# Patient Record
Sex: FEMALE | Race: OTHER | HISPANIC OR LATINO | Employment: OTHER | URBAN - METROPOLITAN AREA
[De-identification: names, ages, dates, MRNs, and addresses within clinical notes are randomized per-mention and may not be internally consistent; named-entity substitution may affect disease eponyms.]

---

## 2017-01-18 ENCOUNTER — HOSPITAL ENCOUNTER (OUTPATIENT)
Dept: CT IMAGING | Facility: CLINIC | Age: 57
Discharge: HOME | End: 2017-01-18
Payer: COMMERCIAL

## 2017-01-18 DIAGNOSIS — R93.89 ABNORMAL FINDINGS ON DIAGNOSTIC IMAGING OF OTHER SPECIFIED BODY STRUCTURES: ICD-10-CM

## 2017-01-18 PROCEDURE — 71250 CT THORAX DX C-: CPT

## 2017-02-14 ENCOUNTER — TRANSCRIBE ORDERS (OUTPATIENT)
Dept: MRI IMAGING | Facility: CLINIC | Age: 57
End: 2017-02-14

## 2017-02-14 DIAGNOSIS — D25.1 FIBROIDS, INTRAMURAL: ICD-10-CM

## 2017-02-14 DIAGNOSIS — R10.2 ADNEXAL TENDERNESS, RIGHT: Primary | ICD-10-CM

## 2017-02-23 ENCOUNTER — HOSPITAL ENCOUNTER (OUTPATIENT)
Dept: ULTRASOUND IMAGING | Facility: CLINIC | Age: 57
Discharge: HOME | End: 2017-02-23
Payer: COMMERCIAL

## 2017-02-23 DIAGNOSIS — R10.2 ADNEXAL TENDERNESS, RIGHT: ICD-10-CM

## 2017-02-23 DIAGNOSIS — D25.1 FIBROIDS, INTRAMURAL: ICD-10-CM

## 2017-02-23 PROCEDURE — 76856 US EXAM PELVIC COMPLETE: CPT

## 2017-02-23 PROCEDURE — 76830 TRANSVAGINAL US NON-OB: CPT

## 2017-04-06 ENCOUNTER — ALLSCRIPTS OFFICE VISIT (OUTPATIENT)
Dept: OTHER | Facility: OTHER | Age: 57
End: 2017-04-06

## 2017-05-01 ENCOUNTER — ALLSCRIPTS OFFICE VISIT (OUTPATIENT)
Dept: OTHER | Facility: OTHER | Age: 57
End: 2017-05-01

## 2017-05-01 ENCOUNTER — TRANSCRIBE ORDERS (OUTPATIENT)
Dept: ADMINISTRATIVE | Facility: HOSPITAL | Age: 57
End: 2017-05-01

## 2017-05-01 DIAGNOSIS — N20.2 CALCULUS OF KIDNEY AND URETER: ICD-10-CM

## 2017-05-01 DIAGNOSIS — N20.0 URIC ACID NEPHROLITHIASIS: Primary | ICD-10-CM

## 2017-05-01 LAB
CLARITY UR: NORMAL
COLOR UR: YELLOW
GLUCOSE (HISTORICAL): NORMAL
HGB UR QL STRIP.AUTO: NORMAL
KETONES UR STRIP-MCNC: NORMAL MG/DL
LEUKOCYTE ESTERASE UR QL STRIP: NORMAL
NITRITE UR QL STRIP: NORMAL
PH UR STRIP.AUTO: 5 [PH]
PROT UR STRIP-MCNC: NORMAL MG/DL
SP GR UR STRIP.AUTO: 1.01

## 2017-05-05 ENCOUNTER — HOSPITAL ENCOUNTER (OUTPATIENT)
Dept: CT IMAGING | Facility: CLINIC | Age: 57
Discharge: HOME | End: 2017-05-05
Payer: COMMERCIAL

## 2017-05-05 DIAGNOSIS — G35 MULTIPLE SCLEROSIS (HCC): ICD-10-CM

## 2017-05-05 DIAGNOSIS — Z87.442 PERSONAL HISTORY OF URINARY CALCULI: ICD-10-CM

## 2017-05-05 PROCEDURE — 74176 CT ABD & PELVIS W/O CONTRAST: CPT

## 2017-05-19 ENCOUNTER — TRANSCRIBE ORDERS (OUTPATIENT)
Dept: ADMINISTRATIVE | Facility: HOSPITAL | Age: 57
End: 2017-05-19

## 2017-05-19 ENCOUNTER — ALLSCRIPTS OFFICE VISIT (OUTPATIENT)
Dept: OTHER | Facility: OTHER | Age: 57
End: 2017-05-19

## 2017-05-19 DIAGNOSIS — G35 MULTIPLE SCLEROSIS (HCC): Primary | ICD-10-CM

## 2017-05-23 ENCOUNTER — ALLSCRIPTS OFFICE VISIT (OUTPATIENT)
Dept: OTHER | Facility: OTHER | Age: 57
End: 2017-05-23

## 2017-06-04 ENCOUNTER — APPOINTMENT (EMERGENCY)
Dept: RADIOLOGY | Facility: HOSPITAL | Age: 57
End: 2017-06-04
Payer: COMMERCIAL

## 2017-06-04 ENCOUNTER — HOSPITAL ENCOUNTER (EMERGENCY)
Facility: HOSPITAL | Age: 57
Discharge: HOME | End: 2017-06-04
Attending: EMERGENCY MEDICINE | Admitting: EMERGENCY MEDICINE
Payer: COMMERCIAL

## 2017-06-04 VITALS
OXYGEN SATURATION: 98 % | SYSTOLIC BLOOD PRESSURE: 139 MMHG | BODY MASS INDEX: 25.93 KG/M2 | TEMPERATURE: 98.3 F | RESPIRATION RATE: 16 BRPM | DIASTOLIC BLOOD PRESSURE: 69 MMHG | WEIGHT: 132.06 LBS | HEART RATE: 80 BPM | HEIGHT: 60 IN

## 2017-06-04 DIAGNOSIS — G35 MULTIPLE SCLEROSIS EXACERBATION (HCC): Primary | ICD-10-CM

## 2017-06-04 PROCEDURE — 96365 THER/PROPH/DIAG IV INF INIT: CPT

## 2017-06-04 PROCEDURE — 99283 EMERGENCY DEPT VISIT LOW MDM: CPT

## 2017-06-04 PROCEDURE — 73521 X-RAY EXAM HIPS BI 2 VIEWS: CPT

## 2017-06-04 RX ORDER — BUTALBITAL, ACETAMINOPHEN AND CAFFEINE 50; 325; 40 MG/1; MG/1; MG/1
CAPSULE ORAL
COMMUNITY
Start: 2017-05-19 | End: 2018-03-15

## 2017-06-04 RX ORDER — EPINEPHRINE 0.3 MG/.3ML
0.3 INJECTION SUBCUTANEOUS
COMMUNITY
Start: 2011-01-13 | End: 2022-03-17 | Stop reason: SDUPTHER

## 2017-06-04 RX ORDER — METHYLPREDNISOLONE 4 MG/1
4 TABLET ORAL DAILY
Qty: 21 TABLET | Refills: 0 | Status: ON HOLD | OUTPATIENT
Start: 2017-06-04 | End: 2018-01-11

## 2017-06-04 RX ORDER — DIMETHYL FUMARATE 240 MG/1
CAPSULE ORAL
COMMUNITY
Start: 2016-04-11 | End: 2017-06-04

## 2017-06-04 RX ORDER — GABAPENTIN 300 MG/1
CAPSULE ORAL
COMMUNITY
End: 2018-03-22 | Stop reason: SDUPTHER

## 2017-06-04 RX ORDER — BACLOFEN 20 MG/1
TABLET ORAL
COMMUNITY
End: 2018-03-15

## 2017-06-04 RX ORDER — SUMATRIPTAN 100 MG/1
100 TABLET, FILM COATED ORAL ONCE AS NEEDED
COMMUNITY
End: 2018-04-19 | Stop reason: ALTCHOICE

## 2017-06-04 RX ORDER — METHYLPREDNISOLONE 4 MG/1
4 TABLET ORAL DAILY
Qty: 42 TABLET | Refills: 0 | Status: ON HOLD | OUTPATIENT
Start: 2017-06-04 | End: 2018-01-11

## 2017-06-04 RX ORDER — ERGOCALCIFEROL (VITAMIN D2) 1250 MCG
CAPSULE ORAL
COMMUNITY
End: 2017-06-04

## 2017-06-04 RX ORDER — ALBUTEROL SULFATE 2.5 MG/3ML
SOLUTION RESPIRATORY (INHALATION)
COMMUNITY
Start: 2016-07-12

## 2017-06-04 RX ORDER — DIMETHYL FUMARATE 240 MG/1
CAPSULE ORAL
COMMUNITY
Start: 2016-04-11 | End: 2018-04-19 | Stop reason: ALTCHOICE

## 2017-06-04 RX ORDER — TOLTERODINE 4 MG/1
4 CAPSULE, EXTENDED RELEASE ORAL DAILY
COMMUNITY
Start: 2016-10-18 | End: 2018-02-21 | Stop reason: SDUPTHER

## 2017-06-04 RX ORDER — SIMVASTATIN 40 MG
TABLET ORAL
Status: ON HOLD | COMMUNITY
End: 2018-01-17 | Stop reason: ALTCHOICE

## 2017-06-04 RX ORDER — FAMOTIDINE 20 MG/1
TABLET, FILM COATED ORAL
COMMUNITY
End: 2022-05-05 | Stop reason: ALTCHOICE

## 2017-06-04 RX ORDER — LEVOCETIRIZINE DIHYDROCHLORIDE 5 MG/1
5 TABLET, FILM COATED ORAL AS NEEDED
COMMUNITY
End: 2018-03-13 | Stop reason: SDUPTHER

## 2017-06-04 RX ORDER — NIFEDIPINE 90 MG/1
90 TABLET, FILM COATED, EXTENDED RELEASE ORAL DAILY
Qty: 7 TABLET | Refills: 0 | Status: SHIPPED | OUTPATIENT
Start: 2017-06-04 | End: 2020-11-11 | Stop reason: HOSPADM

## 2017-06-04 RX ORDER — NIFEDIPINE 90 MG/1
TABLET, EXTENDED RELEASE ORAL
Status: ON HOLD | COMMUNITY
End: 2018-01-11

## 2017-06-04 RX ORDER — TIZANIDINE HYDROCHLORIDE 4 MG/1
4 CAPSULE, GELATIN COATED ORAL
Status: ON HOLD | COMMUNITY
End: 2018-01-11

## 2017-06-04 RX ORDER — INDOMETHACIN 50 MG/1
CAPSULE ORAL
COMMUNITY
End: 2018-04-19 | Stop reason: ALTCHOICE

## 2017-06-04 RX ORDER — VALACYCLOVIR HYDROCHLORIDE 1 G/1
1000 TABLET, FILM COATED ORAL AS NEEDED
COMMUNITY
End: 2020-11-11 | Stop reason: HOSPADM

## 2017-06-04 RX ORDER — TIZANIDINE 4 MG/1
TABLET ORAL
COMMUNITY
Start: 2016-04-11 | End: 2017-06-04

## 2017-06-04 RX ADMIN — SODIUM CHLORIDE 1000 MG: 0.9 INJECTION, SOLUTION INTRAVENOUS at 15:38

## 2017-06-08 ENCOUNTER — TRANSCRIBE ORDERS (OUTPATIENT)
Dept: ADMINISTRATIVE | Facility: HOSPITAL | Age: 57
End: 2017-06-08

## 2017-06-08 ENCOUNTER — ALLSCRIPTS OFFICE VISIT (OUTPATIENT)
Dept: OTHER | Facility: OTHER | Age: 57
End: 2017-06-08

## 2017-06-08 DIAGNOSIS — Z87.442 PERSONAL HISTORY OF URINARY CALCULI: Primary | ICD-10-CM

## 2017-06-08 LAB
CLARITY UR: NORMAL
COLOR UR: YELLOW
GLUCOSE (HISTORICAL): NORMAL
HGB UR QL STRIP.AUTO: NORMAL
KETONES UR STRIP-MCNC: NORMAL MG/DL
LEUKOCYTE ESTERASE UR QL STRIP: NORMAL
NITRITE UR QL STRIP: NORMAL
PH UR STRIP.AUTO: 6 [PH]
PROT UR STRIP-MCNC: NORMAL MG/DL
SP GR UR STRIP.AUTO: 1.01

## 2017-06-20 ENCOUNTER — TRANSCRIBE ORDERS (OUTPATIENT)
Dept: ADMINISTRATIVE | Facility: HOSPITAL | Age: 57
End: 2017-06-20

## 2017-06-20 ENCOUNTER — LAB (OUTPATIENT)
Dept: LAB | Facility: HOSPITAL | Age: 57
End: 2017-06-20
Attending: PSYCHIATRY & NEUROLOGY
Payer: COMMERCIAL

## 2017-06-20 ENCOUNTER — GENERIC CONVERSION - ENCOUNTER (OUTPATIENT)
Dept: OTHER | Facility: OTHER | Age: 57
End: 2017-06-20

## 2017-06-20 DIAGNOSIS — G35 MULTIPLE SCLEROSIS (HCC): ICD-10-CM

## 2017-06-20 DIAGNOSIS — G35 MULTIPLE SCLEROSIS (HCC): Primary | ICD-10-CM

## 2017-06-20 LAB
ALBUMIN SERPL BCP-MCNC: 4.1 G/DL (ref 3.5–5)
ALP SERPL-CCNC: 131 U/L (ref 46–116)
ALT SERPL W P-5'-P-CCNC: 28 U/L (ref 12–78)
ANION GAP SERPL CALCULATED.3IONS-SCNC: 7 MMOL/L (ref 4–13)
AST SERPL W P-5'-P-CCNC: 10 U/L (ref 5–45)
BASOPHILS # BLD AUTO: 0.03 THOUSANDS/ΜL (ref 0–0.1)
BASOPHILS NFR BLD AUTO: 0 % (ref 0–1)
BILIRUB SERPL-MCNC: 0.2 MG/DL (ref 0.2–1)
BUN SERPL-MCNC: 11 MG/DL (ref 5–25)
CALCIUM SERPL-MCNC: 9.3 MG/DL (ref 8.3–10.1)
CHLORIDE SERPL-SCNC: 105 MMOL/L (ref 100–108)
CO2 SERPL-SCNC: 33 MMOL/L (ref 21–32)
CREAT SERPL-MCNC: 0.63 MG/DL (ref 0.6–1.3)
EOSINOPHIL # BLD AUTO: 0.11 THOUSAND/ΜL (ref 0–0.61)
EOSINOPHIL NFR BLD AUTO: 2 % (ref 0–6)
ERYTHROCYTE [DISTWIDTH] IN BLOOD BY AUTOMATED COUNT: 12.3 % (ref 11.6–15.1)
GFR SERPL CREATININE-BSD FRML MDRD: >60 ML/MIN/1.73SQ M
GLUCOSE P FAST SERPL-MCNC: 149 MG/DL (ref 65–99)
HCT VFR BLD AUTO: 44.5 % (ref 34.8–46.1)
HGB BLD-MCNC: 14.6 G/DL (ref 11.5–15.4)
LYMPHOCYTES # BLD AUTO: 2.41 THOUSANDS/ΜL (ref 0.6–4.47)
LYMPHOCYTES NFR BLD AUTO: 34 % (ref 14–44)
MCH RBC QN AUTO: 28.5 PG (ref 26.8–34.3)
MCHC RBC AUTO-ENTMCNC: 32.8 G/DL (ref 31.4–37.4)
MCV RBC AUTO: 87 FL (ref 82–98)
MONOCYTES # BLD AUTO: 0.62 THOUSAND/ΜL (ref 0.17–1.22)
MONOCYTES NFR BLD AUTO: 9 % (ref 4–12)
NEUTROPHILS # BLD AUTO: 3.81 THOUSANDS/ΜL (ref 1.85–7.62)
NEUTS SEG NFR BLD AUTO: 54 % (ref 43–75)
NRBC BLD AUTO-RTO: 0 /100 WBCS
PLATELET # BLD AUTO: 252 THOUSANDS/UL (ref 149–390)
PMV BLD AUTO: 10.5 FL (ref 8.9–12.7)
POTASSIUM SERPL-SCNC: 3.9 MMOL/L (ref 3.5–5.3)
PROT SERPL-MCNC: 7.4 G/DL (ref 6.4–8.2)
RBC # BLD AUTO: 5.12 MILLION/UL (ref 3.81–5.12)
SODIUM SERPL-SCNC: 145 MMOL/L (ref 136–145)
WBC # BLD AUTO: 7 THOUSAND/UL (ref 4.31–10.16)

## 2017-06-20 PROCEDURE — 36415 COLL VENOUS BLD VENIPUNCTURE: CPT

## 2017-06-20 PROCEDURE — 85025 COMPLETE CBC W/AUTO DIFF WBC: CPT

## 2017-06-20 PROCEDURE — 86480 TB TEST CELL IMMUN MEASURE: CPT

## 2017-06-20 PROCEDURE — 86787 VARICELLA-ZOSTER ANTIBODY: CPT

## 2017-06-20 PROCEDURE — 80053 COMPREHEN METABOLIC PANEL: CPT

## 2017-06-21 ENCOUNTER — LAB CONVERSION - ENCOUNTER (OUTPATIENT)
Dept: OTHER | Facility: OTHER | Age: 57
End: 2017-06-21

## 2017-06-21 LAB
INDEX VALUE (HISTORICAL): 0.25
JCV ANTIBODY (HISTORICAL): ABNORMAL

## 2017-06-22 ENCOUNTER — ALLSCRIPTS OFFICE VISIT (OUTPATIENT)
Dept: OTHER | Facility: OTHER | Age: 57
End: 2017-06-22

## 2017-06-22 ENCOUNTER — GENERIC CONVERSION - ENCOUNTER (OUTPATIENT)
Dept: OTHER | Facility: OTHER | Age: 57
End: 2017-06-22

## 2017-06-22 ENCOUNTER — TRANSCRIBE ORDERS (OUTPATIENT)
Dept: ADMINISTRATIVE | Facility: HOSPITAL | Age: 57
End: 2017-06-22

## 2017-06-22 ENCOUNTER — LAB CONVERSION - ENCOUNTER (OUTPATIENT)
Dept: OTHER | Facility: OTHER | Age: 57
End: 2017-06-22

## 2017-06-22 DIAGNOSIS — R26.9 ABNORMALITY OF GAIT AND MOBILITY: ICD-10-CM

## 2017-06-22 LAB
ANNOTATION COMMENT IMP: NORMAL
GAMMA INTERFERON BACKGROUND BLD IA-ACNC: 0.05 IU/ML
INDEX VALUE (HISTORICAL): 0.25
JCV ANTIBODY (HISTORICAL): ABNORMAL
JCV ANTIBODY (HISTORICAL): ABNORMAL
M TB IFN-G BLD-IMP: NEGATIVE
M TB IFN-G CD4+ BCKGRND COR BLD-ACNC: <0.01 IU/ML
M TB IFN-G CD4+ T-CELLS BLD-ACNC: 0.03 IU/ML
MITOGEN IGNF BLD-ACNC: 1.45 IU/ML
QUANTIFERON-TB GOLD IN TUBE: NORMAL
SERVICE CMNT-IMP: NORMAL
VZV IGG SER IA-ACNC: NORMAL

## 2017-06-27 ENCOUNTER — ALLSCRIPTS OFFICE VISIT (OUTPATIENT)
Dept: OTHER | Facility: OTHER | Age: 57
End: 2017-06-27

## 2017-06-27 ENCOUNTER — LAB CONVERSION - ENCOUNTER (OUTPATIENT)
Dept: OTHER | Facility: OTHER | Age: 57
End: 2017-06-27

## 2017-07-07 ENCOUNTER — HOSPITAL ENCOUNTER (OUTPATIENT)
Dept: MRI IMAGING | Facility: CLINIC | Age: 57
Discharge: HOME | End: 2017-07-07
Payer: COMMERCIAL

## 2017-07-07 DIAGNOSIS — R26.9 ABNORMALITY OF GAIT AND MOBILITY: ICD-10-CM

## 2017-07-07 PROCEDURE — A9585 GADOBUTROL INJECTION: HCPCS | Performed by: PSYCHIATRY & NEUROLOGY

## 2017-07-07 PROCEDURE — 72156 MRI NECK SPINE W/O & W/DYE: CPT

## 2017-07-07 RX ADMIN — GADOBUTROL 6 ML: 604.72 INJECTION INTRAVENOUS at 13:32

## 2017-08-01 ENCOUNTER — ALLSCRIPTS OFFICE VISIT (OUTPATIENT)
Dept: OTHER | Facility: OTHER | Age: 57
End: 2017-08-01

## 2017-09-08 ENCOUNTER — TRANSCRIBE ORDERS (OUTPATIENT)
Dept: ADMINISTRATIVE | Facility: HOSPITAL | Age: 57
End: 2017-09-08

## 2017-10-17 ENCOUNTER — HOSPITAL ENCOUNTER (OUTPATIENT)
Dept: MRI IMAGING | Facility: CLINIC | Age: 57
Discharge: HOME | End: 2017-10-17
Payer: COMMERCIAL

## 2017-10-17 DIAGNOSIS — G35 MULTIPLE SCLEROSIS (HCC): ICD-10-CM

## 2017-10-17 PROCEDURE — A9585 GADOBUTROL INJECTION: HCPCS | Performed by: PSYCHIATRY & NEUROLOGY

## 2017-10-17 PROCEDURE — 72157 MRI CHEST SPINE W/O & W/DYE: CPT

## 2017-10-17 RX ADMIN — GADOBUTROL 6 ML: 604.72 INJECTION INTRAVENOUS at 10:23

## 2017-10-18 ENCOUNTER — GENERIC CONVERSION - ENCOUNTER (OUTPATIENT)
Dept: OTHER | Facility: OTHER | Age: 57
End: 2017-10-18

## 2017-10-24 ENCOUNTER — TRANSCRIBE ORDERS (OUTPATIENT)
Dept: ADMINISTRATIVE | Facility: HOSPITAL | Age: 57
End: 2017-10-24

## 2017-10-26 ENCOUNTER — TRANSCRIBE ORDERS (OUTPATIENT)
Dept: ADMINISTRATIVE | Facility: HOSPITAL | Age: 57
End: 2017-10-26

## 2017-10-26 DIAGNOSIS — N60.01 BILATERAL BREAST CYSTS: Primary | ICD-10-CM

## 2017-10-26 DIAGNOSIS — N60.02 BILATERAL BREAST CYSTS: Primary | ICD-10-CM

## 2017-11-07 ENCOUNTER — HOSPITAL ENCOUNTER (OUTPATIENT)
Dept: MRI IMAGING | Facility: CLINIC | Age: 57
Discharge: HOME | End: 2017-11-07
Payer: COMMERCIAL

## 2017-11-07 DIAGNOSIS — G35 MULTIPLE SCLEROSIS (HCC): ICD-10-CM

## 2017-11-07 PROCEDURE — A9585 GADOBUTROL INJECTION: HCPCS | Performed by: PSYCHIATRY & NEUROLOGY

## 2017-11-07 PROCEDURE — 70553 MRI BRAIN STEM W/O & W/DYE: CPT

## 2017-11-07 RX ADMIN — GADOBUTROL 6 ML: 604.72 INJECTION INTRAVENOUS at 09:31

## 2017-11-08 ENCOUNTER — GENERIC CONVERSION - ENCOUNTER (OUTPATIENT)
Dept: OTHER | Facility: OTHER | Age: 57
End: 2017-11-08

## 2017-11-09 ENCOUNTER — HOSPITAL ENCOUNTER (OUTPATIENT)
Dept: MAMMOGRAPHY | Facility: CLINIC | Age: 57
Discharge: HOME | End: 2017-11-09
Payer: COMMERCIAL

## 2017-11-09 ENCOUNTER — HOSPITAL ENCOUNTER (OUTPATIENT)
Dept: ULTRASOUND IMAGING | Facility: CLINIC | Age: 57
Discharge: HOME | End: 2017-11-09
Payer: COMMERCIAL

## 2017-11-09 DIAGNOSIS — N60.02 BILATERAL BREAST CYSTS: ICD-10-CM

## 2017-11-09 DIAGNOSIS — N60.01 BILATERAL BREAST CYSTS: ICD-10-CM

## 2017-11-09 PROCEDURE — G0204 DX MAMMO INCL CAD BI: HCPCS

## 2017-11-09 PROCEDURE — G0279 TOMOSYNTHESIS, MAMMO: HCPCS

## 2017-11-09 PROCEDURE — 76642 ULTRASOUND BREAST LIMITED: CPT

## 2017-11-16 ENCOUNTER — ALLSCRIPTS OFFICE VISIT (OUTPATIENT)
Dept: OTHER | Facility: OTHER | Age: 57
End: 2017-11-16

## 2017-11-16 ENCOUNTER — TRANSCRIBE ORDERS (OUTPATIENT)
Dept: ADMINISTRATIVE | Facility: HOSPITAL | Age: 57
End: 2017-11-16

## 2017-11-16 DIAGNOSIS — R29.898 OTHER SYMPTOMS AND SIGNS INVOLVING THE MUSCULOSKELETAL SYSTEM: ICD-10-CM

## 2017-11-16 DIAGNOSIS — G35 MULTIPLE SCLEROSIS (HCC): ICD-10-CM

## 2017-11-16 DIAGNOSIS — R29.898 MOTOR PROBLEMS WITH LIMBS: Primary | ICD-10-CM

## 2017-11-17 NOTE — PROGRESS NOTES
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) (R26 9)   4  Panniculitis (729 30) (M79 3)   5  Transient right leg weakness (729 89) (R29 898)   6  Bowel incontinence (787 60) (R15 9)    Plan   Multiple sclerosis    · (1) CBC/PLT/DIFF; Status:Active; Requested for:16Nov2017;    Perform:[facility]; WUU:84NSJ1749; Ordered; For:Multiple sclerosis; Ordered By:Carlota Morales;   · (1) QUANTIFERON - TB GOLD; Status:Active; Requested for:16Nov2017;    Perform:[facility]; HZD:27NGK9810; Ordered; For:Multiple sclerosis; Ordered By:Carlota Morales;   · (1) VARICELLA ZOSTER IMMUNITY(IGG); Status:Active; Requested for:16Nov2017;    Perform:[facility]; HTP:33IEQ6674; Ordered; For:Multiple sclerosis; Ordered By:Carlota Morales;   · (LC) CMP14+eGFR; Status:Active; Requested for:16Nov2017;    Perform:[facility]; Due:16Nov2018; Ordered; For:Multiple sclerosis; Ordered By:Carlota Morales;   · ECG 12-LEAD; Status:Hold For - Scheduling; Requested XRX:89LVF4580;    Perform:[facility]; GZY:72KFF6217; Ordered;sclerosis; Ordered By:Carlota Morales;  Panniculitis    · *1 -  [facility]  patient hasmesenteric panniculitis - please evaluate  Status: Active  Requested for:16Nov2017   Ordered;Panniculitis; Ordered By: Gallito Graves Performed:  Due: 61ZZI8727  Care Summary provided  : Yes  Right sided weakness    · EMG ONE EXTREMITY WITH OR W/O RELATED PARASPINAL AREAS; Status:Hold For -Scheduling; Requested LIJ:09LYH8381;    Perform:[facility]; SHP:48XUI9229; Ordered;sided weakness; Ordered By:Carlota Morales;  ONE : RLE  Transient right leg weakness    · * MRI LUMBAR SPINE W WO CONTRAST; Status:Need Information - FinancialAuthorization;  Requested for:16Nov2017;    Perform:[facility]; RCW:[id number]; Last Updated By:Nate Yadav; 11/16/2017 2:47:16 PM;Ordered; Stat;right leg weakness; Ordered By:Carlota Morales; Follow-up visit in 2 months Evaluation and Treatment  Follow-up  Status: Hold For - Scheduling  Requested for: 90QEL4103 Ordered; For: Multiple sclerosis; Ordered By: Luke Sullivan  Performed:   Due: 81NQZ6818   Discussion/Summary  Mrs Juan Wright has presented to [address] [place] [address] for follow up on Ms and related issues  Since last office visit, patient described having more right lower extremity weakness  She had completed MRI brain with mild burden of the disease noted, and then MRI cervical spine with persistent  C2 lesion appreciated (1 7 cm), with no changes noted since 2012  Patient has no demyelination in her thoracic spine though Schmorl's nodes appreciated through  Patient has C5-C6 and C6-C7 moderate spine l stenosis and mild lumbar stenosis in 2016 - we will repeat her imaging and if there is any progression in her lumbar degenerative disorder noted I would recommend to follow with Ortho team   Considering progression of ambulation - I may consider she has primary progressive MS, if no obvious worsening in her lumbar spine noted  Patient has been complaining about right upper and lower quadrant pain with mesenteric panniculitis suspected in May 2017 - she has not followed with GI tea m and referral was provided  We agreed to discontinue Tecfidera  now, as it is acidic drug may potentially worsen her status - we will start Gilenya on her follow up visit, blood work was sent  EKG will be ordered  EMG/NCS of RLE will be further considered- patient ambulates with a cane  Chief Complaint  Chief Complaint Free Text Note Form: Patient is here today for her 5 month follow up for her multiple sclerosis      History of Present Illness  Mrs Fidel Covarrubias has a history of relapsing remitting multiple sclerosis, neurogenic bladder, disbalance, cervical disc disease with myelopathy, classic migraine without aura, who presented for follow-up on multiple sclerosis and related issues  Since last office visit patient described daily headaches predominantly in the morning time with relieve noted with Fioricet and Advil  Patient has progressive worsening of right leg weakness and she fell to the time since last office visit  Patient times feel lightheaded and dizzy when she would turn her head to the left  No vertigo has been noted  She has been hearing clicking sounds in her neck on turning likely due to advanced arthritis  Patient has been using cane for ambulation  Patient has been using Tecfidera for her multiple sclerosis  Her absolute lymphocyte count is 2 41  Since last evaluation and hospitalization patient was also hospitalized in June 2017 requiring steroid treatment  She has never followed with gastroenterology for information in lymphoid tissue in her abdomen with persistent right upper quadrant pain  Patient has completed MRI of brain, cervical and thoracic spine and she is here today to discuss her findings  Review of Systems  Neurological ROS: last fall 11/15/17  Constitutional: fatigue  HEENT: sinus problems-- and-- blurred vision  Cardiovascular:  no chest pain or pressure, no palpitations present, the heart rate was not rapid or irregular, no swelling in the arms or legs, no poor circulation  Respiratory:  no unusual or persistant cough, no shortness of breath with or without exertion  Gastrointestinal: changes in bowel habits-- and-- loss of bowel control  Genitourinary: feelings of urinary urgency-- and-- increased frequency  Musculoskeletal: myalgias,-- immobility or loss of function,-- head/neck/back pain-- and-- neck pain  Integumentary  no masses, no rash, no skin lesions, no livedo reticularis  Psychiatric: mood swings  Endocrine   no unusual weight loss or gain, no excessive urination, no excessive thirst, no hair loss or gain, no hot or cold intolerance, no menstrual period change or irregularity, no loss of sexual ability or drive, no erection difficulty, no nipple discharge  Hematologic/Lymphatic:  no unusual bleeding, no tendency for easy bruising, no clotting skin or lumps  Neurological General: headache,-- lightheadedness,-- trouble falling asleep,-- snoring-- and-- waking up at night  Neurological Mental Status: confusion-- and-- memory problems  Neurological Cranial Nerves: vertigo or dizziness  Neurological Motor findings include:  no tremor, no twitching, no cramping(pre/post exercise), no atrophy  Neurological Coordination: balance difficulties-- and-- clumsiness  Neurological Sensory: numbness-- and-- tingling  Neurological Gait: difficulty walking-- and-- has had falls  ROS Reviewed:   ROS reviewed  Active Problems    1  Asthma (493 90) (J45 909)   2  Cervical disc disease with myelopathy (722 71) (M50 00)   3  Chronic cough (786 2) (R05)   4  Chronic fatigue (780 79) (R53 82)   5  Common migraine without aura (346 10) (G43 009)   6  High cholesterol (272 0) (E78 00)   7  History of nephrolithiasis (V13 01) (Z87 442)   8  Hypertension (401 9) (I10)   9  Irregular cardiac rhythm (427 9) (I49 9)   10  Lumbar strain (847 2) (S39 012A)   11  Multiple sclerosis (340) (G35)   12  Neurogenic bladder (596 54) (N31 9)   13  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) (R26 9)   14  Nonspecific abnormal findings on radiological and examination of intrathoracic organs  (793 2) (R93 8)   15  Overactive bladder (596 51) (N32 81)   16  Right sided weakness (728 87) (R53 1)   17  Seasonal allergies (477 9) (J30 2)   18  Shortness of breath on exertion (786 05) (R06 02)   19  Trigger ring finger of left hand (727 03) (M65 342)    Past Medical History  1  History of COPD exacerbation (491 21) (J44 1)   2  History of High cholesterol (272 0) (E78 00)   3  History of depression (V11 8) (Z86 59)   4  History of hypertension (V12 59) (Z86 79)   5  History of migraine (V12 49) (Z86 69)   6   History of multiple sclerosis (V12 49) (Z86 69)  Active Problems And Past Medical History Reviewed: The active problems and past medical history were reviewed and updated today  Surgical History  1  History of Dilation And Curettage   2  History of Uterine Fibroid Embolization  Surgical History Reviewed: The surgical history was reviewed and updated today  Family History  Mother    1  Family history of arthritis (V17 7) (Z82 61)   2  Family history of diabetes mellitus (V18 0) (Z83 3)   3  Family history of hypertension (V17 49) (Z82 49)   4  Family history of Headache   5  Family history of High cholesterol   6  Family history of Stroke, hemorrhagic  Father    7  Family history of diabetes mellitus (V18 0) (Z83 3)   8  Family history of hypertension (V17 49) (Z82 49)   9  FH: heart attack (V17 3) (Z82 49)   10  Family history of Headache   11  Family history of High cholesterol  Sister    15  Family history of arthritis (V17 7) (Z82 61)   13  Family history of Headache  Brother    15  Family history of arthritis (V17 7) (Z82 61)   15  Family history of hypertension (V17 49) (Z82 49)   16  Family history of myasthenia gravis (V17 89) (Z82 0)   17  Family history of neuropathy (V17 2) (Z82 0)   18  Family history of Headache   19  Family history of High cholesterol  Family History Reviewed: The family history was reviewed and updated today  Social History     · Disabled   · Never a smoker   · No illicit drug use   · Occasional alcohol use   ·  (V61 03) (Z63 5)  Social History Reviewed: The social history was reviewed and updated today  The social history was reviewed and is unchanged  Current Meds   1  Albuterol Sulfate (2 5 MG/3ML) 0 083% Inhalation Nebulization Solution; USE 1 UNIT DOSE IN NEBULIZER EVERY 6 HOURS AS NEEDED; Therapy: 50QUT2753 to (Evaluate:08Jan2017)  Requested for: 11Wvm8652; Last Rx:59Bpk3095 Ordered   2  Aspirin 81 MG TABS; Take 1 tablet daily;  Therapy: (Recorded:89Qan0311) to Recorded   3  Atorvastatin Calcium 20 MG Oral Tablet; TAKE 1 TABLET AT BEDTIME; Therapy: (Recorded:16Nov2017) to Recorded   4  Baclofen 20 MG Oral Tablet; Take 1 tablet twice daily; Therapy: 69GNC3160 to (Evaluate:18Nov2017)  Requested for: 79JFJ2158; Last Rx:08Nov2017 Ordered   5  Butalbital-APAP-Caffeine -40 MG Oral Capsule; TAKE 1 CAPSULE EVERY 4 HOURS AS NEEDED; Therapy: 87FAS8200 to (Evaluate:90Ons2409); Last Rx:34Iwm7087 Ordered   6  Ergocalciferol 24804 UNIT CAPS; TAKE 1 CAPSULE Weekly; Therapy: (Recorded:52Jro5277) to Recorded   7  Famotidine 20 MG Oral Tablet; TAKE 1 TABLET  PRN; Therapy: (Recorded:06Apr2017) to Recorded   8  Gabapentin 300 MG Oral Capsule; TAKE 1 CAPSULE 3 times daily; Therapy: 63RHL7862 to (Juan Leonard)  Requested for: 05Slr5982; Last Rx:08Sep2017 Ordered   9  Indomethacin 50 MG Oral Capsule; TAKE 1 CAPSULE  PRN; Therapy: ([facility]) to Recorded   10  Levocetirizine Dihydrochloride 5 MG Oral Tablet; take 1-2 tablets daily prn; Therapy: (Recorded:06Apr2017) to Recorded   11  Procardia XL 90 MG Oral Tablet Extended Release 24 Hour; TAKE 1 TABLET DAILY; Therapy: (Recorded:06Apr2017) to Recorded   12  Pulmicort Flexhaler 180 MCG/ACT Inhalation Aerosol Powder Breath Activated; INHALE 1  PUFF TWICE DAILY  RINSE MOUTH AFTER USE; Therapy: 90NNT3458 to (Evaluate:66Mlj9680); Last Rx:25Hpv3833 Ordered   13  Sertraline HCl - 50 MG Oral Tablet; Take 1 tablet twice daily; Therapy: ([facility]) to Recorded   14  SUMAtriptan Succinate 100 MG Oral Tablet; TAKE 1 TABLET FOR MIGRAINE RELIEF  MAY  REPEAT 2 HOURS LATER  MAXIMUM 200MG/DAY; Therapy: 37WHO0448 to (Evaluate:65Xtw7104)  Requested for: 42YHT1399; Last  Rx:06Apr2017 Ordered   15  Tecfidera 240 MG Oral Capsule Delayed Release; TAKE ONE CAPSULE (240MG)  ORALLY TWICE A DAY  MAY TAKE WITH FOOD  DO NOT CUT, OR CHEW  CAPSULES;   Therapy: 17Dsv7612 to (Evaluate:21Apr2018)  Requested for: 85ESV4144; Last  Rx:46Msv6511 Ordered   16  TiZANidine HCl - 4 MG Oral Tablet; TAKE 1 TABLET AT BEDTIME AS NEEDED; Therapy: 52Uau0024 to (Evaluate:17Mar2017)  Requested for: 22EDM8922; Last  Rx:16Jan2017 Ordered   17  Tolterodine Tartrate ER 4 MG Oral Capsule Extended Release 24 Hour; take 1 capsule  daily; Therapy: 25SRS6540 to (Evaluate:13Oct2017); Last Rx:88Asy0386 Ordered   18  ValACYclovir HCl - 1 GM Oral Tablet; TAKE 1 TABLET  PRN; Therapy: (Recorded:06Apr2017) to Recorded   19  Ventolin  (90 Base) MCG/ACT Inhalation Aerosol Solution; INHALE 2 PUFFS  Every 6 hours PRN; Therapy: 56JDJ8033 to (Talya Nuñezchcock)  Requested for: 44Lwu2184; Last  Rx:10Rja6446 Ordered  Medication List Reviewed: The medication list was reviewed and updated today  Allergies  1  Copaxone    2  Nuts   3  Other   4  Shellfish   5  Pollen   6  Seasonal   7  Trees    Vitals  Signs   Recorded: 62URS4768 01:38PM   Heart Rate: 99  Respiration: 18  Systolic: 459  Diastolic: 71  Height: 4 ft 11 in  Weight: 132 lb 0 5 oz  BMI Calculated: 26 67  BSA Calculated: 1 55  O2 Saturation: 97    Physical Exam  CONSTITUTIONAL: NAD, pleasant  NECK: supple, no lymphadenopathy, no thyromegaly, no JVD  CARDIOVASCULAR: RRR, normal S1S2, no murmurs, no rubs  RESP: clear to auscultation bilaterally, no wheezes/rhonchi/rales  ABDOMEN: soft, non tender, non distended  SKIN: no rash or skin lesions  EXTREMITIES: no edema, pulses 2+bilaterally  PSYCH: appropriate mood and affect NEUROLOGIC COMPREHENSIVE EXAM: Patient is oriented to person, place and time, NAD; appropriate affect  CN II, III, IV, V, VI, VII,VIII,IX,X,XI-XII intact with EOMI, PERRLA, OKN intact, VF grossly intact, fundi poorly visualized secondary to pupillary constriction; symmetric face noted  Motor: 5/5 LUE/LLE through, RUE 4/5 shoulder abduction, biceps and finger extension, 4/5 hip flexion right and 3/5 dorsiflexion; Sensory: Decrease to light touch and pinprick Right lower extremity; normal vibration sensation feet bilaterally; Coordination within normal limits on FTN and ROLANDO testing; DTR: 2/4 through, Brisk reflexes right biceps and right patella  , no Babinski, no clonus  Tandem gait is intact  Romberg: negative  Future Appointments    Date/Time Provider Specialty Site   01/09/2018 11:00 AM Davis Orta MD Urology [facility] FOR UROLOGY [facility]   11/29/2017 11:00 AM DIVYA Gandhi  Orthopedic Surgery [facility]   01/19/2018 10:30 AM DIVYA Brooke   Neurology [address] [place] [address]       Signatures   Electronically signed by : DIVYA Cortes ; Nov 16 2017  9:28PM EST                       (Author)

## 2017-11-27 DIAGNOSIS — R50.9 FEVER: ICD-10-CM

## 2017-11-27 DIAGNOSIS — Z87.442 PERSONAL HISTORY OF URINARY CALCULI: ICD-10-CM

## 2017-11-27 DIAGNOSIS — R93.5 ABNORMAL FINDINGS ON DIAGNOSTIC IMAGING OF OTHER ABDOMINAL REGIONS, INCLUDING RETROPERITONEUM: ICD-10-CM

## 2017-12-01 ENCOUNTER — ALLSCRIPTS OFFICE VISIT (OUTPATIENT)
Dept: OTHER | Facility: OTHER | Age: 57
End: 2017-12-01

## 2017-12-01 ENCOUNTER — GENERIC CONVERSION - ENCOUNTER (OUTPATIENT)
Dept: OTHER | Facility: OTHER | Age: 57
End: 2017-12-01

## 2017-12-05 ENCOUNTER — GENERIC CONVERSION - ENCOUNTER (OUTPATIENT)
Dept: OTHER | Facility: OTHER | Age: 57
End: 2017-12-05

## 2017-12-05 ENCOUNTER — HOSPITAL ENCOUNTER (OUTPATIENT)
Dept: ULTRASOUND IMAGING | Facility: CLINIC | Age: 57
Discharge: HOME | End: 2017-12-05
Payer: COMMERCIAL

## 2017-12-05 ENCOUNTER — GENERIC CONVERSION - ENCOUNTER (OUTPATIENT)
Dept: GASTROENTEROLOGY | Facility: CLINIC | Age: 57
End: 2017-12-05

## 2017-12-05 DIAGNOSIS — Z87.442 PERSONAL HISTORY OF URINARY CALCULI: ICD-10-CM

## 2017-12-05 PROCEDURE — 51798 US URINE CAPACITY MEASURE: CPT

## 2017-12-11 ENCOUNTER — GENERIC CONVERSION - ENCOUNTER (OUTPATIENT)
Dept: OTHER | Facility: OTHER | Age: 57
End: 2017-12-11

## 2017-12-11 ENCOUNTER — HOSPITAL ENCOUNTER (OUTPATIENT)
Dept: MRI IMAGING | Facility: CLINIC | Age: 57
Discharge: HOME | End: 2017-12-11
Payer: COMMERCIAL

## 2017-12-11 DIAGNOSIS — R29.898 OTHER SYMPTOMS AND SIGNS INVOLVING THE MUSCULOSKELETAL SYSTEM: ICD-10-CM

## 2017-12-11 PROCEDURE — 72158 MRI LUMBAR SPINE W/O & W/DYE: CPT

## 2017-12-11 PROCEDURE — A9585 GADOBUTROL INJECTION: HCPCS | Performed by: PSYCHIATRY & NEUROLOGY

## 2017-12-11 RX ADMIN — GADOBUTROL 6 ML: 604.72 INJECTION INTRAVENOUS at 14:33

## 2017-12-14 ENCOUNTER — ALLSCRIPTS OFFICE VISIT (OUTPATIENT)
Dept: OTHER | Facility: OTHER | Age: 57
End: 2017-12-14

## 2017-12-22 ENCOUNTER — GENERIC CONVERSION - ENCOUNTER (OUTPATIENT)
Dept: OTHER | Facility: OTHER | Age: 57
End: 2017-12-22

## 2017-12-22 ENCOUNTER — LAB CONVERSION - ENCOUNTER (OUTPATIENT)
Dept: OTHER | Facility: OTHER | Age: 57
End: 2017-12-22

## 2017-12-22 LAB
A/G RATIO (HISTORICAL): 1.8 (CALC) (ref 1–2.5)
ALBUMIN SERPL BCP-MCNC: 4.7 G/DL (ref 3.6–5.1)
ALP SERPL-CCNC: 144 U/L (ref 33–130)
ALT SERPL W P-5'-P-CCNC: 26 U/L (ref 6–29)
AST SERPL W P-5'-P-CCNC: 19 U/L (ref 10–35)
BASOPHILS # BLD AUTO: 0.6 %
BASOPHILS # BLD AUTO: 31 CELLS/UL (ref 0–200)
BILIRUB SERPL-MCNC: 0.6 MG/DL (ref 0.2–1.2)
BUN SERPL-MCNC: 12 MG/DL (ref 7–25)
BUN/CREA RATIO (HISTORICAL): ABNORMAL (CALC) (ref 6–22)
CALCIUM SERPL-MCNC: 9.9 MG/DL (ref 8.6–10.4)
CHLORIDE SERPL-SCNC: 107 MMOL/L (ref 98–110)
CO2 SERPL-SCNC: 29 MMOL/L (ref 20–31)
CREAT SERPL-MCNC: 0.6 MG/DL (ref 0.5–1.05)
DEPRECATED RDW RBC AUTO: 12.9 % (ref 11–15)
EGFR AFRICAN AMERICAN (HISTORICAL): 116 ML/MIN/1.73M2
EGFR-AMERICAN CALC (HISTORICAL): 100 ML/MIN/1.73M2
EOSINOPHIL # BLD AUTO: 1.1 %
EOSINOPHIL # BLD AUTO: 57 CELLS/UL (ref 15–500)
GAMMA GLOBULIN (HISTORICAL): 2.6 G/DL (CALC) (ref 1.9–3.7)
GLUCOSE (HISTORICAL): 131 MG/DL (ref 65–99)
HCT VFR BLD AUTO: 42.2 % (ref 35–45)
HGB BLD-MCNC: 14.1 G/DL (ref 11.7–15.5)
LYMPHOCYTES # BLD AUTO: 2044 CELLS/UL (ref 850–3900)
LYMPHOCYTES # BLD AUTO: 39.3 %
MCH RBC QN AUTO: 29.4 PG (ref 27–33)
MCHC RBC AUTO-ENTMCNC: 33.4 G/DL (ref 32–36)
MCV RBC AUTO: 87.9 FL (ref 80–100)
MONOCYTES # BLD AUTO: 468 CELLS/UL (ref 200–950)
MONOCYTES (HISTORICAL): 9 %
NEUTROPHILS # BLD AUTO: 2600 CELLS/UL (ref 1500–7800)
NEUTROPHILS # BLD AUTO: 50 %
PLATELET # BLD AUTO: 264 THOUSAND/UL (ref 140–400)
PMV BLD AUTO: 11.1 FL (ref 7.5–12.5)
POTASSIUM SERPL-SCNC: 3.9 MMOL/L (ref 3.5–5.3)
RBC # BLD AUTO: 4.8 MILLION/UL (ref 3.8–5.1)
SODIUM SERPL-SCNC: 145 MMOL/L (ref 135–146)
TOTAL PROTEIN (HISTORICAL): 7.3 G/DL (ref 6.1–8.1)
VARICELLA ZOSTER IGG (HISTORICAL): >4000 INDEX
WBC # BLD AUTO: 5.2 THOUSAND/UL (ref 3.8–10.8)

## 2017-12-26 ENCOUNTER — APPOINTMENT (OUTPATIENT)
Dept: RADIOLOGY | Facility: CLINIC | Age: 57
End: 2017-12-26
Payer: COMMERCIAL

## 2017-12-26 ENCOUNTER — GENERIC CONVERSION - ENCOUNTER (OUTPATIENT)
Dept: OTHER | Facility: OTHER | Age: 57
End: 2017-12-26

## 2017-12-26 DIAGNOSIS — R50.9 FEVER: ICD-10-CM

## 2017-12-26 PROCEDURE — 71020 HB CHEST X-RAY 2VW FRONTAL&LATL: CPT

## 2018-01-02 ENCOUNTER — OFFICE VISIT (OUTPATIENT)
Dept: LAB | Facility: HOSPITAL | Age: 58
End: 2018-01-02
Attending: PSYCHIATRY & NEUROLOGY
Payer: COMMERCIAL

## 2018-01-02 ENCOUNTER — TRANSCRIBE ORDERS (OUTPATIENT)
Dept: ADMINISTRATIVE | Facility: HOSPITAL | Age: 58
End: 2018-01-02

## 2018-01-02 ENCOUNTER — GENERIC CONVERSION - ENCOUNTER (OUTPATIENT)
Dept: OTHER | Facility: OTHER | Age: 58
End: 2018-01-02

## 2018-01-02 ENCOUNTER — GENERIC CONVERSION - ENCOUNTER (OUTPATIENT)
Dept: NEUROLOGY | Facility: CLINIC | Age: 58
End: 2018-01-02

## 2018-01-02 DIAGNOSIS — G35 MULTIPLE SCLEROSIS (HCC): ICD-10-CM

## 2018-01-02 DIAGNOSIS — G35 MULTIPLE SCLEROSIS (HCC): Primary | ICD-10-CM

## 2018-01-02 LAB
ATRIAL RATE: 63 BPM
P AXIS: 49 DEGREES
PR INTERVAL: 132 MS
QRS AXIS: 9 DEGREES
QRSD INTERVAL: 82 MS
QT INTERVAL: 408 MS
QTC INTERVAL: 417 MS
T WAVE AXIS: 23 DEGREES
VENTRICULAR RATE: 63 BPM

## 2018-01-02 PROCEDURE — 93005 ELECTROCARDIOGRAM TRACING: CPT

## 2018-01-04 RX ORDER — ATORVASTATIN CALCIUM 20 MG/1
40 TABLET, FILM COATED ORAL
COMMUNITY
End: 2022-05-05 | Stop reason: SDUPTHER

## 2018-01-05 ENCOUNTER — LAB CONVERSION - ENCOUNTER (OUTPATIENT)
Dept: OTHER | Facility: OTHER | Age: 58
End: 2018-01-05

## 2018-01-05 ENCOUNTER — GENERIC CONVERSION - ENCOUNTER (OUTPATIENT)
Dept: OTHER | Facility: OTHER | Age: 58
End: 2018-01-05

## 2018-01-05 LAB
MITOGEN-NIL (HISTORICAL): 0.53 IU/ML
QAUNTIFERON NIL VALUE (HISTORICAL): 0.02 IU/ML
QFT TB AG MINUS NIL VALUE (HISTORICAL): 0.01 IU/ML
QUANTIFERON TB GOLD (HISTORICAL): NEGATIVE

## 2018-01-09 ENCOUNTER — TRANSCRIBE ORDERS (OUTPATIENT)
Dept: ADMINISTRATIVE | Facility: HOSPITAL | Age: 58
End: 2018-01-09

## 2018-01-09 ENCOUNTER — ALLSCRIPTS OFFICE VISIT (OUTPATIENT)
Dept: OTHER | Facility: OTHER | Age: 58
End: 2018-01-09

## 2018-01-09 DIAGNOSIS — Z87.442 PERSONAL HISTORY OF KIDNEY STONES: Primary | ICD-10-CM

## 2018-01-10 ENCOUNTER — ANESTHESIA EVENT (OUTPATIENT)
Dept: PERIOP | Facility: HOSPITAL | Age: 58
End: 2018-01-10
Payer: COMMERCIAL

## 2018-01-10 NOTE — ANESTHESIA PREPROCEDURE EVALUATION
Review of Systems/Medical History  Patient summary reviewed        Cardiovascular  EKG reviewed, Hyperlipidemia, Hypertension , CAD, ,    Pulmonary  COPD , Asthma: , ,        GI/Hepatic  Negative GI/hepatic ROS          Kidney stones,        Endo/Other  Negative endo/other ROS      GYN  Negative gynecology ROS          Hematology  Negative hematology ROS      Musculoskeletal  Negative musculoskeletal ROS   Comment: MS      Neurology    Neuromuscular disease , multiple sclerosis, Headaches,    Psychology   Depression ,            Physical Exam    Airway    Mallampati score: II  TM Distance: >3 FB  Neck ROM: full     Dental       Cardiovascular  Cardiovascular exam normal    Pulmonary  Pulmonary exam normal     Other Findings        Anesthesia Plan  ASA Score- 2     Anesthesia Type- IV sedation with anesthesia with ASA Monitors  Additional Monitors:   Airway Plan:         Plan Factors-    Induction- intravenous  Postoperative Plan-     Informed Consent- Anesthetic plan and risks discussed with patient  I personally reviewed this patient with the CRNA  Discussed and agreed on the Anesthesia Plan with the CRNA  Elzbieta Rg

## 2018-01-10 NOTE — RESULT NOTES
Verified Results  (1) QUANTIFERON - TB GOLD 20Jun2017 02:07PM [facility]   TW Order Number: [id number]     Test Name Result Flag Reference   QUANTIFERON TB GOLD      Specimen incubated at [place], [place]    Performed at:  [address] [place] [address]  [id number]  : Lukasz Ngo MD, Phone:  [phone]   QUANTIFERON TB GOLD Negative  Negative   QUANTIFERON CRITERIA Comment     To be considered positive a specimen should have a TB Ag minus Nil  value greater than or equal to 0 35 IU/mL and in addition the TB Ag  minus Nil value must be greater than or equal to 25% of the Nil  value  There may be insufficient information in these values to  differentiate between some negative and some indeterminate test  values  QUANTIFERON TB AG VALUE 0 03 IU/mL     QUANTIFERON NIL VALUE 0 05 IU/mL     QUANTIFERON MITOGEN VALUE 1 45 IU/mL     QFT TB AG MINUS NIL VALUE <0 01 IU/mL     QFT INTERPRETATION Comment     The QuantiFERON TB Gold (in Tube) assay is intended for use as an aid  in the diagnosis of TB infection  Negative results suggest that there  is no TB infection  In patients with high suspicion of exposure, a  negative test should be repeated  A positive test indicates infection  with Mycobacterium tuberculosis  Among individuals without  tuberculosis infection, a positive test may be due to exposure to  [place], M  gayathrii or M  marinum  On the Internet, go to  cdc gov/tb for further details      Performed at:  [address] [place] [address]  [id number]  : Lukasz Ngo MD, Phone:  [phone]     STRATIFY JCV(TM) AB (WITH INDEX) W/RFL INHIBITION [id number] 11:47AM [facility]   REPORT COMMENT:  FASTING:NO     Test Name Result Flag Reference   INDEX VALUE 0 25 H    JCV ANTIBODY INDETERMINATE A    Index interpretive criteria:       <0 20 negative       0 20-0 40 indeterminate       >0 40 positive     INTERPRETATION Negative: Antibodies to JCV not detected  Indeterminate: Low level reactivity detected, see                      Inhibition Assay result to follow                      for the final antibody result  Positive: Antibodies to GABRIELE virus (JCV) detected                 indicating the patient has been exposed                 to JCV at an undetermined time  The STRATIFY JCV Antibody Test is an enzyme-linked  immunosorbent assay (MIRTHA) designed to detect JCV  antibodies to help identify individuals who have  been exposed to the virus  Samples with low level  reactivity in the detection assay are retested in  a confirmation (inhibition) assay to confirm  presence or absence of JCV-specific antibodies     JCV AB BY INHIBITION FINAL RSLT: NEGATIVE     INTERPRETATION  Positive: Antibodies to GABRIELE virus (JCV) detected            indicating the patient has been exposed            to JCV at an undetermined time  Negative: Antibodies to JCV not detected

## 2018-01-10 NOTE — RESULT NOTES
Verified Results  * MRI THORACIC SPINE [address] [id number] 08:59AM Terry Laureano Order Number: [id number]    - Patient Instructions: To schedule this appointment, please contact Central Scheduling at [phone]  Test Name Result Flag Reference   MRI THORACIC SPINE [address] (Report)     MRI THORACIC SPINE WITH AND WITHOUT CONTRAST     INDICATION: Bilateral hip and leg pain, back spasms, multiple sclerosis     COMPARISON: MR 1/23/2012     TECHNIQUE: Sagittal T1, sagittal T2, sagittal inversion recovery, axial T2, axial 2D MERGE  Sagittal and axial T1 postcontrast      IV Contrast: 6 mL of Gadobutrol injection (SINGLE-DOSE)      IMAGE QUALITY: Diagnostic  FINDINGS:     ALIGNMENT: Normal alignment of the thoracic spine  No compression fracture  No subluxation  No evidence of scoliosis  MARROW SIGNAL: Normal marrow signal is identified within the visualized bony structures  No discrete marrow lesion  THORACIC CORD: Normal signal within the thoracic cord  No convincing evidence for demyelinating disease  Conus terminates at the L1 level  PREVERTEBRAL AND PARASPINAL SOFT TISSUES:  Normal          THORACIC DEGENERATIVE CHANGE: Minor, noncompressive degenerative changes of the thoracic spine with the small number of scattered Schmorl's nodes  POSTCONTRAST: No abnormal enhancement  IMPRESSION:     Normal enhanced MRI of the thoracic spine  No convincing evidence for demyelinating disease  No cord or root compression  Workstation performed: [id number]     Signed by:    Marty Morrissey MD   10/18/17

## 2018-01-10 NOTE — PROGRESS NOTES
Assessment   1  Overactive bladder (596 51) (N32 81)   2  Neurogenic bladder (596 54) (N31 9)   3  History of nephrolithiasis (V13 01) (Z87 442)   4  Nephrolithiasis (592 0) (N20 0)    Plan   History of nephrolithiasis    · * XR ABDOMEN 1 VIEW KUB; Status:Hold For - Exact Date; Requested for:Approx    [id number]; Perform:[facility]; Last Updated By:Radha Patrick; 1/9/2018 11:32:51 AM;Ordered;For:History of nephrolithiasis; Ordered By:Levy Bales;   · [facility]; Status:Hold For - Scheduling; Requested for:57Ygs1420; Perform:[facility]; HRR:42IUJ1249;IVRMUSL;VDY:FIIVZOI of nephrolithiasis; Ordered By:Levy Bales;    Discussion/Summary   Discussion Summary:    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, and nephrolithiasis    is doing well from a overactive bladder standpoint  She will continue her anticholinergic as needed  Refills were provided  did discuss that her left upper pole stone has grown in size now measuring 4 mm  If the stone is radiopaque I would favor shockwave lithotripsy as she is likely to experience some morbidity should the stone moved into the ureter  is scheduled for a GI endoscopy for further evaluation of panniculitis and has a number of other elective surgical procedure schedule  As such we have elected to defer management for now    will return in 4 months with an updated KUB and renal ultrasound  Again if the stone is visualized on her KUB shockwave lithotripsy will be offered  Chief Complaint   Chief Complaint Free Text Note Form: Nephrolithiasis / NGB      History of Present Illness   HPI: Beba Brar returns in follow-up for nephrolithiasis and 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  is s doing well from a urologic standpoint  n he continues on her anticholinergic  She takes this as needed as her urinary symptoms seem to wax and wane with her multiple sclerosis flare ups   She has had no UTIs or hematuria  presents with updated renal ultrasound and KUB  Her left upper pole stone has grown somewhat now measuring 4 mm  She remains asymptomatic    is scheduled for a GI endoscopy for further evaluation of panic ileitis visualized on her prior stone protocol CTs      Review of Systems   Complete-Female Urology:      Constitutional: No fever, no chills, feels well, no tiredness, no recent weight gain or weight loss  Respiratory: No complaints of shortness of breath, no wheezing, no cough, no SOB on exertion, no orthopnea, no PND  Cardiovascular: No complaints of slow heart rate, no fast heart rate, no chest pain, no palpitations, no leg claudication, no lower extremity edema  Gastrointestinal: No complaints of abdominal pain, no constipation, no nausea or vomiting, no diarrhea, no bloody stools  Genitourinary: feelings of urinary urgency-- (occ),-- incontinence-- (stress)-- and-- stream quality fair, but-- as noted in HPI,-- no dysuria,-- no empty sensation-- and-- no hematuria--       The patient presents with complaints of urinary hesitancy (occ)  The patient presents with complaints of nocturia (3 times)      Musculoskeletal: No complaints of arthralgias, no myalgias, no joint swelling or stiffness, no limb pain or swelling  Integumentary: No complaints of skin rash or lesions, no itching, no skin wounds, no breast pain or lump  Hematologic/Lymphatic: No complaints of swollen glands, no swollen glands in the neck, does not bleed easily, does not bruise easily  Neurological: No complaints of headache, no confusion, no convulsions, no numbness, no dizziness or fainting, no tingling, no limb weakness, no difficulty walking  ROS Reviewed:    ROS reviewed  Active Problems   1  Abdominal discomfort in right lower quadrant (789 03) (R10 31)   2  Abnormal CT of the abdomen (793 6) (R93 5)   3  Acute URI (465 9) (J06 9)   4  Asthma (493 90) (J45 909)   5   Bowel incontinence (787 60) (R15 9)   6  Cervical disc disease with myelopathy (722 71) (M50 00)   7  Change in bowel habits (787 99) (R19 4)   8  Chronic cough (786 2) (R05)   9  Chronic fatigue (780 79) (R53 82)   10  Common migraine without aura (346 10) (G43 009)   11  Diarrhea, unspecified type (787 91) (R19 7)   12  Fecal urgency (787 63) (R15 2)   13  Fever (780 60) (R50 9)   14  High cholesterol (272 0) (E78 00)   15  History of nephrolithiasis (V13 01) (Z87 442)   16  Hypertension (401 9) (I10)   17  Irregular cardiac rhythm (427 9) (I49 9)   18  Lumbar strain (847 2) (S39 012A)   19  Multiple sclerosis (340) (G35)   20  Neurogenic bladder (596 54) (N31 9)   21  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) (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)   22  Nonspecific abnormal findings on radiological and examination of intrathoracic organs      (793 2) (R93 8)   23  Overactive bladder (596 51) (N32 81)   24  Panniculitis (729 30) (M79 3)   25  Right sided weakness (728 87) (R53 1)   26  Seasonal allergies (477 9) (J30 2)   27  Shortness of breath on exertion (786 05) (R06 02)   28  Transient right leg weakness (729 89) (R29 898)   29  Trigger ring finger of left hand (727 03) (M65 342)    Past Medical History   1  History of COPD exacerbation (491 21) (J44 1)   2  History of High cholesterol (272 0) (E78 00)   3  History of depression (V11 8) (Z86 59)   4  History of hypertension (V12 59) (Z86 79)   5  History of migraine (V12 49) (Z86 69)   6  History of multiple sclerosis (V12 49) (Z86 69)  Active Problems And Past Medical History Reviewed: The active problems and past medical history were reviewed and updated today  Surgical History   1  History of Dilation And Curettage   2  History of Uterine Fibroid Embolization  Surgical History Reviewed: The surgical history was reviewed and updated today  Family History   Mother    1  Family history of arthritis (V17 7) (Z82 61)   2  Family history of diabetes mellitus (V18 0) (Z83 3)   3   Family history of hypertension (V17 49) (Z82 49)   4  Family history of Headache   5  Family history of High cholesterol   6  Family history of Stroke, hemorrhagic  Father    7  Family history of diabetes mellitus (V18 0) (Z83 3)   8  Family history of hypertension (V17 49) (Z82 49)   9  FH: heart attack (V17 3) (Z82 49)   10  Family history of Headache   11  Family history of High cholesterol  Sister    15  Family history of arthritis (V17 7) (Z82 61)   13  Family history of Headache  Brother    15  Family history of arthritis (V17 7) (Z82 61)   15  Family history of hypertension (V17 49) (Z82 49)   16  Family history of myasthenia gravis (V17 89) (Z82 0)   17  Family history of neuropathy (V17 2) (Z82 0)   18  Family history of Headache   19  Family history of High cholesterol  Family History Reviewed: The family history was reviewed and updated today  Social History    · Disabled   · Never a smoker   · No illicit drug use   · Occasional alcohol use   ·  (V61 03) (Z63 5)  Social History Reviewed: The social history was reviewed and updated today  Current Meds    1  Albuterol Sulfate (2 5 MG/3ML) 0 083% Inhalation Nebulization Solution; USE 1 UNIT     DOSE IN NEBULIZER EVERY 6 HOURS AS NEEDED; Therapy: 53OLU3124 to (Evaluate:08Jan2017)  Requested for: 44Cvb3540; Last     Rx:83Efj9363 Ordered   2  Aspirin 81 MG TABS; Take 1 tablet daily; Therapy: (Recorded:46Vmv4587) to Recorded   3  Atorvastatin Calcium 20 MG Oral Tablet; TAKE 1 TABLET AT BEDTIME; Therapy: (Recorded:16Nov2017) to Recorded   4  Baclofen 20 MG Oral Tablet; 1 tablet twice a day; Therapy: 99CUW1200 to (OMQNKONB:73NQP9088)  Requested for: 92QNE5808; Last     Rx:17Nov2017 Ordered   5  Butalbital-APAP-Caffeine -40 MG Oral Capsule; TAKE 1 CAPSULE EVERY 4     HOURS AS NEEDED; Therapy: 68HWZ2183 to (Evaluate:48Equ9561); Last Rx:19May2017 Ordered   6  Ergocalciferol 40316 UNIT CAPS; TAKE 1 CAPSULE Weekly;      Therapy: (Recorded:98Ppy6800) to Recorded   7  Famotidine 20 MG Oral Tablet; TAKE 1 TABLET  PRN; Therapy: (Recorded:06Apr2017) to Recorded   8  Gabapentin 300 MG Oral Capsule; TAKE 1 CAPSULE 3 times daily; Therapy: 87UIX2025 to (Triston Montesinos)  Requested for: 69Wjb3006; Last     Rx:47Mjr2192 Ordered   9  Indomethacin 50 MG Oral Capsule; TAKE 1 CAPSULE  PRN; Therapy: (Xavier Baugh) to Recorded   10  Levocetirizine Dihydrochloride 5 MG Oral Tablet; take 1-2 tablets daily prn; Therapy: (Recorded:06Apr2017) to Recorded   11  Metamucil 48 57 % Oral Powder; USE AS DIRECTED; Therapy: 42LJC7872 to (Last Rx:83Qvv7668) Ordered   12  Oseltamivir Phosphate 75 MG Oral Capsule; ONE CAP DAILY FOR 10 DAYS; Therapy: 19YKD8266 to 450 39 173)  Requested for: 11Bjr3395; Last      Rx:41Mhv2388 Ordered   13  PredniSONE 20 MG Oral Tablet; 2 TAB FOR 3 DAYS AND ONE TAB FOR 3 DAYS AND      THEN 1/2 TAB FOR 3 DAYS; Therapy: 56PCJ8550 to (Complete:15Jan2018)  Requested for: 45Kkv7717; Last      Rx:04Byx4757 Ordered   14  Procardia XL 90 MG Oral Tablet Extended Release 24 Hour; TAKE 1 TABLET DAILY; Therapy: (Recorded:06Apr2017) to Recorded   15  Pulmicort Flexhaler 180 MCG/ACT Inhalation Aerosol Powder Breath Activated; INHALE 1      PUFF TWICE DAILY  RINSE MOUTH AFTER USE; Therapy: 62GMX6647 to (Evaluate:54Dpt0349); Last Rx:31Xvl4266 Ordered   16  Sertraline HCl - 50 MG Oral Tablet; Take 1 tablet twice daily; Therapy: (Xavier Baugh) to Recorded   17  SUMAtriptan Succinate 100 MG Oral Tablet; TAKE 1 TABLET FOR MIGRAINE RELIEF  MAY      REPEAT 2 HOURS LATER  MAXIMUM 200MG/DAY; Therapy: 67YGZ7755 to (Evaluate:07Vtl8729)  Requested for: 02LIC8271; Last      Rx:06Apr2017 Ordered   18  Tecfidera 240 MG Oral Capsule Delayed Release; TAKE ONE CAPSULE (240MG)      ORALLY TWICE A DAY  MAY TAKE WITH FOOD  DO NOT CUT, OR CHEW      CAPSULES;       Therapy: 09Mnn4544 to (Evaluate:21Apr2018) Requested for: 80Vgo7457; Last      Rx:83Tiv9774 Ordered   19  TiZANidine HCl - 4 MG Oral Tablet; TAKE 1 TABLET AT BEDTIME AS NEEDED; Therapy: 00Obg9340 to (Evaluate:17Mar2017)  Requested for: 37DOZ6902; Last      Rx:16Jan2017 Ordered   20  Tolterodine Tartrate ER 4 MG Oral Capsule Extended Release 24 Hour; take 1 capsule      daily; Therapy: 46FCG6468 to (Evaluate:13Oct2017); Last Rx:29Gov9581 Ordered   21  ValACYclovir HCl - 1 GM Oral Tablet; TAKE 1 TABLET  PRN; Therapy: (Recorded:95Ndv0573) to Recorded   22  Ventolin  (90 Base) MCG/ACT Inhalation Aerosol Solution; INHALE 2 PUFFS      Every 6 hours PRN; Therapy: 38UMH1710 to (Lance Bathe)  Requested for: 21Xyn8224; Last      Rx:74Rqf2639 Ordered  Medication List Reviewed: The medication list was reviewed and updated today  Allergies   1  Copaxone  2  Nuts   3  Other   4  Shellfish   5  Pollen   6  Seasonal   7  Trees    Vitals   Vital Signs    Recorded: 12YBE3684 11:21AM   Heart Rate 78   Systolic 282   Diastolic 68   Height 4 ft 11 in   Weight 130 lb    BMI Calculated 26 26   BSA Calculated 1 54     Physical Exam        Constitutional      General appearance: No acute distress, well appearing and well nourished  Pulmonary      Respiratory effort: No increased work of breathing or signs of respiratory distress  Cardiovascular      Examination of extremities for edema and/or varicosities: Normal        Musculoskeletal ambulates with cane assistance  Skin      Skin and subcutaneous tissue: Normal without rashes or lesions  Additional Exam:  no focal neurologic deficits  Mood and affect appropriate  Results/Data   US KIDNEY AND BLADDER WITH PVR 69DKT3959 09:14AM Lobo Hernadez      Test Name Result Flag Reference   US KIDNEY AND BLADDER WITH PVR (Report)     RENAL ULTRASOUND           INDICATION: Z87 442: Personal history of urinary calculi   History taken directly from the electronic ordering system  COMPARISON: CT 5/5/2017  TECHNIQUE:  Ultrasound of the retroperitoneum was performed with a curvilinear transducer utilizing volumetric sweeps and still imaging techniques  FINDINGS:           KIDNEYS:      Symmetric and normal size  Right kidney: 11 6 x 3 6 cm  Normal echogenicity and contour  No suspicious masses detected  No hydronephrosis  No shadowing calculi  No perinephric fluid collections  Left kidney: 12 4 x 5 9 cm  Normal echogenicity and contour  No suspicious masses detected  No hydronephrosis  There is a 4 mm nonobstructing calculus within the upper pole which corresponds to findings on prior CT  No perinephric fluid collections  URETERS:      Nonvisualized  BLADDER:       Normally distended  No focal thickening or mass lesions  Bilateral ureteral jets detected  No significant postvoid residual urine volume  IMPRESSION:                1  4 mm nonobstructing calculus within the upper pole of the left kidney, corresponding to finding on prior CT  2  Normal right kidney  3  Bilateral ureteral jets detected  Workstation performed: [id number]           Signed by:      Ariela Sanchez MD      12/7/17      Future Appointments      Date/Time Provider Specialty Site   01/24/2018 03:00 PM [facility] Orthopedic Surgery [facility] [address] [place]   01/17/2018 01:30 PM DIVYA Sánchez  Orthopedic Surgery [facility] [address]   01/11/2018 07:45 AM DIVYA Simental  Gastroenterology Adult [address]   01/25/2018 05:30 PM Georgiann Bamberger, M D   Neurology [address]     Signatures    Electronically signed by : Wicho James MD; Jan 9 2018 11:34AM EST                       (Author)

## 2018-01-11 ENCOUNTER — ANESTHESIA (OUTPATIENT)
Dept: PERIOP | Facility: HOSPITAL | Age: 58
End: 2018-01-11
Payer: COMMERCIAL

## 2018-01-11 ENCOUNTER — GENERIC CONVERSION - ENCOUNTER (OUTPATIENT)
Dept: GASTROENTEROLOGY | Facility: CLINIC | Age: 58
End: 2018-01-11

## 2018-01-11 ENCOUNTER — HOSPITAL ENCOUNTER (OUTPATIENT)
Facility: HOSPITAL | Age: 58
Setting detail: OUTPATIENT SURGERY
Discharge: HOME | End: 2018-01-11
Attending: INTERNAL MEDICINE | Admitting: INTERNAL MEDICINE
Payer: COMMERCIAL

## 2018-01-11 VITALS
RESPIRATION RATE: 20 BRPM | SYSTOLIC BLOOD PRESSURE: 125 MMHG | BODY MASS INDEX: 24.98 KG/M2 | TEMPERATURE: 98.1 F | HEIGHT: 60 IN | DIASTOLIC BLOOD PRESSURE: 60 MMHG | WEIGHT: 127.25 LBS | OXYGEN SATURATION: 95 % | HEART RATE: 64 BPM

## 2018-01-11 DIAGNOSIS — R93.5 ABNORMAL FINDINGS ON DIAGNOSTIC IMAGING OF OTHER ABDOMINAL REGIONS, INCLUDING RETROPERITONEUM: ICD-10-CM

## 2018-01-11 DIAGNOSIS — R19.7 DIARRHEA: ICD-10-CM

## 2018-01-11 DIAGNOSIS — R15.2 FECAL URGENCY: ICD-10-CM

## 2018-01-11 DIAGNOSIS — R19.4 CHANGE IN BOWEL HABITS: ICD-10-CM

## 2018-01-11 DIAGNOSIS — R15.9 BOWEL INCONTINENCE: ICD-10-CM

## 2018-01-11 DIAGNOSIS — R10.31 RLQ ABDOMINAL PAIN: ICD-10-CM

## 2018-01-11 PROCEDURE — 88305 TISSUE EXAM BY PATHOLOGIST: CPT | Performed by: INTERNAL MEDICINE

## 2018-01-11 RX ORDER — SODIUM CHLORIDE, SODIUM LACTATE, POTASSIUM CHLORIDE, CALCIUM CHLORIDE 600; 310; 30; 20 MG/100ML; MG/100ML; MG/100ML; MG/100ML
125 INJECTION, SOLUTION INTRAVENOUS CONTINUOUS
Status: DISCONTINUED | OUTPATIENT
Start: 2018-01-11 | End: 2018-01-11 | Stop reason: HOSPADM

## 2018-01-11 RX ORDER — PROPOFOL 10 MG/ML
INJECTION, EMULSION INTRAVENOUS AS NEEDED
Status: DISCONTINUED | OUTPATIENT
Start: 2018-01-11 | End: 2018-01-11 | Stop reason: SURG

## 2018-01-11 RX ORDER — SODIUM CHLORIDE 9 MG/ML
INJECTION, SOLUTION INTRAVENOUS CONTINUOUS PRN
Status: DISCONTINUED | OUTPATIENT
Start: 2018-01-11 | End: 2018-01-11 | Stop reason: SURG

## 2018-01-11 RX ADMIN — PROPOFOL 50 MG: 10 INJECTION, EMULSION INTRAVENOUS at 07:47

## 2018-01-11 RX ADMIN — PROPOFOL 100 MG: 10 INJECTION, EMULSION INTRAVENOUS at 07:43

## 2018-01-11 RX ADMIN — PROPOFOL 50 MG: 10 INJECTION, EMULSION INTRAVENOUS at 07:50

## 2018-01-11 RX ADMIN — SODIUM CHLORIDE: 0.9 INJECTION, SOLUTION INTRAVENOUS at 07:41

## 2018-01-11 RX ADMIN — SODIUM CHLORIDE, SODIUM LACTATE, POTASSIUM CHLORIDE, AND CALCIUM CHLORIDE 125 ML/HR: .6; .31; .03; .02 INJECTION, SOLUTION INTRAVENOUS at 07:00

## 2018-01-11 NOTE — ANESTHESIA POSTPROCEDURE EVALUATION
Post-Op Assessment Note      CV Status:  Stable    Mental Status:  Alert and awake    Hydration Status:  Stable and euvolemic    PONV Controlled:  Controlled    Airway Patency:  Patent and adequate    Post Op Vitals Reviewed: Yes          Staff: CRNA           /62 (01/11/18 0751)    Temp     Pulse 73 (01/11/18 0751)   Resp 20 (01/11/18 0751)    SpO2 100 % (01/11/18 0751)

## 2018-01-11 NOTE — DISCHARGE INSTRUCTIONS
Abdominal Pain   WHAT YOU NEED TO KNOW:   Abdominal pain can be dull, achy, or sharp  You may have pain in one area of your abdomen, or in your entire abdomen  Your pain may be caused by a condition such as constipation, food sensitivity or poisoning, infection, or a blockage  Abdominal pain can also be from a hernia, appendicitis, or an ulcer  Liver, gallbladder, or kidney conditions can also cause abdominal pain  The cause of your abdominal pain may be unknown  DISCHARGE INSTRUCTIONS:   Return to the emergency department if:   · You have new chest pain or shortness of breath  · You have pulsing pain in your upper abdomen or lower back that suddenly becomes constant  · Your pain is in the right lower abdominal area and worsens with movement  · You have a fever over 100 4°F (38°C) or shaking chills  · You are vomiting and cannot keep food or liquids down  · Your pain does not improve or gets worse over the next 8 to 12 hours  · You see blood in your vomit or bowel movements, or they look black and tarry  · Your skin or the whites of your eyes turn yellow  · You are a woman and have a large amount of vaginal bleeding that is not your monthly period  Contact your healthcare provider if:   · You have pain in your lower back  · You are a man and have pain in your testicles  · You have pain when you urinate  · You have questions or concerns about your condition or care  Follow up with your healthcare provider within 24 hours or as directed:  Write down your questions so you remember to ask them during your visits  Medicines:   · Medicines  may be given to calm your stomach and prevent vomiting or to decrease pain  Ask how to take pain medicine safely  · Take your medicine as directed  Contact your healthcare provider if you think your medicine is not helping or if you have side effects  Tell him of her if you are allergic to any medicine   Keep a list of the medicines, vitamins, and herbs you take  Include the amounts, and when and why you take them  Bring the list or the pill bottles to follow-up visits  Carry your medicine list with you in case of an emergency  © 2017 [address] [employer] Information is for End User's use only and may not be sold, redistributed or otherwise used for commercial purposes  All illustrations and images included in CareNotes® are the copyrighted property of A D A M , Inc  or [employer]le[employer]  The above information is an  only  It is not intended as medical advice for individual conditions or treatments  Talk to your doctor, nurse or pharmacist before following any medical regimen to see if it is safe and effective for you

## 2018-01-11 NOTE — OP NOTE
**** GI/ENDOSCOPY REPORT ****     PATIENT NAME: SELENA MORRISSEY ------ VISIT ID:  Patient ID:   [id number] YOB: 1960     INTRODUCTION: Colonoscopy - A 61 female patient presents for an outpatient   Colonoscopy at [facility]  PREVIOUS COLONOSCOPY: Patient's last colonoscopy was 5 years ago  INDICATIONS: Change in bowel habits  Diarrhea  Abnormal CT scan  Pain   centered in the right lower quadrant of the abdomen  Hx polyps     CONSENT:  The benefits, risks, and alternatives to the procedure were   discussed and informed consent was obtained from the patient  PREPARATION: EKG, pulse, pulse oximetry and blood pressure were monitored   throughout the procedure  The patient was identified by myself both   verbally and by visual inspection of ID band  Airway Assessment   Classification: Airway class 2 - Visualization of the soft palate, fauces   and uvula  ASA Classification: Class 2 - Patient has mild to moderate   systemic disturbance that may or may not be related to the disorder   requiring surgery  MEDICATIONS: Anesthesia-check records     PROCEDURE:  The endoscope was passed with ease through the anus under   direct visualization and advanced to the terminal ileum, confirmed by   appendiceal orifice, cecal strap (crow's foot), and ileocecal valve  The   scope was withdrawn and the mucosa was carefully examined  The quality of   the preparation was excellent  Cecal Intubation Time: 2 minutes(s) Scope   Withdrawal Time: 6 minutes(s)     RECTAL EXAM: Normal rectal exam      FINDINGS:  The terminal ileum appeared to be normal  The colonoscopy   examination was completely normal  A biopsy was taken from the ascending   colon and descending colon  Sent for microscopic colitis  Normal   retroversion     COMPLICATIONS: There were no complications  IMPRESSIONS: Normal terminal ileum  Normal colonoscopy       RECOMMENDATIONS: Follow-up on the results of the biopsy specimens  Colonoscopy recommended in 5 years  ESTIMATED BLOOD LOSS: Insignificant  PATHOLOGY SPECIMENS: Random biopsy taken from the ascending colon and   descending colon  PROCEDURE CODES: Colonoscopy with biopsy     ICD-9 Codes: 787 99 Other symptoms involving digestive system 787 91   Diarrhea 793 4 Nonspecific (abnormal) findings on radiological and other   examination of gastrointestinal tract 789 03 Abdominal pain, right lower   quadrant     ICD-10 Codes: R19 4 Change in bowel habit R19 7 Diarrhea, unspecified   R93 3 Abnormal findings on diagnostic imaging of other parts of digestive   tract R10 31 Right lower quadrant pain     PERFORMED BY: DIVYA Clinton  [facility]  on 01/11/2018  Version 1, electronically signed by DIVYA Murcia , D O  on   01/11/2018 at 07:55

## 2018-01-12 VITALS
DIASTOLIC BLOOD PRESSURE: 73 MMHG | BODY MASS INDEX: 25.2 KG/M2 | HEART RATE: 83 BPM | WEIGHT: 125 LBS | HEIGHT: 59 IN | SYSTOLIC BLOOD PRESSURE: 149 MMHG

## 2018-01-12 VITALS
HEART RATE: 78 BPM | HEIGHT: 59 IN | WEIGHT: 131 LBS | SYSTOLIC BLOOD PRESSURE: 118 MMHG | BODY MASS INDEX: 26.41 KG/M2 | DIASTOLIC BLOOD PRESSURE: 64 MMHG

## 2018-01-12 VITALS
DIASTOLIC BLOOD PRESSURE: 72 MMHG | WEIGHT: 128 LBS | HEART RATE: 88 BPM | HEIGHT: 59 IN | SYSTOLIC BLOOD PRESSURE: 126 MMHG | BODY MASS INDEX: 25.8 KG/M2

## 2018-01-12 VITALS
HEIGHT: 59 IN | WEIGHT: 130 LBS | HEART RATE: 76 BPM | DIASTOLIC BLOOD PRESSURE: 78 MMHG | BODY MASS INDEX: 26.21 KG/M2 | SYSTOLIC BLOOD PRESSURE: 134 MMHG

## 2018-01-12 NOTE — RESULT NOTES
PFT Results v2:   Diagnosis/Reason For Study: Shortness of breath   Referring Provider: Dr Erick Maravilla   Spirometry: Forced vital capacity: 2 75L and 99% Predicted Values  Forced expiratory volume in one second: 2 28L and 104% Predicted Value  FEV1/FVC ratio is 104% Predicted Values  Post Bronchodilator Spirometry: Forced vital capacity : 2 76L and 99% Predicted Values  Forced expiratory volume in one second : 2 42L and 111% Predicted Value  FEV1/FVC ratio is 111% Predicted Values  Lung Volumes: Total lung capacity : 4 46L and 101% Predicted Values  RV: 97% Predicted Values  RV/T% Predicted Values  DLCO:   DLCO 80% Predicted Values  PFT Interpretation:   Patient had a full lung function testing with spirometry lung volumes and DLCO  Patient gave a good effort  The flow volume curve is normal   There is no obstructive or restrictive ventilatory limitation  The lung volumes and DLCO are normal   Clinical correlation is required  Future Appointments    Date/Time Provider Specialty Site   2016 01:55 PM Bibi Lechuga MD Neurology [facility] [address] [place] [address]   2016 11:00 AM DIVYA Silver   Pulmonary Medicine [facility]  2  CT      Electronically signed by : DIVYA Chiu ; 2016  9:26AM EST                       (Author)

## 2018-01-13 VITALS
SYSTOLIC BLOOD PRESSURE: 146 MMHG | HEIGHT: 59 IN | HEART RATE: 76 BPM | WEIGHT: 131.38 LBS | DIASTOLIC BLOOD PRESSURE: 78 MMHG | BODY MASS INDEX: 26.48 KG/M2

## 2018-01-14 VITALS
SYSTOLIC BLOOD PRESSURE: 137 MMHG | DIASTOLIC BLOOD PRESSURE: 81 MMHG | HEART RATE: 78 BPM | BODY MASS INDEX: 26.28 KG/M2 | HEIGHT: 59 IN | WEIGHT: 130.38 LBS

## 2018-01-14 VITALS
WEIGHT: 132.03 LBS | DIASTOLIC BLOOD PRESSURE: 71 MMHG | SYSTOLIC BLOOD PRESSURE: 119 MMHG | HEART RATE: 99 BPM | RESPIRATION RATE: 18 BRPM | OXYGEN SATURATION: 97 % | BODY MASS INDEX: 26.62 KG/M2 | HEIGHT: 59 IN

## 2018-01-14 VITALS
DIASTOLIC BLOOD PRESSURE: 80 MMHG | BODY MASS INDEX: 26.41 KG/M2 | WEIGHT: 131 LBS | OXYGEN SATURATION: 98 % | HEIGHT: 59 IN | RESPIRATION RATE: 16 BRPM | SYSTOLIC BLOOD PRESSURE: 120 MMHG | HEART RATE: 71 BPM

## 2018-01-14 NOTE — RESULT NOTES
Verified Results  (1) CBC/PLT/DIFF 20Jun2017 02:07PM Silverio Martinez    Order Number: [id number]     Test Name Result Flag Reference   WBC COUNT 7 00 Thousand/uL  4 31-10 16   RBC COUNT 5 12 Million/uL  3 81-5 12   HEMOGLOBIN 14 6 g/dL  11 5-15 4   HEMATOCRIT 44 5 %  34 8-46  1   MCV 87 fL  82-98   MCH 28 5 pg  26 8-34 3   MCHC 32 8 g/dL  31 4-37 4   RDW 12 3 %  11 6-15 1   MPV 10 5 fL  8 9-12 7   PLATELET COUNT 379 Thousands/uL  149-390   nRBC AUTOMATED 0 /100 WBCs     NEUTROPHILS RELATIVE PERCENT 54 %  43-75   LYMPHOCYTES RELATIVE PERCENT 34 %  14-44   MONOCYTES RELATIVE PERCENT 9 %  4-12   EOSINOPHILS RELATIVE PERCENT 2 %  0-6   BASOPHILS RELATIVE PERCENT 0 %  0-1   NEUTROPHILS ABSOLUTE COUNT 3 81 Thousands/? ??L  1 85-7 62   LYMPHOCYTES ABSOLUTE COUNT 2 41 Thousands/? ??L  0 60-4 47   MONOCYTES ABSOLUTE COUNT 0 62 Thousand/? ??L  0 17-1 22   EOSINOPHILS ABSOLUTE COUNT 0 11 Thousand/? ??L  0 00-0 61   BASOPHILS ABSOLUTE COUNT 0 03 Thousands/? ??L  0 00-0 10

## 2018-01-14 NOTE — RESULT NOTES
Verified Results  (1) VARICELLA ZOSTER IMMUNITY(IGG) 25UZP5117 02:07PM Donya Paz    Order Number: [id number]     Test Name Result Flag Reference   SYBIL ZOSTER IGG IMMUNE  IMMUNE   INTERPRETATION:    IMMUNE     - Presumed immune to VZV IgG infection  EQUIVOCAL  - Suggest repeat testing in 2-4 weeks  NON-IMMUNE - Presumed non-immune to VZV IgG infection

## 2018-01-15 VITALS
BODY MASS INDEX: 26.81 KG/M2 | HEART RATE: 78 BPM | HEIGHT: 59 IN | DIASTOLIC BLOOD PRESSURE: 74 MMHG | SYSTOLIC BLOOD PRESSURE: 118 MMHG | WEIGHT: 133 LBS

## 2018-01-16 NOTE — PRE-PROCEDURE INSTRUCTIONS
Pre-Surgery Instructions:   Medication Instructions    albuterol (2 5 mg/3 mL) 0 083 % nebulizer solution Patient was instructed by Physician and understands   atorvastatin (LIPITOR) 20 mg tablet Patient was instructed by Physician and understands   baclofen 20 mg tablet Patient was instructed by Physician and understands   budesonide (PULMICORT FLEXHALER) 180 MCG/ACT inhaler Patient was instructed by Physician and understands   Butalbital-APAP-Caffeine -40 MG per capsule Patient was instructed by Physician and understands   Dimethyl Fumarate (TECFIDERA) 240 MG CPDR Patient was instructed by Physician and understands   EPINEPHrine (EPIPEN) 0 3 mg/0 3 mL SOAJ Patient was instructed by Physician and understands   ERGOCALCIFEROL PO Patient was instructed by Physician and understands   famotidine (PEPCID) 20 mg tablet Patient was instructed by Physician and understands   gabapentin (NEURONTIN) 300 mg capsule Patient was instructed by Physician and understands   indomethacin (INDOCIN) 50 mg capsule Patient was instructed by Physician and understands   levocetirizine (XYZAL) 5 MG tablet Patient was instructed by Physician and understands   NIFEdipine (ADALAT CC) 90 mg 24 hr tablet Patient was instructed by Physician and understands   sertraline (ZOLOFT) 50 mg tablet Patient was instructed by Physician and understands   simvastatin (ZOCOR) 40 mg tablet Patient was instructed by Physician and understands   SUMAtriptan (IMITREX) 100 mg tablet Patient was instructed by Physician and understands   tolterodine (DETROL LA) 4 mg 24 hr capsule Patient was instructed by Physician and understands   valACYclovir (VALTREX) 1,000 mg tablet Patient was instructed by Physician and understands  Pre shower with hibiclens as instructed by surgeon  You may drive yourself to the hospital   You may take your medications day of surgery  You may eat on the day of your surgery    You may have a dressing, please wear something that will fit over it

## 2018-01-17 ENCOUNTER — HOSPITAL ENCOUNTER (OUTPATIENT)
Facility: HOSPITAL | Age: 58
Setting detail: OUTPATIENT SURGERY
Discharge: HOME | End: 2018-01-17
Attending: ORTHOPAEDIC SURGERY | Admitting: ORTHOPAEDIC SURGERY
Payer: COMMERCIAL

## 2018-01-17 VITALS
RESPIRATION RATE: 16 BRPM | TEMPERATURE: 98.3 F | SYSTOLIC BLOOD PRESSURE: 127 MMHG | OXYGEN SATURATION: 95 % | DIASTOLIC BLOOD PRESSURE: 68 MMHG | HEART RATE: 72 BPM

## 2018-01-17 PROBLEM — M65.342 TRIGGER RING FINGER OF LEFT HAND: Status: ACTIVE | Noted: 2018-01-17

## 2018-01-17 RX ORDER — MAGNESIUM HYDROXIDE 1200 MG/15ML
LIQUID ORAL AS NEEDED
Status: DISCONTINUED | OUTPATIENT
Start: 2018-01-17 | End: 2018-01-17 | Stop reason: HOSPADM

## 2018-01-17 RX ORDER — HYDROCODONE BITARTRATE AND ACETAMINOPHEN 5; 325 MG/1; MG/1
1 TABLET ORAL EVERY 6 HOURS PRN
Qty: 10 TABLET | Refills: 0 | Status: SHIPPED | OUTPATIENT
Start: 2018-01-17 | End: 2018-01-27

## 2018-01-17 RX ORDER — LIDOCAINE HYDROCHLORIDE AND EPINEPHRINE 10; 10 MG/ML; UG/ML
10 INJECTION, SOLUTION INFILTRATION; PERINEURAL ONCE
Status: COMPLETED | OUTPATIENT
Start: 2018-01-17 | End: 2018-01-17

## 2018-01-17 RX ADMIN — LIDOCAINE HYDROCHLORIDE,EPINEPHRINE BITARTRATE 6 ML: 10; .01 INJECTION, SOLUTION INFILTRATION; PERINEURAL at 12:11

## 2018-01-17 NOTE — H&P
H&P Exam - [address] 61 y o  female MRN: [id number]  Unit/Bed#: OR POOL    Assessment/Plan   Assessment:  Left hand ring finger trigger finger  Plan:  Plan for left hand ring finger trigger finger release under local medication    Risks, benefits, and alternatives of the procedure were explained to the patient  The patient is willing to undergo the procedure  Operative consent was obtained  All questions were answered to the patient's satisfaction and they were willing to proceed  Risks include but are not limited to bleeding, infection, damage to nerves, arteries, or blood vessels, problems with wound healing, delayed healing, healing in the wrong position, pain, scar, failure to give the required result, need for further surgery and anesthesia risks  History of Present Illness   HPI:  Diane Kimbrough is a 61 y o  y o  female who presents with left hand ring finger pain with catching, popping, clicking, and locking that has failed conservative management with no associated numbness or tingling  Historical Information  Review Of Systems:   · Skin: Normal  · Neuro: See HPI  · Musculoskeletal: See HPI  · 14 point review of systems negative except as stated above     Past Medical History:   Past Medical History:   Diagnosis Date    Asthma     Cardiac disease     COPD (chronic obstructive pulmonary disease) ([facility]     Depression     Hyperlipidemia     Hypertension     Influenza     December 2017    Irregular heart beat     Migraine     Migraine     MRSA infection     [facility]     Multiple sclerosis ([facility] )     Nephrolithiasis     Neurogenic bladder     Panniculitis        Past Surgical History:   Past Surgical History:   Procedure Laterality Date    COLONOSCOPY      DILATION AND CURETTAGE OF UTERUS      VT COLONOSCOPY FLX DX W/COLLJ SPEC WHEN PFRMD N/A 1/11/2018    Procedure: COLONOSCOPY;  Surgeon: Anna Gill MD;  Location: [facility];   Service: Gastroenterology    UTERINE FIBROID SURGERY         Family History:  Family history reviewed and non-contributory  Family History   Problem Relation Age of Onset    Stroke Mother     Diabetes Mother     Hypertension Mother     Heart disease Father    Clifm Riley Diabetes Father     Hypertension Father     Diabetes Sister     Hypertension Brother        Social History:  Social History     Social History    Marital status: /Civil Union     Spouse name: N/A    Number of children: N/A    Years of education: N/A     Social History Main Topics    Smoking status: Never Smoker    Smokeless tobacco: Never Used    Alcohol use Yes      Comment: social    Drug use: No      Comment: last use 4 days ago   Sexual activity: Not Asked     Other Topics Concern    None     Social History Narrative    None       Allergies: Allergies   Allergen Reactions    Nuts Shortness Of Breath    Other Anaphylaxis      walnuts & cashews  Cats    Shellfish Allergy Shortness Of Breath    Shellfish-Derived Products Shortness Of Breath and Swelling    Copaxone [Glatiramer Acetate] Hives    Pollen Extract Sneezing           Labs:    0  Lab Value Date/Time   HCT 44 5 06/20/2017 1407   HGB 14 6 06/20/2017 1407   WBC 7 00 06/20/2017 1407       Meds:  No current facility-administered medications for this encounter  Blood Culture:   No results found for: BLOODCX    Wound Culture:   No results found for: WOUNDCULT    Ins and Outs:  No intake/output data recorded  Physical Exam  /79   Pulse 79   Temp 98 3 °F (36 8 °C) (Temporal)   Resp 16   SpO2 95%   Gen: Alert and oriented to person, place, time  HEENT: EOMI, eyes clear, moist mucus membranes, hearing intact  Respiratory: Bilateral chest rise  No audible wheezing found  Cardiovascular: Regular Rate and Rhythm  Abdomen: soft nontender/nondistended  Ortho Exam:  Tenderness to palpation left hand ring finger with positive nodule in crepitation    Decreased range of motion secondary to pain  Flexor tendons are intact  Neuro Exam:  Neurologically intact left upper extremity      Lab Results: Reviewed  Imaging: Reviewed

## 2018-01-17 NOTE — OP NOTE
OPERATIVE REPORT  PATIENT NAME: Bernie Maldonado  :  1960  MRN: [id number]  Pt Location: [facility]    SURGERY DATE: 18    Surgeon(s) and Role:     Laurie Rodriguez MD - Primary     * Citlalli Edwards PA-C - Assisting    Pre-Op Diagnosis:  Trigger ring finger of left hand [M65 342]    Post-Op Diagnosis Codes:     * Trigger ring finger of left hand [M65 342]    Procedure(s):  RING TRIGGER FINGER RELEASE (Left)    Specimen(s):  * No order type specified *    Estimated Blood Loss:   Minimal      Anesthesia Type:   Local    Operative Indications: The patient has a history of Trigger Finger that was recalcitrant to conservative management  The decision was made to bring the patient to the operating room for Trigger Finger Release  Risks of the procedure were explained which include, but are not limited to bleeding; infection; damage to nerves, arteries,veins, tendons; scar; pain; need for reoperation; failure to give desired result; and risks of anaesthesia  All questions were answered to satisfaction and they were willing to proceed  Operative Findings:  Left ring finger trigger finger    Complications:   None    Procedure and Technique:  After the patient, site, and procedure were identified, the patient was brought into the operating room in a supine position  Local anaesthesia was adminstered in the preoperative holding area  A tourniquet was not used  The  left upper extremity was then prepped and drapped in a normal, sterile, orthopedic fashion  After the patient, site, and procedure were once again identified, attention was turned to the left ring finger  An incision was made over the flexor tendon sheath at the level of the A1 pulley  Dissection was carried out in-line with the flexor tendon sheath and the radial and ulnar digital artery and nerve were protected  The A1 pulley was identified at the base of the incision    Under direct visualization, the A1 pulley was divided along the midline in its entirety with care taken to avoid injury to the underlying tendon  The tendons were examined to ensure that no further catching, popping, clicking or locking occurred with motion of the finger  At the completion of the procedure, hemostasis was obtained with cautery and direct pressure  The wounds were copiously irrigated with sterile solution  The wounds were closed with Monocryl, Histocryl and Steri-strips  Sterile dressings were applied, including Gauze, Tweeners, Webril, Ace  Please note, all sponge, needle, and instrument counts were correct prior to closure  Loupe magnification was utilized  The patient tolerated the procedure well       I was present for the entire procedure and A qualified resident physician was not available    Patient Disposition:  APU and hemodynamically stable    SIGNATURE: Winnie Michaels MD  DATE: 01/17/18  TIME: 12:46 PM

## 2018-01-17 NOTE — DISCHARGE INSTRUCTIONS
Post Operative Instructions    You have had surgery on your arm today, please read and follow the information below:  Elevate your hand above your elbow during the next 24-48 hours to help with swelling  Place your hand and arm over your head with motion at your shoulder three times a day  Do not apply any cream/ointment/oil to your incisions including antibiotics  Do not soak your hands in standing water (dishwater, tubs, Jacuzzi's, pools, etc ) until given permission (typically 2-3 weeks after injury)    Call the office if you notice any:  Increased numbness or tingling of your hand or fingers that is not relieved with elevation  Increasing pain that is not controlled with medication  Difficulty chewing, breathing, swallowing  Chest pains or shortness of breath  Fever over 101 4 degrees  Bandage: Remove bandage after 2 days  Motion: Move fingers into a fist 5 times a day, DO NOT move any splinted fingers  Weight bearing status: Avoid heavy lifting with the extremity that was operated on until follow up appointment  Normal activities of daily living are OK  Ice:  You may apply ice to year hand for 10 minutes every hour for 24 hours  Sling: No sling necessary  Pain medication: Norco/Hydrocodone 1 tab every 6 hours as needed for pain  Follow-up Appointment: 7-10 days  Please call the office if you have any questions or concerns regarding your post-operative care

## 2018-01-22 VITALS
BODY MASS INDEX: 26.51 KG/M2 | SYSTOLIC BLOOD PRESSURE: 137 MMHG | WEIGHT: 131.5 LBS | HEIGHT: 59 IN | DIASTOLIC BLOOD PRESSURE: 77 MMHG | HEART RATE: 71 BPM

## 2018-01-22 VITALS
DIASTOLIC BLOOD PRESSURE: 77 MMHG | BODY MASS INDEX: 26.51 KG/M2 | HEIGHT: 59 IN | HEART RATE: 71 BPM | WEIGHT: 131.5 LBS | SYSTOLIC BLOOD PRESSURE: 137 MMHG

## 2018-01-23 VITALS
BODY MASS INDEX: 26.61 KG/M2 | WEIGHT: 132 LBS | SYSTOLIC BLOOD PRESSURE: 136 MMHG | HEIGHT: 59 IN | DIASTOLIC BLOOD PRESSURE: 74 MMHG

## 2018-01-23 VITALS
WEIGHT: 130 LBS | SYSTOLIC BLOOD PRESSURE: 122 MMHG | BODY MASS INDEX: 26.21 KG/M2 | HEIGHT: 59 IN | DIASTOLIC BLOOD PRESSURE: 68 MMHG | HEART RATE: 78 BPM

## 2018-01-23 NOTE — RESULT NOTES
Verified Results  (1) CBC/PLT/DIFF [id number] 10:34AM Haile Davis   REPORT COMMENT:  FASTING:NO  SPECIALIZED COLLECTION  PATIENT REFERRED TO ALTERNATE SITE  Test Name Result Flag Reference   WHITE BLOOD CELL COUNT 5 2 Thousand/uL  3 8-10 8   RED BLOOD CELL COUNT 4 80 Million/uL  3 80-5 10   HEMOGLOBIN 14 1 g/dL  11 7-15 5   HEMATOCRIT 42 2 %  35 0-45 0   MCV 87 9 fL  80 0-100 0   MCH 29 4 pg  27 0-33 0   MCHC 33 4 g/dL  32 0-36 0   RDW 12 9 %  11 0-15 0   PLATELET COUNT 635 Thousand/uL  140-400   ABSOLUTE NEUTROPHILS 2600 cells/uL  7300-9838   ABSOLUTE LYMPHOCYTES 2044 cells/uL  850-3900   ABSOLUTE MONOCYTES 468 cells/uL  200-950   ABSOLUTE EOSINOPHILS 57 cells/uL     ABSOLUTE BASOPHILS 31 cells/uL  0-200   NEUTROPHILS 50 %     LYMPHOCYTES 39 3 %     MONOCYTES 9 0 %     EOSINOPHILS 1 1 %     BASOPHILS 0 6 %     MPV 11 1 fL  7 5-12 5     (1) COMPREHENSIVE METABOLIC PANEL [id number] 93:33EJ Haile Davis     Test Name Result Flag Reference   GLUCOSE 131 mg/dL H 65-99   Fasting reference interval     For someone without known diabetes, a glucose  value >125 mg/dL indicates that they may have  diabetes and this should be confirmed with a  follow-up test    UREA NITROGEN (BUN) 12 mg/dL  7-25   CREATININE 0 60 mg/dL  0 50-1 05   For patients >52years of age, the reference limit  for Creatinine is approximately 13% higher for people  identified as -American  eGFR NON-AFR   AMERICAN 100 mL/min/1 73m2  > OR = 60   eGFR AFRICAN AMERICAN 116 mL/min/1 73m2  > OR = 60   BUN/CREATININE RATIO   4-92   NOT APPLICABLE (calc)   SODIUM 145 mmol/L  135-146   POTASSIUM 3 9 mmol/L  3 5-5 3   CHLORIDE 107 mmol/L     CARBON DIOXIDE 29 mmol/L  20-31   CALCIUM 9 9 mg/dL  8 6-10 4   PROTEIN, TOTAL 7 3 g/dL  6 1-8 1   ALBUMIN 4 7 g/dL  3 6-5 1   GLOBULIN 2 6 g/dL (calc)  1 9-3 7   ALBUMIN/GLOBULIN RATIO 1 8 (calc)  1 0-2 5   BILIRUBIN, TOTAL 0 6 mg/dL  0 2-1 2   ALKALINE PHOSPHATASE 144 U/L H  AST 19 U/L  10-35   ALT 26 U/L  6-29

## 2018-01-23 NOTE — CONSULTS
I had the pleasure of evaluating your patient, Myla Morris  My full evaluation follows:      Chief Complaint  Abdominal discomfort      History of Present Illness  58-year-old female with several GI problems  The 1st appears to be right lower quadrant pain of sudden onset  At 1st it was thought it might be kidney stones  Patient underwent CT of the abdomen kidney protocol about 6 months ago  At that time it showed mesenteric panniculitis as a possibility, with hazy mesentery as the finding on CT scan  Patient was not treated with any antibiotics at this time  No weight loss  No hematochezia or melena  No fever or chills  Patient also has developed fecal urgency and incontinence  Change in bowel habits with loose stool  Again no rectal bleeding or weight loss  No other alarm symptomatology  No change in or medication at this time  She does have MS and is being considered for other medications besides the 1 she is on  No other associated symptomatology      Review of Systems    Constitutional: No fever, no chills, feels well, no tiredness, no recent weight gain or weight loss  Eyes: eyesight problems and Difficulty with vision in the right eye secondary to MS    ENT: no complaints of earache, no loss of hearing, no nose bleeds, no nasal discharge, no sore throat, no hoarseness  Cardiovascular: No complaints of slow heart rate, no fast heart rate, no chest pain, no palpitations, no leg claudication, no lower extremity edema  Respiratory: No complaints of shortness of breath, no wheezing, no cough, no SOB on exertion, no orthopnea, no PND  Gastrointestinal: as noted in HPI  Genitourinary: No complaints of dysuria, no incontinence, no pelvic pain, no dysmenorrhea, no vaginal discharge or bleeding  Musculoskeletal: No complaints of arthralgias, no myalgias, no joint swelling or stiffness, no limb pain or swelling     Integumentary: No complaints of skin rash or lesions, no itching, no skin wounds, no breast pain or lump  Neurological: limb weakness and difficulty walking  Psychiatric: Not suicidal, no sleep disturbance, no anxiety or depression, no change in personality, no emotional problems  Endocrine: No complaints of proptosis, no hot flashes, no muscle weakness, no deepening of the voice, no feelings of weakness  Hematologic/Lymphatic: No complaints of swollen glands, no swollen glands in the neck, does not bleed easily, does not bruise easily  Active Problems    1  Asthma (493 90) (J45 909)   2  Bowel incontinence (787 60) (R15 9)   3  Cervical disc disease with myelopathy (722 71) (M50 00)   4  Chronic cough (786 2) (R05)   5  Chronic fatigue (780 79) (R53 82)   6  Common migraine without aura (346 10) (G43 009)   7  High cholesterol (272 0) (E78 00)   8  History of nephrolithiasis (V13 01) (Z87 442)   9  Hypertension (401 9) (I10)   10  Irregular cardiac rhythm (427 9) (I49 9)   11  Lumbar strain (847 2) (S39 012A)   12  Multiple sclerosis (340) (G35)   13  Neurogenic bladder (596 54) (N31 9)   14  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) (R26 9)   15  Nonspecific abnormal findings on radiological and examination of intrathoracic organs    (793 2) (R93 8)   16  Overactive bladder (596 51) (N32 81)   17  Panniculitis (729 30) (M79 3)   18  Right sided weakness (728 87) (R53 1)   19  Seasonal allergies (477 9) (J30 2)   20  Shortness of breath on exertion (786 05) (R06 02)   21  Transient right leg weakness (729 89) (R29 898)   22  Trigger ring finger of left hand (727 03) (M65 342)    Past Medical History    · History of COPD exacerbation (491 21) (J44 1)   · History of High cholesterol (272 0) (E78 00)   · History of depression (V11 8) (Z86 59)   · History of hypertension (V12 59) (Z86 79)   · History of migraine (V12 49) (Z86 69)   · History of multiple sclerosis (V12 49) (Z86 69)    The active problems and past medical history were reviewed and updated today        Surgical History    · History of Dilation And Curettage   · History of Uterine Fibroid Embolization    The surgical history was reviewed and updated today  Family History    · Family history of arthritis (V17 7) (Z82 61)   · Family history of diabetes mellitus (V18 0) (Z83 3)   · Family history of hypertension (V17 49) (Z82 49)   · Family history of Headache   · Family history of High cholesterol   · Family history of Stroke, hemorrhagic    · Family history of diabetes mellitus (V18 0) (Z83 3)   · Family history of hypertension (V17 49) (Z82 49)   · FH: heart attack (V17 3) (Z82 49)   · Family history of Headache   · Family history of High cholesterol    · Family history of arthritis (V17 7) (Z82 61)   · Family history of Headache    · Family history of arthritis (V17 7) (Z82 61)   · Family history of hypertension (V17 49) (Z82 49)   · Family history of myasthenia gravis (V17 89) (Z82 0)   · Family history of neuropathy (V17 2) (Z82 0)   · Family history of Headache   · Family history of High cholesterol    The family history was reviewed and updated today  Social History    · Disabled   · Never a smoker   · No illicit drug use   · Occasional alcohol use   ·  (V61 03) (Z63 5)  The social history was reviewed and updated today  The social history was reviewed and is unchanged  Current Meds   1  Albuterol Sulfate (2 5 MG/3ML) 0 083% Inhalation Nebulization Solution; USE 1 UNIT   DOSE IN NEBULIZER EVERY 6 HOURS AS NEEDED; Therapy: 57JJT4314 to (Evaluate:08Jan2017)  Requested for: 20Qdo8541; Last   Rx:10Jbt9261 Ordered   2  Aspirin 81 MG TABS; Take 1 tablet daily; Therapy: (Recorded:31Zlr6023) to Recorded   3  Atorvastatin Calcium 20 MG Oral Tablet; TAKE 1 TABLET AT BEDTIME; Therapy: (Recorded:16Nov2017) to Recorded   4  Baclofen 20 MG Oral Tablet; 1 tablet twice a day; Therapy: 22LEQ7706 to (CRHELUTA:66KQM0038)  Requested for: 72GCI3113; Last   Rx:17Nov2017 Ordered   5   Butalbital-APAP-Caffeine -40 MG Oral Capsule; TAKE 1 CAPSULE EVERY 4   HOURS AS NEEDED; Therapy: 11XEC5860 to (Evaluate:42Ezl2389); Last Rx:53Hvd8365 Ordered   6  Ergocalciferol 73632 UNIT CAPS; TAKE 1 CAPSULE Weekly; Therapy: (Recorded:11Bho8255) to Recorded   7  Famotidine 20 MG Oral Tablet; TAKE 1 TABLET  PRN; Therapy: (Recorded:27Bnm4405) to Recorded   8  Gabapentin 300 MG Oral Capsule; TAKE 1 CAPSULE 3 times daily; Therapy: 87UKU6121 to (Jacque Thompson)  Requested for: 21Goj3393; Last   Rx:71Pga5368 Ordered   9  Indomethacin 50 MG Oral Capsule; TAKE 1 CAPSULE  PRN; Therapy: (Rosa Ramires) to Recorded   10  Levocetirizine Dihydrochloride 5 MG Oral Tablet; take 1-2 tablets daily prn; Therapy: (Recorded:77Gfr5210) to Recorded   11  Procardia XL 90 MG Oral Tablet Extended Release 24 Hour; TAKE 1 TABLET DAILY; Therapy: (Recorded:37Ciz0888) to Recorded   12  Pulmicort Flexhaler 180 MCG/ACT Inhalation Aerosol Powder Breath Activated; INHALE 1    PUFF TWICE DAILY  RINSE MOUTH AFTER USE; Therapy: 83XNQ3787 to (Evaluate:65Htc1287); Last Rx:02Ebz6764 Ordered   13  Sertraline HCl - 50 MG Oral Tablet; Take 1 tablet twice daily; Therapy: (Rosa Ramires) to Recorded   14  SUMAtriptan Succinate 100 MG Oral Tablet; TAKE 1 TABLET FOR MIGRAINE RELIEF  MAY    REPEAT 2 HOURS LATER  MAXIMUM 200MG/DAY; Therapy: 76QHV9129 to (Evaluate:77Ure3642)  Requested for: 58RRA7245; Last    Rx:68Uqd5412 Ordered   15  Tecfidera 240 MG Oral Capsule Delayed Release; TAKE ONE CAPSULE (240MG)    ORALLY TWICE A DAY  MAY TAKE WITH FOOD  DO NOT CUT, OR CHEW    CAPSULES; Therapy: 71Vqa3485 to (Evaluate:91Nmh3842)  Requested for: 24EBA2945; Last    Rx:70Cmh9263 Ordered   16  TiZANidine HCl - 4 MG Oral Tablet; TAKE 1 TABLET AT BEDTIME AS NEEDED; Therapy: 72Omz4365 to (Evaluate:81Ark8609)  Requested for: 20YMV5790; Last    Rx:16Jan2017 Ordered   17   Tolterodine Tartrate ER 4 MG Oral Capsule Extended Release 24 Hour; take 1 capsule    daily; Therapy: 70KYJ2203 to (Evaluate:13Oct2017); Last Rx:51Mqq6145 Ordered   18  ValACYclovir HCl - 1 GM Oral Tablet; TAKE 1 TABLET  PRN; Therapy: (Recorded:06Apr2017) to Recorded   19  Ventolin  (90 Base) MCG/ACT Inhalation Aerosol Solution; INHALE 2 PUFFS    Every 6 hours PRN; Therapy: 52KJZ0056 to (Judi Santizo)  Requested for: 83Wsl0953; Last    Rx:09Eey0576 Ordered    The medication list was reviewed and updated today  Allergies    1  Copaxone    2  Nuts   3  Other   4  Shellfish   5  Pollen   6  Seasonal   7  Trees    Vitals   Recorded: 75YZC8085 67:42NU   Systolic 649   Diastolic 74   Height 4 ft 11 in   Weight 132 lb    BMI Calculated 26 66   BSA Calculated 1 55     Physical Exam    Constitutional   General appearance: No acute distress, well appearing and well nourished  Eyes   Conjunctiva and lids: No swelling, erythema or discharge  Pupils and irises: Abnormal   Slight divergent strabismus right pupil  Ears, Nose, Mouth, and Throat   External inspection of ears and nose: Normal     Nasal mucosa, septum, and turbinates: Normal without edema or erythema  Oropharynx: Normal with no erythema, edema, exudate or lesions  Pulmonary   Respiratory effort: No increased work of breathing or signs of respiratory distress  Auscultation of lungs: Clear to auscultation  Cardiovascular   Auscultation of heart: Normal rate and rhythm, normal S1 and S2, without murmurs  Examination of extremities for edema and/or varicosities: Normal     Carotid pulses: Normal     Abdomen   Abdomen: Abnormal   Minimal right lower quadrant tenderness on deep palpation, no rebound  Bowel sounds normoactive  Liver and spleen: No hepatomegaly or splenomegaly  Lymphatic   Palpation of lymph nodes in neck: No lymphadenopathy  Musculoskeletal   Gait and station: Abnormal   Unsteady gait, uses a cane  Digits and nails: Normal without clubbing or cyanosis      Inspection/palpation of joints, bones, and muscles: Normal     Skin   Skin and subcutaneous tissue: Normal without rashes or lesions  Neurologic No nystagmus or asterixis  Psychiatric   Orientation to person, place, and time: Normal     Mood and affect: Normal          Assessment    1  Bowel incontinence (787 60) (R15 9)   2  Change in bowel habits (787 99) (R19 4)   3  Diarrhea, unspecified type (787 91) (R19 7)   4  Fecal urgency (787 63) (R15 2)   5  Abdominal discomfort in right lower quadrant (789 03) (R10 31)   6  Abnormal CT of the abdomen (793 6) (R93 5)    Plan  Abnormal CT of the abdomen    · COLONOSCOPY; Status:Active; Requested for:77Wki0057;    Perform:[facility]; Due:29Ppy8070; Ordered; For:Abnormal CT of the abdomen; Ordered By:Bayron Hendrix;   · CT ABDOMEN W CONTRAST; Status:Need Information - Financial Authorization; Requested for:68Fsn7662;    Perform:[facility]; Due:72Hfn0400; Ordered; For:Abnormal CT of the abdomen; Ordered By:Bayron Hendrix;   · ENDOSCOPY - PROCEDURE, RISKS, AND BENEFITS; Status:Complete;   Done:  55IJK7883   Ordered; For:Abnormal CT of the abdomen; Ordered By:Carlos Alberto Hendrix;  Fecal urgency    · Metamucil 48 57 % Oral Powder; USE AS DIRECTED   Rx By: Jimmy Delong; Dispense: 0 Days ; #:120 GM; Refill: 0; For: Fecal urgency; KIP = N; Record    Discussion/Summary    Problem 1 , change in bowel habits with fecal urgency, incontinence, loose stool  , colonoscopy  Last colonoscopy almost 5 years ago  No alarm symptoms problem 2 , abnormal CT scan with mesenteric haziness  No weight loss  Plan repeat CT  Trial of Metamucil  Thank you very much for allowing me to participate in the care of this patient  If you have any questions, please do not hesitate to contact me        Future Appointments    Signatures   Electronically signed by : DIVYA Reagan ; Dec 14 2017  3:40PM EST                       (Author)

## 2018-01-23 NOTE — CONSULTS
Chief Complaint    1  Finger Problem    Active Problems    1  Asthma (493 90) (J45 909)   2  Bowel incontinence (787 60) (R15 9)   3  Cervical disc disease with myelopathy (722 71) (M50 00)   4  Chronic cough (786 2) (R05)   5  Chronic fatigue (780 79) (R53 82)   6  Common migraine without aura (346 10) (G43 009)   7  High cholesterol (272 0) (E78 00)   8  History of nephrolithiasis (V13 01) (Z87 442)   9  Hypertension (401 9) (I10)   10  Irregular cardiac rhythm (427 9) (I49 9)   11  Lumbar strain (847 2) (S39 012A)   12  Multiple sclerosis (340) (G35)   13  Neurogenic bladder (596 54) (N31 9)   14  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) (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)   15  Nonspecific abnormal findings on radiological and examination of intrathoracic organs    (793 2) (R93 8)   16  Overactive bladder (596 51) (N32 81)   17  Panniculitis (729 30) (M79 3)   18  Right sided weakness (728 87) (R53 1)   19  Seasonal allergies (477 9) (J30 2)   20  Shortness of breath on exertion (786 05) (R06 02)   21  Transient right leg weakness (729 89) (R29 898)   22   Trigger ring finger of left hand (727 03) (M65 342)    Past Medical History    · History of COPD exacerbation (491 21) (J44 1)   · History of High cholesterol (272 0) (E78 00)   · History of depression (V11 8) (Z86 59)   · History of hypertension (V12 59) (Z86 79)   · History of migraine (V12 49) (Z86 69)   · History of multiple sclerosis (V12 49) (Z86 69)    Surgical History    · History of Dilation And Curettage   · History of Uterine Fibroid Embolization    Family History    · Family history of arthritis (V17 7) (Z82 61)   · Family history of diabetes mellitus (V18 0) (Z83 3)   · Family history of hypertension (V17 49) (Z82 49)   · Family history of Headache   · Family history of High cholesterol   · Family history of Stroke, hemorrhagic    · Family history of diabetes mellitus (V18 0) (Z83 3)   · Family history of hypertension (V17 49) (Z82 49)   · FH: heart attack (V17 3) (Z82 49)   · Family history of Headache   · Family history of High cholesterol    · Family history of arthritis (V17 7) (Z82 61)   · Family history of Headache    · Family history of arthritis (V17 7) (Z82 61)   · Family history of hypertension (V17 49) (Z82 49)   · Family history of myasthenia gravis (V17 89) (Z82 0)   · Family history of neuropathy (V17 2) (Z82 0)   · Family history of Headache   · Family history of High cholesterol    Social History    · Disabled   · Never a smoker   · No illicit drug use   · Occasional alcohol use   ·  (V61 03) (Z63 5)    Current Meds   1  Albuterol Sulfate (2 5 MG/3ML) 0 083% Inhalation Nebulization Solution; USE 1 UNIT   DOSE IN NEBULIZER EVERY 6 HOURS AS NEEDED; Therapy: 68ULZ6620 to (Evaluate:08Jan2017)  Requested for: 83Ksg0290; Last   Rx:11Hqp3333 Ordered   2  Aspirin 81 MG TABS; Take 1 tablet daily; Therapy: (Recorded:39Xpb8941) to Recorded   3  Atorvastatin Calcium 20 MG Oral Tablet; TAKE 1 TABLET AT BEDTIME; Therapy: (Recorded:16Nov2017) to Recorded   4  Baclofen 20 MG Oral Tablet; 1 tablet twice a day; Therapy: 88OCV9313 [facility]  Requested for: 37GGI9600; Last   Rx:17Nov2017 Ordered   5  Butalbital-APAP-Caffeine -40 MG Oral Capsule; TAKE 1 CAPSULE EVERY 4   HOURS AS NEEDED; Therapy: 64MIV2874 to (Evaluate:24Ysy7237); Last Rx:35Krn4849 Ordered   6  Ergocalciferol 12143 UNIT CAPS; TAKE 1 CAPSULE Weekly; Therapy: (Recorded:26Kfl8157) to Recorded   7  Famotidine 20 MG Oral Tablet; TAKE 1 TABLET  PRN; Therapy: (Recorded:06Apr2017) to Recorded   8  Gabapentin 300 MG Oral Capsule; TAKE 1 CAPSULE 3 times daily; Therapy: 75KIS8034 to (Leonora Car)  Requested for: 08Sep2017; Last   Rx:08Sep2017 Ordered   9  Indomethacin 50 MG Oral Capsule; TAKE 1 CAPSULE  PRN; Therapy: ([facility]) to Recorded   10  Levocetirizine Dihydrochloride 5 MG Oral Tablet; take 1-2 tablets daily prn; Therapy: (Recorded:06Apr2017) to Recorded   11  Procardia XL 90 MG Oral Tablet Extended Release 24 Hour; TAKE 1 TABLET DAILY; Therapy: (Recorded:06Apr2017) to Recorded   12  Pulmicort Flexhaler 180 MCG/ACT Inhalation Aerosol Powder Breath Activated; INHALE 1    PUFF TWICE DAILY  RINSE MOUTH AFTER USE; Therapy: 50ZSZ9638 to (Evaluate:25Ezy3262); Last Rx:17Bhl6487 Ordered   13  Sertraline HCl - 50 MG Oral Tablet; Take 1 tablet twice daily; Therapy: ([place] [facility]) to Recorded   14  SUMAtriptan Succinate 100 MG Oral Tablet; TAKE 1 TABLET FOR MIGRAINE RELIEF  MAY    REPEAT 2 HOURS LATER  MAXIMUM 200MG/DAY; Therapy: 39ACU8962 to (Evaluate:34Pby2917)  Requested for: 28LEJ9254; Last    Rx:06Apr2017 Ordered   15  Tecfidera 240 MG Oral Capsule Delayed Release; TAKE ONE CAPSULE (240MG)    ORALLY TWICE A DAY  MAY TAKE WITH FOOD  DO NOT CUT, OR CHEW    CAPSULES; Therapy: 77Sij2505 to (Evaluate:21Apr2018)  Requested for: 34RNO3335; Last    Rx:09Zkb8935 Ordered   16  TiZANidine HCl - 4 MG Oral Tablet; TAKE 1 TABLET AT BEDTIME AS NEEDED; Therapy: 89Yev1372 to (Evaluate:17Mar2017)  Requested for: 05GVG6567; Last    Rx:16Jan2017 Ordered   17  Tolterodine Tartrate ER 4 MG Oral Capsule Extended Release 24 Hour; take 1 capsule    daily; Therapy: 17GHK9086 to (Evaluate:13Oct2017); Last Rx:02Soq2215 Ordered   18  ValACYclovir HCl - 1 GM Oral Tablet; TAKE 1 TABLET  PRN; Therapy: (Recorded:06Apr2017) to Recorded   19  Ventolin  (90 Base) MCG/ACT Inhalation Aerosol Solution; INHALE 2 PUFFS    Every 6 hours PRN; Therapy: 20JWT8238 to (Leeanna Stakatya)  Requested for: 28Irj1952; Last    Rx:05Get5864 Ordered    Allergies    1  Copaxone    2  Nuts   3  Other   4  Shellfish   5  Pollen   6  Seasonal   7  Trees    Vitals  Signs    Heart Rate: 18YCJZJCUN: 136LCJBYFQCM: 81JSAMXO: 4 ft 11 inWeight: 131 lb 8 ozBMI Calculated: 26 56BSA Calculated: 1 54    Assessment    1   Trigger ring finger of left hand (727 03) (C42 071)    Plan     Trigger ring finger of left hand    · Follow Up After Surgery Evaluation and Treatment  Follow-up  Status: Hold For -  Scheduling  Requested for: 66CKO9018   · Continue with our present treatment plan ; Status:Complete;   Done: 57NBV7042    Discussion/Summary    Assessment: L ring trigger finger    Plan:   The anatomy and physiology of trigger finger was discussed with the patient today in the office  Edema and increased contact pressure within the flexor tendons at the A1 pulley can cause pain, crepitation, and limitation of function  Treatment options include resting MP blocking splints to decrease edema, oral anti-inflammatory medications, home or formal therapy exercises, up to 2 steroid injections or surgical release  While majority of patients do respond to conservative treatment, up to 20% may require surgical release  The patient has elected release of the L hand ring trigger finger  The patient has elected to undergo a release of the A1 pulley (trigger finger)  A small incision will be made over the palmar aspect of the hand, the tendon sheath holding the flexor tendons will be released  In the postoperative period, light activities are allowed immediately, driving is allowed when narcotic medication has stopped, and the incision may get wet after 2 days  Heavy activities (lifting more than approximately 10 pounds) will be allowed after the follow up appointment in 1-2 weeks  While the pain and discomfort within the wrist typically improves rapidly, some residual discomfort may be present for up to 6 weeks  The nodule that is typically palpable in the palmar aspect of the hand will not be removed, as this would necessitate removal of a portion of the flexor tendon, however the catching, clicking, and locking should resolve  Approximate success rate is 98%      The risks and benefits of the procedure were explained to the patient, which include, but are not limited to: Bleeding, infection, recurrence, pain, scar, damage to tendons, damage to nerves, and damage to blood vessels, need for future surgery and complications related to anesthesia  If bony work is done, risks also include malunion and nonunion  These risks, along with alternative conservative treatment options, and postoperative protocols were voiced back and understood by the patient  All questions were answered to the patient's satisfaction  The patient agrees to comply with a standard postoperative protocol, and is willing to proceed  Education was provided via written and auditory forms  There were no barriers to learning  Written handouts regarding wound care, incision and scar care, and general preoperative information, as well as risks and benefits were provided to the patient  This note was dictated with dragon dictation software  As such, and may contain typographical errors, improperly dictated words, background noise, or other errors  HPI:  The patient presents with L ring finger trigger finger  Pt states that Dr Denisse Murphy gave her two injections in her L ring finger  Pt states that previous injections worked for about 1 month  Pt states that Dr Denisse Murphy also drained a cyst on the volvar side of the base of the L ring finger at her last visit and says that mostly water came out of this cyst  Pt today c/o not being able to fully flex her L ring finger for the past 3 months with moderate pain to that finger  No numbness or tingling  Pt wants to discuss surgical options today  The past medical history, surgical history, family history, social history, medications, allergies, and review of systems have been read and reviewed on the chart and have been updated  Review of Systems was recorded in the office today on a separate evaluation sheet and is listed below      Review of Systems:  Constitutional: Negative  HEENT: Negative  Cardiovascular: Negative  Pulmonary: Negative  : Negative  GI: Negative  Skin: Negative except as above  Musculoskeletal: As above  Neurologic: Negative except as above  Endocrine: Negative  Psychiatric: Negative    Examination:    General / Psych: Awake and Oriented, No acute distress, age appropriate  HEENT: Normocephalic, atraumatic, mucous membranes moist, neck supple, trachea midline  Abdomen: No rebound or signs of guarding  Cardio: No discernible arrhythmia, 2+ pulses with good capillary refill  Pulmonary: No audible wheezing or audible stridor  Neurologic: [The patient is neurovascularly intact in the median, ulnar, and radial nerve distribution  There is normal sensation and good capillary refill within the digits  2+ pulses ]  Skin: [No lesions, rashes, streaking, purulence, abscesses, lymphangitis]  Musculoskeletal: full elbow and wrist ROM, full motion of fingers expect ring finger  Ring finger passively has full motion, active full motion at MP jt and 30 degrees flexion at PIP jt  no triggering at thumb, index, and small  Trigger and nodel at A1 pully of L ring finger  No evidence of arthritis   negative tinels at the wrist      Diagnostic Studies: [none]         Signatures   Electronically signed by : DIVYA Jurado ; Dec  1 2017  1:48PM EST                       (Author)

## 2018-01-23 NOTE — RESULT NOTES
Verified Results  * MRI LUMBAR SPINE W WO CONTRAST [id number] 01:49PM Nancy Staff Order Number: [id number]    - Patient Instructions: To schedule this appointment, please contact Central Scheduling at [phone]  Test Name Result Flag Reference   MRI LUMBAR SPINE W [address] (Report)     MRI LUMBAR SPINE WITH AND WITHOUT CONTRAST     INDICATION: Other symptoms and signs involving the musculoskeletal system [R29 898 (ICD-10-CM)]     COMPARISON: March 1, 2016     TECHNIQUE: Sagittal T1, sagittal T2, sagittal inversion recovery, axial T1 and axial T2, coronal T2  Sagittal and axial T1 postcontrast      IV Contrast: 6 mL of Gadobutrol injection (SINGLE-DOSE)      IMAGE QUALITY: Diagnostic     FINDINGS:     ALIGNMENT: Normal alignment of the lumbar spine  No compression fracture  No spondylolysis or spondylolisthesis  No scoliosis  MARROW SIGNAL: Unremarkable except for multilevel Schmorl's nodes  DISTAL CORD AND CONUS: Normal size and signal of the distal cord and conus  The conus ends at the L1 level  PARASPINAL SOFT TISSUES: Paraspinal soft tissues are unremarkable  SACRUM: Normal signal within the sacrum  No evidence of insufficiency or stress fracture  LOWER THORACIC DISC SPACES: Normal disc height and signal  No disc herniation, canal stenosis or foraminal narrowing  LUMBAR DISC SPACES:        L1-L2: Disc desiccation and mild broad-based disc bulge  L2-L3: Disc desiccation with bilateral facet arthropathy causing mild left neural foraminal narrowing  L3-L4: Broad-based disc bulge causing mild spinal canal narrowing with the spinal canal measuring 7 mm in AP dimension  Bilateral facet arthropathy causing mild bilateral neural foraminal narrowing  The inferior and superior degenerative Schmorl's nodes   at L3 vertebral body have progressed from prior exam      L4-L5: Disc desiccation and posterior annular fissure tear   Bilateral facet arthropathy and right foraminal disc protrusion causing moderate right and mild to moderate left neural foraminal narrowing  L5-S1: Posterior annular fissure tear  Bilateral facet arthropathy  POSTCONTRAST IMAGING: No abnormal enhancement  IMPRESSION:     Degenerative changes as described above have only slightly progressed from the prior exam  These are most severe at L3-L4 where there is spinal canal narrowing         Workstation performed: [id number]     Signed by:   Lauren Cervantes MD   12/11/17

## 2018-01-23 NOTE — CONSULTS
Chief Complaint    1  Finger Problem    Active Problems    1  Asthma (493 90) (J45 909)   2  Bowel incontinence (787 60) (R15 9)   3  Cervical disc disease with myelopathy (722 71) (M50 00)   4  Chronic cough (786 2) (R05)   5  Chronic fatigue (780 79) (R53 82)   6  Common migraine without aura (346 10) (G43 009)   7  High cholesterol (272 0) (E78 00)   8  History of nephrolithiasis (V13 01) (Z87 442)   9  Hypertension (401 9) (I10)   10  Irregular cardiac rhythm (427 9) (I49 9)   11  Lumbar strain (847 2) (S39 012A)   12  Multiple sclerosis (340) (G35)   13  Neurogenic bladder (596 54) (N31 9)   14  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) (R26 9)   15  Nonspecific abnormal findings on radiological and examination of intrathoracic organs    (793 2) (R93 8)   16  Overactive bladder (596 51) (N32 81)   17  Panniculitis (729 30) (M79 3)   18  Right sided weakness (728 87) (R53 1)   19  Seasonal allergies (477 9) (J30 2)   20  Shortness of breath on exertion (786 05) (R06 02)   21  Transient right leg weakness (729 89) (R29 898)   22  Trigger ring finger of left hand (727 03) (M65 342)    Past Medical History    1  History of COPD exacerbation (491 21) (J44 1)   2  History of High cholesterol (272 0) (E78 00)   3  History of depression (V11 8) (Z86 59)   4  History of hypertension (V12 59) (Z86 79)   5  History of migraine (V12 49) (Z86 69)   6  History of multiple sclerosis (V12 49) (Z86 69)    Surgical History    1  History of Dilation And Curettage   2  History of Uterine Fibroid Embolization    Family History    1  Family history of arthritis (V17 7) (Z82 61)   2  Family history of diabetes mellitus (V18 0) (Z83 3)   3  Family history of hypertension (V17 49) (Z82 49)   4  Family history of Headache   5  Family history of High cholesterol   6  Family history of Stroke, hemorrhagic    7  Family history of diabetes mellitus (V18 0) (Z83 3)   8  Family history of hypertension (V17 49) (Z82 49)   9   FH: heart attack (V17 3) (Z82 49)   10  Family history of Headache   11  Family history of High cholesterol    12  Family history of arthritis (V17 7) (Z82 61)   13  Family history of Headache    14  Family history of arthritis (V17 7) (Z82 61)   15  Family history of hypertension (V17 49) (Z82 49)   16  Family history of myasthenia gravis (V17 89) (Z82 0)   17  Family history of neuropathy (V17 2) (Z82 0)   18  Family history of Headache   19  Family history of High cholesterol    Social History    · Disabled   · Never a smoker   · No illicit drug use   · Occasional alcohol use   ·  (V61 03) (Z63 5)    Current Meds   1  Albuterol Sulfate (2 5 MG/3ML) 0 083% Inhalation Nebulization Solution; USE 1 UNIT   DOSE IN NEBULIZER EVERY 6 HOURS AS NEEDED; Therapy: 25VZA9019 to (Evaluate:08Jan2017)  Requested for: 77Qgt2295; Last   Rx:92Bzs7170 Ordered   2  Aspirin 81 MG TABS; Take 1 tablet daily; Therapy: (Recorded:50Kzb1493) to Recorded   3  Atorvastatin Calcium 20 MG Oral Tablet; TAKE 1 TABLET AT BEDTIME; Therapy: (Recorded:66Wwo4959) to Recorded   4  Baclofen 20 MG Oral Tablet; 1 tablet twice a day; [facility]HV6712 [facility]  Requested for: 04PLP2350; Last   Rx:17Nov2017 Ordered   5  Butalbital-APAP-Caffeine -40 MG Oral Capsule; TAKE 1 CAPSULE EVERY 4   HOURS AS NEEDED; Therapy: 68QQQ2152 to (Evaluate:30Vil5382); Last Rx:28Jhj6411 Ordered   6  Ergocalciferol 91384 UNIT CAPS; TAKE 1 CAPSULE Weekly; Therapy: (Recorded:39Vgw0551) to Recorded   7  Famotidine 20 MG Oral Tablet; TAKE 1 TABLET  PRN; Therapy: (Recorded:12Etw0841) to Recorded   8  Gabapentin 300 MG Oral Capsule; TAKE 1 CAPSULE 3 times daily; Therapy: 81XDI3982 to (Kenisha Thomas)  Requested for: 72Qni6917; Last   Rx:42Pbz0092 Ordered   9  Indomethacin 50 MG Oral Capsule; TAKE 1 CAPSULE  PRN; Therapy: ([facility]) to Recorded   10  Levocetirizine Dihydrochloride 5 MG Oral Tablet; take 1-2 tablets daily prn;     Therapy: (Recorded:06Apr2017) to Recorded   11  Procardia XL 90 MG Oral Tablet Extended Release 24 Hour; TAKE 1 TABLET DAILY; Therapy: (Recorded:06Apr2017) to Recorded   12  Pulmicort Flexhaler 180 MCG/ACT Inhalation Aerosol Powder Breath Activated; INHALE 1    PUFF TWICE DAILY  RINSE MOUTH AFTER USE; Therapy: 89NQI1396 to (Evaluate:55Qvf0586); Last Rx:10Tvx9220 Ordered   13  Sertraline HCl - 50 MG Oral Tablet; Take 1 tablet twice daily; Therapy: (Candelaria Rios) to Recorded   14  SUMAtriptan Succinate 100 MG Oral Tablet; TAKE 1 TABLET FOR MIGRAINE RELIEF  MAY    REPEAT 2 HOURS LATER  MAXIMUM 200MG/DAY; Therapy: 52BEP4204 to (Evaluate:70Ruw7595)  Requested for: 66JZV8661; Last    Rx:06Apr2017 Ordered   15  Tecfidera 240 MG Oral Capsule Delayed Release; TAKE ONE CAPSULE (240MG)    ORALLY TWICE A DAY  MAY TAKE WITH FOOD  DO NOT CUT, OR CHEW    CAPSULES; Therapy: 16Sfh0481 to (Evaluate:21Apr2018)  Requested for: 46JRA4933; Last    Rx:76Jpt2389 Ordered   16  TiZANidine HCl - 4 MG Oral Tablet; TAKE 1 TABLET AT BEDTIME AS NEEDED; Therapy: 78Fwu7928 to (Evaluate:17Mar2017)  Requested for: 63FUV6693; Last    Rx:16Jan2017 Ordered   17  Tolterodine Tartrate ER 4 MG Oral Capsule Extended Release 24 Hour; take 1 capsule    daily; Therapy: 93RBM5129 to (Evaluate:13Oct2017); Last Rx:18Oct2016 Ordered   18  ValACYclovir HCl - 1 GM Oral Tablet; TAKE 1 TABLET  PRN; Therapy: (Recorded:06Apr2017) to Recorded   19  Ventolin  (90 Base) MCG/ACT Inhalation Aerosol Solution; INHALE 2 PUFFS    Every 6 hours PRN; Therapy: 28QMQ1891 to (Oneyda Torres)  Requested for: 00Vwi4630; Last    Rx:00Tsz7772 Ordered    Allergies    1  Copaxone    2  Nuts   3  Other   4  Shellfish   5  Pollen   6  Seasonal   7  Trees    Vitals  Signs   Recorded: 01Dec2017 08:42AM   Heart Rate: 71  Systolic: 176  Diastolic: 77  Height: 4 ft 11 in  Weight: 131 lb 8 oz  BMI Calculated: 26 56  BSA Calculated: 1 54    Assessment    1  Trigger ring finger of left hand (727 03) (M65 342)    Plan  Trigger ring finger of left hand    1  Follow Up After Surgery Evaluation and Treatment  Follow-up  Status: Hold For -   Scheduling  Requested for: 33VGG1662   2  Continue with our present treatment plan ; Status:Complete;   Done: 16VKC1347    Discussion/Summary  Discussion Summary:   Assessment: L ring trigger finger    Plan:   The anatomy and physiology of trigger finger was discussed with the patient today in the office  Edema and increased contact pressure within the flexor tendons at the A1 pulley can cause pain, crepitation, and limitation of function  Treatment options include resting MP blocking splints to decrease edema, oral anti-inflammatory medications, home or formal therapy exercises, up to 2 steroid injections or surgical release  While majority of patients do respond to conservative treatment, up to 20% may require surgical release  The patient has elected release of the L hand ring trigger finger  The patient has elected to undergo a release of the A1 pulley (trigger finger)  A small incision will be made over the palmar aspect of the hand, the tendon sheath holding the flexor tendons will be released  In the postoperative period, light activities are allowed immediately, driving is allowed when narcotic medication has stopped, and the incision may get wet after 2 days  Heavy activities (lifting more than approximately 10 pounds) will be allowed after the follow up appointment in 1-2 weeks  While the pain and discomfort within the wrist typically improves rapidly, some residual discomfort may be present for up to 6 weeks  The nodule that is typically palpable in the palmar aspect of the hand will not be removed, as this would necessitate removal of a portion of the flexor tendon, however the catching, clicking, and locking should resolve  Approximate success rate is 98%      The risks and benefits of the procedure were explained to the patient, which include, but are not limited to: Bleeding, infection, recurrence, pain, scar, damage to tendons, damage to nerves, and damage to blood vessels, need for future surgery and complications related to anesthesia  If bony work is done, risks also include malunion and nonunion  These risks, along with alternative conservative treatment options, and postoperative protocols were voiced back and understood by the patient  All questions were answered to the patient's satisfaction  The patient agrees to comply with a standard postoperative protocol, and is willing to proceed  Education was provided via written and auditory forms  There were no barriers to learning  Written handouts regarding wound care, incision and scar care, and general preoperative information, as well as risks and benefits were provided to the patient  This note was dictated with dragon dictation software  As such, and may contain typographical errors, improperly dictated words, background noise, or other errors  HPI:  The patient presents with L ring finger trigger finger  Pt states that Dr Romana Gondola gave her two injections in her L ring finger  Pt states that previous injections worked for about 1 month  Pt states that Dr Romana Gondola also drained a cyst on the volvar side of the base of the L ring finger at her last visit and says that mostly water came out of this cyst  Pt today c/o not being able to fully flex her L ring finger for the past 3 months with moderate pain to that finger  No numbness or tingling  Pt wants to discuss surgical options today  The past medical history, surgical history, family history, social history, medications, allergies, and review of systems have been read and reviewed on the chart and have been updated  Review of Systems was recorded in the office today on a separate evaluation sheet and is listed below      Review of Systems:  Constitutional: Negative  HEENT: Negative  Cardiovascular: Negative  Pulmonary: Negative  : Negative  GI: Negative  Skin: Negative except as above  Musculoskeletal: As above  Neurologic: Negative except as above  Endocrine: Negative  Psychiatric: Negative    Examination:    General / Psych: Awake and Oriented, No acute distress, age appropriate  HEENT: Normocephalic, atraumatic, mucous membranes moist, neck supple, trachea midline  Abdomen: No rebound or signs of guarding  Cardio: No discernible arrhythmia, 2+ pulses with good capillary refill  Pulmonary: No audible wheezing or audible stridor  Neurologic: [The patient is neurovascularly intact in the median, ulnar, and radial nerve distribution  There is normal sensation and good capillary refill within the digits  2+ pulses ]  Skin: [No lesions, rashes, streaking, purulence, abscesses, lymphangitis]  Musculoskeletal: full elbow and wrist ROM, full motion of fingers expect ring finger  Ring finger passively has full motion, active full motion at MP jt and 30 degrees flexion at PIP jt  no triggering at thumb, index, and small  Trigger and nodel at A1 pully of L ring finger  No evidence of arthritis   negative tinels at the wrist      Diagnostic Studies: [none]         Future Appointments    Signatures   Electronically signed by : DIVYA Mcallister ; Dec  1 2017  1:48PM EST                       (Author)

## 2018-01-23 NOTE — RESULT NOTES
Verified Results  (Q) QUANTIFERON(R)-TB [facility] INCUBATED) [id number] 09:30AM Nathaly Bolus   REPORT COMMENT:  SPLIT 12/21/2017 FROM [id number]  FASTING:NO     Test Name Result Flag Reference   QUANTIFERON(R)-TB GOLD,$(INCUBATED) NEGATIVE  NEGATIVE   Negative test result  M  tuberculosis complex   infection unlikely  NIL 0 02 IU/mL     MITOGEN-NIL 0 53 IU/mL     TB-NIL 0 01 IU/mL     The Nil tube value is used to determine if the patient  has a preexisting immune response which could cause a   false-positive reading on the test  In order for a   test to be valid, the Nil tube must have a value of   less than or equal to 8 0 IU/mL  The mitogen control tube is used to assure the patient   has a healthy immune status and also serves as a   control for correct blood handling and incubation  It   is used to detect false-negative readings  The mitogen   tube must have a gamma interferon value of greater   than or equal to 0 5 IU/mL higher than the value of   the Nil tube  The TB antigen tube is coated with the M  tuberculosis  specific antigens  For a test to be considered   positive, the TB antigen tube value minus the Nil tube   value must be greater than or equal to 0 35 IU/mL  For additional information, please refer to   http://education  "[employer]/faq/QFT  (This link is being provided for informational/  educational purposes only )

## 2018-01-23 NOTE — RESULT NOTES
Verified Results  ECG 12-LEAD 02Jan2018 02:03PM Ugo Roca     Test Name Result Flag Reference   ECG 12-LEAD      Normal sinus rhythm   Possible Left atrial enlargement   No previous ECGs available   Confirmed by Celio Lainez MD, Purcell Spatz ([id number]) on 1/2/2018 3:00:57 PM

## 2018-01-23 NOTE — RESULT NOTES
Verified Results  (1) CBC/PLT/DIFF [id number] 10:34AM Colleen Lewis     Test Name Result Flag Reference   WHITE BLOOD CELL COUNT 5 2 Thousand/uL  3 8-10 8   RED BLOOD CELL COUNT 4 80 Million/uL  3 80-5 10   HEMOGLOBIN 14 1 g/dL  11 7-15 5   HEMATOCRIT 42 2 %  35 0-45 0   MCV 87 9 fL  80 0-100 0   MCH 29 4 pg  27 0-33 0   MCHC 33 4 g/dL  32 0-36 0   RDW 12 9 %  11 0-15 0   PLATELET COUNT 465 Thousand/uL  140-400   ABSOLUTE NEUTROPHILS 2600 cells/uL  0901-9839   ABSOLUTE LYMPHOCYTES 2044 cells/uL  850-3900   ABSOLUTE MONOCYTES 468 cells/uL  200-950   ABSOLUTE EOSINOPHILS 57 cells/uL     ABSOLUTE BASOPHILS 31 cells/uL  0-200   NEUTROPHILS 50 %     LYMPHOCYTES 39 3 %     MONOCYTES 9 0 %     EOSINOPHILS 1 1 %     BASOPHILS 0 6 %     MPV 11 1 fL  7 5-12 5     (1) COMPREHENSIVE METABOLIC PANEL [id number] 94:35KK Colleen Lewis     Test Name Result Flag Reference   GLUCOSE 131 mg/dL H 65-99   Fasting reference interval     For someone without known diabetes, a glucose  value >125 mg/dL indicates that they may have  diabetes and this should be confirmed with a  follow-up test    UREA NITROGEN (BUN) 12 mg/dL  7-25   CREATININE 0 60 mg/dL  0 50-1 05   For patients >52years of age, the reference limit  for Creatinine is approximately 13% higher for people  identified as -American  eGFR NON-AFR   AMERICAN 100 mL/min/1 73m2  > OR = 60   eGFR AFRICAN AMERICAN 116 mL/min/1 73m2  > OR = 60   BUN/CREATININE RATIO   8-17   NOT APPLICABLE (calc)   SODIUM 145 mmol/L  135-146   POTASSIUM 3 9 mmol/L  3 5-5 3   CHLORIDE 107 mmol/L     CARBON DIOXIDE 29 mmol/L  20-31   CALCIUM 9 9 mg/dL  8 6-10 4   PROTEIN, TOTAL 7 3 g/dL  6 1-8 1   ALBUMIN 4 7 g/dL  3 6-5 1   GLOBULIN 2 6 g/dL (calc)  1 9-3 7   ALBUMIN/GLOBULIN RATIO 1 8 (calc)  1 0-2 5   BILIRUBIN, TOTAL 0 6 mg/dL  0 2-1 2   ALKALINE PHOSPHATASE 144 U/L H    AST 19 U/L  10-35   ALT 26 U/L  6-29     (Q) VARICELLA ZOSTER VIRUS AB (IGG) [id number] 10: 34AM Muriellaly LordSusan   REPORT COMMENT:  FASTING:NO  SPECIALIZED COLLECTION  PATIENT REFERRED TO ALTERNATE SITE  Test Name Result Flag Reference   VARICELLA ZOSTER VIRUS$AB (IGG) >4000 00 index     Index               Interpretation       ---------         ----------------------      <135 00            Negative - Antibody not detected      135 00 - 164 99    Equivocal      > or = 165 00      Positive - Antibody detected         A positive result indicates that the patient      has antibody to VZV but does not differentiate      between an active or past infection  The clinical diagnosis must be interpreted in       conjunction with the clinical signs and symptoms of       the patient  This assay reliably measures immunity      due to previous infection but may not be       sensitive enough to detect antibodies induced by      vaccination  Thus, a negative result in a vaccinated      individual does not necessarily indicate      susceptibility to VZV infection

## 2018-01-24 ENCOUNTER — OFFICE VISIT (OUTPATIENT)
Dept: OBGYN CLINIC | Facility: HOSPITAL | Age: 58
End: 2018-01-24

## 2018-01-24 VITALS
BODY MASS INDEX: 26.15 KG/M2 | DIASTOLIC BLOOD PRESSURE: 70 MMHG | WEIGHT: 133.2 LBS | SYSTOLIC BLOOD PRESSURE: 120 MMHG | HEART RATE: 83 BPM | HEIGHT: 60 IN

## 2018-01-24 VITALS
TEMPERATURE: 101.8 F | WEIGHT: 132 LBS | HEIGHT: 59 IN | HEART RATE: 70 BPM | BODY MASS INDEX: 26.61 KG/M2 | OXYGEN SATURATION: 98 % | DIASTOLIC BLOOD PRESSURE: 80 MMHG | SYSTOLIC BLOOD PRESSURE: 144 MMHG

## 2018-01-24 DIAGNOSIS — M65.342 TRIGGER RING FINGER OF LEFT HAND: Primary | ICD-10-CM

## 2018-01-24 PROCEDURE — 99024 POSTOP FOLLOW-UP VISIT: CPT | Performed by: PHYSICIAN ASSISTANT

## 2018-01-24 RX ORDER — TOLTERODINE 4 MG/1
1 CAPSULE, EXTENDED RELEASE ORAL DAILY
COMMUNITY
Start: 2016-10-18 | End: 2022-05-05 | Stop reason: CLARIF

## 2018-01-24 RX ORDER — CEPHALEXIN 500 MG/1
500 CAPSULE ORAL EVERY 6 HOURS SCHEDULED
Qty: 28 CAPSULE | Refills: 0 | Status: SHIPPED | OUTPATIENT
Start: 2018-01-24 | End: 2018-01-24 | Stop reason: SDUPTHER

## 2018-01-24 RX ORDER — OSELTAMIVIR PHOSPHATE 75 MG/1
CAPSULE ORAL
COMMUNITY
Start: 2017-12-26 | End: 2018-04-19 | Stop reason: ALTCHOICE

## 2018-01-24 RX ORDER — CEPHALEXIN 500 MG/1
500 CAPSULE ORAL EVERY 6 HOURS SCHEDULED
Qty: 28 CAPSULE | Refills: 0 | Status: SHIPPED | OUTPATIENT
Start: 2018-01-24 | End: 2018-01-31

## 2018-01-24 RX ORDER — PREDNISONE 20 MG/1
TABLET ORAL
Refills: 1 | COMMUNITY
Start: 2017-12-26 | End: 2018-04-19 | Stop reason: ALTCHOICE

## 2018-01-24 RX ORDER — ERGOCALCIFEROL 1.25 MG/1
CAPSULE ORAL
Refills: 1 | COMMUNITY
Start: 2017-10-20

## 2018-01-24 NOTE — PROGRESS NOTES
ASSESSMENT / PLAN:  ASSESSMENT:   Trigger Finger  left  ring finger  PLAN:  Wound care to include washing with soap and water, thin layer of abx ointment to be applied 2x/day, use band-aid for drainage or if exposure to dirty surfaces, otherwise keep open to air  Do not use cane with left hand if able to avoid  Avoid lifting >5lbs with left hand  FOLLOW UP:  1  week(s)      CHIEF COMPLAINT:  Chief Complaint   Patient presents with    Left Hand - Pain, Post-op         SUBJECTIVE:  Jesus Mdidleton is a 61y o  year old female who presents for follow up after Trigger Finger Release  left  ring finger  Today patient has Pain  Mild  Intermittant  Aching, tingling and tremor when active  Some of the tingling/tremor goes into the small finger as well  PHYSICAL EXAMINATION:  General: well developed and well nourished, alert, oriented times 3 and appears comfortable  Psychiatric: Normal    MUSCULOSKELETAL EXAMINATION:  Incision: clean, dry, dehiscence noted and swelling present  Range of Motion: As expected  Neurovascular status: Pt with negative tinels at [facility] canal and cubital tunnel  She has 5/5 strength to intrinsics     Activity Restrictions: Restrictions: 5lb weight liting         STUDIES REVIEWED:  No Studies to review      PROCEDURES PERFORMED:  Procedures  No Procedures performed today

## 2018-01-26 ENCOUNTER — TELEPHONE (OUTPATIENT)
Dept: GASTROENTEROLOGY | Facility: CLINIC | Age: 58
End: 2018-01-26

## 2018-01-26 NOTE — TELEPHONE ENCOUNTER
Results given - doing well  Wants an opinion for an abdominal bulge, non painful - I suggested she contact dr Neha Garay for an opinion

## 2018-02-02 ENCOUNTER — OFFICE VISIT (OUTPATIENT)
Dept: OBGYN CLINIC | Facility: HOSPITAL | Age: 58
End: 2018-02-02

## 2018-02-02 VITALS
HEART RATE: 68 BPM | WEIGHT: 130 LBS | BODY MASS INDEX: 25.52 KG/M2 | DIASTOLIC BLOOD PRESSURE: 74 MMHG | HEIGHT: 60 IN | SYSTOLIC BLOOD PRESSURE: 144 MMHG

## 2018-02-02 DIAGNOSIS — M65.342 TRIGGER RING FINGER OF LEFT HAND: Primary | ICD-10-CM

## 2018-02-02 DIAGNOSIS — Z98.890 S/P TRIGGER FINGER RELEASE: ICD-10-CM

## 2018-02-02 PROCEDURE — 99024 POSTOP FOLLOW-UP VISIT: CPT | Performed by: ORTHOPAEDIC SURGERY

## 2018-02-02 NOTE — PROGRESS NOTES
ASSESSMENT / PLAN:  ASSESSMENT:   Trigger Finger  left  ring finger  PLAN:  wound care  FOLLOW UP:  PRN    Patient's incision is healing  Explained that edema and stiffness should improve with time  Was offered formal therapy, but patient politely declined  She is to keep the area clean with soap and water, finish her antibiotics  Regarding the rash on her arm, this is believed to be an allergic reaction of sort, not related to her trigger finger surgery  Patient is to notify us immediately if this gets worse  I did explain that we have a phone service available over the weekend if needed  CHIEF COMPLAINT:  Chief Complaint   Patient presents with    Left Ring Finger - Post-op         SUBJECTIVE:  Lynsey Nicholson is a 61y o  year old female who presents for follow up after Trigger Finger Release  left  ring finger  Today patient has Pain  Moderate  Intermittant  Aching, Stiffness/LROM and edema  Patient has been taking antibiotics and applying abx ointment as instructed at last appointment  She states she is concerned b/c she has also developed a rash going from the hand up the arm which started this morning  PHYSICAL EXAMINATION:  General: well developed and well nourished, alert, oriented times 3 and appears comfortable  Psychiatric: Normal    MUSCULOSKELETAL EXAMINATION:  Incision: Incision is still dehisced, but closing more than her last visit by secondary intent  She has just some slight serous drainage sitting in the wound, this is not able to be expressed with pressure  No concerning warmth, but ttp  Range of Motion: Limited due to stiffness  Neurovascular status: Neuro intact, good cap refill   Skin: Patient with a red rash extending from her dorsal hand up her arm with some ecchymosis seen on the upper arm  This does not resemble streaking, but more-so a contact reaction (patient with multiple allergies)        STUDIES REVIEWED:  No Studies to review      PROCEDURES PERFORMED:  Procedures  No Procedures performed today

## 2018-02-05 ENCOUNTER — TELEPHONE (OUTPATIENT)
Dept: OBGYN CLINIC | Facility: HOSPITAL | Age: 58
End: 2018-02-05

## 2018-02-05 NOTE — TELEPHONE ENCOUNTER
If rash is worsening, she should probably be seen to make sure there is nothing else that could be causing this (PCP, urgent care, ED) and also that she doesn't need further tx  Should def be seen in the ED if having any worsening allergy type symptoms (ie difficulty swallowing, trouble breathing)  Is her wound looking OK? Last we saw it, still needed to heal but no signs of active infection

## 2018-02-05 NOTE — TELEPHONE ENCOUNTER
Patient states that the Rash is improving after stopping Keflex  No problems with throat closing or itching in throat  Patient advised if these symptoms should arise she should go to ER for evaluation  Keflex marked as an allergy  Wound looks good and is improving  Patient is using neosporin ointment daily

## 2018-02-05 NOTE — TELEPHONE ENCOUNTER
Pt called to let dr Jeremiah Gonzalez know that she stopping taking her keflex about 3 days early because she is having a rash that is spreading

## 2018-02-21 ENCOUNTER — OFFICE VISIT (OUTPATIENT)
Dept: NEUROLOGY | Facility: CLINIC | Age: 58
End: 2018-02-21
Payer: COMMERCIAL

## 2018-02-21 VITALS
WEIGHT: 131.3 LBS | BODY MASS INDEX: 25.78 KG/M2 | HEART RATE: 73 BPM | DIASTOLIC BLOOD PRESSURE: 74 MMHG | HEIGHT: 60 IN | SYSTOLIC BLOOD PRESSURE: 118 MMHG

## 2018-02-21 DIAGNOSIS — G35 MULTIPLE SCLEROSIS (HCC): ICD-10-CM

## 2018-02-21 DIAGNOSIS — G89.29 CHRONIC RIGHT-SIDED HEADACHES: ICD-10-CM

## 2018-02-21 DIAGNOSIS — R51.9 CHRONIC RIGHT-SIDED HEADACHES: ICD-10-CM

## 2018-02-21 DIAGNOSIS — M79.3 PANNICULITIS: Primary | ICD-10-CM

## 2018-02-21 DIAGNOSIS — R26.2 AMBULATORY DYSFUNCTION: ICD-10-CM

## 2018-02-21 PROCEDURE — 99215 OFFICE O/P EST HI 40 MIN: CPT | Performed by: PSYCHIATRY & NEUROLOGY

## 2018-02-21 RX ORDER — BUTALBITAL, ACETAMINOPHEN AND CAFFEINE 50; 325; 40 MG/1; MG/1; MG/1
1 TABLET ORAL EVERY 4 HOURS PRN
Qty: 12 TABLET | Refills: 4 | Status: SHIPPED | OUTPATIENT
Start: 2018-02-21 | End: 2022-03-17 | Stop reason: SDUPTHER

## 2018-02-21 RX ORDER — BACLOFEN 20 MG/1
20 TABLET ORAL 3 TIMES DAILY
Qty: 270 TABLET | Refills: 1 | Status: SHIPPED | OUTPATIENT
Start: 2018-02-21 | End: 2018-08-19 | Stop reason: SDUPTHER

## 2018-02-21 NOTE — PROGRESS NOTES
Patient ID: Harlan Morgan is a 60 y o  female  Assessment/Plan:     Problem List Items Addressed This Visit        Nervous and Auditory    Multiple sclerosis (HCC)    Relevant Medications    baclofen 20 mg tablet    Other Relevant Orders    Motorized Scooter       Musculoskeletal and Integument    Panniculitis - Primary       Other    Ambulatory dysfunction    Relevant Orders    Motorized Scooter    Chronic right-sided headaches    Relevant Medications    butalbital-acetaminophen-caffeine (FIORICET,ESGIC) -40 mg per tablet         Mrs Cheli Walters has presented for follow up on MS and related issues, with no new focal neurologic deficit has been described  Patient was asked to discontinue Tecfidera now and complete her work up with GI team for mesenteric panniculitis  Her blood work in December was consistent with normal liver and kidney function with absolute lymphocytes count 2044  Patient JCV 0 25, which is negative, but might be seroconverted  We agreed to consider steroid infusion in 6-8 weeks and then initiate either Tysabri or Gilenya - patient completed blood work and she was cleared for both medication  She is to follow with Cardiology for left atrial enlargement-  Tysabri would be of better option in her case if underlying cardiac dysfunction suspected  Patient is to resume baclofen 20 mg tid, normal liver function  Patirent has 2 forms for both DMTs, and based on final management and diagnosis of panniculitis, we will proceed with either medication  No new focal neurologic deficit noted - she was provided script and referral for Motorized scooter  HPI History of Present Illness    Mrs Cheli Walters has a history of relapsing remitting multiple sclerosis, neurogenic bladder, disbalance, mesenteric panniculitis, cervical disc disease with myelopathy, classic migraine without aura, who presented for follow Apt on multiple sclerosis and related issues    Since last office visit patient described no new focal neurological deficit  She has started experiencing new headache which comes once a week predominantly in the right side of her head  Headache comes with lightheadedness and described as paresthesia and numbness without severe pain  Sometimes patient can have up to 4 headaches in 2 weeks  Patient responded very well to Fioricet with no signs of overuse of the medication has been noted  Patient discussed having right upper quadrant tenderness with diarrhea on and no with subcutaneous heart tissue noted  This event started 1 year ago  Patient had completed colonoscopy with no involvement of the gut has been described, no diverticulitis or inflammatory process inside her intestine has been appreciated  Patient has normal kidney and liver function  Patient is to follow with Dr Kofi Bojorquez, gastroenterology surgical team for further evaluation  Patient continues experiencing right hemiparesis with ambulatory dysfunction and she requires cane to walk short distances as well as wheelchair for ambulation due long distances  The following portions of the patient's history were reviewed and updated as appropriate: allergies, current medications, past family history, past medical history, past social history, past surgical history and problem list          Objective:    Blood pressure 118/74, pulse 73, height 5' (1 524 m), weight 59 6 kg (131 lb 4 8 oz)  Physical Exam/Neurological Exam  CONSTITUTIONAL: NAD, pleasant  NECK: supple, no lymphadenopathy, no thyromegaly, no JVD  CARDIOVASCULAR: RRR, normal S1S2, no murmurs, no rubs  RESP: clear to auscultation bilaterally, no wheezes/rhonchi/rales  ABDOMEN: soft, non tender, non distended  SKIN: no rash or skin lesions  EXTREMITIES: no edema, pulses 2+bilaterally  PSYCH: appropriate mood and affect  NEUROLOGIC COMPREHENSIVE EXAM: Patient is oriented to person, place and time, NAD; appropriate affect   CN II, III, IV, V, VI, VII,VIII,IX,X,XI-XII intact with EOMI, PERRLA, OKN intact, VF grossly intact, fundi poorly visualized secondary to pupillary constriction; symmetric face noted  Motor: 5/5 UE/LE bilateral symmetric; RUE 4/5 shoulder abduction, biceps and finger extension; 4/5 right hip fleexion and 4/5 dorsiflexion; Sensory: intact to light touch and pinprick bilaterally; normal vibration sensation feet bilaterally; Coordination within normal limits on FTN and ROLANDO testing; DTR: 2++/4 through R>L, no Babinski, no clonus  Tandem gait is abnormal  Romberg: negative  ROS:  12 points of review of system was reviewed with the patient and was unremarkable with exception: see HPI  Review of Systems   Constitutional: Negative  Negative for appetite change and fever  HENT: Negative  Negative for hearing loss, tinnitus, trouble swallowing and voice change  Eyes: Negative  Negative for photophobia and pain  Respiratory: Negative  Negative for shortness of breath  Cardiovascular: Negative  Negative for palpitations  Gastrointestinal: Negative  Negative for nausea and vomiting  Endocrine: Negative  Negative for cold intolerance and heat intolerance  Genitourinary: Positive for frequency  Negative for dysuria and urgency  Musculoskeletal: Positive for back pain  Negative for myalgias and neck pain  Skin: Negative  Negative for rash  Neurological: Positive for light-headedness, numbness and headaches  Negative for dizziness, tremors, seizures, syncope, facial asymmetry, speech difficulty and weakness  Tingling   Hematological: Negative  Does not bruise/bleed easily  Psychiatric/Behavioral: Positive for sleep disturbance  Negative for confusion and hallucinations

## 2018-03-04 DIAGNOSIS — G35 MULTIPLE SCLEROSIS (HCC): Primary | ICD-10-CM

## 2018-03-04 RX ORDER — BACLOFEN 20 MG/1
TABLET ORAL
Qty: 60 TABLET | Refills: 1 | Status: SHIPPED | OUTPATIENT
Start: 2018-03-04 | End: 2018-03-15

## 2018-03-13 ENCOUNTER — TELEPHONE (OUTPATIENT)
Dept: PULMONOLOGY | Facility: CLINIC | Age: 58
End: 2018-03-13

## 2018-03-13 DIAGNOSIS — J30.1 CHRONIC SEASONAL ALLERGIC RHINITIS DUE TO POLLEN: Primary | ICD-10-CM

## 2018-03-13 RX ORDER — LEVOCETIRIZINE DIHYDROCHLORIDE 5 MG/1
5 TABLET, FILM COATED ORAL AS NEEDED
Qty: 30 TABLET | Refills: 0 | Status: SHIPPED | OUTPATIENT
Start: 2018-03-13 | End: 2018-04-11 | Stop reason: SDUPTHER

## 2018-03-13 NOTE — TELEPHONE ENCOUNTER
If shes breaking out she needs to see her pcp, ?  What meds is causing that will need to figure it out?  send the levocetricizene to the pharmacy

## 2018-03-15 ENCOUNTER — OFFICE VISIT (OUTPATIENT)
Dept: SURGICAL ONCOLOGY | Facility: CLINIC | Age: 58
End: 2018-03-15
Payer: COMMERCIAL

## 2018-03-15 VITALS
SYSTOLIC BLOOD PRESSURE: 100 MMHG | TEMPERATURE: 96.9 F | HEIGHT: 60 IN | WEIGHT: 132 LBS | RESPIRATION RATE: 16 BRPM | HEART RATE: 78 BPM | DIASTOLIC BLOOD PRESSURE: 80 MMHG | BODY MASS INDEX: 25.91 KG/M2

## 2018-03-15 DIAGNOSIS — K65.4 MESENTERIC PANNICULITIS (HCC): Primary | ICD-10-CM

## 2018-03-15 PROCEDURE — 99204 OFFICE O/P NEW MOD 45 MIN: CPT | Performed by: SURGERY

## 2018-03-15 NOTE — PROGRESS NOTES
Surgical Oncology Follow Up       [facility]  [facility] [place] [address] [address]  1960  [id number]      Diagnoses and all orders for this visit:    Mesenteric panniculitis ([address] )  -     CT abdomen pelvis w contrast; Future  -     BUN; Future  -     Creatinine, serum; Future        Chief Complaint   Patient presents with    New patient consultation     Patient is here for consultation for abnormal CT from last year, possible mesenteric panniculitis  No history exists  History of Present Illness:   79-year-old female who for the last year has been having some right-sided abdominal pain  This was mostly in the right lower quadrant  This happens once every 1-2 weeks  It is not related to food intake  There is no fevers or chills  She also notices a swelling in this area and that sometimes becomes hard  She also notices a pins and needle sensation which she describes as crawling of ants in her abdomen  Over the last 2 months she has also been having vomiting of mucus, but no real nausea  She has also been complaining of greasy stools over that time period  There is a family history of gastric cancer and pancreatitis  A CT May 5, 2017 reveals haziness of the mesentery with numerous small lymph nodes  I personally reviewed the films  Review of Systems   Gastrointestinal: Positive for abdominal pain (right abdomen, pins & needles sensation) and vomiting (mucus)  Greasy stools     Complete ROS Surg Onc:   Constitutional: The patient denies new or recent history of general fatigue, no recent weight loss, no change in appetite  Eyes: No complaints of visual problems, no scleral icterus  ENT: no complaints of ear pain, no hoarseness, no difficulty swallowing,  no tinnitus and no new masses in head, oral cavity, or neck     Cardiovascular: No complaints of chest pain, no palpitations, no ankle edema  Respiratory: No complaints of shortness of breath, no cough  Gastrointestinal: No complaints of jaundice, no bloody stools, no pale stools  Genitourinary: No complaints of dysuria, no hematuria, no nocturia, no frequent urination, no urethral discharge  Musculoskeletal: No complaints of weakness, paralysis, joint stiffness or arthralgias  Integumentary: No complaints of rash, no new lesions  Neurological: No complaints of convulsions, no seizures, no dizziness  Hematologic/Lymphatic: No complaints of easy bruising  Endocrine:  No hot or cold intolerance  No polydipsia, polyphagia, or polyuria  Allergy/immunology:  No environmental allergies  No food allergies  Not immunocompromised  Skin:  No pallor or rash  No wound  Patient Active Problem List   Diagnosis    Trigger ring finger of left hand    S/P trigger finger release    Ambulatory dysfunction    Chronic right-sided headaches    Multiple sclerosis (HCC)    Panniculitis    [facility]enteric pann[address] [facility] )     Past Medical History:   Diagnosis Date    Asthma     Cardiac disease     [facility]     Depression     Hyperlipidemia     Hypertension     Influenza     December 2017    Irregular heart beat     Migraine     Migraine     MRSA infection     [facility]     Multiple sclerosis ([facility]     Nephrolithiasis     Neurogenic bladder     Panniculitis      Past Surgical History:   Procedure Laterality Date    COLONOSCOPY      DILATION AND CURETTAGE OF UTERUS      LA COLONOSCOPY FLX DX W/COLLJ SPEC WHEN PFRMD N/A 1/11/2018    Procedure: COLONOSCOPY;  Surgeon: Sindy Winston MD;  Location: [facility];   Service: Gastroenterology    LA INCISE FINGER TENDON SHEATH Left 1/17/2018    Procedure: RING TRIGGER FINGER RELEASE;  Surgeon: Adria Hu MD;  Location[facility];  Service: Orthopedics    UTERINE FIBROID SURGERY       Family History Problem Relation Age of Onset    Stroke Mother     Diabetes Mother     Hypertension Mother     Heart disease Father     Diabetes Father     Hypertension Father     Diabetes Sister     Hypertension Brother     Breast cancer Maternal Aunt     Stomach cancer Maternal Aunt      Social History     Social History    Marital status: /Civil Union     Spouse name: N/A    Number of children: N/A    Years of education: N/A     Occupational History    Not on file  Social History Main Topics    Smoking status: Never Smoker    Smokeless tobacco: Never Used    Alcohol use Yes      Comment: social    Drug use: Yes     Types: Marijuana      Comment: last use 4 days ago   Sexual activity: Not on file     Other Topics Concern    Not on file     Social History Narrative    No narrative on file       Current Outpatient Prescriptions:     albuterol (2 5 mg/3 mL) 0 083 % nebulizer solution, Albuterol Sulfate (2 5 MG/3ML) 0 083% Inhalation Nebulization Solution USE 1 UNIT DOSE IN NEBULIZER EVERY 6 HOURS AS NEEDED  Quantity: 1;  Refills: 5    Malai Henri GARCIA ;  Started 12-July-2016 Active3 ML Plas Cont (125 Plas Conts), Disp: , Rfl:     aspirin 81 MG tablet, Aspirin 81 MG TABS Take 1 tablet daily  Refills: 0  Active, Disp: , Rfl:     atorvastatin (LIPITOR) 20 mg tablet, Take 40 mg by mouth Medrol Dose Pack scheduling ONLY  , Disp: , Rfl:     baclofen 20 mg tablet, Baclofen 20 MG Oral Tablet TAKE 2 TABLETS in AM 1 tablet at NOON and 1 tablet in PM  Refills: 0  Active, Disp: , Rfl:     baclofen 20 mg tablet, Take 1 tablet (20 mg total) by mouth 3 (three) times a day, Disp: 270 tablet, Rfl: 1    baclofen 20 mg tablet, TAKE 1 TABLET TWICE DAILY, Disp: 60 tablet, Rfl: 1    budesonide (PULMICORT FLEXHALER) 180 MCG/ACT inhaler, Pulmicort Flexhaler 180 MCG/ACT Inhalation Aerosol Powder Breath Activated INHALE 1 PUFF TWICE DAILY  RINSE MOUTH AFTER USE    Quantity: 1;  Refills: 0    Maila GARCIA ; Started 12-July-2016 Active, Disp: , Rfl:     butalbital-acetaminophen-caffeine (FIORICET,ESGIC) -40 mg per tablet, Take 1 tablet by mouth every 4 (four) hours as needed for headaches, Disp: 12 tablet, Rfl: 4    Butalbital-APAP-Caffeine -40 MG per capsule, Take by mouth, Disp: , Rfl:     Cyanocobalamin (VITAMIN B-12 PO), Take by mouth Take as directed, Disp: , Rfl:     Dimethyl Fumarate (TECFIDERA) 240 MG CPDR, Tecfidera 240 MG Oral Capsule Delayed Release Take 1 capsule twice daily  Quantity: 180;  Refills: 3    Trevon Mancilla MD;  Started 11-Apr-2016 Active, Disp: , Rfl:     EPINEPHrine (EPIPEN) 0 3 mg/0 3 mL SOAJ, Inject 0 3 mg into the shoulder, thigh, or buttocks, Disp: , Rfl:     ergocalciferol (VITAMIN D2) 50,000 units, TAKE ONE CAPSULE WEEKLY WITH FOOD, Disp: , Rfl: 1    ERGOCALCIFEROL PO, Ergocalciferol 75998 UNIT Oral Capsule TAKE 1 CAPSULE Weekly  Refills: 0  Active, Disp: , Rfl:     famotidine (PEPCID) 20 mg tablet, Famotidine 20 MG Oral Tablet TAKE 1 TABLET DAILY AS DIRECTED    Refills: 0  Active, Disp: , Rfl:     gabapentin (NEURONTIN) 300 mg capsule, Gabapentin 300 MG Oral Capsule Take 2 caps in AM, 1 cap at NOON and 1 cap in PM  Refills: 0  Active, Disp: , Rfl:     indomethacin (INDOCIN) 50 mg capsule, Indomethacin CAPS TAKE 1 CAPSULE Daily PRN  Refills: 0  Active, Disp: , Rfl:     levocetirizine (XYZAL) 5 MG tablet, Take 1 tablet (5 mg total) by mouth as needed (hives), Disp: 30 tablet, Rfl: 0    NIFEdipine (ADALAT CC) 90 mg 24 hr tablet, Take 1 tablet by mouth daily, Disp: 7 tablet, Rfl: 0    oseltamivir (TAMIFLU) 75 mg capsule, Take by mouth, Disp: , Rfl:     predniSONE 20 mg tablet, TAKE 2 TABS FOR 3 DAYS AND 1 TAB FOR 3 DAYS AND THEN 1/2 TAB FOR 3 DAYS, Disp: , Rfl: 1    Psyllium (METAMUCIL) 48 57 % POWD, Take by mouth, Disp: , Rfl:     sertraline (ZOLOFT) 50 mg tablet, Sertraline HCl - 50 MG Oral Tablet TAKE 3 TABLETS DAILY  Refills: 0  Active, Disp: , Rfl:    SUMAtriptan (IMITREX) 100 mg tablet, Take 100 mg by mouth once as needed for migraine, Disp: , Rfl:     tolterodine (DETROL LA) 4 mg 24 hr capsule, Take 1 capsule by mouth daily, Disp: , Rfl:     valACYclovir (VALTREX) 1,000 mg tablet, Take 1,000 mg by mouth as needed, Disp: , Rfl:   Allergies   Allergen Reactions    Imitrex [Sumatriptan] Headache    Keflex [Cephalexin] Rash    Nuts Shortness Of Breath and Anaphylaxis    Other Anaphylaxis, Hives and Itching     Annotation - [id number]: POISON IVY   walnuts & cashews  Cats    Shellfish Allergy Shortness Of Breath and Swelling    Shellfish-Derived Products Shortness Of Breath and Swelling    Copaxone [Glatiramer Acetate] Hives    Seasonal Ic  [Cholestatin]     Pollen Extract Sneezing     Vitals:    03/15/18 1001   BP: 100/80   Pulse: 78   Resp: 16   Temp: (!) 96 9 °F (36 1 °C)       Physical Exam   Constitutional: General appearance: The Patient is well-developed and well-nourished who appears the stated age in no acute distress  Patient is pleasant and talkative  HEENT:  Normocephalic  Sclerae are anicteric  Mucous membranes are moist  Neck is supple without adenopathy  No JVD  Chest: The lungs are clear to auscultation  Cardiac: Heart is regular rate  Abdomen: Abdomen is soft, non-tender, non-distended and without masses  Extremities: There is no clubbing or cyanosis  There is no edema  Symmetric  Neuro: Grossly nonfocal  Gait is normal      Lymphatic: No evidence of cervical adenopathy bilaterally  No evidence of axillary adenopathy bilaterally  No evidence of inguinal adenopathy bilaterally  Skin: Warm, anicteric  Psych:  Patient is pleasant and talkative  Breasts:      Pathology:      Labs:      Imaging    I reviewed the above laboratory and imaging data  Discussion/Summary:  76-year-old female with a prominent small bowel mesenteric lymph nodes  I suspect this is benign    There is no obvious cause of her intermittent right lower quadrant pain  I have recommended obtaining a CT with contrast   If this shows any underlying abnormalities, we will address this at her follow-up visit  If this shows stable lymphadenopathy, this will need to be followed  I have recommended that she follow up with GI regarding her abdominal pain assuming the CT is negative  She may need to be started on pancreas enzymes given these new greasy stools  I will see her back once we have the results of the CT  She is agreeable to this  All her questions were answered

## 2018-03-15 NOTE — LETTER
March 15, 2018     Tanya Kinney, DO  [address] [place], [place] [id number]    Patient: Eugenia Taylor   YOB: 1960   Date of Visit: 3/15/2018       Dear Dr Jennie Whitley:    Thank you for referring Roslyn Douhgerty to me for evaluation  Below are my notes for this consultation  If you have questions, please do not hesitate to call me  I look forward to following your patient along with you  Sincerely,        Naty Knapp MD        CC: MD Alberto Felder MD [place] Bucks, MD Rosaland Gouty, MD  3/15/2018 10:25 AM  Sign at close encounter               Surgical Oncology Follow Up       [address]  1960  [id number]      Diagnoses and all orders for this visit:    Mesenteric panniculitis ([facility] [address] )  -     CT abdomen pelvis w contrast; Future  -     BUN; Future  -     Creatinine, serum; Future        Chief Complaint   Patient presents with    New patient consultation     Patient is here for consultation for abnormal CT from last year, possible mesenteric panniculitis  No history exists  History of Present Illness:   62-year-old female who for the last year has been having some right-sided abdominal pain  This was mostly in the right lower quadrant  This happens once every 1-2 weeks  It is not related to food intake  There is no fevers or chills  She also notices a swelling in this area and that sometimes becomes hard  She also notices a pins and needle sensation which she describes as crawling of ants in her abdomen  Over the last 2 months she has also been having vomiting of mucus, but no real nausea  She has also been complaining of greasy stools over that time period  There is a family history of gastric cancer and pancreatitis  A CT May 5, 2017 reveals haziness of the mesentery with numerous small lymph nodes    I personally reviewed the films  Review of Systems   Gastrointestinal: Positive for abdominal pain (right abdomen, pins & needles sensation) and vomiting (mucus)  Greasy stools     Complete ROS Surg Onc:   Constitutional: The patient denies new or recent history of general fatigue, no recent weight loss, no change in appetite  Eyes: No complaints of visual problems, no scleral icterus  ENT: no complaints of ear pain, no hoarseness, no difficulty swallowing,  no tinnitus and no new masses in head, oral cavity, or neck  Cardiovascular: No complaints of chest pain, no palpitations, no ankle edema  Respiratory: No complaints of shortness of breath, no cough  Gastrointestinal: No complaints of jaundice, no bloody stools, no pale stools  Genitourinary: No complaints of dysuria, no hematuria, no nocturia, no frequent urination, no urethral discharge  Musculoskeletal: No complaints of weakness, paralysis, joint stiffness or arthralgias  Integumentary: No complaints of rash, no new lesions  Neurological: No complaints of convulsions, no seizures, no dizziness  Hematologic/Lymphatic: No complaints of easy bruising  Endocrine:  No hot or cold intolerance  No polydipsia, polyphagia, or polyuria  Allergy/immunology:  No environmental allergies  No food allergies  Not immunocompromised  Skin:  No pallor or rash  No wound            Patient Active Problem List   Diagnosis    Trigger ring finger of left hand    S/P trigger finger release    Ambulatory dysfunction    Chronic right-sided headaches    Multiple sclerosis (HCC)    Panniculitis    [facility]     Past Medical History:   Diagnosis Date    Asthma     Cardiac disease     COPD [facility]chronic obstructive pulmonary disease) [facility] [address] )     Depression     Hyperlipidemia     Hypertension     Influenza     December 2017    Irregular heart beat     Migraine     Migraine     MRSA infection     [facility]     Multiple sclerosis ([facility] [address] )     Nephrolithiasis     Neurogenic bladder     Panniculitis      Past Surgical History:   Procedure Laterality Date    COLONOSCOPY      DILATION AND CURETTAGE OF UTERUS      SC COLONOSCOPY FLX DX W/COLLJ SPEC WHEN PFRMD N/A 1/11/2018    Procedure: COLONOSCOPY;  Surgeon: Sindy Winston MD;  [facility]; Service: Gastroenterology    SC INCISE FINGER TENDON SHEATH Left 1/17/2018    Procedure: RING TRIGGER FINGER RELEASE;  Surgeon: Adria Hu MD;  Location:  MAIN OR;  Service: Orthopedics    UTERINE FIBROID SURGERY       Family History   Problem Relation Age of Onset    Stroke Mother     Diabetes Mother     Hypertension Mother     Heart disease Father     Diabetes Father     Hypertension Father     Diabetes Sister     Hypertension Brother     Breast cancer Maternal Aunt     Stomach cancer Maternal Aunt      Social History     Social History    Marital status: /Civil Union     Spouse name: N/A    Number of children: N/A    Years of education: N/A     Occupational History    Not on file  Social History Main Topics    Smoking status: Never Smoker    Smokeless tobacco: Never Used    Alcohol use Yes      Comment: social    Drug use: Yes     Types: Marijuana      Comment: last use 4 days ago   Sexual activity: Not on file     Other Topics Concern    Not on file     Social History Narrative    No narrative on file       Current Outpatient Prescriptions:     albuterol (2 5 mg/3 mL) 0 083 % nebulizer solution, Albuterol Sulfate (2 5 MG/3ML) 0 083% Inhalation Nebulization Solution USE 1 UNIT DOSE IN NEBULIZER EVERY 6 HOURS AS NEEDED    Quantity: 1;  Refills: 5    Lenon Settles M D ;  Started 12-July-2016 Active3 ML Plas Cont (125 Plas Conts), Disp: , Rfl:     aspirin 81 MG tablet, Aspirin 81 MG TABS Take 1 tablet daily  Refills: 0  Active, Disp: , Rfl:     atorvastatin (LIPITOR) 20 mg tablet, Take 40 mg by mouth Medrol Dose Pack scheduling ONLY  , Disp: , Rfl:     baclofen 20 mg tablet, Baclofen 20 MG Oral Tablet TAKE 2 TABLETS in AM 1 tablet at NOON and 1 tablet in PM  Refills: 0  Active, Disp: , Rfl:     baclofen 20 mg tablet, Take 1 tablet (20 mg total) by mouth 3 (three) times a day, Disp: 270 tablet, Rfl: 1    baclofen 20 mg tablet, TAKE 1 TABLET TWICE DAILY, Disp: 60 tablet, Rfl: 1    budesonide (PULMICORT FLEXHALER) 180 MCG/ACT inhaler, Pulmicort Flexhaler 180 MCG/ACT Inhalation Aerosol Powder Breath Activated INHALE 1 PUFF TWICE DAILY  RINSE MOUTH AFTER USE  Quantity: 1;  Refills: 0    Niels GARCIA ;  Started 12-July-2016 Active, Disp: , Rfl:     butalbital-acetaminophen-caffeine (FIORICET,ESGIC) -40 mg per tablet, Take 1 tablet by mouth every 4 (four) hours as needed for headaches, Disp: 12 tablet, Rfl: 4    Butalbital-APAP-Caffeine -40 MG per capsule, Take by mouth, Disp: , Rfl:     Cyanocobalamin (VITAMIN B-12 PO), Take by mouth Take as directed, Disp: , Rfl:     Dimethyl Fumarate (TECFIDERA) 240 MG CPDR, Tecfidera 240 MG Oral Capsule Delayed Release Take 1 capsule twice daily  Quantity: 180;  Refills: 3    Joana Hathaway MD;  Started 11-Apr-2016 Active, Disp: , Rfl:     EPINEPHrine (EPIPEN) 0 3 mg/0 3 mL SOAJ, Inject 0 3 mg into the shoulder, thigh, or buttocks, Disp: , Rfl:     ergocalciferol (VITAMIN D2) 50,000 units, TAKE ONE CAPSULE WEEKLY WITH FOOD, Disp: , Rfl: 1    ERGOCALCIFEROL PO, Ergocalciferol 78779 UNIT Oral Capsule TAKE 1 CAPSULE Weekly  Refills: 0  Active, Disp: , Rfl:     famotidine (PEPCID) 20 mg tablet, Famotidine 20 MG Oral Tablet TAKE 1 TABLET DAILY AS DIRECTED    Refills: 0  Active, Disp: , Rfl:     gabapentin (NEURONTIN) 300 mg capsule, Gabapentin 300 MG Oral Capsule Take 2 caps in AM, 1 cap at NOON and 1 cap in PM  Refills: 0  Active, Disp: , Rfl:     indomethacin (INDOCIN) 50 mg capsule, Indomethacin CAPS TAKE 1 CAPSULE Daily PRN  Refills: 0  Active, Disp: , Rfl:    levocetirizine (XYZAL) 5 MG tablet, Take 1 tablet (5 mg total) by mouth as needed (hives), Disp: 30 tablet, Rfl: 0    NIFEdipine (ADALAT CC) 90 mg 24 hr tablet, Take 1 tablet by mouth daily, Disp: 7 tablet, Rfl: 0    oseltamivir (TAMIFLU) 75 mg capsule, Take by mouth, Disp: , Rfl:     predniSONE 20 mg tablet, TAKE 2 TABS FOR 3 DAYS AND 1 TAB FOR 3 DAYS AND THEN 1/2 TAB FOR 3 DAYS, Disp: , Rfl: 1    Psyllium (METAMUCIL) 48 57 % POWD, Take by mouth, Disp: , Rfl:     sertraline (ZOLOFT) 50 mg tablet, Sertraline HCl - 50 MG Oral Tablet TAKE 3 TABLETS DAILY  Refills: 0  Active, Disp: , Rfl:     SUMAtriptan (IMITREX) 100 mg tablet, Take 100 mg by mouth once as needed for migraine, Disp: , Rfl:     tolterodine (DETROL LA) 4 mg 24 hr capsule, Take 1 capsule by mouth daily, Disp: , Rfl:     valACYclovir (VALTREX) 1,000 mg tablet, Take 1,000 mg by mouth as needed, Disp: , Rfl:   Allergies   Allergen Reactions    Imitrex [Sumatriptan] Headache    Keflex [Cephalexin] Rash    Nuts Shortness Of Breath and Anaphylaxis    Other Anaphylaxis, Hives and Itching     [facility] [id number]: POISON IVY   walnuts & cashews  Cats    Shellfish Allergy Shortness Of Breath and Swelling    Shellfish-Derived Products Shortness Of Breath and Swelling    Copaxone [Glatiramer Acetate] Hives    Seasonal Ic  [Cholestatin]     Pollen Extract Sneezing     Vitals:    03/15/18 1001   BP: 100/80   Pulse: 78   Resp: 16   Temp: (!) 96 9 °F (36 1 °C)       Physical Exam   Constitutional: General appearance: The Patient is well-developed and well-nourished who appears the stated age in no acute distress  Patient is pleasant and talkative  HEENT:  Normocephalic  Sclerae are anicteric  Mucous membranes are moist  Neck is supple without adenopathy  No JVD  Chest: The lungs are clear to auscultation  Cardiac: Heart is regular rate  Abdomen: Abdomen is soft, non-tender, non-distended and without masses  Extremities:  There is no clubbing or cyanosis  There is no edema  Symmetric  Neuro: Grossly nonfocal  Gait is normal      Lymphatic: No evidence of cervical adenopathy bilaterally  No evidence of axillary adenopathy bilaterally  No evidence of inguinal adenopathy bilaterally  Skin: Warm, anicteric  Psych:  Patient is pleasant and talkative  Breasts:      Pathology:      Labs:      Imaging    I reviewed the above laboratory and imaging data  Discussion/Summary:  53-year-old female with a prominent small bowel mesenteric lymph nodes  I suspect this is benign  There is no obvious cause of her intermittent right lower quadrant pain  I have recommended obtaining a CT with contrast   If this shows any underlying abnormalities, we will address this at her follow-up visit  If this shows stable lymphadenopathy, this will need to be followed  I have recommended that she follow up with GI regarding her abdominal pain assuming the CT is negative  She may need to be started on pancreas enzymes given these new greasy stools  I will see her back once we have the results of the CT  She is agreeable to this  All her questions were answered

## 2018-03-16 LAB
BUN SERPL-MCNC: 9 MG/DL (ref 7–25)
CREAT SERPL-MCNC: 0.64 MG/DL (ref 0.5–1.05)
SL AMB EGFR AFRICAN AMERICAN: 113 ML/MIN/1.73M2
SL AMB EGFR NON AFRICAN AMERICAN: 98 ML/MIN/1.73M2

## 2018-03-19 ENCOUNTER — HOSPITAL ENCOUNTER (OUTPATIENT)
Dept: CT IMAGING | Facility: HOSPITAL | Age: 58
Discharge: HOME | End: 2018-03-19
Attending: SURGERY
Payer: COMMERCIAL

## 2018-03-19 DIAGNOSIS — K65.4 MESENTERIC PANNICULITIS (HCC): ICD-10-CM

## 2018-03-19 PROCEDURE — 74177 CT ABD & PELVIS W/CONTRAST: CPT

## 2018-03-19 RX ADMIN — IOHEXOL 100 ML: 350 INJECTION, SOLUTION INTRAVENOUS at 08:27

## 2018-03-22 DIAGNOSIS — G35 MULTIPLE SCLEROSIS (HCC): Primary | ICD-10-CM

## 2018-03-22 RX ORDER — GABAPENTIN 300 MG/1
CAPSULE ORAL
Qty: 270 CAPSULE | Refills: 1 | Status: SHIPPED | OUTPATIENT
Start: 2018-03-22 | End: 2018-09-29 | Stop reason: SDUPTHER

## 2018-04-03 ENCOUNTER — HOSPITAL ENCOUNTER (EMERGENCY)
Facility: HOSPITAL | Age: 58
Discharge: HOME | End: 2018-04-03
Payer: COMMERCIAL

## 2018-04-03 VITALS
RESPIRATION RATE: 18 BRPM | DIASTOLIC BLOOD PRESSURE: 65 MMHG | OXYGEN SATURATION: 98 % | BODY MASS INDEX: 25.91 KG/M2 | SYSTOLIC BLOOD PRESSURE: 149 MMHG | HEIGHT: 60 IN | TEMPERATURE: 98.3 F | WEIGHT: 132 LBS | HEART RATE: 70 BPM

## 2018-04-03 DIAGNOSIS — L25.9 CONTACT DERMATITIS, UNSPECIFIED CONTACT DERMATITIS TYPE, UNSPECIFIED TRIGGER: Primary | ICD-10-CM

## 2018-04-03 PROCEDURE — 99282 EMERGENCY DEPT VISIT SF MDM: CPT

## 2018-04-03 RX ORDER — PREDNISONE 20 MG/1
40 TABLET ORAL DAILY
Qty: 8 TABLET | Refills: 0 | Status: SHIPPED | OUTPATIENT
Start: 2018-04-03 | End: 2018-04-07

## 2018-04-03 RX ORDER — MUPIROCIN CALCIUM 20 MG/G
CREAM TOPICAL 3 TIMES DAILY
Qty: 15 G | Refills: 0 | Status: SHIPPED | OUTPATIENT
Start: 2018-04-03 | End: 2019-08-15 | Stop reason: ALTCHOICE

## 2018-04-03 NOTE — DISCHARGE INSTRUCTIONS
Contact Dermatitis   WHAT YOU NEED TO KNOW:   Contact dermatitis is a skin rash  It develops when you touch something that irritates your skin or causes an allergic reaction  DISCHARGE INSTRUCTIONS:   Call 911 for any of the following:   · You have sudden trouble breathing  · Your throat swells and you have trouble eating  · Your face is swollen  Contact your healthcare provider if:   · You have a fever  · Your blisters are draining pus  · Your rash spreads or does not get better, even after treatment  · You have questions or concerns about your condition or care  Medicines:   · Medicines  help decrease itching and swelling  They will be given as a topical medicine to apply to your rash or as a pill  · Take your medicine as directed  Contact your healthcare provider if you think your medicine is not helping or if you have side effects  Tell him or her if you are allergic to any medicine  Keep a list of the medicines, vitamins, and herbs you take  Include the amounts, and when and why you take them  Bring the list or the pill bottles to follow-up visits  Carry your medicine list with you in case of an emergency  Manage contact dermatitis:   · Take short baths or showers in cool water  Use mild soap or soap-free cleansers  Add oatmeal, baking soda, or cornstarch to the bath water to help decrease skin irritation  · Avoid skin irritants , such as makeup, hair products, soaps, and cleansers  Use products that do not contain perfume or dye  · Apply a cool compress to your rash  This will help soothe your skin  · Keep your skin moist   Rub unscented cream or lotion on your skin to prevent dryness and itching  Do this right after a bath or shower when your skin is still damp  Follow up with your healthcare provider or dermatologist in 2 to 3 days:  Write down your questions so you remember to ask them during your visits     © 2017 [employer] Information is for End User's use only and may not be sold, redistributed or otherwise used for commercial purposes  All illustrations and images included in CareNotes® are the copyrighted property of A D A M , Inc  or [employer]  The above information is an  only  It is not intended as medical advice for individual conditions or treatments  Talk to your doctor, nurse or pharmacist before following any medical regimen to see if it is safe and effective for you

## 2018-04-03 NOTE — ED PROVIDER NOTES
History  Chief Complaint   Patient presents with    Rash     pt with red, itching rash to the left cheek, started on easter evening     60 y o  Female presents with c o  Raised itchy rash on left cheek x 3 days; Notes vesicles were present but have since scabbed, has been applying calamine lotion and apple cider vinegar to the area  Has been taking Benadryl at night  Denies known contact with allergen, Poison Ivy, new soaps, lotions, detergents, make up, perfumes, body wash or shampoos  Does note to have had shingles in the past and this does NOT feel similar  Recently taken off a medication secondary to poor wound healing, but no new medications  Prior to Admission Medications   Prescriptions Last Dose Informant Patient Reported? Taking? Cyanocobalamin (VITAMIN B-12 PO)   Yes No   Sig: Take by mouth Take as directed   Dimethyl Fumarate (TECFIDERA) 240 MG CPDR  Self Yes No   Sig: Tecfidera 240 MG Oral Capsule Delayed Release  Take 1 capsule twice daily   Quantity: 180;  Refills: 3       Kodak Sim MD;  Started 11-Apr-2016  Active   EPINEPHrine (EPIPEN) 0 3 mg/0 3 mL SOAJ  Self Yes No   Sig: Inject 0 3 mg into the shoulder, thigh, or buttocks   NIFEdipine (ADALAT CC) 90 mg 24 hr tablet  Self No No   Sig: Take 1 tablet by mouth daily   Psyllium (METAMUCIL) 48 57 % POWD  Self Yes No   Sig: Take by mouth   SUMAtriptan (IMITREX) 100 mg tablet  Self Yes No   Sig: Take 100 mg by mouth once as needed for migraine   albuterol (2 5 mg/3 mL) 0 083 % nebulizer solution  Self Yes No   Sig: Albuterol Sulfate (2 5 MG/3ML) 0 083% Inhalation Nebulization Solution  USE 1 UNIT DOSE IN NEBULIZER EVERY 6 HOURS AS NEEDED     Quantity: 1;  Refills: 5       Lori GARCIA ;  Started 12-July-2016  Active3 ML Plas Cont (125 Plas Conts)   aspirin 81 MG tablet  Self Yes No   Sig: Aspirin 81 MG TABS  Take 1 tablet daily   Refills: 0    Active   atorvastatin (LIPITOR) 20 mg tablet  Self Yes No   Sig: Take 40 mg by mouth Medrol Dose Pack scheduling ONLY     baclofen 20 mg tablet   No No   Sig: Take 1 tablet (20 mg total) by mouth 3 (three) times a day   budesonide (PULMICORT FLEXHALER) 180 MCG/ACT inhaler  Self Yes No   Sig: Pulmicort Flexhaler 180 MCG/ACT Inhalation Aerosol Powder Breath Activated  INHALE 1 PUFF TWICE DAILY  RINSE MOUTH AFTER USE  Quantity: 1;  Refills: 0       Suman Severs M D ;  Started 12-July-2016  Active   butalbital-acetaminophen-caffeine (FIORICET,ESGIC) -40 mg per tablet   No No   Sig: Take 1 tablet by mouth every 4 (four) hours as needed for headaches   ergocalciferol (VITAMIN D2) 50,000 units  Self Yes No   Sig: TAKE ONE CAPSULE WEEKLY WITH FOOD   famotidine (PEPCID) 20 mg tablet  Self Yes No   Sig: Famotidine 20 MG Oral Tablet  TAKE 1 TABLET DAILY AS DIRECTED     Refills: 0    Active   gabapentin (NEURONTIN) 300 mg capsule   No No   Sig: TAKE 1 CAPSULE 3 TIMES DAILY   indomethacin (INDOCIN) 50 mg capsule  Self Yes No   Sig: Indomethacin CAPS  TAKE 1 CAPSULE Daily PRN   Refills: 0    Active   levocetirizine (XYZAL) 5 MG tablet   No No   Sig: Take 1 tablet (5 mg total) by mouth as needed (hives)   oseltamivir (TAMIFLU) 75 mg capsule  Self Yes No   Sig: Take by mouth   predniSONE 20 mg tablet  Self Yes No   Sig: TAKE 2 TABS FOR 3 DAYS AND 1 TAB FOR 3 DAYS AND THEN 1/2 TAB FOR 3 DAYS   sertraline (ZOLOFT) 50 mg tablet  Self Yes No   Sig: Sertraline HCl - 50 MG Oral Tablet  TAKE 3 TABLETS DAILY   Refills: 0    Active   tolterodine (DETROL LA) 4 mg 24 hr capsule  Self Yes No   Sig: Take 1 capsule by mouth daily   valACYclovir (VALTREX) 1,000 mg tablet  Self Yes No   Sig: Take 1,000 mg by mouth as needed      Facility-Administered Medications: None       Past Medical History:   Diagnosis Date    Asthma     Cardiac disease     COPD (chronic obstructive pulmonary disease) (HCC)     Depression     Hyperlipidemia     Hypertension     Influenza     December 2017    Irregular heart beat     Migraine  Migraine     MS (multiple sclerosis) (HCC)     [facility] sclerosis ([facility]     Nephrolithiasis     Neurogenic bladder     Panniculitis        Past Surgical History:   Procedure Laterality Date    COLONOSCOPY      DILATION AND CURETTAGE OF UTERUS      IN COLONOSCOPY FLX DX W/COLLJ SPEC WHEN PFRMD N/A 1/11/2018    Procedure: COLONOSCOPY;  Surgeon: Oneil Ledesma MD;  Location: [facility]; Service: Gastroenterology    IN INCISE FINGER TENDON SHEATH Left 1/17/2018    Procedure: RING TRIGGER FINGER RELEASE;  Surgeon: Kriss Bullard MD;  Location:  MAIN OR;  Service: Orthopedics    UTERINE FIBROID SURGERY         Family History   Problem Relation Age of Onset    Stroke Mother     Diabetes Mother     Hypertension Mother     Heart disease Father     Diabetes Father     Hypertension Father     Diabetes Sister     Hypertension Brother     Breast cancer Maternal Aunt     Stomach cancer Maternal Aunt      I have reviewed and agree with the history as documented  Social History   Substance Use Topics    Smoking status: Never Smoker    Smokeless tobacco: Never Used    Alcohol use Yes      Comment: social        Review of Systems   All other systems reviewed and are negative  Physical Exam  ED Triage Vitals [04/03/18 1405]   Temperature Pulse Respirations Blood Pressure SpO2   98 3 °F (36 8 °C) 70 18 149/65 98 %      Temp Source Heart Rate Source Patient Position - Orthostatic VS BP Location FiO2 (%)   Oral -- Sitting Left arm --      Pain Score       No Pain           Orthostatic Vital Signs  Vitals:    04/03/18 1405   BP: 149/65   Pulse: 70   Patient Position - Orthostatic VS: Sitting       Physical Exam   Constitutional: She is oriented to person, place, and time  She appears well-developed and well-nourished  HENT:   Head: Normocephalic and atraumatic     Right Ear: External ear normal    Left Ear: External ear normal    Nose: Nose normal    Mouth/Throat: Oropharynx is clear and moist    Eyes: Conjunctivae are normal    Neck: Normal range of motion  Neck supple  Cardiovascular: Normal rate, regular rhythm and normal heart sounds  Pulmonary/Chest: Effort normal    Abdominal: Soft  Musculoskeletal: Normal range of motion  Neurological: She is alert and oriented to person, place, and time  Skin: Skin is warm  Capillary refill takes less than 2 seconds  Rash noted  Psychiatric: She has a normal mood and affect  Her behavior is normal    Nursing note and vitals reviewed  ED Medications  Medications - No data to display    Diagnostic Studies  Results Reviewed     None                 No orders to display              Procedures  Procedures       Phone Contacts  ED Phone Contact    ED Course  ED Course                                MDM  Number of Diagnoses or Management Options  Contact dermatitis, unspecified contact dermatitis type, unspecified trigger: new and requires workup     Amount and/or Complexity of Data Reviewed  Tests in the radiology section of CPT®: ordered      CritCare Time    Disposition  Final diagnoses:   None     ED Disposition     None      Follow-up Information    None       Patient's Medications   Discharge Prescriptions    No medications on file     No discharge procedures on file      ED Provider  Electronically Signed by           Roxie Hawley PA-C  04/08/18 1131

## 2018-04-11 DIAGNOSIS — J30.1 CHRONIC SEASONAL ALLERGIC RHINITIS DUE TO POLLEN: ICD-10-CM

## 2018-04-11 RX ORDER — LEVOCETIRIZINE DIHYDROCHLORIDE 5 MG/1
5 TABLET, FILM COATED ORAL AS NEEDED
Qty: 30 TABLET | Refills: 0 | Status: SHIPPED | OUTPATIENT
Start: 2018-04-11 | End: 2018-05-13 | Stop reason: SDUPTHER

## 2018-04-19 ENCOUNTER — OFFICE VISIT (OUTPATIENT)
Dept: SURGICAL ONCOLOGY | Facility: CLINIC | Age: 58
End: 2018-04-19
Payer: COMMERCIAL

## 2018-04-19 VITALS
HEIGHT: 60 IN | DIASTOLIC BLOOD PRESSURE: 72 MMHG | RESPIRATION RATE: 14 BRPM | TEMPERATURE: 98.7 F | HEART RATE: 85 BPM | BODY MASS INDEX: 32.79 KG/M2 | SYSTOLIC BLOOD PRESSURE: 128 MMHG | WEIGHT: 167 LBS

## 2018-04-19 DIAGNOSIS — M79.3 PANNICULITIS: Primary | ICD-10-CM

## 2018-04-19 PROCEDURE — 99213 OFFICE O/P EST LOW 20 MIN: CPT | Performed by: SURGERY

## 2018-04-19 NOTE — PROGRESS NOTES
Surgical Oncology Follow Up       [facility]  [facility] [place]  [address]  1960  [phone]  [place]  [address]  [facility]  [facility] [place]  [address] [address]  Diagnoses and all orders for this visit:    Panniculitis  -     CT abdomen pelvis w contrast; Future  -     BUN; Future  -     Creatinine, serum; Future      Chief Complaint   Patient presents with    Follow-up     CT scan 03/18, resolving Mesenteric Panniculitis          No history exists  History of Present Illness:   Patient returns in follow-up of her CT  She says her right-sided abdominal pain as actually decreased in frequency  She still notices that the area on the right side occasionally becomes hard  She thinks that her increasing her Pepcid and removing red meat from her diet has improved the discomfort  CT from March 19, 2018 revealed stable haziness of the mesenteric fat with nonenlarged mesenteric lymph nodes  This was felt to be stable to her CT in May of 2017  I personally reviewed the films  Review of Systems  Complete ROS Surg Onc:   Complete ROS Surg Onc:   Constitutional: The patient denies new or recent history of general fatigue, no recent weight loss, no change in appetite  Eyes: No complaints of visual problems, no scleral icterus  ENT: no complaints of ear pain, no hoarseness, no difficulty swallowing,  no tinnitus and no new masses in head, oral cavity, or neck  Cardiovascular: No complaints of chest pain, no palpitations, no ankle edema  Respiratory: No complaints of shortness of breath, no cough  Gastrointestinal: No complaints of jaundice, no bloody stools, no pale stools  Genitourinary: No complaints of dysuria, no hematuria, no nocturia, no frequent urination, no urethral discharge     Musculoskeletal: No complaints of weakness, paralysis, joint stiffness or arthralgias  Integumentary: No complaints of rash, no new lesions  Neurological: No complaints of convulsions, no seizures, no dizziness  Hematologic/Lymphatic: No complaints of easy bruising  Endocrine:  No hot or cold intolerance  No polydipsia, polyphagia, or polyuria  Allergy/immunology:  No environmental allergies  No food allergies  Not immunocompromised  Skin:  No pallor or rash  No wound  Patient Active Problem List   Diagnosis    Trigger ring finger of left hand    S/P trigger finger release    Ambulatory dysfunction    Chronic right-sided headaches    Multiple sclerosis (HCC)    Panniculitis    Mesenteric panniculitis ([facility] )     Past Medical History:   Diagnosis Date    Asthma     Cardiac disease     COPD (chronic obstructive pulmonary disease) ([facility] )     Depression     Hyperlipidemia     Hypertension     Influenza     December 2017    Irregular heart beat     Migraine     Migraine     [facility]     Multiple sclerosis ([facility] )     Nephrolithiasis     Neurogenic bladder     Panniculitis      Past Surgical History:   Procedure Laterality Date    COLONOSCOPY      DILATION AND CURETTAGE OF UTERUS      [place] COLONOSCOPY FLX DX W/COLLJ SPEC WHEN PFRMD N/A 1/11/2018    Procedure: COLONOSCOPY;  Surgeon: Arsenio Espinoza MD;  [facility];   Service: Gastroenterology    [place] INCISE FINGER TENDON SHEATH Left 1/17/2018    Procedure: RING TRIGGER FINGER RELEASE;  Surgeon: Meera Wellington MD;  Location:  MAIN OR;  Service: Orthopedics    UTERINE FIBROID SURGERY       Family History   Problem Relation Age of Onset    Stroke Mother     Diabetes Mother     Hypertension Mother     Heart disease Father     Diabetes Father     Hypertension Father     Diabetes Sister     Hypertension Brother     Breast cancer Maternal Aunt     Stomach cancer Maternal Aunt      Social History     Social History    Marital status: /Civil Union     Spouse name: N/A    Number of children: N/A    Years of education: N/A     Occupational History    Not on file  Social History Main Topics    Smoking status: Never Smoker    Smokeless tobacco: Never Used    Alcohol use Yes      Comment: social    Drug use: Yes     Types: Marijuana      Comment: last use 4 days ago   Sexual activity: Not on file     Other Topics Concern    Not on file     Social History Narrative    No narrative on file       Current Outpatient Prescriptions:     albuterol (2 5 mg/3 mL) 0 083 % nebulizer solution, Albuterol Sulfate (2 5 MG/3ML) 0 083% Inhalation Nebulization Solution USE 1 UNIT DOSE IN NEBULIZER EVERY 6 HOURS AS NEEDED  Quantity: 1;  Refills: 5    Chao Louisville M D ;  Started 12-July-2016 Active3 ML Plas Cont (125 Plas Conts), Disp: , Rfl:     aspirin 81 MG tablet, Aspirin 81 MG TABS Take 1 tablet daily  Refills: 0  Active, Disp: , Rfl:     atorvastatin (LIPITOR) 20 mg tablet, Take 40 mg by mouth Medrol Dose Pack scheduling ONLY  , Disp: , Rfl:     baclofen 20 mg tablet, Take 1 tablet (20 mg total) by mouth 3 (three) times a day, Disp: 270 tablet, Rfl: 1    budesonide (PULMICORT FLEXHALER) 180 MCG/ACT inhaler, Pulmicort Flexhaler 180 MCG/ACT Inhalation Aerosol Powder Breath Activated INHALE 1 PUFF TWICE DAILY  RINSE MOUTH AFTER USE  Quantity: 1;  Refills: 0    Chao Darrell M D ;  Started 12-July-2016 Active, Disp: , Rfl:     butalbital-acetaminophen-caffeine (FIORICET,ESGIC) -40 mg per tablet, Take 1 tablet by mouth every 4 (four) hours as needed for headaches, Disp: 12 tablet, Rfl: 4    EPINEPHrine (EPIPEN) 0 3 mg/0 3 mL SOAJ, Inject 0 3 mg into the shoulder, thigh, or buttocks, Disp: , Rfl:     ergocalciferol (VITAMIN D2) 50,000 units, TAKE ONE CAPSULE WEEKLY WITH FOOD, Disp: , Rfl: 1    famotidine (PEPCID) 20 mg tablet, Famotidine 20 MG Oral Tablet TAKE 1 TABLET DAILY AS DIRECTED    Refills: 0  Active, Disp: , Rfl:     gabapentin (NEURONTIN) 300 mg capsule, TAKE 1 CAPSULE 3 TIMES DAILY, Disp: 270 capsule, Rfl: 1    levocetirizine (XYZAL) 5 MG tablet, TAKE 1 TABLET (5 MG TOTAL) BY MOUTH AS NEEDED (HIVES), Disp: 30 tablet, Rfl: 0    mupirocin (BACTROBAN) 2 % cream, Apply topically 3 (three) times a day, Disp: 15 g, Rfl: 0    NIFEdipine (ADALAT CC) 90 mg 24 hr tablet, Take 1 tablet by mouth daily, Disp: 7 tablet, Rfl: 0    sertraline (ZOLOFT) 50 mg tablet, Sertraline HCl - 50 MG Oral Tablet TAKE 3 TABLETS DAILY  Refills: 0  Active, Disp: , Rfl:     tolterodine (DETROL LA) 4 mg 24 hr capsule, Take 1 capsule by mouth daily, Disp: , Rfl:     valACYclovir (VALTREX) 1,000 mg tablet, Take 1,000 mg by mouth as needed, Disp: , Rfl:   Allergies   Allergen Reactions    Imitrex [Sumatriptan] Headache    Keflex [Cephalexin] Rash    Nuts Shortness Of Breath and Anaphylaxis    Other Anaphylaxis, Hives and Itching     [facility] - [id number]: POISON IVY   walnuts & cashews  Cats    Shellfish Allergy Shortness Of Breath and Swelling    Shellfish-Derived Products Shortness Of Breath and Swelling    Copaxone [Glatiramer Acetate] Hives    Seasonal Ic  [Cholestatin]     Pollen Extract Sneezing     Vitals:    04/19/18 1224   BP: 128/72   Pulse: 85   Resp: 14   Temp: 98 7 °F (37 1 °C)       Physical Exam  Constitutional: General appearance: The Patient is well-developed and well-nourished who appears the stated age in no acute distress  Patient is pleasant and talkative  HEENT:  Normocephalic  Sclerae are anicteric  Mucous membranes are moist  Neck is supple without adenopathy  No JVD  Chest: The lungs are clear to auscultation  Cardiac: Heart is regular rate  Abdomen: Abdomen is soft, non-tender, non-distended and without masses  Extremities: There is no clubbing or cyanosis  There is no edema  Symmetric  Neuro: Grossly nonfocal  Gait is normal      Lymphatic: No evidence of cervical adenopathy bilaterally     No evidence of axillary adenopathy bilaterally  Skin: Warm, anicteric  Psych:  Patient is pleasant and talkative  Breasts:        Pathology:      Labs:      Imaging  No results found  I reviewed the above laboratory and imaging data  Discussion/Summary:   13-year-old female with prominent small bowel mesenteric lymph nodes and haziness of mesenteric fat  I suspect this is panniculitis that has been stable  I doubt this is causing her right-sided abdominal discomfort  I have recommended repeat imaging in 1 year  She will continue with her dietary modifications follow-up with her primary care physician and Gastroenterology  I will see her again in 1 year with a repeat CT  She is agreeable to this  All her questions were answered

## 2018-04-19 NOTE — LETTER
April 19, 2018     José Antonio Curry DO  [address] [place] [id number]    Patient: Jennifer Mendez   YOB: 1960   Date of Visit: 4/19/2018       Dear Dr Cantor Lunch:    Thank you for referring Ayde Amaya to me for evaluation  Below are my notes for this consultation  If you have questions, please do not hesitate to call me  I look forward to following your patient along with you  Sincerely,        Kj Arauz MD        CC: No Recipients  Kj Arauz MD  4/19/2018 12:47 PM  Sign at close encounter               Surgical Oncology Follow Up       [address]  1960  [id number]  [address] [place] [address] [address] [place] [address]  Diagnoses and all orders for this visit:    Panniculitis  -     CT abdomen pelvis w contrast; Future  -     BUN; Future  -     Creatinine, serum; Future      Chief Complaint   Patient presents with    Follow-up     CT scan 03/18, resolving Mesenteric Panniculitis          No history exists  History of Present Illness:   Patient returns in follow-up of her CT  She says her right-sided abdominal pain as actually decreased in frequency  She still notices that the area on the right side occasionally becomes hard  She thinks that her increasing her Pepcid and removing red meat from her diet has improved the discomfort  CT from March 19, 2018 revealed stable haziness of the mesenteric fat with nonenlarged mesenteric lymph nodes  This was felt to be stable to her CT in May of 2017  I personally reviewed the films  Review of Systems  Complete ROS Surg Onc:   Complete ROS Surg Onc:   Constitutional: The patient denies new or recent history of general fatigue, no recent weight loss, no change in appetite     Eyes: No complaints of visual problems, no scleral icterus  ENT: no complaints of ear pain, no hoarseness, no difficulty swallowing,  no tinnitus and no new masses in head, oral cavity, or neck  Cardiovascular: No complaints of chest pain, no palpitations, no ankle edema  Respiratory: No complaints of shortness of breath, no cough  Gastrointestinal: No complaints of jaundice, no bloody stools, no pale stools  Genitourinary: No complaints of dysuria, no hematuria, no nocturia, no frequent urination, no urethral discharge  Musculoskeletal: No complaints of weakness, paralysis, joint stiffness or arthralgias  Integumentary: No complaints of rash, no new lesions  Neurological: No complaints of convulsions, no seizures, no dizziness  Hematologic/Lymphatic: No complaints of easy bruising  Endocrine:  No hot or cold intolerance  No polydipsia, polyphagia, or polyuria  Allergy/immunology:  No environmental allergies  No food allergies  Not immunocompromised  Skin:  No pallor or rash  No wound  Patient Active Problem List   Diagnosis    Trigger ring finger of left hand    S/P trigger finger release    Ambulatory dysfunction    Chronic right-sided headaches    Multiple sclerosis (HCC)    Panniculitis    [facility]     Past Medical History:   Diagnosis Date    Asthma     Cardiac disease     [facility]     Depression     Hyperlipidemia     Hypertension     Influenza     December 2017    Irregular heart beat     Migraine     Migraine     [facility]     Multiple sclerosis ([facility] )     Nephrolithiasis     Neurogenic bladder     Panniculitis      Past Surgical History:   Procedure Laterality Date    COLONOSCOPY      DILATION AND CURETTAGE OF UTERUS      [place] COLONOSCOPY FLX DX W/COLLJ SPEC WHEN PFRMD N/A 1/11/2018    Procedure: COLONOSCOPY;  Surgeon: eMlchor Cloud MD;  [facility]: [facility];   Service: Gastroenterology    [place] INCISE FINGER TENDON SHEATH Left 1/17/2018    Procedure: RING TRIGGER FINGER RELEASE;  Surgeon: Verona Neumann MD;  Location:  MAIN OR;  Service: Orthopedics    UTERINE FIBROID SURGERY       Family History   Problem Relation Age of Onset    Stroke Mother     Diabetes Mother     Hypertension Mother     Heart disease Father     Diabetes Father     Hypertension Father     Diabetes Sister     Hypertension Brother     Breast cancer Maternal Aunt     Stomach cancer Maternal Aunt      Social History     Social History    Marital status: /Civil Union     Spouse name: N/A    Number of children: N/A    Years of education: N/A     Occupational History    Not on file  Social History Main Topics    Smoking status: Never Smoker    Smokeless tobacco: Never Used    Alcohol use Yes      Comment: social    Drug use: Yes     Types: Marijuana      Comment: last use 4 days ago   Sexual activity: Not on file     Other Topics Concern    Not on file     Social History Narrative    No narrative on file       Current Outpatient Prescriptions:     albuterol (2 5 mg/3 mL) 0 083 % nebulizer solution, Albuterol Sulfate (2 5 MG/3ML) 0 083% Inhalation Nebulization Solution USE 1 UNIT DOSE IN NEBULIZER EVERY 6 HOURS AS NEEDED  Quantity: 1;  Refills: 5    Panda Hemp M D ;  Started 12-July-2016 Active3 ML Plas Cont (125 Plas Conts), Disp: , Rfl:     aspirin 81 MG tablet, Aspirin 81 MG TABS Take 1 tablet daily  Refills: 0  Active, Disp: , Rfl:     atorvastatin (LIPITOR) 20 mg tablet, Take 40 mg by mouth Medrol Dose Pack scheduling ONLY  , Disp: , Rfl:     baclofen 20 mg tablet, Take 1 tablet (20 mg total) by mouth 3 (three) times a day, Disp: 270 tablet, Rfl: 1    budesonide (PULMICORT FLEXHALER) 180 MCG/ACT inhaler, Pulmicort Flexhaler 180 MCG/ACT Inhalation Aerosol Powder Breath Activated INHALE 1 PUFF TWICE DAILY  RINSE MOUTH AFTER USE    Quantity: 1;  Refills: 0    Panda Hemp M D ;  Started 12-July-2016 Active, Disp: , Rfl:     butalbital-acetaminophen-caffeine (FIORICET,ESGIC) -40 mg per tablet, Take 1 tablet by mouth every 4 (four) hours as needed for headaches, Disp: 12 tablet, Rfl: 4    EPINEPHrine (EPIPEN) 0 3 mg/0 3 mL SOAJ, Inject 0 3 mg into the shoulder, thigh, or buttocks, Disp: , Rfl:     ergocalciferol (VITAMIN D2) 50,000 units, TAKE ONE CAPSULE WEEKLY WITH FOOD, Disp: , Rfl: 1    famotidine (PEPCID) 20 mg tablet, Famotidine 20 MG Oral Tablet TAKE 1 TABLET DAILY AS DIRECTED  Refills: 0  Active, Disp: , Rfl:     gabapentin (NEURONTIN) 300 mg capsule, TAKE 1 CAPSULE 3 TIMES DAILY, Disp: 270 capsule, Rfl: 1    levocetirizine (XYZAL) 5 MG tablet, TAKE 1 TABLET (5 MG TOTAL) BY MOUTH AS NEEDED (HIVES), Disp: 30 tablet, Rfl: 0    mupirocin (BACTROBAN) 2 % cream, Apply topically 3 (three) times a day, Disp: 15 g, Rfl: 0    NIFEdipine (ADALAT CC) 90 mg 24 hr tablet, Take 1 tablet by mouth daily, Disp: 7 tablet, Rfl: 0    sertraline (ZOLOFT) 50 mg tablet, Sertraline HCl - 50 MG Oral Tablet TAKE 3 TABLETS DAILY  Refills: 0  Active, Disp: , Rfl:     tolterodine (DETROL LA) 4 mg 24 hr capsule, Take 1 capsule by mouth daily, Disp: , Rfl:     valACYclovir (VALTREX) 1,000 mg tablet, Take 1,000 mg by mouth as needed, Disp: , Rfl:   Allergies   Allergen Reactions    Imitrex [Sumatriptan] Headache    Keflex [Cephalexin] Rash    Nuts Shortness Of Breath and Anaphylaxis    Other Anaphylaxis, Hives and Itching     Annotation - [id number]: POISON IVY   walnuts & cashews  Cats    Shellfish Allergy Shortness Of Breath and Swelling    Shellfish-Derived Products Shortness Of Breath and Swelling    Copaxone [Glatiramer Acetate] Hives    Seasonal Ic  [Cholestatin]     Pollen Extract Sneezing     Vitals:    04/19/18 1224   BP: 128/72   Pulse: 85   Resp: 14   Temp: 98 7 °F (37 1 °C)       Physical Exam  Constitutional: General appearance:  The Patient is well-developed and well-nourished who appears the stated age in no acute distress  Patient is pleasant and talkative  HEENT:  Normocephalic  Sclerae are anicteric  Mucous membranes are moist  Neck is supple without adenopathy  No JVD  Chest: The lungs are clear to auscultation  Cardiac: Heart is regular rate  Abdomen: Abdomen is soft, non-tender, non-distended and without masses  Extremities: There is no clubbing or cyanosis  There is no edema  Symmetric  Neuro: Grossly nonfocal  Gait is normal      Lymphatic: No evidence of cervical adenopathy bilaterally  No evidence of axillary adenopathy bilaterally  Skin: Warm, anicteric  Psych:  Patient is pleasant and talkative  Breasts:        Pathology:      Labs:      Imaging  No results found  I reviewed the above laboratory and imaging data  Discussion/Summary:   23-year-old female with prominent small bowel mesenteric lymph nodes and haziness of mesenteric fat  I suspect this is panniculitis that has been stable  I doubt this is causing her right-sided abdominal discomfort  I have recommended repeat imaging in 1 year  She will continue with her dietary modifications follow-up with her primary care physician and Gastroenterology  I will see her again in 1 year with a repeat CT  She is agreeable to this  All her questions were answered

## 2018-04-24 ENCOUNTER — PROCEDURE VISIT (OUTPATIENT)
Dept: NEUROLOGY | Facility: CLINIC | Age: 58
End: 2018-04-24
Payer: COMMERCIAL

## 2018-04-24 DIAGNOSIS — R53.1 WEAKNESS: Primary | ICD-10-CM

## 2018-04-24 PROCEDURE — 95886 MUSC TEST DONE W/N TEST COMP: CPT | Performed by: PHYSICAL MEDICINE & REHABILITATION

## 2018-04-24 PROCEDURE — 95908 NRV CNDJ TST 3-4 STUDIES: CPT | Performed by: PHYSICAL MEDICINE & REHABILITATION

## 2018-04-24 NOTE — PROGRESS NOTES
The procedure was discussed with the patient  Verbal consent was obtained after discussing risks and benefits  A sterile concentric needle electrode was used  The patient tolerated the procedure well  There were no complications  EMG RIGHT LOWER EXTREMITY    Motor and sensory conduction studies were performed on the right peroneal tibial and sural nerves  The peroneal motor terminal latency was normal with a low compound motor action potential amplitude and normal conduction velocity distally and across the fibular head  The tibial compound motor action potential was normal     Right peroneal and tibial F waves were normal     The right sural distal sensory latency was normal with normal sensory action potential amplitudes  H  reflexes were normal symmetrically    Concentric needle EMG was performed in the right EDB, tibialis anterior, medial gastrocnemius, vastus lateralis, gluteus medius and the lumbar paraspinal region  There was no evidence of spontaneous activity seen  Mild-to-moderate decreased recruitment of giant motor units was noted in the gluteus medius and extensor digitorum brevis  The compound motor unit potentials were of normal configuration and interference patterns were full were full for effort  in the remaining muscles tested  IMPRESSION: There is  electrophysiologic evidence  of a:    1  Chronic L5 radiculopathy as evidenced by the low peroneal motor amplitude and chronic denervation changes in the above-mentioned muscles  Clinical and imaging correlation of the lumbosacral spine is suggested      DIVYA Guevara

## 2018-04-26 ENCOUNTER — TRANSCRIBE ORDERS (OUTPATIENT)
Dept: ADMINISTRATIVE | Facility: HOSPITAL | Age: 58
End: 2018-04-26

## 2018-04-26 DIAGNOSIS — I51.7 LEFT ATRIAL ENLARGEMENT: Primary | ICD-10-CM

## 2018-05-02 ENCOUNTER — TELEPHONE (OUTPATIENT)
Dept: PULMONOLOGY | Facility: CLINIC | Age: 58
End: 2018-05-02

## 2018-05-03 DIAGNOSIS — J45.909 ASTHMA, UNSPECIFIED ASTHMA SEVERITY, UNSPECIFIED WHETHER COMPLICATED, UNSPECIFIED WHETHER PERSISTENT: Primary | ICD-10-CM

## 2018-05-03 DIAGNOSIS — Z87.442 PERSONAL HISTORY OF URINARY CALCULI: ICD-10-CM

## 2018-05-13 DIAGNOSIS — J30.1 CHRONIC SEASONAL ALLERGIC RHINITIS DUE TO POLLEN: ICD-10-CM

## 2018-05-14 RX ORDER — LEVOCETIRIZINE DIHYDROCHLORIDE 5 MG/1
TABLET, FILM COATED ORAL
Qty: 30 TABLET | Refills: 0 | Status: SHIPPED | OUTPATIENT
Start: 2018-05-14 | End: 2021-04-12 | Stop reason: ALTCHOICE

## 2018-05-21 ENCOUNTER — HOSPITAL ENCOUNTER (OUTPATIENT)
Dept: ULTRASOUND IMAGING | Facility: HOSPITAL | Age: 58
Discharge: HOME | End: 2018-05-21
Attending: UROLOGY
Payer: COMMERCIAL

## 2018-05-21 DIAGNOSIS — Z87.442 PERSONAL HISTORY OF KIDNEY STONES: ICD-10-CM

## 2018-05-21 PROCEDURE — 76770 US EXAM ABDO BACK WALL COMP: CPT

## 2018-05-23 ENCOUNTER — HOSPITAL ENCOUNTER (OUTPATIENT)
Dept: NON INVASIVE DIAGNOSTICS | Facility: CLINIC | Age: 58
Discharge: HOME | End: 2018-05-23
Payer: COMMERCIAL

## 2018-05-23 DIAGNOSIS — I51.7 LEFT ATRIAL ENLARGEMENT: ICD-10-CM

## 2018-05-23 PROCEDURE — 93306 TTE W/DOPPLER COMPLETE: CPT | Performed by: INTERNAL MEDICINE

## 2018-05-23 PROCEDURE — 93306 TTE W/DOPPLER COMPLETE: CPT

## 2018-05-24 ENCOUNTER — OFFICE VISIT (OUTPATIENT)
Dept: NEUROLOGY | Facility: CLINIC | Age: 58
End: 2018-05-24
Payer: COMMERCIAL

## 2018-05-24 ENCOUNTER — TELEPHONE (OUTPATIENT)
Dept: NEUROLOGY | Facility: CLINIC | Age: 58
End: 2018-05-24

## 2018-05-24 VITALS
SYSTOLIC BLOOD PRESSURE: 130 MMHG | BODY MASS INDEX: 26.7 KG/M2 | HEART RATE: 78 BPM | DIASTOLIC BLOOD PRESSURE: 60 MMHG | WEIGHT: 136 LBS | HEIGHT: 60 IN

## 2018-05-24 DIAGNOSIS — R26.2 AMBULATORY DYSFUNCTION: ICD-10-CM

## 2018-05-24 DIAGNOSIS — G35 MULTIPLE SCLEROSIS (HCC): Primary | ICD-10-CM

## 2018-05-24 DIAGNOSIS — K65.4 MESENTERIC PANNICULITIS (HCC): ICD-10-CM

## 2018-05-24 PROCEDURE — 99215 OFFICE O/P EST HI 40 MIN: CPT | Performed by: PSYCHIATRY & NEUROLOGY

## 2018-05-24 NOTE — TELEPHONE ENCOUNTER
lmom awaiting a call back- left detailed message to make pt aware she is scheduled for her 1 day infusion on Friday 6/1/18 at 10am at the [facility]  Please relay this message to the patient since I will be out of the office  (can close once completed)  Savage Zelaya-    Patient is all scheduled for 6/1/18 at [facility]  Orders were also faxed over on 5/24/18  Thanks!

## 2018-05-24 NOTE — PROGRESS NOTES
Patient ID: Sara Perez is a 60 y o  female  Assessment/Plan:     Problem List Items Addressed This Visit        Nervous and Auditory    Multiple sclerosis ([facility] ) - Primary    Relevant Orders    CBC and differential    Comprehensive metabolic panel       Other    Ambulatory dysfunction    Mesenteric panniculitis ([facility] )           Today I had the pleasure of seeing your patient, Malu Levy, in consultation at [address]  Mrs Edgar Chaves has RRMS with ambulatory dysfunction, ambulating with a cane  Due to mesenteric panniculitis and related issues, patient has been off Tecfidera for the last 3 months  Patient has been working with GI team and surgical oncology with no significant issues has been described  We discussed having single dose of Solumedrol 1000 mcg IV now - patient described worsening weakness in her lower extremities  Recent evaluation :  VZV IgG >4000 00  Cardiology completed 2DEcho with completely normal findings described  Patient will have CBC/CMP and JCV again ( last one was June 2017 JCV was 0 25)   We have 2 forms filled - for Tysabri ( priority) and Gilenya  Based on her JCV status, any of 2 medication forms will be faxed  No new focal neurologic deficit noted on exam      Greater than 50% of the 40 minutes evaluation was a face-to-face discussion regarding  the pathophysiology of her current symptoms and further plan, as well as counseling, educating, and coordinating the patient's care  HPI/History of Present Illness  Mrs Edgar Chaves has a history of relapsing remitting multiple sclerosis, neurogenic bladder,ambulatory disfunction, disbalance, mesenteric panniculitis, cervical disc disease with myelopathy, classic migraine without aura, who presented for follow up on MS and related issues  Patient has worsening generalized weakness since 30 days ago  Fatigue is also making the patient less functional - she ambulates with cane     Patient has not been on DMT since 2/21/2018- we will consider 1 dose of IV Solumedrol  Not confident, but Tecfidera might had contributed to mesenteric panniculitis, as patient has been feeling better and less inflammation on repeated CT A/P  Patient has been having intermittent mild pain in abdominal area, with hardness in right upper quadrant  Follow up with GI team recommended  No new or worsening bladder dysfunction, no visual disturbances, no cognitive decline reported  The following portions of the patient's history were reviewed and updated as appropriate:   She  has a past medical history of Asthma; Cardiac disease; COPD (chronic obstructive pulmonary disease) ([facility] ); Depression; Hyperlipidemia; Hypertension; Influenza; Irregular heart beat; Migraine; Migraine; MS (multiple sclerosis) ([facility] ); Multiple sclerosis ([facility] ); Nephrolithiasis; Neurogenic bladder; and Panniculitis  She   Patient Active Problem List    Diagnosis Date Noted    Mesenteric panniculitis ([facility] ) 03/15/2018    Ambulatory dysfunction 02/21/2018    Chronic right-sided headaches 02/21/2018    Multiple sclerosis ([facility] ) 02/21/2018    Panniculitis 02/21/2018    S/P trigger finger release 02/02/2018    Trigger ring finger of left hand 01/17/2018     She  has a past surgical history that includes Dilation and curettage of uterus; Uterine fibroid surgery; pr colonoscopy flx dx w/collj spec when pfrmd (N/A, 1/11/2018); Colonoscopy; and pr incise finger tendon sheath (Left, 1/17/2018)  Her family history includes Breast cancer in her maternal aunt; Diabetes in her father, mother, and sister; Heart disease in her father; Hypertension in her brother, father, and mother; Stomach cancer in her maternal aunt; Stroke in her mother  She  reports that she has never smoked  She has never used smokeless tobacco  She reports that she drinks alcohol  She reports that she uses drugs, including Marijuana    Current Outpatient Prescriptions   Medication Sig Dispense Refill    albuterol (2 5 mg/3 mL) 0 083 % nebulizer solution Albuterol Sulfate (2 5 MG/3ML) 0 083% Inhalation Nebulization Solution  USE 1 UNIT DOSE IN NEBULIZER EVERY 6 HOURS AS NEEDED  Quantity: 1;  Refills: 5       Corina Vicente GARCIA ;  Started 12-July-2016  Active3 ML Plas Cont (125 Plas Conts)      aspirin 81 MG tablet Aspirin 81 MG TABS  Take 1 tablet daily   Refills: 0    Active      atorvastatin (LIPITOR) 20 mg tablet Take 40 mg by mouth Medrol Dose Pack scheduling ONLY        baclofen 20 mg tablet Take 1 tablet (20 mg total) by mouth 3 (three) times a day 270 tablet 1    budesonide (PULMICORT FLEXHALER) 180 MCG/ACT inhaler Inhale 1 puff 2 (two) times a day 3 Inhaler 3    butalbital-acetaminophen-caffeine (FIORICET,ESGIC) -40 mg per tablet Take 1 tablet by mouth every 4 (four) hours as needed for headaches 12 tablet 4    EPINEPHrine (EPIPEN) 0 3 mg/0 3 mL SOAJ Inject 0 3 mg into the shoulder, thigh, or buttocks      ergocalciferol (VITAMIN D2) 50,000 units TAKE ONE CAPSULE WEEKLY WITH FOOD  1    famotidine (PEPCID) 20 mg tablet Famotidine 20 MG Oral Tablet  TAKE 1 TABLET DAILY AS DIRECTED  Refills: 0    Active      gabapentin (NEURONTIN) 300 mg capsule TAKE 1 CAPSULE 3 TIMES DAILY 270 capsule 1    levocetirizine (XYZAL) 5 MG tablet TAKE 1 TABLET (5 MG TOTAL) BY MOUTH DAILY AS NEEDED (HIVES) 30 tablet 0    NIFEdipine (ADALAT CC) 90 mg 24 hr tablet Take 1 tablet by mouth daily 7 tablet 0    sertraline (ZOLOFT) 50 mg tablet Sertraline HCl - 50 MG Oral Tablet  TAKE 3 TABLETS DAILY   Refills: 0    Active      tolterodine (DETROL LA) 4 mg 24 hr capsule Take 1 capsule by mouth daily      valACYclovir (VALTREX) 1,000 mg tablet Take 1,000 mg by mouth as needed      mupirocin (BACTROBAN) 2 % cream Apply topically 3 (three) times a day 15 g 0     No current facility-administered medications for this visit        Current Outpatient Prescriptions on File Prior to Visit   Medication Sig    albuterol (2 5 mg/3 mL) 0 083 % nebulizer solution Albuterol Sulfate (2 5 MG/3ML) 0 083% Inhalation Nebulization Solution  USE 1 UNIT DOSE IN NEBULIZER EVERY 6 HOURS AS NEEDED  Quantity: 1;  Refills: 5       Chao GARCIA ;  Started 12-July-2016  Active3 ML Plas Cont (125 Plas Conts)    aspirin 81 MG tablet Aspirin 81 MG TABS  Take 1 tablet daily   Refills: 0    Active    atorvastatin (LIPITOR) 20 mg tablet Take 40 mg by mouth Medrol Dose Pack scheduling ONLY      baclofen 20 mg tablet Take 1 tablet (20 mg total) by mouth 3 (three) times a day    budesonide (PULMICORT FLEXHALER) 180 MCG/ACT inhaler Inhale 1 puff 2 (two) times a day    butalbital-acetaminophen-caffeine (FIORICET,ESGIC) -40 mg per tablet Take 1 tablet by mouth every 4 (four) hours as needed for headaches    EPINEPHrine (EPIPEN) 0 3 mg/0 3 mL SOAJ Inject 0 3 mg into the shoulder, thigh, or buttocks    ergocalciferol (VITAMIN D2) 50,000 units TAKE ONE CAPSULE WEEKLY WITH FOOD    famotidine (PEPCID) 20 mg tablet Famotidine 20 MG Oral Tablet  TAKE 1 TABLET DAILY AS DIRECTED  Refills: 0    Active    gabapentin (NEURONTIN) 300 mg capsule TAKE 1 CAPSULE 3 TIMES DAILY    levocetirizine (XYZAL) 5 MG tablet TAKE 1 TABLET (5 MG TOTAL) BY MOUTH DAILY AS NEEDED (HIVES)    NIFEdipine (ADALAT CC) 90 mg 24 hr tablet Take 1 tablet by mouth daily    sertraline (ZOLOFT) 50 mg tablet Sertraline HCl - 50 MG Oral Tablet  TAKE 3 TABLETS DAILY   Refills: 0    Active    tolterodine (DETROL LA) 4 mg 24 hr capsule Take 1 capsule by mouth daily    valACYclovir (VALTREX) 1,000 mg tablet Take 1,000 mg by mouth as needed    mupirocin (BACTROBAN) 2 % cream Apply topically 3 (three) times a day     No current facility-administered medications on file prior to visit        She is allergic to imitrex [sumatriptan]; keflex [cephalexin]; nuts; other; shellfish allergy; shellfish-derived products; copaxone [glatiramer acetate]; glatiramer; seasonal ic  [cholestatin]; and pollen extract            Objective:    Blood pressure 130/60, pulse 78, height 5' (1 524 m), weight 61 7 kg (136 lb)  Physical Exam/Neurological Exam  CONSTITUTIONAL: NAD, pleasant  NECK: supple, no lymphadenopathy, no thyromegaly, no JVD  CARDIOVASCULAR: RRR, normal S1S2, no murmurs, no rubs  RESP: clear to auscultation bilaterally, no wheezes/rhonchi/rales  ABDOMEN: soft, non tender, non distended  SKIN: no rash or skin lesions  EXTREMITIES: no edema, pulses 2+bilaterally  PSYCH: appropriate mood and affect  NEUROLOGIC COMPREHENSIVE EXAM: Patient is oriented to person, place and time, NAD; appropriate affect  CN II, III, IV, V, VI, VII,VIII,IX,X,XI-XII intact with EOMI, PERRLA, OKN intact, VF grossly intact, fundi poorly visualized secondary to pupillary constriction; symmetric face noted  Motor: 5/5 UE/LE bilateral symmetric; RUE 4/5 shoulder abduction, biceps and finger extension; 4/5 right hip fleexion and 4/5 dorsiflexion; Sensory: intact to light touch and pinprick bilaterally; normal vibration sensation feet bilaterally; Coordination within normal limits on FTN and ROLANDO testing; DTR: 2++/4 through R>L, no Babinski, no clonus  Tandem gait is abnormal  Romberg: negative  ROS:  12 points of review of system was reviewed with the patient and was unremarkable with exception: see HPI  Review of Systems   Constitutional: Positive for fatigue  Negative for appetite change and fever  HENT: Negative  Negative for hearing loss, tinnitus, trouble swallowing and voice change  Eyes: Negative  Negative for photophobia and pain  Respiratory: Negative  Negative for shortness of breath  Cardiovascular: Negative  Negative for palpitations  Gastrointestinal: Negative  Negative for nausea and vomiting  Endocrine: Negative  Negative for cold intolerance and heat intolerance  Genitourinary: Negative  Negative for dysuria, frequency and urgency  Musculoskeletal: Positive for back pain and neck pain  Negative for myalgias  Muscle pain  Joint pain     Skin: Negative  Negative for rash  Allergic/Immunologic: Negative  Neurological: Positive for dizziness, weakness, numbness and headaches  Negative for tremors, seizures, syncope, facial asymmetry, speech difficulty and light-headedness  Balance problems     Hematological: Negative  Does not bruise/bleed easily  Psychiatric/Behavioral: Negative  Negative for confusion, hallucinations and sleep disturbance

## 2018-05-24 NOTE — TELEPHONE ENCOUNTER
Pt has Medicare as primary and [employer] as secondary  No PA needed per Allyson Navarrete @ [facility] as they follow medicare guidelines

## 2018-05-25 NOTE — TELEPHONE ENCOUNTER
Pt states that [facility] is her primary & medicare is secondary, she's unsure why its listed the other way here  I did pass this on to [facility] for review as Mariela Avendano is out of office today

## 2018-05-25 NOTE — TELEPHONE ENCOUNTER
I spoke to [facility] who stated that rep gave me incorrect info  BC primary and no PA needed for codes J [id number], 09060, [id number]  Pt has met calendar year deductible  Pt as met $1810 of out pocket max   Pt will be covered at 85% until met  Plan will then  cover at 100%  Reference # [id number]  Medicare/secondary will follow medicare guidelines  Pt made aware

## 2018-05-29 ENCOUNTER — TELEPHONE (OUTPATIENT)
Dept: PULMONOLOGY | Facility: CLINIC | Age: 58
End: 2018-05-29

## 2018-05-29 DIAGNOSIS — J45.909 UNCOMPLICATED ASTHMA, UNSPECIFIED ASTHMA SEVERITY, UNSPECIFIED WHETHER PERSISTENT: Primary | ICD-10-CM

## 2018-05-29 RX ORDER — ALBUTEROL SULFATE 90 UG/1
2 AEROSOL, METERED RESPIRATORY (INHALATION) EVERY 6 HOURS PRN
Qty: 3 INHALER | Refills: 0 | Status: SHIPPED | OUTPATIENT
Start: 2018-05-29 | End: 2022-05-23 | Stop reason: SDUPTHER

## 2018-05-31 ENCOUNTER — OFFICE VISIT (OUTPATIENT)
Dept: UROLOGY | Facility: CLINIC | Age: 58
End: 2018-05-31
Payer: COMMERCIAL

## 2018-05-31 VITALS
DIASTOLIC BLOOD PRESSURE: 76 MMHG | SYSTOLIC BLOOD PRESSURE: 130 MMHG | HEART RATE: 62 BPM | HEIGHT: 60 IN | BODY MASS INDEX: 26.35 KG/M2 | WEIGHT: 134.2 LBS

## 2018-05-31 DIAGNOSIS — N20.0 KIDNEY STONE: ICD-10-CM

## 2018-05-31 DIAGNOSIS — R30.0 BURNING WITH URINATION: Primary | ICD-10-CM

## 2018-05-31 LAB
POST-VOID RESIDUAL VOLUME, ML POC: 10 ML
SL AMB  POCT GLUCOSE, UA: NORMAL
SL AMB LEUKOCYTE ESTERASE,UA: NORMAL
SL AMB POCT BILIRUBIN,UA: NORMAL
SL AMB POCT BLOOD,UA: NORMAL
SL AMB POCT CLARITY,UA: CLEAR
SL AMB POCT COLOR,UA: YELLOW
SL AMB POCT KETONES,UA: NORMAL
SL AMB POCT NITRITE,UA: NORMAL
SL AMB POCT PH,UA: 6.5
SL AMB POCT SPECIFIC GRAVITY,UA: 1.01
SL AMB POCT URINE PROTEIN: NORMAL
SL AMB POCT UROBILINOGEN: NORMAL

## 2018-05-31 PROCEDURE — 87086 URINE CULTURE/COLONY COUNT: CPT | Performed by: PHYSICIAN ASSISTANT

## 2018-05-31 PROCEDURE — 99213 OFFICE O/P EST LOW 20 MIN: CPT | Performed by: PHYSICIAN ASSISTANT

## 2018-05-31 PROCEDURE — 81002 URINALYSIS NONAUTO W/O SCOPE: CPT | Performed by: PHYSICIAN ASSISTANT

## 2018-05-31 PROCEDURE — 51798 US URINE CAPACITY MEASURE: CPT | Performed by: PHYSICIAN ASSISTANT

## 2018-05-31 RX ORDER — SODIUM CHLORIDE 9 MG/ML
20 INJECTION, SOLUTION INTRAVENOUS CONTINUOUS
Status: DISCONTINUED | OUTPATIENT
Start: 2018-06-01 | End: 2018-06-04 | Stop reason: HOSPADM

## 2018-05-31 NOTE — PROGRESS NOTES
1  Burning with urination  POCT urine dip    POCT Measure PVR    Urine culture   2  Kidney stone  XR abdomen 1 view kub    Urine culture       Assessment and plan:       1  OAB/ NGB -- managed by Dr Amber Shell  - well controlled on detrol at this time  She will continue to take this daily  2  History of nephrolithiasis  - reviewed that there is not current stone burden noted on recent CT and US  - we discussed the importance of proper hydration and dietary modifications in order to prevent future stone formation   - f/u 1 year with a KUB prior to visit  VICTOR M Cope-KWADWO      Chief Complaint     f/u kidney stones     History of Present Illness     Jennifer Mendez is a 60 y o  female patient of Dr Amber Shell with a history of nephrolithiasis and 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 presenting for follow up  Patient is managed on Detrol 4mg daily  Patient does feel that her symptoms are well controlled on this  She is currently changing medications for her MS so does feel like this does exacerbate her urination occasionally  She does admit to mild intermittent dysuria, however denies any gross hematuria, flank pain, fevers, or chills  Urine dip in the office today is negative for leukocytes, nitrites, and blood  Patient has previously had nephrolithiasis  A recent CT 3/2018 and US 5/2018 was negative for any stone burden  Patient believes she may have passed her previously, however is unsure  PVR in the office reveals 10mL  Laboratory     Lab Results   Component Value Date    CREATININE 0 64 03/15/2018       Review of Systems     Review of Systems   Constitutional: Negative for activity change, appetite change, chills, diaphoresis, fatigue, fever and unexpected weight change  Respiratory: Negative for chest tightness and shortness of breath  Cardiovascular: Negative for chest pain, palpitations and leg swelling     Gastrointestinal: Negative for abdominal distention, abdominal pain, constipation, diarrhea, nausea and vomiting  Genitourinary: Negative for decreased urine volume, difficulty urinating, dysuria, enuresis, flank pain, frequency, genital sores, hematuria and urgency  Musculoskeletal: Negative for back pain, gait problem and myalgias  Skin: Negative for color change, pallor, rash and wound  Psychiatric/Behavioral: Negative for behavioral problems  The patient is not nervous/anxious  Allergies     Allergies   Allergen Reactions    Imitrex [Sumatriptan] Headache    Keflex [Cephalexin] Rash    Nuts Shortness Of Breath and Anaphylaxis    Other Anaphylaxis, Hives and Itching     [facility] [id number]: POISON IVY   walnuts & cashews  Cats    Shellfish Allergy Shortness Of Breath and Swelling    Shellfish-Derived Products Shortness Of Breath and Swelling    Copaxone [Glatiramer Acetate] Hives    Glatiramer      Other reaction(s): rash    Seasonal Ic  [Cholestatin]     Pollen Extract Sneezing       Physical Exam     Physical Exam   Constitutional: She is oriented to person, place, and time  She appears well-developed and well-nourished  Neck: Normal range of motion  No tracheal deviation present  Pulmonary/Chest: Effort normal    Musculoskeletal: Normal range of motion  She exhibits no edema or deformity  Neurological: She is alert and oriented to person, place, and time  Skin: Skin is warm and dry  No erythema  No pallor  Psychiatric: She has a normal mood and affect   Her behavior is normal          Vital Signs     Vitals:    05/31/18 1314   BP: 130/76   BP Location: Left arm   Patient Position: Sitting   Cuff Size: Standard   Pulse: 62   Weight: 60 9 kg (134 lb 3 2 oz)   Height: 5' (1 524 m)         Current Medications       Current Outpatient Prescriptions:     albuterol (2 5 mg/3 mL) 0 083 % nebulizer solution, Albuterol Sulfate (2 5 MG/3ML) 0 083% Inhalation Nebulization Solution USE 1 UNIT DOSE IN NEBULIZER EVERY 6 HOURS AS NEEDED  Quantity: 1;  Refills: 5    Ricardo GARCIA ;  Started 12-July-2016 Active3 ML Plas Cont (125 Plas Conts), Disp: , Rfl:     albuterol (VENTOLIN HFA) 90 mcg/act inhaler, Inhale 2 puffs every 6 (six) hours as needed for wheezing, Disp: 3 Inhaler, Rfl: 0    aspirin 81 MG tablet, Aspirin 81 MG TABS Take 1 tablet daily  Refills: 0  Active, Disp: , Rfl:     atorvastatin (LIPITOR) 20 mg tablet, Take 40 mg by mouth Medrol Dose Pack scheduling ONLY  , Disp: , Rfl:     baclofen 20 mg tablet, Take 1 tablet (20 mg total) by mouth 3 (three) times a day, Disp: 270 tablet, Rfl: 1    budesonide (PULMICORT FLEXHALER) 180 MCG/ACT inhaler, Inhale 1 puff 2 (two) times a day, Disp: 3 Inhaler, Rfl: 3    butalbital-acetaminophen-caffeine (FIORICET,ESGIC) -40 mg per tablet, Take 1 tablet by mouth every 4 (four) hours as needed for headaches, Disp: 12 tablet, Rfl: 4    EPINEPHrine (EPIPEN) 0 3 mg/0 3 mL SOAJ, Inject 0 3 mg into the shoulder, thigh, or buttocks, Disp: , Rfl:     ergocalciferol (VITAMIN D2) 50,000 units, TAKE ONE CAPSULE WEEKLY WITH FOOD, Disp: , Rfl: 1    famotidine (PEPCID) 20 mg tablet, Famotidine 20 MG Oral Tablet TAKE 1 TABLET DAILY AS DIRECTED    Refills: 0  Active, Disp: , Rfl:     gabapentin (NEURONTIN) 300 mg capsule, TAKE 1 CAPSULE 3 TIMES DAILY, Disp: 270 capsule, Rfl: 1    levocetirizine (XYZAL) 5 MG tablet, TAKE 1 TABLET (5 MG TOTAL) BY MOUTH DAILY AS NEEDED (HIVES), Disp: 30 tablet, Rfl: 0    mupirocin (BACTROBAN) 2 % cream, Apply topically 3 (three) times a day, Disp: 15 g, Rfl: 0    NIFEdipine (ADALAT CC) 90 mg 24 hr tablet, Take 1 tablet by mouth daily, Disp: 7 tablet, Rfl: 0    sertraline (ZOLOFT) 50 mg tablet, Sertraline HCl - 50 MG Oral Tablet TAKE 3 TABLETS DAILY  Refills: 0  Active, Disp: , Rfl:     tolterodine (DETROL LA) 4 mg 24 hr capsule, Take 1 capsule by mouth daily, Disp: , Rfl:     valACYclovir (VALTREX) 1,000 mg tablet, Take 1,000 mg by mouth as needed, Disp: , Rfl:   No current facility-administered medications for this visit  Facility-Administered Medications Ordered in Other Visits:     [START ON 6/1/2018] methylPREDNISolone sodium succinate (Solu-MEDROL) 1,000 mg in sodium chloride 0 9 % 250 mL IVPB, , Intravenous, Once, Pj Delgadillo MD  Denisa Robb  [START ON 6/1/2018] sodium chloride 0 9 % infusion, 20 mL/hr, Intravenous, Continuous, Pj Delgadillo MD      Active Problems     Patient Active Problem List   Diagnosis    Trigger ring finger of left hand    S/P trigger finger release    Ambulatory dysfunction    Chronic right-sided headaches    Multiple sclerosis (HCC)    Panniculitis    Mesenteric panniculitis ([facility] )         Past Medical History     Past Medical History:   Diagnosis Date    Asthma     Cardiac disease     [facility]     Depression     Hyperlipidemia     Hypertension     Influenza     December 2017    Irregular heart beat     Migraine     Migraine     MS (multiple sclerosis) ([facility] )     [facility] sclerosis ([facility] )     Nephrolithiasis     Neurogenic bladder     Panniculitis          Surgical History     Past Surgical History:   Procedure Laterality Date    COLONOSCOPY      DILATION AND CURETTAGE OF UTERUS      CO COLONOSCOPY FLX DX W/COLLJ SPEC WHEN PFRMD N/A 1/11/2018    Procedure: COLONOSCOPY;  Surgeon: Vishnu Flowers MD;  Location: [facility];   Service: Gastroenterology    CO INCISE FINGER TENDON SHEATH Left 1/17/2018    Procedure: RING TRIGGER FINGER RELEASE;  Surgeon: Elana Veras MD;  Location: [facility] OR;  Service: Orthopedics    UTERINE FIBROID SURGERY           Family History     Family History   Problem Relation Age of Onset    Stroke Mother     Diabetes Mother     Hypertension Mother     Heart disease Father     Diabetes Father     Hypertension Father     Diabetes Sister     Hypertension Brother     Breast cancer Maternal Aunt     Stomach cancer Maternal Aunt          Social History     Social History       Radiology   CT ABDOMEN AND PELVIS WITH IV CONTRAST     INDICATION:   K65 4: Sclerosing mesenteritis  Follow-up      COMPARISON: CT abdomen/pelvis dated May 5, 2017      TECHNIQUE:  CT examination of the abdomen and pelvis was performed  Axial, sagittal, and coronal 2D reformatted images were created from the source data and submitted for interpretation      Radiation dose length product (DLP) for this visit:  607 mGy-cm   This examination, like all CT scans performed in the [facility], was performed utilizing techniques to minimize radiation dose exposure, including the use of iterative   reconstruction and automated exposure control      IV Contrast:  100 mL of iohexol (OMNIPAQUE)  Enteric Contrast:  Enteric contrast was administered      FINDINGS:     ABDOMEN     LOWER CHEST:  No clinically significant abnormality identified in the visualized lower chest      LIVER/BILIARY TREE:  Unremarkable      GALLBLADDER:  No calcified gallstones  No pericholecystic inflammatory change      SPLEEN:  Unremarkable      PANCREAS:  Unremarkable      ADRENAL GLANDS:  Unremarkable      KIDNEYS/URETERS:  Unremarkable  No hydronephrosis      STOMACH AND BOWEL:  Unremarkable      APPENDIX:  No findings to suggest appendicitis      ABDOMINOPELVIC CAVITY:  No ascites or free intraperitoneal air  Again noted are scattered nonenlarged mesenteric root lymph nodes similar in size and appearance from the prior exam   Mild haziness of the mesentery is also similar to the prior study    No   lymphadenopathy      VESSELS:  Unremarkable for patient's age      PELVIS     REPRODUCTIVE ORGANS:  Unremarkable for patient's age      URINARY BLADDER:  Unremarkable      ABDOMINAL WALL/INGUINAL REGIONS:  There is a tiny fat-containing umbilical hernia      OSSEOUS STRUCTURES:  No acute fracture or destructive osseous lesion      IMPRESSION:     Stable mild haziness of the mesenteric root fat with multiple scattered nonenlarged mesenteric lymph nodes when compared to a CT abdomen/pelvis dated May 5, 2017  The findings are likely related to a chronic mesenteric panniculitis      No intra-abdominal or pelvic lymphadenopathy      No acute intra-abdominal abnormality  No free air or free fluid  RENAL ULTRASOUND     INDICATION:   Z87 442: Personal history of urinary calculi  Bilateral flank pain right greater than left      COMPARISON: Ultrasound 12/5/2017 and CT scan 3/19/2018     TECHNIQUE:   Ultrasound of the retroperitoneum was performed with a curvilinear transducer utilizing volumetric sweeps and still imaging techniques       FINDINGS:     KIDNEYS:  Symmetric and normal size  Right kidney:  11 4 x 4 6 cm  Left kidney:  11 3 x 6 4 cm      Right kidney  Normal echogenicity and contour  No suspicious masses detected  No hydronephrosis  No shadowing calculi  No perinephric fluid collections      Left kidney  Normal echogenicity and contour  No suspicious masses detected  No hydronephrosis  No shadowing calculi  No perinephric fluid collections      URETERS:  Nonvisualized      BLADDER:   Normally distended  No focal thickening or mass lesions    Bilateral ureteral jets detected         IMPRESSION:     Normal

## 2018-05-31 NOTE — TELEPHONE ENCOUNTER
Juanita from [address] [place] [address] pre encounter center called inquiring infusion PA  Advised her of the below and verbalized understanding

## 2018-06-01 ENCOUNTER — TELEPHONE (OUTPATIENT)
Dept: NEUROLOGY | Facility: CLINIC | Age: 58
End: 2018-06-01

## 2018-06-01 ENCOUNTER — HOSPITAL ENCOUNTER (OUTPATIENT)
Dept: INFUSION CENTER | Facility: CLINIC | Age: 58
Discharge: HOME | End: 2018-06-01
Payer: COMMERCIAL

## 2018-06-01 VITALS
HEART RATE: 84 BPM | WEIGHT: 133.6 LBS | BODY MASS INDEX: 26.23 KG/M2 | SYSTOLIC BLOOD PRESSURE: 147 MMHG | DIASTOLIC BLOOD PRESSURE: 68 MMHG | RESPIRATION RATE: 16 BRPM | HEIGHT: 60 IN | TEMPERATURE: 97.9 F

## 2018-06-01 PROCEDURE — 96374 THER/PROPH/DIAG INJ IV PUSH: CPT

## 2018-06-01 PROCEDURE — 96375 TX/PRO/DX INJ NEW DRUG ADDON: CPT

## 2018-06-01 RX ORDER — DIPHENHYDRAMINE HYDROCHLORIDE 50 MG/ML
25 INJECTION INTRAMUSCULAR; INTRAVENOUS ONCE AS NEEDED
Status: COMPLETED | OUTPATIENT
Start: 2018-06-01 | End: 2018-06-01

## 2018-06-01 RX ADMIN — SODIUM CHLORIDE: 9 INJECTION, SOLUTION INTRAVENOUS at 10:17

## 2018-06-01 RX ADMIN — SODIUM CHLORIDE 20 ML/HR: 9 INJECTION, SOLUTION INTRAVENOUS at 10:04

## 2018-06-01 RX ADMIN — DIPHENHYDRAMINE HYDROCHLORIDE 25 MG: 50 INJECTION, SOLUTION INTRAMUSCULAR; INTRAVENOUS at 10:31

## 2018-06-01 NOTE — TELEPHONE ENCOUNTER
Mariola Lovelace from infusion center called, stating pt received 10 mins of her steroid infusion (day 1 of 1) and she started experiencing itching in her throat and roof of mouth  They gave her 25 mg benadryl and this has improved  Per Aranza West- we will send over orders to D/C infusion and initiate the hypersensitivity protocol  Please generate orders for Aranza West to sign     P: [phone]  F: [phone]

## 2018-06-01 NOTE — TELEPHONE ENCOUNTER
Olu Reynaga from [facility] infusion center called back and states they need a order specifically for Benadryl 25mg IV prn  They cannot use hypersensitivity protocol as it is not specific for methylpred per her manager  Please write out on physician order sheet:    Benadryl 25mg IV prn reaction DX: multiple Sclerosis G35    Have mingo date and sign and fax  Fax [phone]    Thanks!

## 2018-06-01 NOTE — PROGRESS NOTES
called office x's 3 for specific 25mg iv benadryl order still awaiting order from Dr Preston Cloud office

## 2018-06-01 NOTE — PROGRESS NOTES
Order to dc infusion for today and initiate hypersensitivity being sent  Per Ca Aguila from Dr Olivia Payor office  Finish bag of fluids and discharge pt writing order for benadryl 25mg prn for reaction

## 2018-06-01 NOTE — TELEPHONE ENCOUNTER
Charity Hernandez completed the hypersensitivity protocol with all specifications that required and  faxed over  Thanks

## 2018-06-01 NOTE — TELEPHONE ENCOUNTER
Sara Abebe from infusion center called stating that they don't have hypersensitivity protocol  She is requesting an order for Benadryl 25 mg IV prn for reaction       Fax [phone]

## 2018-06-01 NOTE — PROGRESS NOTES
Pt started with reaction of itching and bumps on roof of mouth  pts boyfriend also stated voice was sounding different  Hyper sensitivity protocol was attached to orders and so pt was given 25mg of benadryl office is sending order and will discuss with dr to decide on resuming with methylprednisone  No other meds were given pt stated resolving of itching and bumps after stopping med and given benadryl      Vitals:  1030: 98, HR; 74, BP: 146/64, O2: 97%  1033: BP: 151/78 HR: 80

## 2018-06-02 LAB — BACTERIA UR CULT: NORMAL

## 2018-06-06 LAB
ALBUMIN SERPL-MCNC: 4.6 G/DL (ref 3.6–5.1)
ALBUMIN/GLOB SERPL: 1.9 (CALC) (ref 1–2.5)
ALP SERPL-CCNC: 143 U/L (ref 33–130)
ALT SERPL-CCNC: 22 U/L (ref 6–29)
AST SERPL-CCNC: 17 U/L (ref 10–35)
BASOPHILS # BLD AUTO: 28 CELLS/UL (ref 0–200)
BASOPHILS NFR BLD AUTO: 0.5 %
BILIRUB SERPL-MCNC: 0.7 MG/DL (ref 0.2–1.2)
BUN SERPL-MCNC: 9 MG/DL (ref 7–25)
BUN/CREAT SERPL: ABNORMAL (CALC) (ref 6–22)
CALCIUM SERPL-MCNC: 9.3 MG/DL (ref 8.6–10.4)
CHLORIDE SERPL-SCNC: 104 MMOL/L (ref 98–110)
CO2 SERPL-SCNC: 28 MMOL/L (ref 20–31)
CREAT SERPL-MCNC: 0.72 MG/DL (ref 0.5–1.05)
EOSINOPHIL # BLD AUTO: 72 CELLS/UL (ref 15–500)
EOSINOPHIL NFR BLD AUTO: 1.3 %
ERYTHROCYTE [DISTWIDTH] IN BLOOD BY AUTOMATED COUNT: 12.7 % (ref 11–15)
GLOBULIN SER CALC-MCNC: 2.4 G/DL (CALC) (ref 1.9–3.7)
GLUCOSE SERPL-MCNC: 116 MG/DL (ref 65–99)
HCT VFR BLD AUTO: 43.1 % (ref 35–45)
HGB BLD-MCNC: 14.4 G/DL (ref 11.7–15.5)
LYMPHOCYTES # BLD AUTO: 2123 CELLS/UL (ref 850–3900)
LYMPHOCYTES NFR BLD AUTO: 38.6 %
MCH RBC QN AUTO: 29.4 PG (ref 27–33)
MCHC RBC AUTO-ENTMCNC: 33.4 G/DL (ref 32–36)
MCV RBC AUTO: 88.1 FL (ref 80–100)
MONOCYTES # BLD AUTO: 374 CELLS/UL (ref 200–950)
MONOCYTES NFR BLD AUTO: 6.8 %
NEUTROPHILS # BLD AUTO: 2904 CELLS/UL (ref 1500–7800)
NEUTROPHILS NFR BLD AUTO: 52.8 %
PLATELET # BLD AUTO: 321 THOUSAND/UL (ref 140–400)
PMV BLD REES-ECKER: 10.9 FL (ref 7.5–12.5)
POTASSIUM SERPL-SCNC: 3.3 MMOL/L (ref 3.5–5.3)
PROT SERPL-MCNC: 7 G/DL (ref 6.1–8.1)
RBC # BLD AUTO: 4.89 MILLION/UL (ref 3.8–5.1)
SL AMB EGFR AFRICAN AMERICAN: 106 ML/MIN/1.73M2
SL AMB EGFR NON AFRICAN AMERICAN: 92 ML/MIN/1.73M2
SODIUM SERPL-SCNC: 141 MMOL/L (ref 135–146)
WBC # BLD AUTO: 5.5 THOUSAND/UL (ref 3.8–10.8)

## 2018-06-08 NOTE — TELEPHONE ENCOUNTER
Nay RN received call for infusion center stating attending physician signature required  Can sign below Oanh's signature  Order placed in Dr Valdez Harley folder  Please fax to infusion center & [facility]  Thanks

## 2018-06-09 LAB
JCPYV AB SERPL QL IA: ABNORMAL
SL AMB INDEX VALUE: 0.27
SL AMB JCV AB BY INHIBITION: NORMAL

## 2018-07-31 ENCOUNTER — TELEPHONE (OUTPATIENT)
Dept: NEUROLOGY | Facility: CLINIC | Age: 58
End: 2018-07-31

## 2018-08-15 ENCOUNTER — TELEPHONE (OUTPATIENT)
Dept: NEUROLOGY | Facility: CLINIC | Age: 58
End: 2018-08-15

## 2018-08-15 ENCOUNTER — OFFICE VISIT (OUTPATIENT)
Dept: NEUROLOGY | Facility: CLINIC | Age: 58
End: 2018-08-15
Payer: COMMERCIAL

## 2018-08-15 VITALS
WEIGHT: 134 LBS | HEIGHT: 60 IN | SYSTOLIC BLOOD PRESSURE: 110 MMHG | DIASTOLIC BLOOD PRESSURE: 66 MMHG | BODY MASS INDEX: 26.31 KG/M2

## 2018-08-15 DIAGNOSIS — M79.3 PANNICULITIS: ICD-10-CM

## 2018-08-15 DIAGNOSIS — R26.2 AMBULATORY DYSFUNCTION: ICD-10-CM

## 2018-08-15 DIAGNOSIS — G35 MULTIPLE SCLEROSIS (HCC): Primary | ICD-10-CM

## 2018-08-15 PROCEDURE — 99214 OFFICE O/P EST MOD 30 MIN: CPT | Performed by: PSYCHIATRY & NEUROLOGY

## 2018-08-15 NOTE — TELEPHONE ENCOUNTER
Eleicer start form completed  Per Dr Saad Long, patient cleared to start  Start form faxed to [facility], [facility] and [facility]  He will fax to [id number] [employer] [facility] and investigate where medication needs to be filled  Scanned start form to CF

## 2018-08-15 NOTE — PROGRESS NOTES
Patient ID: Bryant Alarcon is a 60 y o  female  Assessment/Plan:     Problem List Items Addressed This Visit        Nervous and Auditory    Multiple sclerosis ([facility] ) - Primary       Musculoskeletal and Integument    Panniculitis       Other    Ambulatory dysfunction         Today I had the pleasure of seeing your patient, Lesley Dao, in consultation at [address]  Mrs Saqib Flores has presented for follow up on MS and related issues with worsening right hemiparesis described  Patient has not been on any DMT for MS due to mesenteric panniculitis work up  Patient attempted Solumedrol 1000 mcg with allergic reaction described in a form of anaphylactic throat swelling - will consider Benadryl 50 mg IV and Tylenol prior to steroid infusion  Patient is JCV positive with intermediate results of JCV 0 27 -we had chosen to avoid Tysabri for now and agreed to proceed with Gilenya  We will faxing Fingolimod, with VZV and quantiferon gold, ECG and CBC/CMP had previously completed  Gilenya form is completed and faxed now  Bladder has worsening function now, concern for progression of her MS related demyelination  Will consider PT on her follow up visit  Family stress is overwhelming at this point  Follow up in 2 months  Subjective: MS and related issues  HPI/History of Present Illness  Mrs Brady has a history of relapsing remitting multiple sclerosis, neurogenic bladder,ambulatory dysfunction due to right hemiparesis, disbalance, mesenteric panniculitis, cervical disc disease with myelopathy, classic migraine without aura, who presented for follow up on MS and related issues  Mrs Saqib Flores presents today for a multiple sclerosis follow up  Patient has decided that she would like to start treatment with Gilenya  Worsening symptoms since last visit with an increase of right leg dragging  Also noticing vision changes with the right eye    Patient has not been on DMT for a while now  Family stress making her with more fatigue and cognitive sharpness  Patient's daughter in law has stage IV brain cancer  Fatigue is worse, with right sided worsening weakness and right eye is hazy  Right hemiparesis has been interfering with her balance and ambulation  The following portions of the patient's history were reviewed and updated as appropriate: She  has a past medical history of Asthma; Cardiac disease; COPD (chronic obstructive pulmonary disease) ([facility] ); Depression; Hyperlipidemia; Hypertension; Influenza; Irregular heart beat; Migraine; Migraine; MS (multiple sclerosis) ([facility] ); Multiple sclerosis ([facility] [address] ); Nephrolithiasis; Neurogenic bladder; and Panniculitis  She   Patient Active Problem List    Diagnosis Date Noted    Mesenteric panniculitis ([facility] ) 03/15/2018    Ambulatory dysfunction 02/21/2018    Chronic right-sided headaches 02/21/2018    [facility] 02/21/2018    Panniculitis 02/21/2018    S/P trigger finger release 02/02/2018    Trigger ring finger of left hand 01/17/2018     She  has a past surgical history that includes Dilation and curettage of uterus; Uterine fibroid surgery; pr colonoscopy flx dx w/collj spec when pfrmd (N/A, 1/11/2018); Colonoscopy; and pr incise finger tendon sheath (Left, 1/17/2018)  Her family history includes Breast cancer in her maternal aunt; Diabetes in her father, mother, and sister; Heart disease in her father; Hypertension in her brother, father, and mother; Stomach cancer in her maternal aunt; Stroke in her mother  She  reports that she has never smoked  She has never used smokeless tobacco  She reports that she drinks alcohol  She reports that she uses drugs, including Marijuana    Current Outpatient Prescriptions   Medication Sig Dispense Refill    albuterol (2 5 mg/3 mL) 0 083 % nebulizer solution Albuterol Sulfate (2 5 MG/3ML) 0 083% Inhalation Nebulization Solution  USE 1 UNIT DOSE IN NEBULIZER EVERY 6 HOURS AS NEEDED  Quantity: 1;  Refills: 5       Ehsan Abebe DIVYA D ;  Started 12-July-2016  Active3 ML Plas Cont (125 Plas Conts)      albuterol (VENTOLIN HFA) 90 mcg/act inhaler Inhale 2 puffs every 6 (six) hours as needed for wheezing 3 Inhaler 0    aspirin 81 MG tablet Aspirin 81 MG TABS  Take 1 tablet daily   Refills: 0    Active      atorvastatin (LIPITOR) 20 mg tablet Take 40 mg by mouth Medrol Dose Pack scheduling ONLY        baclofen 20 mg tablet Take 1 tablet (20 mg total) by mouth 3 (three) times a day 270 tablet 1    budesonide (PULMICORT FLEXHALER) 180 MCG/ACT inhaler Inhale 1 puff 2 (two) times a day 3 Inhaler 3    butalbital-acetaminophen-caffeine (FIORICET,ESGIC) -40 mg per tablet Take 1 tablet by mouth every 4 (four) hours as needed for headaches 12 tablet 4    EPINEPHrine (EPIPEN) 0 3 mg/0 3 mL SOAJ Inject 0 3 mg into the shoulder, thigh, or buttocks      ergocalciferol (VITAMIN D2) 50,000 units TAKE ONE CAPSULE WEEKLY WITH FOOD  1    famotidine (PEPCID) 20 mg tablet Famotidine 20 MG Oral Tablet  TAKE 1 TABLET DAILY AS DIRECTED  Refills: 0    Active      gabapentin (NEURONTIN) 300 mg capsule TAKE 1 CAPSULE 3 TIMES DAILY 270 capsule 1    levocetirizine (XYZAL) 5 MG tablet TAKE 1 TABLET (5 MG TOTAL) BY MOUTH DAILY AS NEEDED (HIVES) 30 tablet 0    mupirocin (BACTROBAN) 2 % cream Apply topically 3 (three) times a day 15 g 0    NIFEdipine (ADALAT CC) 90 mg 24 hr tablet Take 1 tablet by mouth daily 7 tablet 0    sertraline (ZOLOFT) 50 mg tablet Sertraline HCl - 50 MG Oral Tablet  TAKE 3 TABLETS DAILY   Refills: 0    Active      tolterodine (DETROL LA) 4 mg 24 hr capsule Take 1 capsule by mouth daily      valACYclovir (VALTREX) 1,000 mg tablet Take 1,000 mg by mouth as needed       No current facility-administered medications for this visit        Current Outpatient Prescriptions on File Prior to Visit   Medication Sig    albuterol (2 5 mg/3 mL) 0 083 % nebulizer solution Albuterol Sulfate (2 5 MG/3ML) 0 083% Inhalation Nebulization Solution  USE 1 UNIT DOSE IN NEBULIZER EVERY 6 HOURS AS NEEDED  Quantity: 1;  Refills: 5       Matthew Nora M D ;  Started 12-July-2016  Active3 ML Plas Cont (125 Plas Conts)    albuterol (VENTOLIN HFA) 90 mcg/act inhaler Inhale 2 puffs every 6 (six) hours as needed for wheezing    aspirin 81 MG tablet Aspirin 81 MG TABS  Take 1 tablet daily   Refills: 0    Active    atorvastatin (LIPITOR) 20 mg tablet Take 40 mg by mouth Medrol Dose Pack scheduling ONLY      baclofen 20 mg tablet Take 1 tablet (20 mg total) by mouth 3 (three) times a day    budesonide (PULMICORT FLEXHALER) 180 MCG/ACT inhaler Inhale 1 puff 2 (two) times a day    butalbital-acetaminophen-caffeine (FIORICET,ESGIC) -40 mg per tablet Take 1 tablet by mouth every 4 (four) hours as needed for headaches    EPINEPHrine (EPIPEN) 0 3 mg/0 3 mL SOAJ Inject 0 3 mg into the shoulder, thigh, or buttocks    ergocalciferol (VITAMIN D2) 50,000 units TAKE ONE CAPSULE WEEKLY WITH FOOD    famotidine (PEPCID) 20 mg tablet Famotidine 20 MG Oral Tablet  TAKE 1 TABLET DAILY AS DIRECTED  Refills: 0    Active    gabapentin (NEURONTIN) 300 mg capsule TAKE 1 CAPSULE 3 TIMES DAILY    levocetirizine (XYZAL) 5 MG tablet TAKE 1 TABLET (5 MG TOTAL) BY MOUTH DAILY AS NEEDED (HIVES)    mupirocin (BACTROBAN) 2 % cream Apply topically 3 (three) times a day    NIFEdipine (ADALAT CC) 90 mg 24 hr tablet Take 1 tablet by mouth daily    sertraline (ZOLOFT) 50 mg tablet Sertraline HCl - 50 MG Oral Tablet  TAKE 3 TABLETS DAILY   Refills: 0    Active    tolterodine (DETROL LA) 4 mg 24 hr capsule Take 1 capsule by mouth daily    valACYclovir (VALTREX) 1,000 mg tablet Take 1,000 mg by mouth as needed     No current facility-administered medications on file prior to visit        She is allergic to imitrex [sumatriptan]; keflex [cephalexin]; nuts; other; shellfish allergy; shellfish-derived products; copaxone Tomasa Alegria acetate]; glatiramer; seasonal ic  [cholestatin]; and pollen extract            Objective:    Blood pressure 110/66, height 5' (1 524 m), weight 60 8 kg (134 lb)  Physical Exam/Neurological Exam  CONSTITUTIONAL: NAD, pleasant  NECK: supple, no lymphadenopathy, no thyromegaly, no JVD  CARDIOVASCULAR: RRR, normal S1S2, no murmurs, no rubs  RESP: clear to auscultation bilaterally, no wheezes/rhonchi/rales  ABDOMEN: soft, non tender, non distended  SKIN: no rash or skin lesions  EXTREMITIES: no edema, pulses 2+bilaterally  PSYCH: appropriate mood and affect  NEUROLOGIC COMPREHENSIVE EXAM: Patient is oriented to person, place and time, NAD; appropriate affect  CN II, III, IV, V, VI, VII,VIII,IX,X,XI-XII intact with EOMI, PERRLA, OKN intact, VF grossly intact, fundi poorly visualized secondary to pupillary constriction; symmetric face noted  Motor: 5/5 UE/LE bilateral symmetric, RUE 4/5 shoulder abduction and finger flexion; RLU 4-/5 hip flexion and knee flexion; Sensory: intact to light touch and pinprick bilaterally; normal vibration sensation feet bilaterally; Coordination within normal limits on FTN and ROLANDO testing; DTR: 2/4 through, no Babinski, no clonus  Tandem gait is intact  Romberg: negative  ROS:  12 points of review of system was reviewed with the patient and was unremarkable with exception: see HPI  Review of Systems   Constitutional: Negative  Negative for appetite change and fever  HENT: Negative  Negative for hearing loss, tinnitus, trouble swallowing and voice change  Eyes: Positive for visual disturbance  Negative for photophobia and pain  Respiratory: Negative  Negative for shortness of breath  Cardiovascular: Negative  Negative for palpitations  Gastrointestinal: Negative  Negative for nausea and vomiting  Endocrine: Negative  Negative for cold intolerance and heat intolerance  Genitourinary: Negative  Negative for dysuria, frequency and urgency     Musculoskeletal: Negative  Negative for myalgias and neck pain  Skin: Negative  Negative for rash  Neurological: Positive for dizziness  Negative for tremors, seizures, syncope, facial asymmetry, speech difficulty, weakness, light-headedness, numbness and headaches  Hematological: Negative  Does not bruise/bleed easily  Psychiatric/Behavioral: Negative  Negative for confusion, hallucinations and sleep disturbance

## 2018-08-15 NOTE — TELEPHONE ENCOUNTER
Meli Savage from [facility] confirms PA is required  PA completed on CMM  Patiño [id number]  Awaiting determination

## 2018-08-19 DIAGNOSIS — G35 MULTIPLE SCLEROSIS (HCC): ICD-10-CM

## 2018-08-19 RX ORDER — BACLOFEN 20 MG/1
TABLET ORAL
Qty: 270 TABLET | Refills: 1 | Status: SHIPPED | OUTPATIENT
Start: 2018-08-19 | End: 2019-02-21 | Stop reason: SDUPTHER

## 2018-08-27 NOTE — TELEPHONE ENCOUNTER
BRAYDON- Spoke with Miah Villegas at [facility], they have not been able to get in contact with the patient  Co-pay is approximately $2200, need additional information from patient to submit for a co-pay card (assitance program)  Have left multiple messages on her home and cell numbers  Miah Villegas will attempt to call again now  Cannot move forward until pharmacy is able to contact patient

## 2018-09-10 NOTE — TELEPHONE ENCOUNTER
Spoke with Yana Martinez at [facility]  Sent request to [facility] who will be able to fill prescription

## 2018-09-14 NOTE — TELEPHONE ENCOUNTER
Spoke with Juan Kern from [address] [facility] to check status of FDO and patient assistance  States patient had not received her starter pack of information yet  She will follow up with patient to get scheduled for FDO  Juan Kern had also submitted for co-pay assistance

## 2018-09-19 NOTE — TELEPHONE ENCOUNTER
Linda Dickerson from [id number] [facility] has been unsuccessful in reaching patient to set up up FDO  They will be sending letter to patient's home  LMOM for patient to return call

## 2018-09-24 NOTE — TELEPHONE ENCOUNTER
Pt called and advised pt of all of the below  She will contact Sheree from [address] [facility] to set up [facility]

## 2018-09-25 NOTE — TELEPHONE ENCOUNTER
Blank from [facility] called re: Eliecer, asking  observation date? Advised that we give pharmacy a call once we know the observation date          [phone]

## 2018-09-27 NOTE — TELEPHONE ENCOUNTER
Spoke with Kapil Irby  Patient has not be scheduled for FDO yet  Patient has been hard to get a hold of  She will call patient to get scheduled and then will provide FDO date to [place] once scheduled

## 2018-09-27 NOTE — TELEPHONE ENCOUNTER
BRAYDON- Spoke with Bulmaro Alejandro, patient is going on vacation until Oct 27 and does not want to start the medication right before she goes away  Bulmaro Alejandro from [address] will touch base with patient after she returns    Patient is also asking if she can have the flu shot in close proximity to starting [address]  Please advise

## 2018-09-29 DIAGNOSIS — G35 MULTIPLE SCLEROSIS (HCC): ICD-10-CM

## 2018-10-01 RX ORDER — GABAPENTIN 300 MG/1
CAPSULE ORAL
Qty: 270 CAPSULE | Refills: 1 | Status: SHIPPED | OUTPATIENT
Start: 2018-10-01 | End: 2019-08-15 | Stop reason: SDUPTHER

## 2018-10-03 NOTE — TELEPHONE ENCOUNTER
Spoke with patient and made her aware of flu shot recommendations  Patient will call and schedule her FDO for when she returns from her vacation  Rescheduled her follow up with you until after she has started Eliecer  Patient also making sure everything has been completed for her to do the FDO  Per office visit, everything has been completed

## 2018-10-06 LAB
BUN SERPL-MCNC: 12 MG/DL (ref 7–25)
CREAT SERPL-MCNC: 0.72 MG/DL (ref 0.5–1.05)
SL AMB EGFR AFRICAN AMERICAN: 106 ML/MIN/1.73M2
SL AMB EGFR NON AFRICAN AMERICAN: 92 ML/MIN/1.73M2

## 2018-10-10 ENCOUNTER — HOSPITAL ENCOUNTER (OUTPATIENT)
Dept: CT IMAGING | Facility: HOSPITAL | Age: 58
Discharge: HOME | End: 2018-10-10
Attending: SURGERY
Payer: COMMERCIAL

## 2018-10-10 DIAGNOSIS — M79.3 PANNICULITIS: ICD-10-CM

## 2018-10-10 PROCEDURE — 74177 CT ABD & PELVIS W/CONTRAST: CPT

## 2018-10-10 RX ADMIN — IOHEXOL 100 ML: 350 INJECTION, SOLUTION INTRAVENOUS at 14:01

## 2018-11-05 DIAGNOSIS — G35 MS (MULTIPLE SCLEROSIS) (HCC): Primary | ICD-10-CM

## 2018-11-05 NOTE — TELEPHONE ENCOUNTER
I would consider CMP only due to unclear etiology longstanding GI problem   Please mail/fax CMP- Thank you

## 2018-11-05 NOTE — TELEPHONE ENCOUNTER
Spoke with Carlota Sacks from [facility] program   They have not been able to reach patient to schedule her FDO  Will attempt to call patient tomorrow  Dr Crista Mcelroy:  Patient was clearing for Eliecer in September  Still has not completed her FDO  Does she need to re-do any of her labs?

## 2018-11-06 NOTE — TELEPHONE ENCOUNTER
LMOM for patient to return call  Patient needs to call Eliecer Hutchinson to schedule her FDO  [facility] to [facility] she will need to have another set of labs completed

## 2018-11-09 NOTE — TELEPHONE ENCOUNTER
LMOM for patient to return call  Attempt #3, letter mailed home  Patient needs to call Eliecer Hutchinson to schedule her FDO  Prior to [facility] she will need to have another set of labs completed

## 2018-11-09 NOTE — TELEPHONE ENCOUNTER
leidy, pt  from [facility] called and states that they have been trying to reach pt regarding starting gilenya  they have been unable to reach pt   she is asking if we want to close case or keep it open  i asked her to keep case open at this time  i made her aware that we have beeen trying to get in touch with pt also regarding this and that we left 3 messages and sent a letter home    If pt calls back pt can call   mark [phone] and also see jeremie's message  if we do want to close this case out please call leidy at  [phone]

## 2018-11-13 NOTE — TELEPHONE ENCOUNTER
Kendal Tierney called back asking if we were able to get in touch with pt  Kendal Tierney made aware that we were not  Spoke with Nori Scott, she agrees to keep case open  We will have pt contact Eliecer if she calls back

## 2018-12-04 ENCOUNTER — OFFICE VISIT (OUTPATIENT)
Dept: NEUROLOGY | Facility: CLINIC | Age: 58
End: 2018-12-04
Payer: COMMERCIAL

## 2018-12-04 VITALS — BODY MASS INDEX: 26.46 KG/M2 | HEIGHT: 60 IN | WEIGHT: 134.8 LBS

## 2018-12-04 DIAGNOSIS — K65.4 MESENTERIC PANNICULITIS (HCC): ICD-10-CM

## 2018-12-04 DIAGNOSIS — G35 MULTIPLE SCLEROSIS (HCC): Primary | ICD-10-CM

## 2018-12-04 PROCEDURE — 99215 OFFICE O/P EST HI 40 MIN: CPT | Performed by: PSYCHIATRY & NEUROLOGY

## 2018-12-04 NOTE — PROGRESS NOTES
Patient ID: Frieda Barrera is a 60 y o  female  Assessment/Plan:     Problem List Items Addressed This Visit        Nervous and Auditory    Multiple sclerosis ([facility] ) - Primary    Relevant Orders    CBC and differential    Comprehensive metabolic panel       Other    Mesenteric panniculitis ([facility] )         Today I had the pleasure of seeing your patient, Khari Holden, in consultation at [address]  Mrs Singh Man has presented for follow up on MS and related issues - she was not able to start Gilenya due to multiple family related circumstances  Patient has been having exacerbation with mesenteric panniculitis with pain in right lower quadrant, with bowel frequency reported  Patint is to follow with her PCP team for further discussions  Since last seen, patient had COPD exacerbation and 2 PNAs, requiring hospitalization- at that time patient was not on and DMT for MS  Patient has persistent right hemiparesis, with ambulatory dysfunction  We agreed to consider starting Gilenya process again and form was signed, with CBC/CMP/JCV scripts provided  Patient was not interested in steroid therapy at this point  Follow up in 2 months  Subjective: MS and related issues     HPI/History of Present Illness  Mrs Brady has a history of relapsing remitting multiple sclerosis, neurogenic bladder,ambulatory dysfunction due to right hemiparesis, disbalance, mesenteric panniculitis, cervical disc disease with myelopathy, classic migraine without aura, who presented for follow up on MS and related issues  Patient had a trip and she was not committed to have [address], and then her sister notified about new guidelines  Patient described new symptoms with more shadow in right eye; patient has difficulties remembering with [facility] 18/30 and 1/5 words recalled  Patient stated she has more pain on left side arm and upper neck; COPD exacerbation has been reported   She had PNA twice this October 2018, The following portions of the patient's history were reviewed and updated as appropriate:   She  has a past medical history of Asthma; Cardiac disease; COPD (chronic obstructive pulmonary disease) ([facility] ); Depression; Hyperlipidemia; Hypertension; Influenza; Irregular heart beat; Migraine; Migraine; MS (multiple sclerosis) ([facility] ); Multiple sclerosis ([facility] ); Nephrolithiasis; Neurogenic bladder; and Panniculitis  She   Patient Active Problem List    Diagnosis Date Noted    Mesenteric panniculitis ([place] [address] ) 03/15/2018    Ambulatory dysfunction 02/21/2018    Chronic right-sided headaches 02/21/2018    Multiple sclerosis ([place] [address] ) 02/21/2018    Panniculitis 02/21/2018    S/P trigger finger release 02/02/2018    Trigger ring finger of left hand 01/17/2018     She  has a past surgical history that includes Dilation and curettage of uterus; Uterine fibroid surgery; pr colonoscopy flx dx w/collj spec when pfrmd (N/A, 1/11/2018); Colonoscopy; and pr incise finger tendon sheath (Left, 1/17/2018)  Her family history includes Breast cancer in her maternal aunt; Diabetes in her father, mother, and sister; Heart disease in her father; Hypertension in her brother, father, and mother; Stomach cancer in her maternal aunt; Stroke in her mother  She  reports that she has never smoked  She has never used smokeless tobacco  She reports that she drinks alcohol  She reports that she uses drugs, including Marijuana  Current Outpatient Prescriptions   Medication Sig Dispense Refill    albuterol (2 5 mg/3 mL) 0 083 % nebulizer solution Albuterol Sulfate (2 5 MG/3ML) 0 083% Inhalation Nebulization Solution  USE 1 UNIT DOSE IN NEBULIZER EVERY 6 HOURS AS NEEDED     Quantity: 1;  Refills: 5       Beba Palms M D ;  Started 12-July-2016  Active3 ML Plas Cont (125 Plas Conts)      albuterol (VENTOLIN HFA) 90 mcg/act inhaler Inhale 2 puffs every 6 (six) hours as needed for wheezing 3 Inhaler 0    aspirin 81 MG tablet Aspirin 81 MG TABS  Take 1 tablet daily   Refills: 0    Active      atorvastatin (LIPITOR) 20 mg tablet Take 40 mg by mouth Medrol Dose Pack scheduling ONLY        baclofen 20 mg tablet TAKE 1 TABLET BY MOUTH 3 TIMES A  tablet 1    budesonide (PULMICORT FLEXHALER) 180 MCG/ACT inhaler Inhale 1 puff 2 (two) times a day (Patient taking differently: Inhale 1 puff 2 (two) times a day as needed  ) 3 Inhaler 3    butalbital-acetaminophen-caffeine (FIORICET,ESGIC) -40 mg per tablet Take 1 tablet by mouth every 4 (four) hours as needed for headaches 12 tablet 4    EPINEPHrine (EPIPEN) 0 3 mg/0 3 mL SOAJ Inject 0 3 mg into the shoulder, thigh, or buttocks      ergocalciferol (VITAMIN D2) 50,000 units TAKE ONE CAPSULE WEEKLY WITH FOOD  1    famotidine (PEPCID) 20 mg tablet Famotidine 20 MG Oral Tablet  TAKE 1 TABLET DAILY PRN AS DIRECTED  Refills: 0    Active      gabapentin (NEURONTIN) 300 mg capsule TAKE 1 CAPSULE 3 TIMES DAILY 270 capsule 1    NIFEdipine (ADALAT CC) 90 mg 24 hr tablet Take 1 tablet by mouth daily 7 tablet 0    sertraline (ZOLOFT) 50 mg tablet Sertraline HCl - 50 MG Oral Tablet  TAKE 3 TABLETS DAILY   Refills: 0    Active      tolterodine (DETROL LA) 4 mg 24 hr capsule Take 1 capsule by mouth daily      valACYclovir (VALTREX) 1,000 mg tablet Take 1,000 mg by mouth as needed      levocetirizine (XYZAL) 5 MG tablet TAKE 1 TABLET (5 MG TOTAL) BY MOUTH DAILY AS NEEDED (HIVES) (Patient not taking: Reported on 12/4/2018) 30 tablet 0    mupirocin (BACTROBAN) 2 % cream Apply topically 3 (three) times a day (Patient not taking: Reported on 12/4/2018 ) 15 g 0     No current facility-administered medications for this visit  Current Outpatient Prescriptions on File Prior to Visit   Medication Sig    albuterol (2 5 mg/3 mL) 0 083 % nebulizer solution Albuterol Sulfate (2 5 MG/3ML) 0 083% Inhalation Nebulization Solution  USE 1 UNIT DOSE IN NEBULIZER EVERY 6 HOURS AS NEEDED     Quantity: 1;  Refills: 5 Christopher GARCIA ;  Started 12-July-2016  Active3 ML Plas Cont (125 Plas Conts)    albuterol (VENTOLIN HFA) 90 mcg/act inhaler Inhale 2 puffs every 6 (six) hours as needed for wheezing    aspirin 81 MG tablet Aspirin 81 MG TABS  Take 1 tablet daily   Refills: 0    Active    atorvastatin (LIPITOR) 20 mg tablet Take 40 mg by mouth Medrol Dose Pack scheduling ONLY      baclofen 20 mg tablet TAKE 1 TABLET BY MOUTH 3 TIMES A DAY    budesonide (PULMICORT FLEXHALER) 180 MCG/ACT inhaler Inhale 1 puff 2 (two) times a day (Patient taking differently: Inhale 1 puff 2 (two) times a day as needed  )    butalbital-acetaminophen-caffeine (FIORICET,ESGIC) -40 mg per tablet Take 1 tablet by mouth every 4 (four) hours as needed for headaches    EPINEPHrine (EPIPEN) 0 3 mg/0 3 mL SOAJ Inject 0 3 mg into the shoulder, thigh, or buttocks    ergocalciferol (VITAMIN D2) 50,000 units TAKE ONE CAPSULE WEEKLY WITH FOOD    famotidine (PEPCID) 20 mg tablet Famotidine 20 MG Oral Tablet  TAKE 1 TABLET DAILY PRN AS DIRECTED  Refills: 0    Active    gabapentin (NEURONTIN) 300 mg capsule TAKE 1 CAPSULE 3 TIMES DAILY    NIFEdipine (ADALAT CC) 90 mg 24 hr tablet Take 1 tablet by mouth daily    sertraline (ZOLOFT) 50 mg tablet Sertraline HCl - 50 MG Oral Tablet  TAKE 3 TABLETS DAILY   Refills: 0    Active    tolterodine (DETROL LA) 4 mg 24 hr capsule Take 1 capsule by mouth daily    valACYclovir (VALTREX) 1,000 mg tablet Take 1,000 mg by mouth as needed    levocetirizine (XYZAL) 5 MG tablet TAKE 1 TABLET (5 MG TOTAL) BY MOUTH DAILY AS NEEDED (HIVES) (Patient not taking: Reported on 12/4/2018)    mupirocin (BACTROBAN) 2 % cream Apply topically 3 (three) times a day (Patient not taking: Reported on 12/4/2018 )     No current facility-administered medications on file prior to visit        She is allergic to imitrex [sumatriptan]; keflex [cephalexin]; nuts; other; shellfish allergy; shellfish-derived products; copaxone Mijares Mitch acetate]; glatiramer; seasonal ic  [cholestatin]; and pollen extract            Objective:    Height 5' (1 524 m), weight 61 1 kg (134 lb 12 8 oz)  Physical Exam/Neurological Exam  CONSTITUTIONAL: NAD, pleasant  NECK: supple, no lymphadenopathy, no thyromegaly, no JVD  CARDIOVASCULAR: RRR, normal S1S2, no murmurs, no rubs  RESP: clear to auscultation bilaterally, no wheezes/rhonchi/rales  ABDOMEN: soft, non tender, non distended  SKIN: no rash or skin lesions  EXTREMITIES: no edema, pulses 2+bilaterally  PSYCH: appropriate mood and affect  NEUROLOGIC COMPREHENSIVE EXAM: Patient is oriented to person, place and time, NAD; appropriate affect  CN II, III, IV, V, VI, VII,VIII,IX,X,XI-XII intact with EOMI, PERRLA, OKN intact, VF grossly intact, fundi poorly visualized secondary to pupillary constriction; symmetric face noted  Motor: 5/5 UE/LE bilateral symmetric, RUE 4/5 shoulder abduction and finger flexion; RLU 4-/5 hip flexion and knee flexion; Sensory: intact to light touch and pinprick bilaterally; normal vibration sensation feet bilaterally; Coordination within normal limits on FTN and ROLANDO testing; DTR: 2/4 through, no Babinski, no clonus  Tandem gait is intact  Romberg: negative        ROS:  12 points of review of system was reviewed with the patient and was unremarkable with exception: see HPI  Review of Systems   Constitutional: Negative  Negative for appetite change and fever  HENT: Negative  Negative for hearing loss, tinnitus, trouble swallowing and voice change  Eyes: Positive for visual disturbance  Negative for photophobia and pain  Respiratory: Negative  Negative for shortness of breath  Cardiovascular: Negative  Negative for palpitations  Gastrointestinal: Positive for abdominal pain  Negative for nausea and vomiting  Endocrine: Negative  Negative for cold intolerance and heat intolerance  Genitourinary: Negative  Negative for dysuria, frequency and urgency     Musculoskeletal: Negative  Negative for myalgias and neck pain  Skin: Negative  Negative for rash  Neurological: Negative  Negative for dizziness, tremors, seizures, syncope, facial asymmetry, speech difficulty, weakness, light-headedness, numbness and headaches  Hematological: Negative  Does not bruise/bleed easily  Psychiatric/Behavioral: Positive for confusion  Negative for hallucinations and sleep disturbance  The patient is nervous/anxious

## 2018-12-05 ENCOUNTER — TELEPHONE (OUTPATIENT)
Dept: NEUROLOGY | Facility: CLINIC | Age: 58
End: 2018-12-05

## 2018-12-05 NOTE — TELEPHONE ENCOUNTER
Patient agreeable to starting Gilenya  Signed new start form after office visit yesterday with Dr Laron Deutsch  Called and spoke with brian from [place] [facility]   New start form not required, can do a quick start and reactivate patient's account to get her scheduled  Will await my callback as patient needs updated labs before FDO  Will follow up with patient regarding labs

## 2018-12-07 NOTE — TELEPHONE ENCOUNTER
LMOM for patient to return call  Labs have not been drawn yet  Must have results prior to doing FDO

## 2018-12-11 LAB
ALBUMIN SERPL-MCNC: 4.4 G/DL (ref 3.6–5.1)
ALBUMIN/GLOB SERPL: 1.9 (CALC) (ref 1–2.5)
ALP SERPL-CCNC: 132 U/L (ref 33–130)
ALT SERPL-CCNC: 15 U/L (ref 6–29)
AST SERPL-CCNC: 17 U/L (ref 10–35)
BASOPHILS # BLD AUTO: 29 CELLS/UL (ref 0–200)
BASOPHILS NFR BLD AUTO: 0.6 %
BILIRUB SERPL-MCNC: 0.4 MG/DL (ref 0.2–1.2)
BUN SERPL-MCNC: 10 MG/DL (ref 7–25)
BUN/CREAT SERPL: ABNORMAL (CALC) (ref 6–22)
CALCIUM SERPL-MCNC: 9.6 MG/DL (ref 8.6–10.4)
CHLORIDE SERPL-SCNC: 106 MMOL/L (ref 98–110)
CO2 SERPL-SCNC: 30 MMOL/L (ref 20–32)
CREAT SERPL-MCNC: 0.67 MG/DL (ref 0.5–1.05)
EOSINOPHIL # BLD AUTO: 120 CELLS/UL (ref 15–500)
EOSINOPHIL NFR BLD AUTO: 2.5 %
ERYTHROCYTE [DISTWIDTH] IN BLOOD BY AUTOMATED COUNT: 12.7 % (ref 11–15)
GLOBULIN SER CALC-MCNC: 2.3 G/DL (CALC) (ref 1.9–3.7)
GLUCOSE SERPL-MCNC: 93 MG/DL (ref 65–99)
HCT VFR BLD AUTO: 41.6 % (ref 35–45)
HGB BLD-MCNC: 13.8 G/DL (ref 11.7–15.5)
LYMPHOCYTES # BLD AUTO: 2179 CELLS/UL (ref 850–3900)
LYMPHOCYTES NFR BLD AUTO: 45.4 %
MCH RBC QN AUTO: 28.8 PG (ref 27–33)
MCHC RBC AUTO-ENTMCNC: 33.2 G/DL (ref 32–36)
MCV RBC AUTO: 86.8 FL (ref 80–100)
MONOCYTES # BLD AUTO: 350 CELLS/UL (ref 200–950)
MONOCYTES NFR BLD AUTO: 7.3 %
NEUTROPHILS # BLD AUTO: 2122 CELLS/UL (ref 1500–7800)
NEUTROPHILS NFR BLD AUTO: 44.2 %
PLATELET # BLD AUTO: 259 THOUSAND/UL (ref 140–400)
PMV BLD REES-ECKER: 10.9 FL (ref 7.5–12.5)
POTASSIUM SERPL-SCNC: 4.6 MMOL/L (ref 3.5–5.3)
PROT SERPL-MCNC: 6.7 G/DL (ref 6.1–8.1)
RBC # BLD AUTO: 4.79 MILLION/UL (ref 3.8–5.1)
SL AMB EGFR AFRICAN AMERICAN: 112 ML/MIN/1.73M2
SL AMB EGFR NON AFRICAN AMERICAN: 96 ML/MIN/1.73M2
SODIUM SERPL-SCNC: 142 MMOL/L (ref 135–146)
WBC # BLD AUTO: 4.8 THOUSAND/UL (ref 3.8–10.8)

## 2018-12-11 NOTE — TELEPHONE ENCOUNTER
Lab results in [facility] for your review  Ok to proceed with Eliecer FDO scheduling?

## 2018-12-13 LAB
JCPYV AB SERPL QL IA: ABNORMAL
SL AMB INDEX VALUE: 0.2
SL AMB JCV AB BY INHIBITION: NORMAL

## 2018-12-13 NOTE — TELEPHONE ENCOUNTER
Made Sheree aware from [facility] patient is cleared for FDO    She will call to get patient scheduled

## 2018-12-18 ENCOUNTER — TELEPHONE (OUTPATIENT)
Dept: NEUROLOGY | Facility: CLINIC | Age: 58
End: 2018-12-18

## 2018-12-18 NOTE — TELEPHONE ENCOUNTER
Please start the process of elective admission - discontinuing Nifedipine 90 mg for 2 days is no recommended prom neurology prospective

## 2018-12-18 NOTE — TELEPHONE ENCOUNTER
Confirmed with patient that she is taking the Nifedipine 90mg daily for her blood pressure  Made her aware that FDO will need to be performed at the hospital for the 24 hour observation  Patient agreeable to this, requesting [facility] if possible  Will call tomorrow to start to work on pre-certification process      Made Shari aware to cancel FDO scheduled for Dec  27

## 2018-12-18 NOTE — TELEPHONE ENCOUNTER
Received call from Brooks Cabello with [facility] Support team [phone]  Patient is scheduled for Gilenya FDO on Dec  27   Patient is taking nifedipine 90mg daily  Per FDA product label if patient is unable to discontinue medication for 2 days prior and day of FDO, patient must have first dose observation under 24 hour hospital admission  It is not contraindicated for medication itself, guidelines dictate that it must be done in a hospital setting because outpatient they are not equipped for that type of observation  For more clinical information as to why medication is recommended to be stopped prior to [facility] can call overseeing medical director Dr Татьяна Miller [phone]  Called PACS  To admit patient for 24 hour observation need to place order for ADT21 and fill out all of the specifics  Can admit to me/surg tele under [facility] and consult cardiology/neurology  Please advise if this is how you would like to proceed and I will contact insurance to obtain pre-cert for the elective admission

## 2019-01-07 NOTE — TELEPHONE ENCOUNTER
Spoke with patient, she states the only dates that would not work would be Jan 23-28  Made her aware, the authorization had to wait until the start of the new year for her FDO  Will provide update to patient this week

## 2019-01-11 NOTE — TELEPHONE ENCOUNTER
Spoke with Cuong Mi from pre-cert, states patient's primary insurance is Medicare and secondary insurance will follow Medicare guidelines- no pre-cert needed as it is based on medical necessity  Dr Thanh Millan- are you able to place order for 24 hour tele-observation under [facility] with consults for cardiology and neurology for patient's Gilenya FDO? Patient requesting [facility] Roca if that is an option

## 2019-01-16 NOTE — TELEPHONE ENCOUNTER
Spoke with Mariah Montero from [address] [place] [address] to obtain protocol for FDO  States she will speak with her [facility] to obtain protocols  Once protocols are obtained, Dr Ricki Briscoe will need to speak with [facility] to discuss referral prior to placing order

## 2019-01-17 NOTE — TELEPHONE ENCOUNTER
Spoke with Angelito Lawson RN Case manager from [facility] she is unable to provide recommended protocols for Shravanenirving FDO on the inpatient side  States it would be based off what Dr Rina Diaz and admitting team is comfortable with for frequency of vitals etc   Can refer to package insert  Dr Rina Diaz, at this point we would need to set up to have you speak with [facility] to discuss the admission  Looks like the only time you have tomorrow is between 11:30-12 and 3-3:30  I can call tomorrow to have the doctor of the day paged if those times work

## 2019-01-18 NOTE — TELEPHONE ENCOUNTER
Please consider holding off in-patient observation and Gilenya FDO, as new medication will be available in February-March 2019 - Siponimod, with same efficacy but less cardiac influence       I may consider 1 dose of Solumedrol 1000 mg IV

## 2019-01-18 NOTE — TELEPHONE ENCOUNTER
Patient agreeable to plan    Requesting [facility] center Jan 29, 30, 31 or first week in Feb 10am

## 2019-01-18 NOTE — TELEPHONE ENCOUNTER
Scheduled for Jan 29 @ 10am   Script signed by Dr Nathaniel Guaman and faxed  Made patient aware of date and time of infusion

## 2019-01-28 RX ORDER — SODIUM CHLORIDE 9 MG/ML
20 INJECTION, SOLUTION INTRAVENOUS CONTINUOUS
Status: DISCONTINUED | OUTPATIENT
Start: 2019-01-29 | End: 2019-02-01 | Stop reason: HOSPADM

## 2019-01-29 ENCOUNTER — HOSPITAL ENCOUNTER (OUTPATIENT)
Dept: INFUSION CENTER | Facility: CLINIC | Age: 59
Discharge: HOME | End: 2019-01-29
Payer: COMMERCIAL

## 2019-01-29 ENCOUNTER — TELEPHONE (OUTPATIENT)
Dept: NEUROLOGY | Facility: CLINIC | Age: 59
End: 2019-01-29

## 2019-01-29 VITALS
SYSTOLIC BLOOD PRESSURE: 115 MMHG | HEART RATE: 76 BPM | DIASTOLIC BLOOD PRESSURE: 63 MMHG | RESPIRATION RATE: 18 BRPM | TEMPERATURE: 98 F

## 2019-01-29 PROCEDURE — 96375 TX/PRO/DX INJ NEW DRUG ADDON: CPT

## 2019-01-29 PROCEDURE — 96365 THER/PROPH/DIAG IV INF INIT: CPT

## 2019-01-29 RX ORDER — DIPHENHYDRAMINE HYDROCHLORIDE 50 MG/ML
25 INJECTION INTRAMUSCULAR; INTRAVENOUS ONCE
Status: COMPLETED | OUTPATIENT
Start: 2019-01-29 | End: 2019-01-29

## 2019-01-29 RX ORDER — DIPHENHYDRAMINE HYDROCHLORIDE 50 MG/ML
25 INJECTION INTRAMUSCULAR; INTRAVENOUS ONCE AS NEEDED
Status: DISCONTINUED | OUTPATIENT
Start: 2019-01-29 | End: 2019-02-01 | Stop reason: HOSPADM

## 2019-01-29 RX ADMIN — SODIUM CHLORIDE: 0.9 INJECTION, SOLUTION INTRAVENOUS at 11:06

## 2019-01-29 RX ADMIN — DIPHENHYDRAMINE HYDROCHLORIDE 25 MG: 50 INJECTION, SOLUTION INTRAMUSCULAR; INTRAVENOUS at 10:54

## 2019-01-29 RX ADMIN — SODIUM CHLORIDE 20 ML/HR: 0.9 INJECTION, SOLUTION INTRAVENOUS at 10:48

## 2019-01-29 NOTE — TELEPHONE ENCOUNTER
Received call from [address] [place] [address] 14 center requesting order for benadryl  States patient felt throat, "itchiness" after the last time she had solumedrol infusion and had been recommended to have Benadryl before next infusion  Order written, signed by Dr Sangita Lundberg and faxed to infusion center

## 2019-01-29 NOTE — PROGRESS NOTES
Pt tolerated 1 gram methyl without complications  Pt discharged in stable condition and no further infusion appointments scheduled  AVS provided

## 2019-01-29 NOTE — PLAN OF CARE
Problem: Potential for Falls  Goal: Patient will remain free of falls  INTERVENTIONS:  - Assess patient frequently for physical needs  -  Identify cognitive and physical deficits and behaviors that affect risk of falls    -  [place] fall precautions as indicated by assessment   - Educate patient/family on patient safety including physical limitations  - Instruct patient to call for assistance with activity based on assessment  - Modify environment to reduce risk of injury  - Consider OT/PT consult to assist with strengthening/mobility   Outcome: Progressing

## 2019-02-04 ENCOUNTER — TELEPHONE (OUTPATIENT)
Dept: NEUROLOGY | Facility: CLINIC | Age: 59
End: 2019-02-04

## 2019-02-04 DIAGNOSIS — G35 MS (MULTIPLE SCLEROSIS) (HCC): Primary | ICD-10-CM

## 2019-02-04 RX ORDER — METHYLPREDNISOLONE 4 MG/1
TABLET ORAL
Qty: 21 TABLET | Refills: 0 | Status: SHIPPED | OUTPATIENT
Start: 2019-02-04 | End: 2019-02-21 | Stop reason: ALTCHOICE

## 2019-02-04 NOTE — TELEPHONE ENCOUNTER
Pt called stated that today she woke up with what feels like sciatica pain  States that she is having such a hard time walking with her right leg, states that it hurts all the way down to her toes  States that her lower back was hurting, and this pain radiated around her buttocks  She is laying in bed now because she is in a lot of pain  States she knows that she has bone spurs in her lumbar spine and is attributing this pain to that  Questioning if she can go for a steroid infusion  I made her aware that if she didn't feel this was related to the MS it was unlikely that she would be sent for an infusion, she stated that she is also having tingling in her right hand, and that she knows when she has a fever it can affect her MS so she was questioning if what she is experiencing now with the pain would be exacerbating her MS as well?

## 2019-02-05 NOTE — TELEPHONE ENCOUNTER
pt called regarding message form yesterday  she is feeling tingling all the way down her r leg and r fingers  worse that yesterday  also having difficulty walking  she states that she started taking naprosyn every 4 hours and this helped a little bit  She also took an epsom salt bath yesterday and today and this helped while she was in bath but once she gets out, the pain is the same   Please advise

## 2019-02-06 NOTE — TELEPHONE ENCOUNTER
Medrol pack was provided - patient is to try low dose steroid for likely sciatica, along with Ibuprofen 600 mg up to tid  I would avoid infusion at this point  Pain management evaluation might be required if pain is not improving

## 2019-02-11 ENCOUNTER — TRANSCRIBE ORDERS (OUTPATIENT)
Dept: ADMINISTRATIVE | Facility: HOSPITAL | Age: 59
End: 2019-02-11

## 2019-02-11 DIAGNOSIS — M47.26 OTHER SPONDYLOSIS WITH RADICULOPATHY, LUMBAR REGION: Primary | ICD-10-CM

## 2019-02-11 DIAGNOSIS — Z12.39 SCREENING BREAST EXAMINATION: Primary | ICD-10-CM

## 2019-02-11 NOTE — TELEPHONE ENCOUNTER
Spoke with patient, states she does not want to go to the ED  She will call central scheduling and schedule CT  Provided her with phone number for [facility]  She will call and schedule an appointment  Made her aware of recommendation for ED eval if she is getting worse  Verbalizes understanding

## 2019-02-11 NOTE — TELEPHONE ENCOUNTER
CT lumbar spine wo was recommended and referral to Pain management has been available  Last MRI L-spine was 12/2017 with no stenosis noted  Patient may benefit from ER evaluation at any point today if she is actually getting worse with no response to steroids  Please advise to the patient

## 2019-02-11 NOTE — TELEPHONE ENCOUNTER
Pt called back, states today was the last day of prednisone, reports the pain is still there, there was only a slight improvement  Reports that she was taking the ibuprofen TID as recommended  Questioning recommendations at this time? States she is concerned that her back is going to "totally go out"

## 2019-02-14 DIAGNOSIS — G35 MS (MULTIPLE SCLEROSIS) (HCC): Primary | ICD-10-CM

## 2019-02-14 DIAGNOSIS — R26.2 AMBULATORY DYSFUNCTION: ICD-10-CM

## 2019-02-14 NOTE — TELEPHONE ENCOUNTER
Order for CT thoracic spine w/wo was placed ( BUN/Cr available as well)   Please let the patient know

## 2019-02-14 NOTE — TELEPHONE ENCOUNTER
Patient would be advised to be evaluated either at urgent Care clinic or PCP on first available appointment

## 2019-02-14 NOTE — TELEPHONE ENCOUNTER
Pt called and states that CT lumbar spine is scheduled 2/15/19  Pt has another issue she wants to address, 2 days ago she noticed a "bulge in my mid back close to my spine"  Discomfort, pulling feeling and tingling when she walks  Pt did not fall, "I don't know what happened, but I know there's something wrong"  Pls advise          [phone]

## 2019-02-14 NOTE — TELEPHONE ENCOUNTER
Pt made aware  She is asking if you want to add CT thoracic as she will be getting lumbar done tomorrow  She states that the bulge is right in the middle of her back  [facility] is not required per nimo so we soul be able to add this if you wanted

## 2019-02-14 NOTE — TELEPHONE ENCOUNTER
I spoke to dr cervantes and she entered order for CT thoracic w/w/o contrast   Pt will also need labs done for bun/creat  She uses [facility]   She requested that I fax order to [phone]  Scripts faxed to [facility]   She will have this done first thing tomorrow morning at 7 am when [facility] opens   She will call central scheduling and see if they can do both CT's tomorrow

## 2019-02-15 ENCOUNTER — HOSPITAL ENCOUNTER (OUTPATIENT)
Dept: MAMMOGRAPHY | Facility: CLINIC | Age: 59
Discharge: HOME | End: 2019-02-15
Payer: COMMERCIAL

## 2019-02-15 ENCOUNTER — HOSPITAL ENCOUNTER (OUTPATIENT)
Dept: CT IMAGING | Facility: CLINIC | Age: 59
Discharge: HOME | End: 2019-02-15
Payer: COMMERCIAL

## 2019-02-15 ENCOUNTER — HOSPITAL ENCOUNTER (OUTPATIENT)
Dept: ULTRASOUND IMAGING | Facility: CLINIC | Age: 59
Discharge: HOME | End: 2019-02-15
Payer: COMMERCIAL

## 2019-02-15 ENCOUNTER — TELEPHONE (OUTPATIENT)
Dept: NEUROLOGY | Facility: CLINIC | Age: 59
End: 2019-02-15

## 2019-02-15 VITALS — WEIGHT: 127 LBS | HEIGHT: 60 IN | BODY MASS INDEX: 24.94 KG/M2

## 2019-02-15 DIAGNOSIS — M54.40 CHRONIC MIDLINE LOW BACK PAIN WITH SCIATICA, SCIATICA LATERALITY UNSPECIFIED: Primary | ICD-10-CM

## 2019-02-15 DIAGNOSIS — G89.29 CHRONIC MIDLINE LOW BACK PAIN WITH SCIATICA, SCIATICA LATERALITY UNSPECIFIED: Primary | ICD-10-CM

## 2019-02-15 DIAGNOSIS — Z12.39 SCREENING BREAST EXAMINATION: ICD-10-CM

## 2019-02-15 DIAGNOSIS — G35 MS (MULTIPLE SCLEROSIS) (HCC): ICD-10-CM

## 2019-02-15 DIAGNOSIS — R26.2 AMBULATORY DYSFUNCTION: ICD-10-CM

## 2019-02-15 DIAGNOSIS — M47.26 OTHER SPONDYLOSIS WITH RADICULOPATHY, LUMBAR REGION: ICD-10-CM

## 2019-02-15 PROCEDURE — 76377 3D RENDER W/INTRP POSTPROCES: CPT

## 2019-02-15 PROCEDURE — 76641 ULTRASOUND BREAST COMPLETE: CPT

## 2019-02-15 PROCEDURE — 72130 CT CHEST SPINE W/O & W/DYE: CPT

## 2019-02-15 PROCEDURE — 72131 CT LUMBAR SPINE W/O DYE: CPT

## 2019-02-15 PROCEDURE — 77067 SCR MAMMO BI INCL CAD: CPT

## 2019-02-15 RX ADMIN — IOHEXOL 85 ML: 350 INJECTION, SOLUTION INTRAVENOUS at 09:24

## 2019-02-15 NOTE — TELEPHONE ENCOUNTER
Radiology called questioning CT thoracic order  Pt scheduled for CT of lumbar today at 9am and they would like to complete both today if possible  However they need clarification as CT thoracic ordered for:    Worsening MS, lower extremities weakness   Dx: MS (multiple sclerosis) ([facility] [address] ) [G35 (ICD-10-CM)]; Ambulatory dysfunction [R26 2 (ICD-10-CM)]     They are questioning if this should be an MRI instead  If CT needed then dx sould be updated

## 2019-02-15 NOTE — TELEPHONE ENCOUNTER
Per encounter on 12/18/18- note from 1/18/19,    Cosmo Foote MD   to Me        10:07 AM   Note      Please consider holding off in-patient observation and Eliecer MARQUEZO, as new medication will be available in February-March 2019 - Siponimod, with same efficacy but less cardiac influence       I may consider 1 dose of Solumedrol 1000 mg IV            Patient completed her 1 dose of solumedrol IV on 1/29/19  Called and spoke with patient, made her aware of below  Phone number for pain management provided  She has appointment with you next week as well

## 2019-02-15 NOTE — TELEPHONE ENCOUNTER
CT L-spine report is available now - Patient has diffuse disc bulge at L4-L5 with potential impingement of right L4 root, no major spinal stenosis noted  Pain management referral is available in Epic to address lower back pain  No response to steroid or NSAID therapy with persistent right leg sensory dysfunction and pain       Please clarify if Eliecer FDO took place already

## 2019-02-15 NOTE — TELEPHONE ENCOUNTER
whit called back  I made her that we were doing because pt complained of a bulge by her spine  She states that makes more sense  She does not need anything else from our office at this time

## 2019-02-19 RX ORDER — TIZANIDINE HYDROCHLORIDE 4 MG/1
4 CAPSULE, GELATIN COATED ORAL
COMMUNITY
End: 2019-08-15 | Stop reason: ALTCHOICE

## 2019-02-21 ENCOUNTER — OFFICE VISIT (OUTPATIENT)
Dept: NEUROLOGY | Facility: CLINIC | Age: 59
End: 2019-02-21
Payer: COMMERCIAL

## 2019-02-21 VITALS
DIASTOLIC BLOOD PRESSURE: 70 MMHG | BODY MASS INDEX: 25.32 KG/M2 | SYSTOLIC BLOOD PRESSURE: 120 MMHG | WEIGHT: 129 LBS | HEART RATE: 79 BPM | HEIGHT: 60 IN

## 2019-02-21 DIAGNOSIS — R21 SKIN RASH: ICD-10-CM

## 2019-02-21 DIAGNOSIS — R51.9 CHRONIC RIGHT-SIDED HEADACHES: ICD-10-CM

## 2019-02-21 DIAGNOSIS — R26.2 AMBULATORY DYSFUNCTION: ICD-10-CM

## 2019-02-21 DIAGNOSIS — M54.5 RIGHT LOW BACK PAIN, UNSPECIFIED CHRONICITY, WITH SCIATICA PRESENCE UNSPECIFIED: ICD-10-CM

## 2019-02-21 DIAGNOSIS — G81.91 RIGHT HEMIPARESIS (HCC): ICD-10-CM

## 2019-02-21 DIAGNOSIS — G35 MULTIPLE SCLEROSIS (HCC): Primary | ICD-10-CM

## 2019-02-21 DIAGNOSIS — G89.29 CHRONIC RIGHT-SIDED HEADACHES: ICD-10-CM

## 2019-02-21 PROBLEM — M54.50 RIGHT LOW BACK PAIN: Status: ACTIVE | Noted: 2019-02-21

## 2019-02-21 PROCEDURE — 99215 OFFICE O/P EST HI 40 MIN: CPT | Performed by: PSYCHIATRY & NEUROLOGY

## 2019-02-21 RX ORDER — BACLOFEN 20 MG/1
20 TABLET ORAL 3 TIMES DAILY
Qty: 270 TABLET | Refills: 2 | Status: SHIPPED | OUTPATIENT
Start: 2019-02-21 | End: 2020-05-13 | Stop reason: SDUPTHER

## 2019-02-21 NOTE — PROGRESS NOTES
Patient ID: Eugenia Taylor is a 60 y o  female  Assessment/Plan:-     Problem List Items Addressed This Visit        Nervous and Auditory    Multiple sclerosis ([address] ) - Primary    Relevant Medications    baclofen 20 mg tablet    Other Relevant Orders    Ambulatory referral to Dermatology    CBC and differential    Comprehensive metabolic panel    TSH, 3rd generation with Free T4 reflex    Right hemiparesis (HCC)       Musculoskeletal and Integument    Skin rash    Relevant Orders    Ambulatory referral to Dermatology       Other    Ambulatory dysfunction    Chronic right-sided headaches    Right low back pain         Today I had the pleasure of seeing your patient, Roslyn Dougherty , in consultation at [address] [facility]  Mrs Fabien Rosas has presented for follow up on SPMS with right hemiparesis  No new focal neurologic deficit described with worsening right -sided sciatica has been reported  Patient had completed CT lumbar spine and short course of tapering steroids has been completed  Patient described improving right leg pain and she will continue with Pain management evaluation will be recommended  Muscle spasms will be address with gradually re-starting baclofen 20 mg HS, and then up to three times a day  Breast cyst was described- she is to follow with OB/GYN team Dermatology evaluation was provided prior to initiating any other DMT  She had unclear etiology mid thoracic subcutaneou nodule, with no pathology described while having CT T and L-spine  Questionable skin changes in lower part of her shins bilaterally  Patient is planning to use CBD  We discussed considering Siponimod in 2019, waiting for FDA approval  Patient had completed blood work, she may consider repeating CBC/CMP and TSH prior to starting the medications  Script as provided  We may consider monthly steroids till Siponimod is available, but only if new symptoms are described  Follow up in 3 months  Subjective: MS and related issues  HPI/History of Present Illness  Mrs Brady has a history of relapsing remitting multiple sclerosis, neurogenic bladder,ambulatory dysfunction due to right hemiparesis, disbalance, mesenteric panniculitis, cervical disc disease with myelopathy, classic migraine without aura, who presented for follow up on MS and related issues  Patient had lost her daughter in-law due to brain cancer  Patient had right sided sciatica exacerbation and now she has been resolving with pain starting at right buttock area and spreading to the dorsal part of her right foot  Right arm tingling has not changed  Distance had decreased and she is more slow walking; daily medications required for bladder disfunction  LFT has been improved -   CT lumbar spine 2019: Multilevel spondylotic degenerative changes of the lumbar spine as described above, especially at L4/L5, where there is a diffuse disc bulge with a superimposed right foraminal focal disc protrusion causing severe right neural foraminal narrowing  Clinical correlation for right L4 radiculitis is recommended  Mild multilevel spinal canal stenosis  No fracture or subluxation  Patient had received prednisone for sciatica and she subcutaneous erythema had subsided in mid thoracic spine  The following portions of the patient's history were reviewed and updated as appropriate:   She  has a past medical history of Asthma, Cardiac disease, COPD (chronic obstructive pulmonary disease) ([address] ), Depression, Hyperlipidemia, Hypertension, Influenza, Irregular heart beat, Migraine, Migraine, MS (multiple sclerosis) ([address] ), Multiple sclerosis ([address] ), Nephrolithiasis, Neurogenic bladder, and Panniculitis    She   Patient Active Problem List    Diagnosis Date Noted    Right low back pain 02/21/2019    Skin rash 02/21/2019    Right hemiparesis ([address] ) 02/21/2019    Mesenteric panniculitis ([address] ) 03/15/2018    Ambulatory dysfunction 02/21/2018    Chronic right-sided headaches 02/21/2018    Multiple sclerosis [facility] ) 02/21/2018    Panniculitis 02/21/2018    S/P trigger finger release 02/02/2018    Trigger ring finger of left hand 01/17/2018     She  has a past surgical history that includes Dilation and curettage of uterus; Uterine fibroid surgery; pr colonoscopy flx dx w/collj spec when pfrmd (N/A, 1/11/2018); Colonoscopy; and pr incise finger tendon sheath (Left, 1/17/2018)  Her family history includes Breast cancer (age of onset: 76) in her maternal aunt; Diabetes in her father, mother, and sister; Heart disease in her father; Hypertension in her brother, father, and mother; Stomach cancer in her maternal aunt; Stroke in her mother  She  reports that she has never smoked  She has never used smokeless tobacco  She reports that she drinks alcohol  She reports that she has current or past drug history  Drug: Marijuana  Current Outpatient Medications   Medication Sig Dispense Refill    albuterol (2 5 mg/3 mL) 0 083 % nebulizer solution Albuterol Sulfate (2 5 MG/3ML) 0 083% Inhalation Nebulization Solution  USE 1 UNIT DOSE IN NEBULIZER EVERY 6 HOURS AS NEEDED     Quantity: 1;  Refills: 5       Pippa GARCIA ;  Started 12-July-2016  Active3 ML Plas Cont (125 Plas Conts)      albuterol (VENTOLIN HFA) 90 mcg/act inhaler Inhale 2 puffs every 6 (six) hours as needed for wheezing 3 Inhaler 0    aspirin 81 MG tablet Aspirin 81 MG TABS  Take 1 tablet daily   Refills: 0    Active      atorvastatin (LIPITOR) 20 mg tablet Take 40 mg by mouth Medrol Dose Pack scheduling ONLY        budesonide (PULMICORT FLEXHALER) 180 MCG/ACT inhaler Inhale 1 puff 2 (two) times a day (Patient taking differently: Inhale 1 puff 2 (two) times a day as needed  ) 3 Inhaler 3    butalbital-acetaminophen-caffeine (FIORICET,ESGIC) -40 mg per tablet Take 1 tablet by mouth every 4 (four) hours as needed for headaches 12 tablet 4    EPINEPHrine (EPIPEN) 0 3 mg/0 3 mL SOAJ Inject 0 3 mg into the shoulder, thigh, or buttocks      ergocalciferol (VITAMIN D2) 50,000 units TAKE ONE CAPSULE WEEKLY WITH FOOD  1    famotidine (PEPCID) 20 mg tablet Famotidine 20 MG Oral Tablet  TAKE 1 TABLET DAILY PRN AS DIRECTED  Refills: 0    Active      gabapentin (NEURONTIN) 300 mg capsule TAKE 1 CAPSULE 3 TIMES DAILY 270 capsule 1    levocetirizine (XYZAL) 5 MG tablet TAKE 1 TABLET (5 MG TOTAL) BY MOUTH DAILY AS NEEDED (HIVES) 30 tablet 0    NIFEdipine (ADALAT CC) 90 mg 24 hr tablet Take 1 tablet by mouth daily 7 tablet 0    sertraline (ZOLOFT) 50 mg tablet Sertraline HCl - 50 MG Oral Tablet  TAKE 3 TABLETS DAILY   Refills: 0    Active      TiZANidine (ZANAFLEX) 4 MG capsule Take 4 mg by mouth      tolterodine (DETROL LA) 4 mg 24 hr capsule Take 1 capsule by mouth daily      valACYclovir (VALTREX) 1,000 mg tablet Take 1,000 mg by mouth as needed      baclofen 20 mg tablet Take 1 tablet (20 mg total) by mouth 3 (three) times a day 270 tablet 2    mupirocin (BACTROBAN) 2 % cream Apply topically 3 (three) times a day (Patient not taking: Reported on 2/21/2019) 15 g 0     No current facility-administered medications for this visit  Current Outpatient Medications on File Prior to Visit   Medication Sig    albuterol (2 5 mg/3 mL) 0 083 % nebulizer solution Albuterol Sulfate (2 5 MG/3ML) 0 083% Inhalation Nebulization Solution  USE 1 UNIT DOSE IN NEBULIZER EVERY 6 HOURS AS NEEDED     Quantity: 1;  Refills: 5       Rafael GARCIA ;  Started 12-July-2016  Active3 ML Plas Cont (125 Plas Conts)    albuterol (VENTOLIN HFA) 90 mcg/act inhaler Inhale 2 puffs every 6 (six) hours as needed for wheezing    aspirin 81 MG tablet Aspirin 81 MG TABS  Take 1 tablet daily   Refills: 0    Active    atorvastatin (LIPITOR) 20 mg tablet Take 40 mg by mouth Medrol Dose Pack scheduling ONLY      budesonide (PULMICORT FLEXHALER) 180 MCG/ACT inhaler Inhale 1 puff 2 (two) times a day (Patient taking differently: Inhale 1 puff 2 (two) times a day as needed  )    butalbital-acetaminophen-caffeine (FIORICET,ESGIC) -40 mg per tablet Take 1 tablet by mouth every 4 (four) hours as needed for headaches    EPINEPHrine (EPIPEN) 0 3 mg/0 3 mL SOAJ Inject 0 3 mg into the shoulder, thigh, or buttocks    ergocalciferol (VITAMIN D2) 50,000 units TAKE ONE CAPSULE WEEKLY WITH FOOD    famotidine (PEPCID) 20 mg tablet Famotidine 20 MG Oral Tablet  TAKE 1 TABLET DAILY PRN AS DIRECTED  Refills: 0    Active    gabapentin (NEURONTIN) 300 mg capsule TAKE 1 CAPSULE 3 TIMES DAILY    levocetirizine (XYZAL) 5 MG tablet TAKE 1 TABLET (5 MG TOTAL) BY MOUTH DAILY AS NEEDED (HIVES)    NIFEdipine (ADALAT CC) 90 mg 24 hr tablet Take 1 tablet by mouth daily    sertraline (ZOLOFT) 50 mg tablet Sertraline HCl - 50 MG Oral Tablet  TAKE 3 TABLETS DAILY   Refills: 0    Active    TiZANidine (ZANAFLEX) 4 MG capsule Take 4 mg by mouth    tolterodine (DETROL LA) 4 mg 24 hr capsule Take 1 capsule by mouth daily    valACYclovir (VALTREX) 1,000 mg tablet Take 1,000 mg by mouth as needed    [DISCONTINUED] methylPREDNISolone 4 MG tablet therapy pack Use as directed on package    mupirocin (BACTROBAN) 2 % cream Apply topically 3 (three) times a day (Patient not taking: Reported on 2/21/2019)    [DISCONTINUED] baclofen 20 mg tablet TAKE 1 TABLET BY MOUTH 3 TIMES A DAY (Patient not taking: Reported on 2/21/2019)     No current facility-administered medications on file prior to visit  She is allergic to imitrex [sumatriptan]; keflex [cephalexin]; nuts; other; peanut oil; shellfish allergy; shellfish-derived products; copaxone [glatiramer acetate]; cat hair extract; glatiramer; seasonal ic  [cholestatin]; and pollen extract            Objective:    Blood pressure 120/70, pulse 79, height 5' (1 524 m), weight 58 5 kg (129 lb), not currently breastfeeding  Physical Exam/Neurological Exam    CONSTITUTIONAL: NAD, pleasant   NECK: supple, no lymphadenopathy, no thyromegaly, no JVD  CARDIOVASCULAR: RRR, normal S1S2, no murmurs, no rubs  RESP: clear to auscultation bilaterally, no wheezes/rhonchi/rales  ABDOMEN: soft, non tender, non distended  SKIN: no rash or skin lesions  EXTREMITIES: no edema, pulses 2+bilaterally  PSYCH: appropriate mood and affect  NEUROLOGIC COMPREHENSIVE EXAM: Patient is oriented to person, place and time, NAD; appropriate affect  CN II, III, IV, V, VI, VII,VIII,IX,X,XI-XII intact with EOMI, PERRLA, OKN intact, VF grossly intact, fundi poorly visualized secondary to pupillary constriction; symmetric face noted  Motor: 5/5 UE/LE bilateral symmetric, RUE 4/5 shoulder abduction and finger flexion; RLU 4-/5 hip flexion and knee flexion; Sensory: intact to light touch and pinprick bilaterally; normal vibration sensation feet bilaterally; Coordination within normal limits on FTN and ROLANDO testing; DTR: 2/4 through, no Babinski, no clonus  Tandem gait is abnormal  Romberg: negative  ROS:  12 points of review of system was reviewed with the patient and was unremarkable with exception: see HPI  Review of Systems   Constitutional: Negative  HENT: Negative  Eyes: Positive for visual disturbance (Right eye)  Respiratory: Negative  Cardiovascular: Negative  Gastrointestinal: Negative  Endocrine: Positive for cold intolerance  Genitourinary: Negative  Musculoskeletal: Positive for back pain, gait problem, neck pain and neck stiffness  T25FW: 13 0 sec   Allergic/Immunologic: Negative  Neurological: Positive for numbness (Right side hand and leg) and headaches  Hematological: Negative  Psychiatric/Behavioral: Positive for sleep disturbance

## 2019-02-28 ENCOUNTER — HOSPITAL ENCOUNTER (OUTPATIENT)
Dept: ULTRASOUND IMAGING | Facility: CLINIC | Age: 59
Discharge: HOME | End: 2019-02-28
Payer: COMMERCIAL

## 2019-02-28 DIAGNOSIS — R92.8 ABNORMAL ULTRASOUND OF BREAST: ICD-10-CM

## 2019-02-28 PROCEDURE — 76642 ULTRASOUND BREAST LIMITED: CPT

## 2019-03-26 LAB
ALBUMIN SERPL-MCNC: 4.5 G/DL (ref 3.6–5.1)
ALBUMIN/GLOB SERPL: 2.4 (CALC) (ref 1–2.5)
ALP SERPL-CCNC: 116 U/L (ref 33–130)
ALT SERPL-CCNC: 21 U/L (ref 6–29)
AST SERPL-CCNC: 16 U/L (ref 10–35)
BASOPHILS # BLD AUTO: 32 CELLS/UL (ref 0–200)
BASOPHILS NFR BLD AUTO: 0.5 %
BILIRUB SERPL-MCNC: 0.6 MG/DL (ref 0.2–1.2)
BUN SERPL-MCNC: 12 MG/DL (ref 7–25)
BUN/CREAT SERPL: ABNORMAL (CALC) (ref 6–22)
CALCIUM SERPL-MCNC: 9.2 MG/DL (ref 8.6–10.4)
CHLORIDE SERPL-SCNC: 103 MMOL/L (ref 98–110)
CO2 SERPL-SCNC: 30 MMOL/L (ref 20–32)
CREAT SERPL-MCNC: 0.65 MG/DL (ref 0.5–0.99)
EOSINOPHIL # BLD AUTO: 107 CELLS/UL (ref 15–500)
EOSINOPHIL NFR BLD AUTO: 1.7 %
ERYTHROCYTE [DISTWIDTH] IN BLOOD BY AUTOMATED COUNT: 12.9 % (ref 11–15)
GLOBULIN SER CALC-MCNC: 1.9 G/DL (CALC) (ref 1.9–3.7)
GLUCOSE SERPL-MCNC: 111 MG/DL (ref 65–99)
HCT VFR BLD AUTO: 41.8 % (ref 35–45)
HGB BLD-MCNC: 13.6 G/DL (ref 11.7–15.5)
LYMPHOCYTES # BLD AUTO: 2287 CELLS/UL (ref 850–3900)
LYMPHOCYTES NFR BLD AUTO: 36.3 %
MCH RBC QN AUTO: 28.3 PG (ref 27–33)
MCHC RBC AUTO-ENTMCNC: 32.5 G/DL (ref 32–36)
MCV RBC AUTO: 87.1 FL (ref 80–100)
MONOCYTES # BLD AUTO: 491 CELLS/UL (ref 200–950)
MONOCYTES NFR BLD AUTO: 7.8 %
NEUTROPHILS # BLD AUTO: 3383 CELLS/UL (ref 1500–7800)
NEUTROPHILS NFR BLD AUTO: 53.7 %
PLATELET # BLD AUTO: 261 THOUSAND/UL (ref 140–400)
PMV BLD REES-ECKER: 11.4 FL (ref 7.5–12.5)
POTASSIUM SERPL-SCNC: 3.8 MMOL/L (ref 3.5–5.3)
PROT SERPL-MCNC: 6.4 G/DL (ref 6.1–8.1)
RBC # BLD AUTO: 4.8 MILLION/UL (ref 3.8–5.1)
SL AMB EGFR AFRICAN AMERICAN: 112 ML/MIN/1.73M2
SL AMB EGFR NON AFRICAN AMERICAN: 96 ML/MIN/1.73M2
SODIUM SERPL-SCNC: 141 MMOL/L (ref 135–146)
TSH SERPL-ACNC: 3.11 MIU/L (ref 0.4–4.5)
WBC # BLD AUTO: 6.3 THOUSAND/UL (ref 3.8–10.8)

## 2019-03-28 ENCOUNTER — CONSULT (OUTPATIENT)
Dept: PAIN MEDICINE | Facility: CLINIC | Age: 59
End: 2019-03-28
Payer: COMMERCIAL

## 2019-03-28 VITALS
HEART RATE: 80 BPM | BODY MASS INDEX: 25.32 KG/M2 | DIASTOLIC BLOOD PRESSURE: 84 MMHG | HEIGHT: 60 IN | SYSTOLIC BLOOD PRESSURE: 124 MMHG | RESPIRATION RATE: 16 BRPM | WEIGHT: 129 LBS

## 2019-03-28 DIAGNOSIS — M54.16 LUMBAR RADICULOPATHY: Primary | ICD-10-CM

## 2019-03-28 PROCEDURE — 99204 OFFICE O/P NEW MOD 45 MIN: CPT | Performed by: ANESTHESIOLOGY

## 2019-03-28 NOTE — PROGRESS NOTES
Assessment  1  Lumbar radiculopathy  FL spine and pain procedure    Ambulatory referral to Physical Therapy         Plan  This is a 42-year-old female who presents today with low back pain and right leg pain which is neuropathic in nature  Patient has a diagnosis of lumbar radiculitis  On physical examination, there is right LE weakness on dorsiflexion and plantarflexion  CT scan of the lumbosacral spine was reviewed today which demonstrates multilevel spondylitic degenerative changes with severe foraminal narrowing at right L4-L5  I will send her for PT for 8 weeks  If she continues to have pain, I will schedule her for a right L4, L5 TFESI  My impressions and treatment recommendations were discussed in detail with the patient who verbalized understanding and had no further questions  Discharge instructions were provided  I personally saw and examined the patient and I agree with the above discussed plan of care  History of Present Illness    Neville Bunch is a 60 y o  female who presents today for initial consultation for management of low back pain and right leg pain  Of note, patient has a history of multiple sclerosis  Patient reports severe pain which she rates 10/10 on the pain scale  Her pain is nearly constant, worse in the evening  Patient further describes pain as shooting with numbness and pins and needles sensation  Patient reports weakness down her right leg  Her pain is aggravated with prolonged standing, bending, sitting, walking and exercise  Patient reports prior physical therapy with moderate relief of pain  She does regular home exercises and uses heat/ice treatment  Patient had a CT scan of the lumbosacral spine which demonstrates multilevel spondylitic degenerative changes of the lumbar spine at L4-L5 causing severe right neural foraminal narrowing  She denies bladder and or bowel issues      I have personally reviewed and/or updated the patient's past medical history, past surgical history, family history, social history, current medications, allergies, and vital signs today  Review of Systems   Constitutional: Negative for fever and unexpected weight change  HENT: Negative for trouble swallowing  Eyes: Positive for visual disturbance  Respiratory: Positive for wheezing  Negative for shortness of breath  Cardiovascular: Negative for chest pain and palpitations  Gastrointestinal: Negative for constipation, diarrhea, nausea and vomiting  Endocrine: Negative for cold intolerance, heat intolerance and polydipsia  Genitourinary: Positive for difficulty urinating  Negative for frequency  Musculoskeletal: Positive for gait problem  Negative for arthralgias, joint swelling and myalgias  Decreased ROM, Joint stiffness   Skin: Negative for rash  Neurological: Positive for dizziness  Negative for seizures, syncope, weakness and headaches  Hematological: Does not bruise/bleed easily  Psychiatric/Behavioral: Positive for dysphoric mood  All other systems reviewed and are negative        Patient Active Problem List   Diagnosis    Trigger ring finger of left hand    S/P trigger finger release    Ambulatory dysfunction    Chronic right-sided headaches    Multiple sclerosis (HCC)    Panniculitis    Mesenteric panniculitis (HCC)    Right low back pain    Skin rash    Right hemiparesis (HCC)       Past Medical History:   Diagnosis Date    Asthma     Cardiac disease     [facility] obstructive [facility] disease) [facility] [address] [facility]     Depression     Hyperlipidemia     Hypertension     Influenza     December 2017    Irregular heart beat     Migraine     Migraine     MS (multiple sclerosis) (HCC)     [facility]     Nephrolithiasis     Neurogenic bladder     Panniculitis        Past Surgical History:   Procedure Laterality Date    COLONOSCOPY      DILATION AND CURETTAGE OF UTERUS      WI COLONOSCOPY FLX DX W/COLLJ SPEC WHEN PFRMD N/A 1/11/2018    Procedure: COLONOSCOPY;  Surgeon: Ina Lind MD;  Location: [facility]; Service: Gastroenterology    ND INCISE FINGER TENDON SHEATH Left 1/17/2018    Procedure: RING TRIGGER FINGER RELEASE;  Surgeon: Rena Acevedo MD;  Location:  MAIN OR;  Service: Orthopedics    UTERINE FIBROID SURGERY         Family History   Problem Relation Age of Onset    Stroke Mother     Diabetes Mother     Hypertension Mother     Heart disease Father     Diabetes Father     Hypertension Father     Diabetes Sister     Hypertension Brother     Breast cancer Maternal Aunt 76    Stomach cancer Maternal Aunt        Social History     Occupational History    Not on file   Tobacco Use    Smoking status: Never Smoker    Smokeless tobacco: Never Used   Substance and Sexual Activity    Alcohol use: Yes     Comment: social    Drug use: Yes     Types: Marijuana     Comment: last use 4 days ago   Sexual activity: Not on file       Current Outpatient Medications on File Prior to Visit   Medication Sig    albuterol (2 5 mg/3 mL) 0 083 % nebulizer solution Albuterol Sulfate (2 5 MG/3ML) 0 083% Inhalation Nebulization Solution  USE 1 UNIT DOSE IN NEBULIZER EVERY 6 HOURS AS NEEDED     Quantity: 1;  Refills: 5       Gracy GARCIA ;  Started 12-July-2016  Active3 ML Plas Cont (125 Plas Conts)    albuterol (VENTOLIN HFA) 90 mcg/act inhaler Inhale 2 puffs every 6 (six) hours as needed for wheezing    aspirin 81 MG tablet Aspirin 81 MG TABS  Take 1 tablet daily   Refills: 0    Active    atorvastatin (LIPITOR) 20 mg tablet Take 40 mg by mouth Medrol Dose Pack scheduling ONLY      baclofen 20 mg tablet Take 1 tablet (20 mg total) by mouth 3 (three) times a day    budesonide (PULMICORT FLEXHALER) 180 MCG/ACT inhaler Inhale 1 puff 2 (two) times a day (Patient taking differently: Inhale 1 puff 2 (two) times a day as needed  )    butalbital-acetaminophen-caffeine (FIORICET,ESGIC) -40 mg per tablet Take 1 tablet by mouth every 4 (four) hours as needed for headaches    EPINEPHrine (EPIPEN) 0 3 mg/0 3 mL SOAJ Inject 0 3 mg into the shoulder, thigh, or buttocks    ergocalciferol (VITAMIN D2) 50,000 units TAKE ONE CAPSULE WEEKLY WITH FOOD    famotidine (PEPCID) 20 mg tablet Famotidine 20 MG Oral Tablet  TAKE 1 TABLET DAILY PRN AS DIRECTED  Refills: 0    Active    gabapentin (NEURONTIN) 300 mg capsule TAKE 1 CAPSULE 3 TIMES DAILY    levocetirizine (XYZAL) 5 MG tablet TAKE 1 TABLET (5 MG TOTAL) BY MOUTH DAILY AS NEEDED (HIVES)    mupirocin (BACTROBAN) 2 % cream Apply topically 3 (three) times a day    NIFEdipine (ADALAT CC) 90 mg 24 hr tablet Take 1 tablet by mouth daily    sertraline (ZOLOFT) 50 mg tablet Sertraline HCl - 50 MG Oral Tablet  TAKE 3 TABLETS DAILY   Refills: 0    Active    TiZANidine (ZANAFLEX) 4 MG capsule Take 4 mg by mouth    tolterodine (DETROL LA) 4 mg 24 hr capsule Take 1 capsule by mouth daily    valACYclovir (VALTREX) 1,000 mg tablet Take 1,000 mg by mouth as needed     No current facility-administered medications on file prior to visit  Allergies   Allergen Reactions    Imitrex [Sumatriptan] Headache    Keflex [Cephalexin] Rash    Nuts Shortness Of Breath and Anaphylaxis    Other Anaphylaxis, Hives and Itching     [facility] [id number]: POISON IVY   walnuts & cashews  Cats    Peanut Oil      Hives anaphylaxis    Shellfish Allergy Shortness Of Breath and Swelling    Shellfish-Derived Products Shortness Of Breath and Swelling    Copaxone [Glatiramer Acetate] Hives    Cat Hair Extract     Glatiramer      Other reaction(s): rash    Seasonal Ic  [Cholestatin]     Pollen Extract Sneezing       Physical Exam    /84   Pulse 80   Resp 16   Ht 5' (1 524 m)   Wt 58 5 kg (129 lb)   BMI 25 19 kg/m²     Constitutional: normal, well developed, well nourished, alert, in no distress and non-toxic and no overt pain behavior    Eyes: anicteric  HEENT: grossly intact  Neck: supple, symmetric, trachea midline and no masses   Pulmonary:even and unlabored  Cardiovascular:No edema or pitting edema present  Skin:Normal without rashes or lesions and well hydrated  Psychiatric:Mood and affect appropriate  Neurologic:Cranial Nerves II-XII grossly intact  Musculoskeletal:normal    Lumbar Spine Exam    Appearance:  Normal lordosis  Palpation/Tenderness:  right lumbar paraspinal tenderness  Sensory:  no sensory deficits noted  Range of Motion:  Extension:  Minimally limited  with pain  Motor Strength:  Left foot dorsiflexion:  5/5  Left foot plantar flexion:  5/5  Right foot dorsiflexion:  3/5  Right foot plantar flexion:  3/5  Reflexes:  Left Patellar:  2+   Right Patellar:  2+       Imaging

## 2019-05-01 DIAGNOSIS — K65.4 MESENTERIC PANNICULITIS (HCC): Primary | ICD-10-CM

## 2019-05-20 ENCOUNTER — HOSPITAL ENCOUNTER (OUTPATIENT)
Dept: CT IMAGING | Facility: HOSPITAL | Age: 59
Discharge: HOME | End: 2019-05-20
Attending: SURGERY
Payer: COMMERCIAL

## 2019-05-20 DIAGNOSIS — K65.4 MESENTERIC PANNICULITIS (HCC): ICD-10-CM

## 2019-05-20 PROCEDURE — 74177 CT ABD & PELVIS W/CONTRAST: CPT

## 2019-05-20 RX ADMIN — IOHEXOL 100 ML: 350 INJECTION, SOLUTION INTRAVENOUS at 11:31

## 2019-05-30 ENCOUNTER — OFFICE VISIT (OUTPATIENT)
Dept: SURGICAL ONCOLOGY | Facility: CLINIC | Age: 59
End: 2019-05-30
Payer: COMMERCIAL

## 2019-05-30 VITALS
BODY MASS INDEX: 25.72 KG/M2 | TEMPERATURE: 97.2 F | RESPIRATION RATE: 15 BRPM | DIASTOLIC BLOOD PRESSURE: 76 MMHG | HEART RATE: 82 BPM | WEIGHT: 131 LBS | SYSTOLIC BLOOD PRESSURE: 144 MMHG | HEIGHT: 60 IN

## 2019-05-30 DIAGNOSIS — K65.4 MESENTERIC PANNICULITIS (HCC): Primary | ICD-10-CM

## 2019-05-30 PROCEDURE — 99213 OFFICE O/P EST LOW 20 MIN: CPT | Performed by: SURGERY

## 2019-05-30 RX ORDER — HYDROCODONE BITARTRATE AND ACETAMINOPHEN 5; 325 MG/1; MG/1
TABLET ORAL
COMMUNITY
Start: 2018-01-17 | End: 2019-08-15 | Stop reason: ALTCHOICE

## 2019-07-11 ENCOUNTER — TELEPHONE (OUTPATIENT)
Dept: NEUROLOGY | Facility: CLINIC | Age: 59
End: 2019-07-11

## 2019-07-11 NOTE — TELEPHONE ENCOUNTER
Patient states she has not received Gilenya yet and is waiting until her appointment on 8/15 to discuss with Dr Jonathan Keen  She states the start forms  on  and will need to be renewed  Forms can be renewed by calling [phone] option 2 then option 0  Patient states her R # is [id number]

## 2019-07-11 NOTE — TELEPHONE ENCOUNTER
Per  office note, pt to try Siponimod (Lucyann Ros) once med FDA approved  Pt also made aware told hold off on Gilenya in 2/15 encounter  I called pt back and she stated [id number]  [facility]  I made pt aware of above  Pt questioning if she can have labs completed prior to 8/15 neuro appt  I made her aware that Dr Quynh Newberry will return to the office  and that start form will need to be completed at next appt  Pt must use [facility] for labs per insurance  Will postpone encounter until Dr Quynh Newberry returns

## 2019-07-22 NOTE — TELEPHONE ENCOUNTER
Please let the patient have her labs completed CBC/CMP/TSH  Dermatology consult would be advised and referral was already provided during her recent encounter  Siponimod SE will be further reviewed during her encounter with me if patient has continuous interest in it

## 2019-08-15 ENCOUNTER — TELEPHONE (OUTPATIENT)
Dept: NEUROLOGY | Facility: CLINIC | Age: 59
End: 2019-08-15

## 2019-08-15 ENCOUNTER — OFFICE VISIT (OUTPATIENT)
Dept: NEUROLOGY | Facility: CLINIC | Age: 59
End: 2019-08-15
Payer: COMMERCIAL

## 2019-08-15 VITALS
HEIGHT: 60 IN | SYSTOLIC BLOOD PRESSURE: 130 MMHG | HEART RATE: 74 BPM | BODY MASS INDEX: 27.09 KG/M2 | WEIGHT: 138 LBS | DIASTOLIC BLOOD PRESSURE: 70 MMHG

## 2019-08-15 DIAGNOSIS — R51.9 CHRONIC RIGHT-SIDED HEADACHES: ICD-10-CM

## 2019-08-15 DIAGNOSIS — R26.2 AMBULATORY DYSFUNCTION: ICD-10-CM

## 2019-08-15 DIAGNOSIS — G89.29 CHRONIC RIGHT-SIDED HEADACHES: ICD-10-CM

## 2019-08-15 DIAGNOSIS — G35 MULTIPLE SCLEROSIS (HCC): Primary | ICD-10-CM

## 2019-08-15 DIAGNOSIS — G81.91 RIGHT HEMIPARESIS (HCC): ICD-10-CM

## 2019-08-15 PROCEDURE — 99215 OFFICE O/P EST HI 40 MIN: CPT | Performed by: PSYCHIATRY & NEUROLOGY

## 2019-08-15 RX ORDER — RANITIDINE 150 MG/1
150 TABLET ORAL 2 TIMES DAILY
Qty: 60 TABLET | Refills: 1 | Status: SHIPPED | OUTPATIENT
Start: 2019-08-15 | End: 2019-10-22

## 2019-08-15 RX ORDER — SODIUM CHLORIDE 9 MG/ML
20 INJECTION, SOLUTION INTRAVENOUS ONCE
Status: CANCELLED | OUTPATIENT
Start: 2019-08-23

## 2019-08-15 RX ORDER — GABAPENTIN 300 MG/1
CAPSULE ORAL
Qty: 360 CAPSULE | Refills: 2 | Status: SHIPPED | OUTPATIENT
Start: 2019-08-15 | End: 2020-02-20 | Stop reason: SDUPTHER

## 2019-08-15 NOTE — PROGRESS NOTES
[facility] MULTIPLE SCLEROSIS CENTER  PATIENT:  Pepe Britton  MRN:  [id number]  :  1960  DATE OF SERVICE:  8/15/2019    Assessment/Plan:     Problem List Items Addressed This Visit        Nervous and Auditory    Multiple sclerosis ([facility] [address] ) - Primary    Relevant Medications    gabapentin (NEURONTIN) 300 mg capsule    ranitidine (ZANTAC) 150 mg tablet    Other Relevant Orders    MRI brain without contrast    Right hemiparesis ([facility] [address] )    Relevant Orders    Ambulatory referral to Physical Therapy       Other    Ambulatory dysfunction    Chronic right-sided headaches           Mrs Amaya Hopper has presented for follow up on MS and related issues  No progression of her disability reported or noted on exam, suggestive of stable clinical findings  Patient will have PT for right -sided hemiparesis, she ambulates with a cane  I Discussed  Siponimod risk and benefits- patient has mild burden of demyelination in her brain, with single lesion in her upper spine noted, with last imaging in 2017  Based on her clinical and radiographic presentation, I would consider repeating MRI brain WO contrast and hold off DMT at this point  Patient will have scheduled Solumedrol 1000 mg IV infusions once in 2 months at this point  Patient has respiratory infections and taking immunomodulatory can make it much worse due to broncospasms  COPD has been followed by pulmonary team       Follow up in 6 months  Subjective: MS and related issues     HPI History of Present Illness    Mrs Amaya Hopper has a history of relapsing remitting multiple sclerosis, neurogenic bladder, ambulatory dysfunction due to right hemiparesis, disbalance, mesenteric panniculitis, cervical disc disease with myelopathy, classic migraine without aura, who presented for follow up on MS and related issues   Patient has ambulatory dysfunction which is multifactorial, including MS and lumbar spine radiculopathy; CT L-spine was consistent with Multilevel spondylotic degenerative changes of the lumbar spine as described above, especially at L4/L5, where there is a diffuse disc bulge with a superimposed right foraminal focal disc protrusion causing severe right neural foraminal narrowing      Patient was advised establishing care with dermatology and OB/GYN for skin changes and breast cyst  Patient started using CBD oil  MRI brain 11/2017: Stable white matter lesions within the cerebral hemispheres  No enhancement to suggest active inflammation/demyelination  Overall mild burden of disease  MRI T-spine  NO DEMYELINATION  MRI C-spine: [id number]  SRABLE single lesion at C2, 1 7 cm  Patient has been reporting less ambulatory due to pain in hips and on lateral side and down  CT L-spine has been done and non-remarkable  Baclofen 20 mg tid, and gabapentin 300 mg tid was       The following portions of the patient's history were reviewed and updated as appropriate:   She  has a past medical history of Asthma, Cardiac disease, COPD (chronic obstructive pulmonary disease) ([address] ), Depression, Hyperlipidemia, Hypertension, Influenza, Irregular heart beat, Migraine, Migraine, MS (multiple sclerosis) ([address] ), Multiple sclerosis ([address] ), Nephrolithiasis, Neurogenic bladder, and Panniculitis  She   Patient Active Problem List    Diagnosis Date Noted    Right low back pain 02/21/2019    Skin rash 02/21/2019    Right hemiparesis ([address] ) 02/21/2019    Mesenteric panniculitis ([address] ) 03/15/2018    Ambulatory dysfunction 02/21/2018    Chronic right-sided headaches 02/21/2018    Multiple sclerosis ([address] ) 02/21/2018    Panniculitis 02/21/2018    S/P trigger finger release 02/02/2018    Trigger ring finger of left hand 01/17/2018     She  has a past surgical history that includes Dilation and curettage of uterus; Uterine fibroid surgery; pr colonoscopy flx dx w/collj spec when pfrmd (N/A, 1/11/2018); Colonoscopy; and pr incise finger tendon sheath (Left, 1/17/2018)    Her family history includes Breast cancer (age of onset: 76) in her maternal aunt; Diabetes in her father, mother, and sister; Heart disease in her father; Hypertension in her brother, father, and mother; Stomach cancer in her maternal aunt; Stroke in her mother  She  reports that she has never smoked  She has never used smokeless tobacco  She reports that she drinks alcohol  She reports that she has current or past drug history  Drug: Marijuana  Current Outpatient Medications   Medication Sig Dispense Refill    albuterol (2 5 mg/3 mL) 0 083 % nebulizer solution Albuterol Sulfate (2 5 MG/3ML) 0 083% Inhalation Nebulization Solution  USE 1 UNIT DOSE IN NEBULIZER EVERY 6 HOURS AS NEEDED  Quantity: 1;  Refills: 5       Dellis Camden DIVYA D ;  Started 12-July-2016  Active3 ML Plas Cont (125 Plas Conts)      albuterol (VENTOLIN HFA) 90 mcg/act inhaler Inhale 2 puffs every 6 (six) hours as needed for wheezing 3 Inhaler 0    aspirin 81 MG tablet Aspirin 81 MG TABS  Take 1 tablet daily   Refills: 0    Active      atorvastatin (LIPITOR) 20 mg tablet Take 40 mg by mouth Medrol Dose Pack scheduling ONLY        baclofen 20 mg tablet Take 1 tablet (20 mg total) by mouth 3 (three) times a day 270 tablet 2    budesonide (PULMICORT FLEXHALER) 180 MCG/ACT inhaler Inhale 1 puff 2 (two) times a day (Patient taking differently: Inhale 1 puff 2 (two) times a day as needed  ) 3 Inhaler 3    butalbital-acetaminophen-caffeine (FIORICET,ESGIC) -40 mg per tablet Take 1 tablet by mouth every 4 (four) hours as needed for headaches 12 tablet 4    EPINEPHrine (EPIPEN) 0 3 mg/0 3 mL SOAJ Inject 0 3 mg into the shoulder, thigh, or buttocks      ergocalciferol (VITAMIN D2) 50,000 units TAKE ONE CAPSULE WEEKLY WITH FOOD  1    famotidine (PEPCID) 20 mg tablet Famotidine 20 MG Oral Tablet  TAKE 1 TABLET DAILY PRN AS DIRECTED     Refills: 0    Active      fluticasone (FLONASE) 50 mcg/act nasal spray 2 sprays into each nostril daily 1 Bottle 3  gabapentin (NEURONTIN) 300 mg capsule Take 1 caps in am and noon and 2 caps  capsule 2    levocetirizine (XYZAL) 5 MG tablet TAKE 1 TABLET (5 MG TOTAL) BY MOUTH DAILY AS NEEDED (HIVES) 30 tablet 0    NIFEdipine (ADALAT CC) 90 mg 24 hr tablet Take 1 tablet by mouth daily (Patient taking differently: Take 60 mg by mouth daily ) 7 tablet 0    sertraline (ZOLOFT) 50 mg tablet Sertraline HCl - 50 MG Oral Tablet  TAKE 3 TABLETS DAILY   Refills: 0    Active      tolterodine (DETROL LA) 4 mg 24 hr capsule Take 1 capsule by mouth daily      valACYclovir (VALTREX) 1,000 mg tablet Take 1,000 mg by mouth as needed      ranitidine (ZANTAC) 150 mg tablet Take 1 tablet (150 mg total) by mouth 2 (two) times a day 60 tablet 1     No current facility-administered medications for this visit  Current Outpatient Medications on File Prior to Visit   Medication Sig    albuterol (2 5 mg/3 mL) 0 083 % nebulizer solution Albuterol Sulfate (2 5 MG/3ML) 0 083% Inhalation Nebulization Solution  USE 1 UNIT DOSE IN NEBULIZER EVERY 6 HOURS AS NEEDED     Quantity: 1;  Refills: 5       Silas James M D ;  Started 12-July-2016  Active3 ML Plas Cont (125 Plas Conts)    albuterol (VENTOLIN HFA) 90 mcg/act inhaler Inhale 2 puffs every 6 (six) hours as needed for wheezing    aspirin 81 MG tablet Aspirin 81 MG TABS  Take 1 tablet daily   Refills: 0    Active    atorvastatin (LIPITOR) 20 mg tablet Take 40 mg by mouth Medrol Dose Pack scheduling ONLY      baclofen 20 mg tablet Take 1 tablet (20 mg total) by mouth 3 (three) times a day    budesonide (PULMICORT FLEXHALER) 180 MCG/ACT inhaler Inhale 1 puff 2 (two) times a day (Patient taking differently: Inhale 1 puff 2 (two) times a day as needed  )    butalbital-acetaminophen-caffeine (FIORICET,ESGIC) -40 mg per tablet Take 1 tablet by mouth every 4 (four) hours as needed for headaches    EPINEPHrine (EPIPEN) 0 3 mg/0 3 mL SOAJ Inject 0 3 mg into the shoulder, thigh, or buttocks    ergocalciferol (VITAMIN D2) 50,000 units TAKE ONE CAPSULE WEEKLY WITH FOOD    famotidine (PEPCID) 20 mg tablet Famotidine 20 MG Oral Tablet  TAKE 1 TABLET DAILY PRN AS DIRECTED  Refills: 0    Active    fluticasone (FLONASE) 50 mcg/act nasal spray 2 sprays into each nostril daily    levocetirizine (XYZAL) 5 MG tablet TAKE 1 TABLET (5 MG TOTAL) BY MOUTH DAILY AS NEEDED (HIVES)    NIFEdipine (ADALAT CC) 90 mg 24 hr tablet Take 1 tablet by mouth daily (Patient taking differently: Take 60 mg by mouth daily )    sertraline (ZOLOFT) 50 mg tablet Sertraline HCl - 50 MG Oral Tablet  TAKE 3 TABLETS DAILY   Refills: 0    Active    tolterodine (DETROL LA) 4 mg 24 hr capsule Take 1 capsule by mouth daily    valACYclovir (VALTREX) 1,000 mg tablet Take 1,000 mg by mouth as needed    [DISCONTINUED] gabapentin (NEURONTIN) 300 mg capsule TAKE 1 CAPSULE 3 TIMES DAILY    [DISCONTINUED] HYDROcodone-acetaminophen (NORCO) 5-325 mg per tablet TAKE 1 TABLET EVERY 6 HOURS AS NEEDED FOR PAIN MAX DAILY AMOUNT OF 4 TABLETS    [DISCONTINUED] mupirocin (BACTROBAN) 2 % cream Apply topically 3 (three) times a day    [DISCONTINUED] TiZANidine (ZANAFLEX) 4 MG capsule Take 4 mg by mouth     No current facility-administered medications on file prior to visit  She is allergic to imitrex [sumatriptan]; keflex [cephalexin]; nuts; other; peanut oil; shellfish allergy; shellfish-derived products; copaxone [glatiramer acetate]; cat hair extract; glatiramer; pollen extract; and seasonal ic [cholestatin]            Objective:    Blood pressure 130/70, pulse 74, height 5' (1 524 m), weight 62 6 kg (138 lb), not currently breastfeeding  Physical Exam    Neurological Exam    Gait  [id number]: 11 16 Sec       CONSTITUTIONAL: NAD, pleasant  NECK: supple, no lymphadenopathy, no thyromegaly, no JVD  CARDIOVASCULAR: RRR, normal S1S2, no murmurs, no rubs  RESP: clear to auscultation bilaterally, no wheezes/rhonchi/rales   ABDOMEN: soft, non tender, non distended  SKIN: no rash or skin lesions  EXTREMITIES: no edema, pulses 2+bilaterally  PSYCH: appropriate mood and affect  NEUROLOGIC COMPREHENSIVE EXAM: Patient is oriented to person, place and time, NAD; appropriate affect  CN II, III, IV, V, VI, VII,VIII,IX,X,XI-XII intact with EOMI, PERRLA, OKN intact, VF grossly intact, fundi poorly visualized secondary to pupillary constriction; symmetric face noted  Motor: 5/5 UE/LE bilateral symmetric, RUE 4/5 shoulder abduction and finger flexion; RLU 4-/5 hip flexion and knee flexion; Sensory: intact to light touch and pinprick bilaterally; normal vibration sensation feet bilaterally; Coordination within normal limits on FTN and ROLANDO testing; DTR: 2/4 through, no Babinski, no clonus  Tandem gait is abnormal  Romberg: negative    ROS:  12 points of review of system was reviewed with the patient and was unremarkable with exception: see HPI  Review of Systems   Constitutional: Negative  Negative for appetite change and fever  HENT: Negative  Negative for hearing loss, tinnitus, trouble swallowing and voice change  Eyes: Positive for visual disturbance  Negative for photophobia and pain  Patient states that she has fuzzy and pain in OD  Respiratory: Negative  Negative for shortness of breath  Cardiovascular: Negative  Negative for palpitations  Gastrointestinal: Negative  Negative for nausea and vomiting  Endocrine: Negative  Negative for cold intolerance and heat intolerance  Genitourinary: Negative  Negative for dysuria, frequency and urgency  Musculoskeletal: Positive for arthralgias  Negative for myalgias and neck pain  Patient states that she has pain from bilateral hip down to bilateral leg  Patient stated her pain today 3-10 pain  Skin: Negative  Negative for rash  Neurological: Positive for headaches   Negative for dizziness, tremors, seizures, syncope, facial asymmetry, speech difficulty, weakness, light-headedness and numbness  Patient states that she has had a headache for 4 days now  Patient also stated that she wakes up with headache  Hematological: Negative  Does not bruise/bleed easily  Psychiatric/Behavioral: Negative  Negative for confusion, hallucinations and sleep disturbance

## 2019-08-15 NOTE — TELEPHONE ENCOUNTER
Please help scheduling Solumedrol 1000 mg IV x1 in August and one more  dose on October 2019  Zantac 150 mg bid for 5 days should be advised

## 2019-08-21 ENCOUNTER — TELEPHONE (OUTPATIENT)
Dept: NEUROLOGY | Facility: CLINIC | Age: 59
End: 2019-08-21

## 2019-08-21 NOTE — TELEPHONE ENCOUNTER
Patient called in with question related to infusion Friday  Previous infusion patient had a "small possible reaction"  Patient is asking if she could take benadryl prior to infusion on Friday  OK to leave a detailed message on phone with recommendations

## 2019-08-23 ENCOUNTER — HOSPITAL ENCOUNTER (OUTPATIENT)
Dept: INFUSION CENTER | Facility: CLINIC | Age: 59
Discharge: HOME | End: 2019-08-23
Payer: COMMERCIAL

## 2019-08-23 VITALS
DIASTOLIC BLOOD PRESSURE: 68 MMHG | SYSTOLIC BLOOD PRESSURE: 152 MMHG | TEMPERATURE: 97.1 F | HEART RATE: 75 BPM | RESPIRATION RATE: 18 BRPM

## 2019-08-23 DIAGNOSIS — G35 MULTIPLE SCLEROSIS (HCC): Primary | ICD-10-CM

## 2019-08-23 PROCEDURE — 96365 THER/PROPH/DIAG IV INF INIT: CPT

## 2019-08-23 RX ORDER — SODIUM CHLORIDE 9 MG/ML
20 INJECTION, SOLUTION INTRAVENOUS ONCE
Status: COMPLETED | OUTPATIENT
Start: 2019-08-23 | End: 2019-08-23

## 2019-08-23 RX ORDER — SODIUM CHLORIDE 9 MG/ML
20 INJECTION, SOLUTION INTRAVENOUS ONCE
Status: CANCELLED | OUTPATIENT
Start: 2019-10-22

## 2019-08-23 RX ADMIN — SODIUM CHLORIDE 1000 MG: 0.9 INJECTION, SOLUTION INTRAVENOUS at 10:37

## 2019-08-23 RX ADMIN — SODIUM CHLORIDE 20 ML/HR: 0.9 INJECTION, SOLUTION INTRAVENOUS at 10:27

## 2019-08-23 NOTE — PROGRESS NOTES
Pt tolerated treatment today without incident  Pt had taken benadryl and tylenol at home prior to infusion    AVS printed

## 2019-08-23 NOTE — PLAN OF CARE
Problem: Potential for Falls  Goal: Patient will remain free of falls  Description  INTERVENTIONS:  - Assess patient frequently for physical needs  -  Identify cognitive and physical deficits and behaviors that affect risk of falls    -  Nunda fall precautions as indicated by assessment   - Educate patient/family on patient safety including physical limitations  - Instruct patient to call for assistance with activity based on assessment  - Modify environment to reduce risk of injury  - Consider OT/PT consult to assist with strengthening/mobility  Outcome: Progressing

## 2019-09-11 ENCOUNTER — OFFICE VISIT (OUTPATIENT)
Dept: DERMATOLOGY | Facility: CLINIC | Age: 59
End: 2019-09-11
Payer: COMMERCIAL

## 2019-09-11 DIAGNOSIS — L28.0 LICHEN SIMPLEX CHRONICUS: ICD-10-CM

## 2019-09-11 DIAGNOSIS — D17.21 LIPOMA OF RIGHT UPPER EXTREMITY: ICD-10-CM

## 2019-09-11 DIAGNOSIS — L81.5 GUTTATE HYPOMELANOSIS: Primary | ICD-10-CM

## 2019-09-11 DIAGNOSIS — Z13.89 SCREENING FOR SKIN CONDITION: ICD-10-CM

## 2019-09-11 PROCEDURE — 99203 OFFICE O/P NEW LOW 30 MIN: CPT | Performed by: DERMATOLOGY

## 2019-09-11 RX ORDER — BETAMETHASONE DIPROPIONATE 0.5 MG/G
CREAM TOPICAL 2 TIMES DAILY
Qty: 15 G | Refills: 0 | Status: SHIPPED | OUTPATIENT
Start: 2019-09-11 | End: 2021-04-12 | Stop reason: ALTCHOICE

## 2019-09-11 NOTE — PROGRESS NOTES
[facility] DERMATOLOGY  [address] [place] [id number]  [phone]  [phone]     MRN: [id number] : 1960  Encounter: [id number]  Patient Information: Ashley Nair  Chief complaint:  Skin checkup for multiple reasons history of multiple sclerosis    History of present illness:  24-year-old female presents for overall skin check concerned regarding a loss of pigmentation that she has noted on the lower legs also concerned regarding some lumps that she noted on the back and also on the right forearm also rash that started with bug bites on the left leg and has been slowly resolve again  Past Medical History:   Diagnosis Date    Asthma     Cardiac disease     COPD [facility]chronic obstructive pulmonary disease) ([facility] [address] )     Depression     Hyperlipidemia     Hypertension     Influenza     2017    Irregular heart beat     Migraine     Migraine     MS (multiple sclerosis) ([facility] [address] )     [email or web address]     Nephrolithiasis     Neurogenic bladder     Panniculitis      Past Surgical History:   Procedure Laterality Date    COLONOSCOPY      DILATION AND CURETTAGE OF UTERUS      DE COLONOSCOPY FLX DX W/COLLJ SPEC WHEN PFRMD N/A 2018    Procedure: COLONOSCOPY;  Surgeon: Michele Morgan MD;  Location: [facility]; Service: Gastroenterology    DE INCISE FINGER TENDON SHEATH Left 2018    Procedure: RING TRIGGER FINGER RELEASE;  Surgeon: Catina Cyr MD;  Location: [facility] OR;  Service: Orthopedics    UTERINE FIBROID SURGERY       Social History   Social History     Substance and Sexual Activity   Alcohol Use Yes    Frequency: Monthly or less    Drinks per session: 1 or 2    Binge frequency: Never    Comment: social     Social History     Substance and Sexual Activity   Drug Use Yes    Types: Marijuana    Comment: last use 4 days ago        Social History     Tobacco Use   Smoking Status Never Smoker   Smokeless Tobacco Never Used     Family History   Problem Relation Age of Onset    Stroke Mother     Diabetes Mother     Hypertension Mother     Heart disease Father     Diabetes Father     Hypertension Father     Diabetes Sister     Hypertension Brother     Breast cancer Maternal Aunt 76    Stomach cancer Maternal Aunt      Meds/Allergies   Allergies   Allergen Reactions    Imitrex [Sumatriptan] Headache    Keflex [Cephalexin] Rash    Nuts Shortness Of Breath and Anaphylaxis    Other Anaphylaxis, Hives and Itching     [facility] [id number]: POISON IVY   walnuts & cashews  Cats    Peanut Oil      Hives anaphylaxis    Shellfish Allergy Shortness Of Breath and Swelling    Shellfish-Derived Products Shortness Of Breath and Swelling    Copaxone [Glatiramer Acetate] Hives    Cat Hair Extract     Glatiramer      Other reaction(s): rash    Pollen Extract Sneezing    Seasonal Ic [Cholestatin]        Meds:  Prior to Admission medications    Medication Sig Start Date End Date Taking? Authorizing Provider   albuterol (2 5 mg/3 mL) 0 083 % nebulizer solution Albuterol Sulfate (2 5 MG/3ML) 0 083% Inhalation Nebulization Solution  USE 1 UNIT DOSE IN NEBULIZER EVERY 6 HOURS AS NEEDED     Quantity: 1;  Refills: 5       Silas James M D ;  Started 12-July-2016  Active3 ML Plas Cont (125 Plas Conts) 7/12/16  Yes Historical Provider, MD   albuterol (VENTOLIN HFA) 90 mcg/act inhaler Inhale 2 puffs every 6 (six) hours as needed for wheezing 5/29/18  Yes Vaughn Hankins PA-C   aspirin 81 MG tablet Aspirin 81 MG TABS  Take 1 tablet daily   Refills: 0    Active   Yes Historical Provider, MD   atorvastatin (LIPITOR) 20 mg tablet Take 40 mg by mouth Medrol Dose Pack scheduling ONLY     Yes Historical Provider, MD   baclofen 20 mg tablet Take 1 tablet (20 mg total) by mouth 3 (three) times a day 2/21/19  Yes Rosalie Goodman MD   budesonide (PULMICORT FLEXHALER) 180 MCG/ACT inhaler Inhale 1 puff 2 (two) times a day  Patient taking differently: Inhale 1 puff 2 (two) times a day as needed   5/3/18  Yes Jane Chaney PA-C   butalbital-acetaminophen-caffeine (FIORICET,ESGIC) -40 mg per tablet Take 1 tablet by mouth every 4 (four) hours as needed for headaches 2/21/18  Yes Javed Almazan MD   EPINEPHrine (EPIPEN) 0 3 mg/0 3 mL SOAJ Inject 0 3 mg into the shoulder, thigh, or buttocks 1/13/11  Yes Historical Provider, MD   ergocalciferol (VITAMIN D2) 50,000 units TAKE ONE CAPSULE WEEKLY WITH FOOD 10/20/17  Yes Historical Provider, MD   fluticasone (FLONASE) 50 mcg/act nasal spray 2 sprays into each nostril daily 6/4/19  Yes Luis Hopkins MD   gabapentin (NEURONTIN) 300 mg capsule Take 1 caps in am and noon and 2 caps HS 8/15/19  Yes Javed Almazan MD   levocetirizine (XYZAL) 5 MG tablet TAKE 1 TABLET (5 MG TOTAL) BY MOUTH DAILY AS NEEDED (HIVES) 5/14/18  Yes Jane Chaney PA-C   NIFEdipine (ADALAT CC) 90 mg 24 hr tablet Take 1 tablet by mouth daily  Patient taking differently: Take 60 mg by mouth daily  6/4/17  Yes Caroline Hassan DO   ranitidine (ZANTAC) 150 mg tablet Take 1 tablet (150 mg total) by mouth 2 (two) times a day 8/15/19  Yes Javed Almazan MD   sertraline (ZOLOFT) 50 mg tablet Sertraline HCl - 50 MG Oral Tablet  TAKE 3 TABLETS DAILY   Refills: 0    Active   Yes Historical Provider, MD   tolterodine (DETROL LA) 4 mg 24 hr capsule Take 1 capsule by mouth daily 10/18/16  Yes Historical Provider, MD   valACYclovir (VALTREX) 1,000 mg tablet Take 1,000 mg by mouth as needed   Yes Historical Provider, MD   famotidine (PEPCID) 20 mg tablet Famotidine 20 MG Oral Tablet  TAKE 1 TABLET DAILY PRN AS DIRECTED     Refills: 0    Active    Historical Provider, MD       Subjective:     Review of Systems:    General: negative for - chills, fatigue, fever,  weight gain or weight loss  Psychological: negative for - anxiety, behavioral disorder, concentration difficulties, decreased libido, depression, irritability, memory difficulties, mood swings, sleep disturbances or suicidal ideation  ENT: negative for - hearing difficulties , nasal congestion, nasal discharge, oral lesions, sinus pain, sneezing, sore throat  Allergy and Immunology: negative for - hives, insect bite sensitivity,  Hematological and Lymphatic: negative for - bleeding problems, blood clots,bruising, swollen lymph nodes  Endocrine: negative for - hair pattern changes, hot flashes, malaise/lethargy, mood swings, palpitations, polydipsia/polyuria, skin changes, temperature intolerance or unexpected weight change  Respiratory: negative for - cough, hemoptysis, orthopnea, shortness of breath, or wheezing  Cardiovascular: negative for - chest pain, dyspnea on exertion, edema,  Gastrointestinal: negative for - abdominal pain, nausea/vomiting  Genito-Urinary: negative for - dysuria, incontinence, irregular/heavy menses or urinary frequency/urgency  Musculoskeletal: negative for - gait disturbance, joint pain, joint stiffness, joint swelling, muscle pain, muscular weakness  Dermatological:  As in HPI  Neurological: negative for confusion, dizziness, headaches, impaired coordination/balance, memory loss, numbness/tingling, seizures, speech problems, tremors or weakness       Objective: There were no vitals taken for this visit      Physical Exam:    General Appearance:    Alert, cooperative, no distress   Head:    Normocephalic, without obvious abnormality, atraumatic           Skin:   A full skin exam was performed including scalp, head scalp, eyes, ears, nose, lips, neck, chest, axilla, abdomen, back, buttocks, bilateral upper extremities, bilateral lower extremities, hands, feet, fingers, toes, fingernails, and toenails small guttate areas of loss of pigmentation noted on the lower legs area of like excoriations lichenification site on the left thigh fleshy nodule subcutaneous noted on the right forearm and left upper back nothing else remarkable noted on exam Assessment:     1  Guttate hypomelanosis     2  Lipoma of right upper extremity     3  Screening for skin condition     4  Lichen simplex chronicus           Plan:   Contain hypomelanosis patient advise that this is just a normal loss of pigmentation that we see with 18  Patient with normal appearing lipoma superior on the back and also on the right forearm no treatment needed  Lichen simplex chronicus again this is probably something she does constantly scratching at and will see if we can reverse this with a topical steroid  Screening for Dermatologic Disorders: Nothing else of concern noted on complete exam follow up in 1 year patient with overall dry skin is suggested use of Amlactin cream       Liz Lubin MD  9/11/2019,10:16 AM    Portions of the record may have been created with voice recognition software   Occasional wrong word or "sound a like" substitutions may have occurred due to the inherent limitations of voice recognition software   Read the chart carefully and recognize, using context, where substitutions have occurred

## 2019-09-11 NOTE — PATIENT INSTRUCTIONS
Contain hypomelanosis patient advise that this is just a normal loss of pigmentation that we see with 18  Patient with normal appearing lipoma superior on the back and also on the right forearm no treatment needed  Lichen simplex chronicus again this is probably something she does constantly scratching at and will see if we can reverse this with a topical steroid  Screening for Dermatologic Disorders: Nothing else of concern noted on complete exam follow up in 1 year patient with overall dry skin is suggested use of Amlactin cream

## 2019-09-23 ENCOUNTER — EVALUATION (OUTPATIENT)
Dept: PHYSICAL THERAPY | Facility: CLINIC | Age: 59
End: 2019-09-23
Payer: COMMERCIAL

## 2019-09-23 ENCOUNTER — EVALUATION (OUTPATIENT)
Dept: OCCUPATIONAL THERAPY | Facility: CLINIC | Age: 59
End: 2019-09-23
Payer: COMMERCIAL

## 2019-09-23 DIAGNOSIS — G35 MULTIPLE SCLEROSIS (HCC): Primary | ICD-10-CM

## 2019-09-23 DIAGNOSIS — M54.16 LUMBAR RADICULOPATHY: ICD-10-CM

## 2019-09-23 DIAGNOSIS — M65.322 TRIGGER INDEX FINGER OF LEFT HAND: ICD-10-CM

## 2019-09-23 DIAGNOSIS — M62.81 MUSCLE WEAKNESS: ICD-10-CM

## 2019-09-23 DIAGNOSIS — M65.332 TRIGGER MIDDLE FINGER OF LEFT HAND: ICD-10-CM

## 2019-09-23 DIAGNOSIS — G81.91 RIGHT HEMIPARESIS (HCC): ICD-10-CM

## 2019-09-23 PROCEDURE — 97530 THERAPEUTIC ACTIVITIES: CPT | Performed by: OCCUPATIONAL THERAPIST

## 2019-09-23 PROCEDURE — 97165 OT EVAL LOW COMPLEX 30 MIN: CPT | Performed by: OCCUPATIONAL THERAPIST

## 2019-09-23 PROCEDURE — 97163 PT EVAL HIGH COMPLEX 45 MIN: CPT | Performed by: PHYSICAL THERAPIST

## 2019-09-23 NOTE — PROGRESS NOTES
PT Evaluation     Today's date: 2019  Patient name: Bebe Rice   : 1960  MRN: [id number]  Referring provider: Josseline Baldwin MD  Dx:   Encounter Diagnosis     ICD-10-CM    1  Multiple sclerosis ([address] ) G35    2  Lumbar radiculopathy M54 16 Ambulatory referral to Physical Therapy   3  Right hemiparesis (HCC) G81 91                   Assessment  Assessment details: Patient is a 59 y/o female with relapsing remitting multiple sclerosis and lumbar pain  Patient presents with decreased functional mobility due to increased lumbar pain, decreased right LE strength, increased gait deviations, decreased balance and decreased endurance associated with multiple sclerosis  Patient will benefit from skilled physical therapy to address impairment and improve functional mobility  She does self report having improved function when performing aquatherapy in the past; she is educated on importance of continued exercises for the rest of her life due to her diagnosis  PT needed to allow for return to maximal function and improve quality of life  Impairments: abnormal gait, abnormal or restricted ROM, activity intolerance, impaired physical strength, lacks appropriate home exercise program, pain with function and safety issue  Understanding of Dx/Px/POC: good   Prognosis: good    Goals  STG within 4 weeks:   1  Patient to be independent in HEP  2  Reduce pain by 50% to improve quality of life  3  Improve RLE strength to 4/5  LTG within 8 weeks:   1  Patient to be independent in ADLs/IADLs without difficulty  2  Patient to ambulate community distances with minimal difficulty  3  Patient to perform stairs with minimal difficulty  Plan  Plan details: Per Dr Carlos Isidro- 8 weeks of physical therapy  If she continues to have pain, I will schedule her for a right L4, L5 TFESI      Per neuro Dr Zuleyma Colindres- "Patient will have PT for right -sided hemiparesis, she ambulates with a cane "  Patient is having an MRI of the brain in October to check for new lesions  Patient will have infusions every two months  Depression screening: Patient is seeing her PCP for depression and has changed her medication, per PCP  Offered patient behavioral health services information and instructed patient to talk her PCP if she needs to talk to someone  (Patient lost her daughter in law this year to brain cancer )   Patient would benefit from: skilled physical therapy and PT eval  Planned modality interventions: cryotherapy, hydrotherapy and unattended electrical stimulation  Planned therapy interventions: therapeutic training, therapeutic exercise, therapeutic activities, stretching, strengthening, postural training, patient education, neuromuscular re-education, manual therapy, joint mobilization, IADL retraining, activity modification, ADL retraining, ADL training, body mechanics training, flexibility, functional ROM exercises, gait training, graded activity, graded exercise, graded motor, home exercise program, balance/weight bearing training and abdominal trunk stabilization  Frequency: 2x week  Duration in weeks: 8  Plan of Care beginning date: 9/23/2019  Plan of Care expiration date: 11/18/2019  Treatment plan discussed with: patient        Subjective Evaluation    History of Present Illness  Onset date: ongoing for years; lumbar pain in February 2019  Mechanism of injury: Patient is a 61 y/o female with relapsing-remitting Multiple sclerosis, diagnosed in 2009  She states she is noting an overall decrease in function  States she had been doing aquatherapy, but she has since stopped exercising due to a lot going on in her personal life  She also reports lumbar pain that occasional radiates into RLE and cervico-thoracic paraspinal muscle spasms  She states her last episode of RLE pain was in February and she was put on medication and rested, and pain eventually decreased  She is starting to notice the pain is coming back    She is referred for evaluation and treatment of low back pain and right hemiparesis  Pain  Current pain ratin  At best pain ratin  At worst pain ratin  Location: low back; right leg   Quality: radiating, needle-like and sharp  Aggravating factors: walking, standing and sitting  Progression: worsening    Social Support  Steps to enter house: yes  Stairs in house: yes   Lives in: multiple-level home  Lives with: significant other    Employment status: not working  Hand dominance: right  Exercise history: none currently       Diagnostic Tests  Abnormal CT scan: Lumbosacral CT: multilevel spondylitic degenerative changes with severe foraminal narrowing at right L4-L5  Treatments  Previous treatment: physical therapy and medication  Current treatment: medication and occupational therapy  Current treatment comments: infusions; she is starting OT for left trigger finger   Patient Goals  Patient goal: "To be able to do more on my own and to get back to some exercises  To be able to reduce some of my medications "         Objective     Active Range of Motion     Lumbar   Flexion: 70 degrees   Extension: 20 degrees   Left lateral flexion: 20 degrees       Right lateral flexion: 20 degrees     Strength/Myotome Testing     Left Hip   Planes of Motion   Flexion: 4+    Right Hip   Planes of Motion   Flexion: 4-    Left Knee   Flexion: 4+  Extension: 4+    Right Knee   Flexion: 4-  Extension: 4-    Left Ankle/Foot   Dorsiflexion: 4+  Plantar flexion: 4+  Great toe extension: 4+    Right Ankle/Foot   Dorsiflexion: 4-  Plantar flexion: 4-  Great toe extension: 4    Ambulation     Observational Gait     Additional Observational Gait Details  Patient ambulates with SPC- using it on right side because of left hand pain secondary to trigger finger     Decreased gait speed, decreased step length, increased shuffling pattern  Patient drags her right foot       Timed Up and Go: to be completed next visit     PIERCE Balance Scale: to be completed in future visits             Precautions: MS; COPD      Manual                                                                                 Increased time spent on patient education with diagnosis, prognosis, goals of therapy, progression of therapy, and plan of care  All questions answered  Patient instructed to call clinic with questions or concerns     Exercise Diary  9/23            Pt Edu KS             Bike NV            TUG NV            PIERCE NV            Transverse Abdominus Engagement NV            Adductor set NV            Hooklying TB hip abd NV                                                                                                                                                                                         Modalities

## 2019-09-23 NOTE — PROGRESS NOTES
OT Evaluation     Today's date: 2019  Patient name: Boris Arreaga  : 1960  MRN: [id number]  Referring provider: Alexandre Macias, *  Dx:   Encounter Diagnosis     ICD-10-CM    1  Multiple sclerosis ([facility] [address] ) G35    2  Trigger index finger of left hand M65 322    3  Trigger middle finger of left hand M65 332    4  Muscle weakness M62 81                   Assessment  Assessment details: 59 yo female who presents with bilateral hands weakness/decreased dexterity and L TF in IF/LF  Physically, patient presents with decreased strength, dexterity and increased pain in left hand secondary to trigger fingers  Functionally, patient is using compensatory movement patterns in left hand and avoiding MP flexion of IF/LF  She also performs tasks slowly with her right hand secondary to decreased dexterity  Patient would benefit from night splinting with her left hand  She also presents with tightness in extensor mass left greater then right and increased tenderness in L ECRB; mild tenderness in right ECRB  Patient would benefit from skilled OT to perform tasks independently without triggering and with improved dexterity  Impairments: abnormal muscle firing, abnormal muscle tone, abnormal or restricted ROM, impaired physical strength, lacks appropriate home exercise program and pain with function  Understanding of Dx/Px/POC: good   Prognosis: fair    Goals  STG:  In 3 weeks  1  Decreased pain to 2-3/10 when performing ADL's in left hand   2  Improved dexterity in right hand as evidenced by decreased time in 9 hole peg test  3  Patient will tolerate wearing her left night splint for trigger finger  LTG's 8 weeks  1    Patient will report minimal to no pain with all activities in left hand when grasping  2   Sufficient strength to perform ADL's and IADL's   3  Patient will note improved dexterity as evidenced by improved reports of tieing/buttoning /zipping and decreased dropping of items  Plan  Patient would benefit from: OT eval and skilled occupational therapy  Planned modality interventions: TENS, thermotherapy: hydrocollator packs and thermotherapy: paraffin bath  Planned therapy interventions: joint mobilization, manual therapy, massage, orthotic fitting/training, orthotic management and training, neuromuscular re-education, therapeutic activities, therapeutic exercise, home exercise program, graded exercise, functional ROM exercises and fine motor coordination training  Frequency: 2x week  Duration in weeks: 8  Plan of Care beginning date: 2019  Plan of Care expiration date: 2019  Treatment plan discussed with: patient        Subjective Evaluation    History of Present Illness  Date of onset: 2019  Mechanism of injury: Patient reports history of L TF for the past 4 months in her IF/MF  She reports TFR in RF approx 18 months ago  It feels like I have swelling in my left hand and the tips are swelling and hurting  Patient is not demonstrating these symptoms in her R hand just her "normal weakness " Patient was diagnosed about 10 years ago with MS  Patient avoids closing her IF/MF because of the pain,            Recurrent probem    Quality of life: good    Pain  Current pain ratin  At best pain rating: 3  At worst pain ratin  Quality: throbbing and sharp  Progression: worsening    Social Support  Lives in: multiple-level home  Lives with: significant other (two grand daughter 8 and 10 )    Employment status: not working  Hand dominance: right  Life stress: medium      Diagnostic Tests  MRI studies: abnormal  Treatments  No previous or current treatments  Patient Goals  Patient goals for therapy: increased strength  Patient goal: to decrease triggering           Objective     Palpation     Additional Palpation Details  Pain with palpation of L ECRB;  Mild pain with palpation of R ECRB  - Tinel's test at [place], UN and RN frandy  - pain with palpation in 1st dorsal compartment frandy      9 hole peg test  R hand 34 1  L hand 27 2    Active Range of Motion     Left Wrist   Wrist flexion: 40 degrees   Wrist extension: 65 degrees   Radial deviation: 12 degrees   Ulnar deviation: 30 degrees     Right Wrist   Wrist flexion: 65 degrees   Wrist extension: 65 degrees   Radial deviation: 17 degrees   Ulnar deviation: 30 degrees     Left Thumb   Opposition: Finger to thumb intact with thumb slide to fifth volar head MC joint    Left Digits    Flexion   Index     MCP: 62    PIP: 93    DIP: 53  Middle     MCP: 66    PIP: 93    DIP: 54  Ring     MCP: 67    PIP: 91    DIP: 60  Little     MCP: 85    PIP: 87    DIP: 64    Additional Active Range of Motion Details  R composite fist wnl    Strength/Myotome Testing     Left Wrist/Hand      (2nd hand position)     Trial 1: 27 2    Trial 2: 27 3    Trial 3: 28 5    Thumb Strength  Key/Lateral Pinch     Trail 1: 11    Trail 2: 9 6    Trial 3: 9 9    Average: 10 17  Tip/Two-Point Pinch     Trial 1: 6 2    Trial 2: 6 1    Trial 3: 7 4    Average: 6 57  Palmar/Three-Point Pinch     Trial 1: 4 4    Trial 2: 6 5    Trial 3: 7 1    Average: 6     Right Wrist/Hand      (2nd hand position)     Trial 1: 37 4    Trial 2: 41 1    Trial 3: 39 7    Thumb Strength   Key/Lateral Pinch     Trial 1: 9 3    Trial 2: 10 8    Trial 3: 10 1    Average: 10 07  Tip/Two-Point Pinch     Trial 1: 6 2    Trial 2: 5 7    Trial 3: 5 7    Average: 5 87  Palmar/Three-Point Pinch     Trial 1: 6    Trial 2: 5 1    Trial 3: 5 7    Average: 5 6    Additional Strength Details  The first  test finger triggered, and the second  test elicited 8/94 pain     C/o pain middle finger P1 4/10 in l MF with campos pinch   4/10 pain between dorsal aspect of PIP and DIP L MF tip to tip pinch    Swelling     Left Wrist/Hand   Index     Middle: 5 5 cm  Circumference MCP: 8 3 cm    Additional Swelling Details  Pain with palpation on left volar head of MP of MF  Pain with p    Right Wrist/Hand   Index     Middle: 5 2 cm  Circumference MCP: 7 9 cm             Precautions:  Fall risk      Manual  9/23            stm extensor mass left             IASTM MP volar head IF/LF                                       Trigger finger splint                 Exercise Diary  923            Extensor mass stretch left  HEP            dexterity HEP/fold paper towel into hand            Adaptive equip Built up handle of can to decrease                                                                                                                                                                                                                                              Modalities              MH left forearm

## 2019-09-23 NOTE — LETTER
2019    [address] [id number]    Patient: Han Estes   YOB: 1960   Date of Visit: 2019     Encounter Diagnosis     ICD-10-CM    1  Multiple sclerosis ([facility] [address] ) G35    2  Trigger index finger of left hand M65 322    3  Trigger middle finger of left hand M65 332    4  Muscle weakness M62 81        Dear Dr Jose Gould: Thank you for your recent referral of Han Estes  Please review the attached evaluation summary from Susana's recent visit  Please verify that you agree with the plan of care by signing the attached order  If you have any questions or concerns, please do not hesitate to call  I sincerely appreciate the opportunity to share in the care of one of your patients and hope to have another opportunity to work with you in the near future  Sincerely,    Patty Dodson, OT      Referring Provider:     I certify that I have read the below Plan of Care and certify the need for these services furnished under this plan of treatment while under my care  [address] [id number]  [facility] [place]        OT Evaluation     Today's date: 2019  Patient name: Han Estes  : 1960  MRN: [id number]  Referring provider: Preet Thomas, *  Dx:   Encounter Diagnosis     ICD-10-CM    1  Multiple sclerosis ([facility] [address] ) G35    2  Trigger index finger of left hand M65 322    3  Trigger middle finger of left hand M65 332    4  Muscle weakness M62 81                   Assessment  Assessment details: 59 yo female who presents with bilateral hands weakness/decreased dexterity and L TF in IF/LF  Physically, patient presents with decreased strength, dexterity and increased pain in left hand secondary to trigger fingers  Functionally, patient is using compensatory movement patterns in left hand and avoiding MP flexion of IF/LF    She also performs tasks slowly with her right hand secondary to decreased dexterity  Patient would benefit from night splinting with her left hand  She also presents with tightness in extensor mass left greater then right and increased tenderness in L ECRB; mild tenderness in right ECRB  Patient would benefit from skilled OT to perform tasks independently without triggering and with improved dexterity  Impairments: abnormal muscle firing, abnormal muscle tone, abnormal or restricted ROM, impaired physical strength, lacks appropriate home exercise program and pain with function  Understanding of Dx/Px/POC: good   Prognosis: fair    Goals  STG:  In 3 weeks  1  Decreased pain to 2-3/10 when performing ADL's in left hand   2  Improved dexterity in right hand as evidenced by decreased time in 9 hole peg test  3  Patient will tolerate wearing her left night splint for trigger finger  LTG's 8 weeks  1    Patient will report minimal to no pain with all activities in left hand when grasping  2   Sufficient strength to perform ADL's and IADL's   3  Patient will note improved dexterity as evidenced by improved reports of tieing/buttoning /zipping and decreased dropping of items        Plan  Patient would benefit from: OT eval and skilled occupational therapy  Planned modality interventions: TENS, thermotherapy: hydrocollator packs and thermotherapy: paraffin bath  Planned therapy interventions: joint mobilization, manual therapy, massage, orthotic fitting/training, orthotic management and training, neuromuscular re-education, therapeutic activities, therapeutic exercise, home exercise program, graded exercise, functional ROM exercises and fine motor coordination training  Frequency: 2x week  Duration in weeks: 8  Plan of Care beginning date: 9/23/2019  Plan of Care expiration date: 11/18/2019  Treatment plan discussed with: patient        Subjective Evaluation    History of Present Illness  Date of onset: 5/23/2019  Mechanism of injury: Patient reports history of L TF for the past 4 months in her IF/MF  She reports TFR in RF approx 18 months ago  It feels like I have swelling in my left hand and the tips are swelling and hurting  Patient is not demonstrating these symptoms in her R hand just her "normal weakness " Patient was diagnosed about 10 years ago with MS  Patient avoids closing her IF/MF because of the pain,            Recurrent probem    Quality of life: good    Pain  Current pain ratin  At best pain rating: 3  At worst pain ratin  Quality: throbbing and sharp  Progression: worsening    Social Support  Lives in: multiple-level home  Lives with: significant other (two grand daughter 8 and 10 )    Employment status: not working  Hand dominance: right  Life stress: medium      Diagnostic Tests  MRI studies: abnormal  Treatments  No previous or current treatments  Patient Goals  Patient goals for therapy: increased strength  Patient goal: to decrease triggering           Objective     Palpation     Additional Palpation Details  Pain with palpation of L ECRB;  Mild pain with palpation of R ECRB  - Tinel's test at MN, UN and RN frandy  - pain with palpation in 1st dorsal compartment frandy      9 hole peg test  R hand 34 1  L hand 27 2    Active Range of Motion     Left Wrist   Wrist flexion: 40 degrees   Wrist extension: 65 degrees   Radial deviation: 12 degrees   Ulnar deviation: 30 degrees     Right Wrist   Wrist flexion: 65 degrees   Wrist extension: 65 degrees   Radial deviation: 17 degrees   Ulnar deviation: 30 degrees     Left Thumb   Opposition: Finger to thumb intact with thumb slide to fifth volar head MC joint    Left Digits    Flexion   Index     MCP: 62    PIP: 93    DIP: 53  Middle     MCP: 66    PIP: 93    DIP: 54  Ring     MCP: 67    PIP: 91    DIP: 60  Little     MCP: 85    PIP: 87    DIP: 64    Additional Active Range of Motion Details  R composite fist wnl    Strength/Myotome Testing     Left Wrist/Hand      (2nd hand position)     Trial 1: 27 2    Trial 2: 27 3    Trial 3: 28 5    Thumb Strength  Key/Lateral Pinch     Trail 1: 11    Trail 2: 9 6    Trial 3: 9 9    Average: 10 17  Tip/Two-Point Pinch     Trial 1: 6 2    Trial 2: 6 1    Trial 3: 7 4    Average: 6 57  Palmar/Three-Point Pinch     Trial 1: 4 4    Trial 2: 6 5    Trial 3: 7 1    Average: 6     Right Wrist/Hand      (2nd hand position)     Trial 1: 37 4    Trial 2: 41 1    Trial 3: 39 7    Thumb Strength   Key/Lateral Pinch     Trial 1: 9 3    Trial 2: 10 8    Trial 3: 10 1    Average: 10 07  Tip/Two-Point Pinch     Trial 1: 6 2    Trial 2: 5 7    Trial 3: 5 7    Average: 5 87  Palmar/Three-Point Pinch     Trial 1: 6    Trial 2: 5 1    Trial 3: 5 7    Average: 5 6    Additional Strength Details  The first  test finger triggered, and the second  test elicited 8/06 pain     C/o pain middle finger P1 4/10 in l MF with campos pinch   4/10 pain between dorsal aspect of PIP and DIP L MF tip to tip pinch    Swelling     Left Wrist/Hand   Index     Middle: 5 5 cm  Circumference MCP: 8 3 cm    Additional Swelling Details  Pain with palpation on left volar head of MP of MF  Pain with p    Right Wrist/Hand   Index     Middle: 5 2 cm  Circumference MCP: 7 9 cm             Precautions:  Fall risk      Manual  9/23            stm extensor mass left             IASTM MP volar head IF/LF                                       Trigger finger splint                 Exercise Diary  923            Extensor mass stretch left  HEP            dexterity HEP/fold paper towel into hand            Adaptive equip Built up handle of can to decrease                                                                                                                                                                                                                                              Modalities              MH left forearm

## 2019-09-25 ENCOUNTER — OFFICE VISIT (OUTPATIENT)
Dept: OCCUPATIONAL THERAPY | Facility: CLINIC | Age: 59
End: 2019-09-25
Payer: COMMERCIAL

## 2019-09-25 DIAGNOSIS — M65.332 TRIGGER MIDDLE FINGER OF LEFT HAND: ICD-10-CM

## 2019-09-25 DIAGNOSIS — M62.81 MUSCLE WEAKNESS: ICD-10-CM

## 2019-09-25 DIAGNOSIS — M65.322 TRIGGER INDEX FINGER OF LEFT HAND: ICD-10-CM

## 2019-09-25 DIAGNOSIS — G35 MULTIPLE SCLEROSIS (HCC): Primary | ICD-10-CM

## 2019-09-25 PROCEDURE — 97530 THERAPEUTIC ACTIVITIES: CPT | Performed by: OCCUPATIONAL THERAPIST

## 2019-09-25 PROCEDURE — 97760 ORTHOTIC MGMT&TRAING 1ST ENC: CPT | Performed by: OCCUPATIONAL THERAPIST

## 2019-09-25 PROCEDURE — 97140 MANUAL THERAPY 1/> REGIONS: CPT | Performed by: OCCUPATIONAL THERAPIST

## 2019-09-26 ENCOUNTER — OFFICE VISIT (OUTPATIENT)
Dept: PHYSICAL THERAPY | Facility: CLINIC | Age: 59
End: 2019-09-26
Payer: COMMERCIAL

## 2019-09-26 DIAGNOSIS — G81.91 RIGHT HEMIPARESIS (HCC): ICD-10-CM

## 2019-09-26 DIAGNOSIS — G35 MULTIPLE SCLEROSIS (HCC): ICD-10-CM

## 2019-09-26 DIAGNOSIS — M54.16 LUMBAR RADICULOPATHY: Primary | ICD-10-CM

## 2019-09-26 PROCEDURE — 97140 MANUAL THERAPY 1/> REGIONS: CPT | Performed by: PHYSICAL THERAPIST

## 2019-09-26 PROCEDURE — 97110 THERAPEUTIC EXERCISES: CPT | Performed by: PHYSICAL THERAPIST

## 2019-09-26 PROCEDURE — 97112 NEUROMUSCULAR REEDUCATION: CPT | Performed by: PHYSICAL THERAPIST

## 2019-09-26 NOTE — PROGRESS NOTES
Daily Note     Today's date: 2019  Patient name: Graham Pike  : 1960  MRN: [id number]  Referring provider: Evelin Contreras MD  Dx:   Encounter Diagnosis     ICD-10-CM    1  Lumbar radiculopathy M54 16    2  Right hemiparesis ([facility] [address] ) G81 91    3  Multiple sclerosis ([facility] [address] ) G35                   Subjective: Patient states she has right sided mid back pain  Ready to start exercises today  Objective: See treatment diary below      Assessment: Tolerated treatment well  Patient is able to progress exercise program  Most difficulty with exercises requiring RLE strength; patient to benefit from progression of program as tolerated  Less back pain following session  Patient would benefit from continued PT      Plan: Continue per plan of care           Precautions: MS; COPD      Manual              STR R TSP Paraspinals  8'                                                                   Exercise Diary             Pt Edu KS             Bike NV            TUG NV            PIERCE NV            Transverse Abdominus Engagement NV 3"x20           Adductor set NV 3"x20           Hooklying TB hip abd NV Red x 20 ea           Standing hip abd/ext  2 x 10ea           Standing marching  X 20           Standing heel raises  x20           SAQ  2x10           R trunk stretch in left sidelying  4 x 15"                                                                                                                       Modalities

## 2019-10-02 ENCOUNTER — OFFICE VISIT (OUTPATIENT)
Dept: PHYSICAL THERAPY | Facility: CLINIC | Age: 59
End: 2019-10-02
Payer: COMMERCIAL

## 2019-10-02 ENCOUNTER — OFFICE VISIT (OUTPATIENT)
Dept: OCCUPATIONAL THERAPY | Facility: CLINIC | Age: 59
End: 2019-10-02
Payer: COMMERCIAL

## 2019-10-02 DIAGNOSIS — G35 MS (MULTIPLE SCLEROSIS) (HCC): Primary | ICD-10-CM

## 2019-10-02 DIAGNOSIS — M54.16 LUMBAR RADICULOPATHY: Primary | ICD-10-CM

## 2019-10-02 DIAGNOSIS — M62.81 MUSCLE WEAKNESS: ICD-10-CM

## 2019-10-02 DIAGNOSIS — G81.91 RIGHT HEMIPARESIS (HCC): ICD-10-CM

## 2019-10-02 DIAGNOSIS — M65.332 TRIGGER FINGER, LEFT MIDDLE FINGER: ICD-10-CM

## 2019-10-02 DIAGNOSIS — M65.322 TRIGGER FINGER, LEFT INDEX FINGER: ICD-10-CM

## 2019-10-02 DIAGNOSIS — G35 MULTIPLE SCLEROSIS (HCC): ICD-10-CM

## 2019-10-02 PROCEDURE — 97530 THERAPEUTIC ACTIVITIES: CPT | Performed by: OCCUPATIONAL THERAPIST

## 2019-10-02 PROCEDURE — 97112 NEUROMUSCULAR REEDUCATION: CPT | Performed by: PHYSICAL THERAPIST

## 2019-10-02 PROCEDURE — 97110 THERAPEUTIC EXERCISES: CPT | Performed by: PHYSICAL THERAPIST

## 2019-10-02 PROCEDURE — 97140 MANUAL THERAPY 1/> REGIONS: CPT | Performed by: OCCUPATIONAL THERAPIST

## 2019-10-02 NOTE — PROGRESS NOTES
Daily Note     Today's date: 10/2/2019  Patient name: Coty Teresa  : 1960  MRN: [id number]  Referring provider: Sami Elliott, *  Dx:   Encounter Diagnosis     ICD-10-CM    1  MS (multiple sclerosis) ([facility] [address] ) G35    2  Trigger finger, left index finger M65 322    3  Trigger finger, left middle finger M65 332    4  Muscle weakness M62 81        Start Time: 1200  Stop Time: 1230  Total time in clinic (min): 30 minutes    Subjective: Patient was pleased with her progress in the left hand  "I wasn't able to move the balls around in my and with my palm up last week!"      Objective: See treatment diary below  Manual   10          stm extensor mass left  4 4          IASTM MP volar head IF/LF  4 4                                    Trigger finger splint  David for I Adjusted              Exercise Diary  923 9/25 10/2          Extensor mass stretch left  HEP reviewed  3'          dexterity HEP/fold paper towel into hand added pen roll  To HEP Pen roll f/b kenn          Adaptive equip Built up handle of can to decrease             Chinese balls   5xs clockwise/counterclockwise /KENN 5xs for 2 sets kenn          Grooved peg board  1 set /kenn 1 set kenn          Hook fist   10xs/L                                                                                                                                                                                                     Modalities              MH left forearm   6'                                             Assessment: Tolerated treatment well  Patient would benefit from continued OT  Patient demonstrated improved dexterity of the left hand as she was able to in hand manipulate Chinese balls, however still has significant difficulty with right hand  Trigger finger in right IF and MF limits her dexterity skills/control in her hand   Advised to wear splint through out the day when she is not using it, as opposed to only wearing it at night       Plan: Continue per plan of care        Precautions: MS; COPD

## 2019-10-02 NOTE — PROGRESS NOTES
Daily Note     Today's date: 10/2/2019  Patient name: Emanule Donaldson  : 1960  MRN: [id number]  Referring provider: Angelito Lala MD  Dx:   Encounter Diagnosis     ICD-10-CM    1  Lumbar radiculopathy M54 16    2  Right hemiparesis ([facility] [address] ) G81 91    3  Multiple sclerosis ([facility] [address] ) G35                   Subjective: Patient states she hasn't had any back pain since last visit  Objective: See treatment diary below      Assessment: Tolerated treatment well  Performance of PIERCE BALANCE SCALE and Timed Up and Go Test, with a score of 34/56 and 15 seconds respectively  She is most challenged with SLB, Tandem balance, weight shifting onto LLE  Provided patient with written HEP  Patient would benefit from continued PT She leaves session without complaint  Plan: Continue per plan of care           Precautions: MS; COPD      Manual   9/26 10/2          STR R TSP Paraspinals  8' NP                                                                  Exercise Diary  9/23 9/26 10/2          Pt Edu KS             Bike NV  5 min          TUG NV  15 sec          PIERCE NV  34/56          Transverse Abdominus Engagement NV 3"x20 HEP          Adductor set NV 3"x20 x20          Hooklying TB hip abd NV Red x 20 ea Red x20          Standing hip abd/ext  2 x 10ea 2x10 ea          Standing marching  X 20 x20          Standing heel raises  x20 x20          SAQ  2x10 HEP          R trunk stretch in left sidelying  4 x 15" NP- no pain          SHC   X 20 ea          Biodex- LOS   Static x 3          Biodex- weight shifting   NV                                                                               Modalities

## 2019-10-03 ENCOUNTER — OFFICE VISIT (OUTPATIENT)
Dept: OCCUPATIONAL THERAPY | Facility: CLINIC | Age: 59
End: 2019-10-03
Payer: COMMERCIAL

## 2019-10-03 ENCOUNTER — OFFICE VISIT (OUTPATIENT)
Dept: PHYSICAL THERAPY | Facility: CLINIC | Age: 59
End: 2019-10-03
Payer: COMMERCIAL

## 2019-10-03 DIAGNOSIS — M65.322 TRIGGER FINGER, LEFT INDEX FINGER: ICD-10-CM

## 2019-10-03 DIAGNOSIS — M54.16 LUMBAR RADICULOPATHY: Primary | ICD-10-CM

## 2019-10-03 DIAGNOSIS — M65.332 TRIGGER FINGER, LEFT MIDDLE FINGER: ICD-10-CM

## 2019-10-03 DIAGNOSIS — M62.81 MUSCLE WEAKNESS: ICD-10-CM

## 2019-10-03 DIAGNOSIS — G81.91 RIGHT HEMIPARESIS (HCC): ICD-10-CM

## 2019-10-03 DIAGNOSIS — G35 MS (MULTIPLE SCLEROSIS) (HCC): Primary | ICD-10-CM

## 2019-10-03 DIAGNOSIS — G35 MULTIPLE SCLEROSIS (HCC): ICD-10-CM

## 2019-10-03 PROCEDURE — 97530 THERAPEUTIC ACTIVITIES: CPT | Performed by: OCCUPATIONAL THERAPIST

## 2019-10-03 PROCEDURE — 97112 NEUROMUSCULAR REEDUCATION: CPT | Performed by: PHYSICAL THERAPIST

## 2019-10-03 PROCEDURE — 97110 THERAPEUTIC EXERCISES: CPT | Performed by: OCCUPATIONAL THERAPIST

## 2019-10-03 PROCEDURE — 97530 THERAPEUTIC ACTIVITIES: CPT | Performed by: PHYSICAL THERAPIST

## 2019-10-03 PROCEDURE — 97110 THERAPEUTIC EXERCISES: CPT | Performed by: PHYSICAL THERAPIST

## 2019-10-03 NOTE — PROGRESS NOTES
Daily Note     Today's date: 10/3/2019  Patient name: Raymond Burgess  : 1960  MRN: [id number]  Referring provider: Mei Beatty, *  Dx:   Encounter Diagnosis     ICD-10-CM    1  MS (multiple sclerosis) ([facility] [address] ) G35    2  Trigger finger, left index finger M65 322    3  Trigger finger, left middle finger M65 332    4  Muscle weakness M62 81                   Subjective: Patient reported the adjustment on the finger splint really helped her  "I woke up this morning and my index finger felt really good, but my middle finger hasn't changed much "    Objective: See treatment diary below  Manual  9/23 9/25 10/2 10/3         stm extensor mass left  4 4 3          IASTM MP volar head IF/LF  4 4 6                                   Trigger finger splint  David for I Adjusted              Exercise Diary  923 9/25 10/2 10/3         Extensor mass stretch left  HEP reviewed  3' 3         dexterity HEP/fold paper towel into hand added pen roll  To HEP Pen roll f/b kenn Pen roll f/b kenn         Adaptive equip Built up handle of can to decrease             Chinese balls   5xs clockwise/counterclockwise /KENN 5xs for 2 sets kenn 8xs 2 sets kenn         Grooved peg board  1 set /kenn 1 set kenn 1 set kenn         Hook fist   10xs/L  10 xs L for 2 sets                                                                                                                                                                                                   Modalities              MH left forearm   6' 5'                                       Assessment: Tolerated treatment well  Patient would benefit from continued OT  Improvements were noted in L IF mobility, as she was able to roll pen forward and backward with more control and ease while utilizing her IF  Patient demonstrated improvement with  in hand manipulate of Chinese balls with her left hand when compared to previous treatment session   She was able to manipulate them for 2' before fatigue when she switched to manipulating them palm down on the table  Fatigue, trigger finger(s), and strength continue to impair her ability to perform functional activities  Plan: Continue per plan of care        Precautions: MS; COPD

## 2019-10-09 ENCOUNTER — OFFICE VISIT (OUTPATIENT)
Dept: PHYSICAL THERAPY | Facility: CLINIC | Age: 59
End: 2019-10-09
Payer: COMMERCIAL

## 2019-10-09 ENCOUNTER — OFFICE VISIT (OUTPATIENT)
Dept: OCCUPATIONAL THERAPY | Facility: CLINIC | Age: 59
End: 2019-10-09
Payer: COMMERCIAL

## 2019-10-09 DIAGNOSIS — M65.322 TRIGGER FINGER, LEFT INDEX FINGER: Primary | ICD-10-CM

## 2019-10-09 DIAGNOSIS — G35 MULTIPLE SCLEROSIS (HCC): ICD-10-CM

## 2019-10-09 DIAGNOSIS — M65.332 TRIGGER FINGER, LEFT MIDDLE FINGER: ICD-10-CM

## 2019-10-09 DIAGNOSIS — G81.91 RIGHT HEMIPARESIS (HCC): ICD-10-CM

## 2019-10-09 DIAGNOSIS — M54.16 LUMBAR RADICULOPATHY: Primary | ICD-10-CM

## 2019-10-09 DIAGNOSIS — M62.81 MUSCLE WEAKNESS: ICD-10-CM

## 2019-10-09 PROCEDURE — 97140 MANUAL THERAPY 1/> REGIONS: CPT | Performed by: PHYSICAL THERAPIST

## 2019-10-09 PROCEDURE — 97112 NEUROMUSCULAR REEDUCATION: CPT | Performed by: PHYSICAL THERAPIST

## 2019-10-09 PROCEDURE — 97140 MANUAL THERAPY 1/> REGIONS: CPT | Performed by: OCCUPATIONAL THERAPIST

## 2019-10-09 PROCEDURE — 97110 THERAPEUTIC EXERCISES: CPT | Performed by: PHYSICAL THERAPIST

## 2019-10-09 PROCEDURE — 97530 THERAPEUTIC ACTIVITIES: CPT | Performed by: OCCUPATIONAL THERAPIST

## 2019-10-09 NOTE — PROGRESS NOTES
Daily Note     Today's date: 10/9/2019  Patient name: Moriah Bowen  : 1960  MRN: [id number]  Referring provider: Estela Morfin MD  Dx:   Encounter Diagnosis     ICD-10-CM    1  Lumbar radiculopathy M54 16    2  Right hemiparesis ([facility] [address] ) G81 91    3  Multiple sclerosis ([facility] [address] ) G35                   Subjective: Patient reports since starting therapy, her back pain is less intense and doesn't last as long  However, she reports incidence of back pain upon rising this morning  She also states her weight bearing is more equal now  Objective: See treatment diary below      Assessment: Tolerated treatment well  Session focuses on posture, LE strengthening, balance, and reducing back pain  Patient to continue with home exercise program  Patient would benefit from continued PT She leaves session without complaint  Plan: Continue per plan of care           Precautions: MS; COPD      Manual   9/26 10/2 10/3 10/9        STR R TSP Paraspinals  8' NP  B 10'                                                                Exercise Diary  9/23 9/26 10/2 10/3 10/9        Pt Edu KS             Bike NV  5 min 5 min 3 min        TUG NV  15 sec          PIERCE NV  34/56          Transverse Abdominus Engagement NV 3"x20 HEP 3"x20         Adductor set NV 3"x20 x20 3"x20 3"x20        Hooklying TB hip abd NV Red x 20 ea Red x20 Red x 20 ea         Standing hip abd/ext  2 x 10ea 2x10 ea abd 2 x 10ea 2x10 ea        Standing marching  X 20 x20          Standing heel raises  x20 x20 x20 x20        SAQ  2x10 HEP 2 x 10 ea LAQ 2 x 10 ea        R trunk stretch in left sidelying  4 x 15" NP- no pain          SHC   X 20 ea          Biodex- LOS   Static x 3 Static x 3 Lv 12 x 3        Biodex- weight shifting   NV equal weight 50% 1 min x 2 equal weight 50% 1 5 min x 2        Mini squat    2x10         Bridge    2x10         Stairs    NV NV        Seated TB hamstring curl    NV Red 2 x 10ea        Ankle TB DF/PF    NV NV Seated lumbar flexion w PB     x5 loretta BURKETT,L,R            Modalities

## 2019-10-09 NOTE — PROGRESS NOTES
Daily Note     Today's date: 10/9/2019  Patient name: Obdulio Alarcon  : 1960  MRN: [id number]  Referring provider: Tessa London, *  Dx:   Encounter Diagnosis     ICD-10-CM    1  Trigger finger, left index finger M65 322    2  Trigger finger, left middle finger M65 332    3  Multiple sclerosis ([address] ) G35    4  Muscle weakness M62 81                   Subjective: "Two days ago I woke up and my fingers were stiff throughout the day, but the next day I was able to move my fingers especially the L MF "      Objective: See treatment diary below  Manual  9/23 9/25 10/2 10/3 10/9        stm extensor mass left  4 4 3  2'        IASTM MP volar head IF/LF  4 4 6 6'                                  Trigger finger splint  David for I Adjusted              Exercise Diary  923 9/25 10/2 10/3 10/9        Extensor mass stretch left  HEP reviewed  3' 3 2'        dexterity HEP/fold paper towel into hand added pen roll  To HEP Pen roll f/b kenn Pen roll f/b kenn         Adaptive equip Built up handle of can to decrease             Chinese balls   5xs clockwise/counterclockwise /KENN 5xs for 2 sets kenn 8xs 2 sets kenn 10xs kenn        Grooved peg board  1 set /kenn 1 set kenn 1 set kenn 1 set kenn w/ static  (only for R hand)        Hook fist   10xs/L  10 xs L for 2 sets 12xs fro 2 sets                                                                                                                                                                                                  Modalities              MH left forearm   6' 5'                                         Assessment: Tolerated treatment well  Patient would benefit from continued OT  Decreased dexterity and muscle weakness R hand still a barrier as patient had increased fatigue with grooved peg board using a static   MF trigger continues to impact functional performance  Plan: Continue per plan of care        Precautions: MS; COPD

## 2019-10-10 ENCOUNTER — OFFICE VISIT (OUTPATIENT)
Dept: PHYSICAL THERAPY | Facility: CLINIC | Age: 59
End: 2019-10-10
Payer: COMMERCIAL

## 2019-10-10 ENCOUNTER — OFFICE VISIT (OUTPATIENT)
Dept: OCCUPATIONAL THERAPY | Facility: CLINIC | Age: 59
End: 2019-10-10
Payer: COMMERCIAL

## 2019-10-10 DIAGNOSIS — M62.81 MUSCLE WEAKNESS: ICD-10-CM

## 2019-10-10 DIAGNOSIS — G35 MULTIPLE SCLEROSIS (HCC): ICD-10-CM

## 2019-10-10 DIAGNOSIS — M65.322 TRIGGER FINGER, LEFT INDEX FINGER: Primary | ICD-10-CM

## 2019-10-10 DIAGNOSIS — G81.91 RIGHT HEMIPARESIS (HCC): ICD-10-CM

## 2019-10-10 DIAGNOSIS — M65.332 TRIGGER FINGER, LEFT MIDDLE FINGER: ICD-10-CM

## 2019-10-10 DIAGNOSIS — M54.16 LUMBAR RADICULOPATHY: Primary | ICD-10-CM

## 2019-10-10 PROCEDURE — 97140 MANUAL THERAPY 1/> REGIONS: CPT | Performed by: OCCUPATIONAL THERAPIST

## 2019-10-10 PROCEDURE — 97110 THERAPEUTIC EXERCISES: CPT | Performed by: OCCUPATIONAL THERAPIST

## 2019-10-10 PROCEDURE — 97110 THERAPEUTIC EXERCISES: CPT | Performed by: PHYSICAL THERAPIST

## 2019-10-10 PROCEDURE — 97530 THERAPEUTIC ACTIVITIES: CPT | Performed by: OCCUPATIONAL THERAPIST

## 2019-10-10 PROCEDURE — 97112 NEUROMUSCULAR REEDUCATION: CPT | Performed by: PHYSICAL THERAPIST

## 2019-10-10 NOTE — PROGRESS NOTES
Daily Note     Today's date: 10/10/2019  Patient name: Coty Teresa  : 1960  MRN: [id number]  Referring provider: Sami Elliott, *  Dx:   Encounter Diagnosis     ICD-10-CM    1  Trigger finger, left index finger M65 322    2  Trigger finger, left middle finger M65 332    3  Muscle weakness M62 81    4  Multiple sclerosis ([address] ) G35                   Subjective: "My pain is a little better today, it's a 4/10, but my middle finger is still giving me the most trouble "      Objective: See treatment diary below  Manual  9/23 9/25 10/2 10/3 10/9 10/10       stm extensor mass left  4 4 3  2' 2'       IASTM MP volar head IF/LF  4 4 6 6' 5'                                 Trigger finger splint  David for I Adjusted              Exercise Diary  923 9/25 10/2 10/3 10/9 10/10       Extensor mass stretch left  HEP reviewed  3' 3 2' 2'       dexterity HEP/fold paper towel into hand added pen roll  To HEP Pen roll f/b kenn Pen roll f/b kenn  Pen roll f/b       Adaptive equip Built up handle of can to decrease             Chinese balls   5xs clockwise/counterclockwise /KENN 5xs for 2 sets kenn 8xs 2 sets kenn 10xs kenn 10xs kenn       Grooved peg board  1 set /kenn 1 set kenn 1 set kenn 1 set kenn w/ static  (only for R hand) same       Hook fist   10xs/L  10 xs L for 2 sets 12xs fro 2 sets same       RFB static  50% for 5 sec hold      10xs on R only                                                                                                                                                                                    Modalities              MH left forearm   6' 5' 5'                                      Assessment: Tolerated treatment well  Patient would benefit from continued OT  Dexterity in R hand has improved as she was able to use isolated finger movements when rolling the pen forward and backward with her finger tips   However, she kept dropping the standard sized pen, and switched to a built up pen which she was able to manipulate with more ease  Extrinsic strengthening of RH is beneficial in order to improve functional use and performance when using the RUE  Pain noted in L MF during TA, and continues to impact her functional performance  Plan: Continue per plan of care        Precautions: MS; COPD

## 2019-10-10 NOTE — PROGRESS NOTES
Daily Note     Today's date: 10/10/2019  Patient name: Miranda Aponte  : 1960  MRN: [id number]  Referring provider: Candelaria Sood MD  Dx:   Encounter Diagnosis     ICD-10-CM    1  Lumbar radiculopathy M54 16    2  Right hemiparesis ([facility] [address] ) G81 91    3  Multiple sclerosis ([facility] [address] ) G35                   Subjective: Patient states her back felt better after last visit  Objective: See treatment diary below      Assessment: Tolerated treatment well  Patient exhibits weakness within entirety of RLE  She is provided HEP for SLR, SAQ, LAQ, HS curl, and ankle 4 way  Patient would benefit from continued PT She leaves session without complaint  Plan: Continue per plan of care           Precautions: MS; COPD      Manual   9/26 10/2 10/3 10/9 10/10       STR R TSP Paraspinals  8' NP  B 10'                                                                Exercise Diary  9/23 9/26 10/2 10/3 10/9 10/10       Pt Edu KS      KS       Bike NV  5 min 5 min 3 min 5 min       TUG NV  15 sec          PIERCE NV  34/56          Transverse Abdominus Engagement NV 3"x20 HEP 3"x20         Adductor set NV 3"x20 x20 3"x20 3"x20        Hooklying TB hip abd NV Red x 20 ea Red x20 Red x 20 ea         Standing hip abd/ext  2 x 10ea 2x10 ea abd 2 x 10ea 2x10 ea yellow 2 x 10 ea       Standing marching  X 20 x20          Standing heel raises  x20 x20 x20 x20 x20       SAQ  2x10 HEP 2 x 10 ea LAQ 2 x 10 ea SAQ & LAQ 2x10ea       R trunk stretch in left sidelying  4 x 15" NP- no pain          SHC   X 20 ea          Biodex- LOS   Static x 3 Static x 3 Lv 12 x 3 Lv 12 x 5       Biodex- weight shifting   NV equal weight 50% 1 min x 2 equal weight 50% 1 5 min x 2        Mini squat    2x10  2x10       Bridge    2x10         Stairs    NV NV        Seated TB hamstring curl    NV Red 2 x 10ea yellow 2 x10       Ankle TB DF/PF    NV NV PF yellow & 3 way [id number]       Seated lumbar flexion w PB     x5 ea C,L,R            Modalities

## 2019-10-15 ENCOUNTER — HOSPITAL ENCOUNTER (OUTPATIENT)
Dept: MRI IMAGING | Facility: CLINIC | Age: 59
Discharge: HOME | End: 2019-10-15
Payer: COMMERCIAL

## 2019-10-15 DIAGNOSIS — G35 MULTIPLE SCLEROSIS (HCC): ICD-10-CM

## 2019-10-15 PROCEDURE — 70551 MRI BRAIN STEM W/O DYE: CPT

## 2019-10-16 ENCOUNTER — OFFICE VISIT (OUTPATIENT)
Dept: OCCUPATIONAL THERAPY | Facility: CLINIC | Age: 59
End: 2019-10-16
Payer: COMMERCIAL

## 2019-10-16 ENCOUNTER — OFFICE VISIT (OUTPATIENT)
Dept: PHYSICAL THERAPY | Facility: CLINIC | Age: 59
End: 2019-10-16
Payer: COMMERCIAL

## 2019-10-16 DIAGNOSIS — M54.16 LUMBAR RADICULOPATHY: Primary | ICD-10-CM

## 2019-10-16 DIAGNOSIS — M62.81 MUSCLE WEAKNESS: ICD-10-CM

## 2019-10-16 DIAGNOSIS — M65.332 TRIGGER FINGER, LEFT MIDDLE FINGER: ICD-10-CM

## 2019-10-16 DIAGNOSIS — G35 MULTIPLE SCLEROSIS (HCC): ICD-10-CM

## 2019-10-16 DIAGNOSIS — M65.322 TRIGGER FINGER, LEFT INDEX FINGER: ICD-10-CM

## 2019-10-16 DIAGNOSIS — G35 MULTIPLE SCLEROSIS (HCC): Primary | ICD-10-CM

## 2019-10-16 DIAGNOSIS — G81.91 RIGHT HEMIPARESIS (HCC): ICD-10-CM

## 2019-10-16 PROCEDURE — 97112 NEUROMUSCULAR REEDUCATION: CPT | Performed by: PHYSICAL THERAPIST

## 2019-10-16 PROCEDURE — 97110 THERAPEUTIC EXERCISES: CPT | Performed by: OCCUPATIONAL THERAPIST

## 2019-10-16 PROCEDURE — 97140 MANUAL THERAPY 1/> REGIONS: CPT | Performed by: PHYSICAL THERAPIST

## 2019-10-16 PROCEDURE — 97140 MANUAL THERAPY 1/> REGIONS: CPT | Performed by: OCCUPATIONAL THERAPIST

## 2019-10-16 PROCEDURE — 97110 THERAPEUTIC EXERCISES: CPT | Performed by: PHYSICAL THERAPIST

## 2019-10-16 PROCEDURE — 97530 THERAPEUTIC ACTIVITIES: CPT | Performed by: OCCUPATIONAL THERAPIST

## 2019-10-16 NOTE — PROGRESS NOTES
Daily Note     Today's date: 10/16/2019  Patient name: Coty Teresa  : 1960  MRN: [id number]  Referring provider: Jaimie Quigley MD  Dx:   Encounter Diagnosis     ICD-10-CM    1  Lumbar radiculopathy M54 16    2  Right hemiparesis ([facility] [address] ) G81 91    3  Multiple sclerosis ([facility] [address] ) G35                   Subjective: Patient reports her right side lumbar region has some soreness  She reports she is walking better and noticing more strength within BLE  Objective: See treatment diary below      Assessment: Tolerated treatment well  She exhibits improved balance and strength within session  Patient would benefit from continued PT She leaves session without complaint; less pain post session  Plan: Continue per plan of care           Precautions: MS; COPD      Manual   9/26 10/2 10/3 10/9 10/10 10/16      STR R TSP Paraspinals  8' NP  B 10'  R 8'                                                              Exercise Diary  9/23 9/26 10/2 10/3 10/9 10/10 10/16      Pt Edu KS      KS       Bike NV  5 min 5 min 3 min 5 min 5 min      TUG NV  15 sec          PIEREC NV  34/56          Transverse Abdominus Engagement NV 3"x20 HEP 3"x20   3"x20      Adductor set NV 3"x20 x20 3"x20 3"x20  3"x20      Hooklying TB hip abd NV Red x 20 ea Red x20 Red x 20 ea         Standing hip abd/ext  2 x 10ea 2x10 ea abd 2 x 10ea 2x10 ea yellow 2 x 10 ea 2x10 ea      Standing marching  X 20 x20          Standing heel raises  x20 x20 x20 x20 x20 x20      SAQ  2x10 HEP 2 x 10 ea LAQ 2 x 10 ea SAQ & LAQ 2x10ea SAQ&LAQ 2 x 10ea      R trunk stretch in left sidelying  4 x 15" NP- no pain          SHC   X 20 ea          Biodex- LOS   Static x 3 Static x 3 Lv 12 x 3 Lv 12 x 5 Lv 12 x 5      Biodex- weight shifting   NV equal weight 50% 1 min x 2 equal weight 50% 1 5 min x 2        Mini squat    2x10  2x10       Bridge    2x10   2x10      Stairs    NV NV        Seated TB hamstring curl    NV Red 2 x 10ea yellow 2 x10       Ankle TB DF/PF    NV NV PF yellow & 3 way [id number]       Tandem Stance on foam       2 x30"ea      SLB on foam       2x30"ea      Seated lumbar flexion w PB     x5 ea C,L,R            Modalities

## 2019-10-16 NOTE — PROGRESS NOTES
Daily Note     Today's date: 10/16/2019  Patient name: uSsan Hughes  : 1960  MRN: [id number]  Referring provider: Mau Garza, *  Dx:   Encounter Diagnosis     ICD-10-CM    1  Multiple sclerosis ([facility] [address] ) G35    2  Muscle weakness M62 81    3  Trigger finger, left index finger M65 322    4  Trigger finger, left middle finger M65 332                   Subjective: "I am able to bend my left MF better, but I'm still having trouble being able to use my L hand "    Objective: See treatment diary below  Manual  9/23 9/25 10/2 10/3 10/9 10/10 10/16      stm extensor mass left  4 4 3  2' 2' 2'      IASTM MP volar head IF/LF  4 4 6 6' 5' 5'                                Trigger finger splint  David for I Adjusted              Exercise Diary  923 9/25 10/2 10/3 10/9 10/10 10/16      Extensor mass stretch left  HEP reviewed  3' 3 2' 2' 2'      dexterity HEP/fold paper towel into hand added pen roll  To HEP Pen roll f/b kenn Pen roll f/b kenn  Pen roll f/b       Adaptive equip Built up handle of can to decrease             Chinese balls   5xs clockwise/counterclockwise /KENN 5xs for 2 sets kenn 8xs 2 sets kenn 10xs kenn 10xs kenn        Grooved peg board  1 set /kenn 1 set kenn 1 set kenn 1 set kenn w/ static  (only for R hand) same same      Hook fist   10xs/L  10 xs L for 2 sets 12xs fro 2 sets same same      RFB static  50% for 5 sec hold      10xs on R only same      Beading       10 items on 2 sets with L hand as assist                                                                                                                                                                      Modalities              MH left forearm   6' 5' 5'                                    Assessment: Tolerated treatment well  Patient would benefit from continued OT  Trigger fingers in L hand continue to impair function  Specifically thel left MF is causing the most pain and difficulty to use   Incorporated functional assist of L hand during TA  Decreased dexterity and muscle weakness in R hand noted during grooved pegboard with static , and beading TA  Plan: Continue per plan of care        Precautions: MS; COPD

## 2019-10-17 ENCOUNTER — OFFICE VISIT (OUTPATIENT)
Dept: PHYSICAL THERAPY | Facility: CLINIC | Age: 59
End: 2019-10-17
Payer: COMMERCIAL

## 2019-10-17 ENCOUNTER — OFFICE VISIT (OUTPATIENT)
Dept: OCCUPATIONAL THERAPY | Facility: CLINIC | Age: 59
End: 2019-10-17
Payer: COMMERCIAL

## 2019-10-17 DIAGNOSIS — M65.332 TRIGGER FINGER, LEFT MIDDLE FINGER: ICD-10-CM

## 2019-10-17 DIAGNOSIS — G35 MULTIPLE SCLEROSIS (HCC): ICD-10-CM

## 2019-10-17 DIAGNOSIS — M62.81 MUSCLE WEAKNESS: Primary | ICD-10-CM

## 2019-10-17 DIAGNOSIS — M65.322 TRIGGER FINGER, LEFT INDEX FINGER: ICD-10-CM

## 2019-10-17 DIAGNOSIS — M54.16 LUMBAR RADICULOPATHY: Primary | ICD-10-CM

## 2019-10-17 DIAGNOSIS — G81.91 RIGHT HEMIPARESIS (HCC): ICD-10-CM

## 2019-10-17 PROCEDURE — 97140 MANUAL THERAPY 1/> REGIONS: CPT | Performed by: OCCUPATIONAL THERAPIST

## 2019-10-17 PROCEDURE — 97112 NEUROMUSCULAR REEDUCATION: CPT | Performed by: PHYSICAL THERAPIST

## 2019-10-17 PROCEDURE — 97530 THERAPEUTIC ACTIVITIES: CPT | Performed by: OCCUPATIONAL THERAPIST

## 2019-10-17 PROCEDURE — 97112 NEUROMUSCULAR REEDUCATION: CPT | Performed by: OCCUPATIONAL THERAPIST

## 2019-10-17 PROCEDURE — 97110 THERAPEUTIC EXERCISES: CPT | Performed by: PHYSICAL THERAPIST

## 2019-10-17 NOTE — PROGRESS NOTES
Daily Note     Today's date: 10/17/2019  Patient name: Ariel Sever  : 1960  MRN: [id number]  Referring provider: Jessi De Leon, *  Dx:   Encounter Diagnosis     ICD-10-CM    1  Muscle weakness M62 81    2  Multiple sclerosis ([facility] [address] ) G35    3  Trigger finger, left middle finger M65 332    4   Trigger finger, left index finger M65 322        Start Time: 1000  Stop Time: 1030  Total time in clinic (min): 30 minutes    Subjective: "I feel about the same in my left hand, but that lump I have in my palm feels better "      Objective: See treatment diary below  Manual  9/23 9/25 10/2 10/3 10/9 10/10 10/16 10/17     stm extensor mass left  4 4 3  2' 2' 2'      IASTM MP volar head IF/LF  4 4 6 6' 5' 5'                                Trigger finger splint  David for I Adjusted              Exercise Diary  923 9/25 10/2 10/3 10/9 10/10 10/16 10/17     Extensor mass stretch left  HEP reviewed  3' 3 2' 2' 2'      dexterity HEP/fold paper towel into hand added pen roll  To HEP Pen roll f/b kenn Pen roll f/b kenn  Pen roll f/b       Adaptive equip Built up handle of can to decrease             Chinese balls   5xs clockwise/counterclockwise /KENN 5xs for 2 sets kenn 8xs 2 sets kenn 10xs kenn 10xs kenn   same     Grooved peg board  1 set /kenn 1 set kenn 1 set kenn 1 set kenn w/ static  (only for R hand) same same      Hook fist   10xs/L  10 xs L for 2 sets 12xs fro 2 sets same same      RFB static  50% for 5 sec hold      10xs on R only same same     Beading       10 items on 2 sets with L hand as assist      Marbles        5 at a time for 1 set     theraputty         Wrist isometrics with dowel R hand only     9 Hole peg re-eval        32 03sec R hand only                                                                                                                              Modalities              MH left forearm   6' 5' 5'        MH left forearm and paraffin      7'                        Assessment: Tolerated treatment well  Patient would benefit from continued OT  Paraffin wax dip releived MF trigger finger pain  Paraffin wax treatment to be added to POC in order to decrease pain and swelling  Patient demonstrated weakness in her R hand as she was dropping items during TA  Isometric strengthening is recommended at this time to increase strength in extrinsic and intrinsic muscle groups  Plan: Continue per plan of care        Precautions: MS; COPD

## 2019-10-22 ENCOUNTER — HOSPITAL ENCOUNTER (OUTPATIENT)
Dept: INFUSION CENTER | Facility: CLINIC | Age: 59
Discharge: HOME | End: 2019-10-22
Payer: COMMERCIAL

## 2019-10-22 VITALS
RESPIRATION RATE: 16 BRPM | HEART RATE: 75 BPM | SYSTOLIC BLOOD PRESSURE: 123 MMHG | TEMPERATURE: 98.7 F | DIASTOLIC BLOOD PRESSURE: 64 MMHG

## 2019-10-22 DIAGNOSIS — G35 MULTIPLE SCLEROSIS (HCC): Primary | ICD-10-CM

## 2019-10-22 PROCEDURE — 96365 THER/PROPH/DIAG IV INF INIT: CPT

## 2019-10-22 RX ORDER — SODIUM CHLORIDE 9 MG/ML
20 INJECTION, SOLUTION INTRAVENOUS ONCE
Status: CANCELLED | OUTPATIENT
Start: 2019-10-22

## 2019-10-22 RX ORDER — SODIUM CHLORIDE 9 MG/ML
20 INJECTION, SOLUTION INTRAVENOUS ONCE
Status: COMPLETED | OUTPATIENT
Start: 2019-10-22 | End: 2019-10-22

## 2019-10-22 RX ADMIN — SODIUM CHLORIDE 20 ML/HR: 0.9 INJECTION, SOLUTION INTRAVENOUS at 12:03

## 2019-10-22 RX ADMIN — SODIUM CHLORIDE 1000 MG: 0.9 INJECTION, SOLUTION INTRAVENOUS at 12:30

## 2019-10-22 NOTE — PROGRESS NOTES
Pt here for steroid infusion, offers no complaints, resting comfortably  States she took 50mg benadryl prior to coming into infusion at 1108AM  Vitals stable  Call bell in reach, will continue to monitor

## 2019-10-23 ENCOUNTER — APPOINTMENT (OUTPATIENT)
Dept: OCCUPATIONAL THERAPY | Facility: CLINIC | Age: 59
End: 2019-10-23
Payer: COMMERCIAL

## 2019-10-23 ENCOUNTER — APPOINTMENT (OUTPATIENT)
Dept: PHYSICAL THERAPY | Facility: CLINIC | Age: 59
End: 2019-10-23
Payer: COMMERCIAL

## 2019-10-24 ENCOUNTER — APPOINTMENT (OUTPATIENT)
Dept: PHYSICAL THERAPY | Facility: CLINIC | Age: 59
End: 2019-10-24
Payer: COMMERCIAL

## 2019-10-24 ENCOUNTER — APPOINTMENT (OUTPATIENT)
Dept: OCCUPATIONAL THERAPY | Facility: CLINIC | Age: 59
End: 2019-10-24
Payer: COMMERCIAL

## 2019-10-30 ENCOUNTER — OFFICE VISIT (OUTPATIENT)
Dept: OCCUPATIONAL THERAPY | Facility: CLINIC | Age: 59
End: 2019-10-30
Payer: COMMERCIAL

## 2019-10-30 ENCOUNTER — OFFICE VISIT (OUTPATIENT)
Dept: PHYSICAL THERAPY | Facility: CLINIC | Age: 59
End: 2019-10-30
Payer: COMMERCIAL

## 2019-10-30 DIAGNOSIS — G81.91 RIGHT HEMIPARESIS (HCC): ICD-10-CM

## 2019-10-30 DIAGNOSIS — G35 MULTIPLE SCLEROSIS (HCC): ICD-10-CM

## 2019-10-30 DIAGNOSIS — M62.81 MUSCLE WEAKNESS: ICD-10-CM

## 2019-10-30 DIAGNOSIS — M54.16 LUMBAR RADICULOPATHY: Primary | ICD-10-CM

## 2019-10-30 DIAGNOSIS — M65.322 TRIGGER FINGER, LEFT INDEX FINGER: Primary | ICD-10-CM

## 2019-10-30 DIAGNOSIS — M65.332 TRIGGER FINGER, LEFT MIDDLE FINGER: ICD-10-CM

## 2019-10-30 PROCEDURE — 97110 THERAPEUTIC EXERCISES: CPT | Performed by: PHYSICAL THERAPIST

## 2019-10-30 PROCEDURE — 97140 MANUAL THERAPY 1/> REGIONS: CPT | Performed by: OCCUPATIONAL THERAPIST

## 2019-10-30 PROCEDURE — 97530 THERAPEUTIC ACTIVITIES: CPT | Performed by: OCCUPATIONAL THERAPIST

## 2019-10-30 PROCEDURE — 97112 NEUROMUSCULAR REEDUCATION: CPT | Performed by: PHYSICAL THERAPIST

## 2019-10-30 NOTE — PROGRESS NOTES
PT Re-Evaluation     Today's date: 10/30/2019  Patient name: Obdulio Alarcon   : 1960  MRN: [id number]  Referring provider: Kimmie Little MD  Dx:   Encounter Diagnosis     ICD-10-CM    1  Lumbar radiculopathy M54 16    2  Right hemiparesis ([address] ) G81 91    3  Multiple sclerosis ([address] ) G35                   Assessment  Assessment details: Patient is a 59 y/o female with relapsing remitting multiple sclerosis and lumbar pain  Patient presents with decreased functional mobility due to increased lumbar pain, decreased right LE strength, increased gait deviations, decreased balance and decreased endurance associated with multiple sclerosis  Since starting therapy, she exhibits improved gait pattern, improved balance and exercise tolerance, and improved active ROM and strength of RLE  She remains with aforementioned impairments and would continue to benefit from skilled PT  Due to relapsing and remitting nature of diagnosis,  PT needed to allow for return to maximal function and improve quality of life  Impairments: abnormal gait, abnormal or restricted ROM, activity intolerance, impaired physical strength, lacks appropriate home exercise program, pain with function and safety issue  Understanding of Dx/Px/POC: good   Prognosis: good    Goals  STG within 4 weeks:   1  Patient to be independent in HEP  -met  2  Reduce pain by 50% to improve quality of life  -partially met   3  Improve RLE strength to 4/5  -not met  LTG within 8 weeks:   1  Patient to be independent in ADLs/IADLs without difficulty  2  Patient to ambulate community distances with minimal difficulty  3  Patient to perform stairs with minimal difficulty  Plan  Plan details: Per Dr Cipriano Sandifer- 8 weeks of physical therapy  If she continues to have pain, I will schedule her for a right L4, L5 TFESI      Per neuro Dr Jacey Mahan- "Patient will have PT for right -sided hemiparesis, she ambulates with a cane "  Patient is having an MRI of the brain in October to check for new lesions  Patient will have infusions every two months  Depression screening: Patient is seeing her PCP for depression and has changed her medication, per PCP  Offered patient behavioral health services information and instructed patient to talk her PCP if she needs to talk to someone  (Patient lost her daughter in law this year to brain cancer )   Patient would benefit from: skilled physical therapy and PT eval  Planned modality interventions: cryotherapy, hydrotherapy and unattended electrical stimulation  Planned therapy interventions: therapeutic training, therapeutic exercise, therapeutic activities, stretching, strengthening, postural training, patient education, neuromuscular re-education, manual therapy, joint mobilization, IADL retraining, activity modification, ADL retraining, ADL training, body mechanics training, flexibility, functional ROM exercises, gait training, graded activity, graded exercise, graded motor, home exercise program, balance/weight bearing training and abdominal trunk stabilization  Frequency: 2x week  Duration in weeks: 8  Plan of Care beginning date: 9/23/2019  Plan of Care expiration date: 11/18/2019  Treatment plan discussed with: patient        Subjective Evaluation    History of Present Illness  Onset date: ongoing for years; lumbar pain in February 2019  Mechanism of injury: Patient is a 61 y/o female with relapsing-remitting Multiple sclerosis, diagnosed in 2009  She states she is noting an overall decrease in function  States she had been doing aquatherapy, but she has since stopped exercising due to a lot going on in her personal life  She also reports lumbar pain that occasional radiates into RLE and cervico-thoracic paraspinal muscle spasms  She states her last episode of RLE pain was in February and she was put on medication and rested, and pain eventually decreased  She is starting to notice the pain is coming back    She is referred for evaluation and treatment of low back pain and right hemiparesis  Upon re-evaluation on 10/30/19, patient self reports overall improved function since starting PT  She states stairs and walking have improved  She also notices less apparent drop foot  She states she had a bad night last night with her MS and was not here at all last week due to having her MS infusion therapy  Pain  Current pain rating: 3  At best pain rating: 3  At worst pain ratin  Location: low back; right leg   Quality: radiating, needle-like and sharp  Aggravating factors: walking, standing and sitting  Progression: improved    Social Support  Steps to enter house: yes  Stairs in house: yes   Lives in: multiple-level home  Lives with: significant other    Employment status: not working  Hand dominance: right  Exercise history: none currently       Diagnostic Tests  Abnormal CT scan: Lumbosacral CT: multilevel spondylitic degenerative changes with severe foraminal narrowing at right L4-L5  Treatments  Previous treatment: physical therapy and medication  Current treatment: medication and occupational therapy  Current treatment comments: infusions; she is starting OT for left trigger finger   Patient Goals  Patient goal: "To be able to do more on my own and to get back to some exercises  To be able to reduce some of my medications "         PIERCE BALANCE SCALE:34/56  Timed Up and Go Test: 15 seconds  (From previous session; to be updated in future sessions)         Precautions: MS; COPD      Manual   9/26 10/2 10/3 10/9 10/10 10/16 10/17 10/30    STR R TSP Paraspinals  8' NP  B 10'  R 8'                                                            Increased time spent on re-evaluation and patient education     Exercise Diary  9/23 9/26 10/2 10/3 10/9 10/10 10/16 10/17 10/30    Pt Edu KS      KS       Bike NV  5 min 5 min 3 min 5 min 5 min 5 min 4 min    TUG NV  15 sec      NV    PIERCE NV  34/56      NV    Transverse Abdominus Engagement NV 3"x20 HEP 3"x20   3"x20 3"x20     Adductor set NV 3"x20 x20 3"x20 3"x20  3"x20 3"x20     Hooklying TB hip abd NV Red x 20 ea Red x20 Red x 20 ea         Standing hip abd/ext  2 x 10ea 2x10 ea abd 2 x 10ea 2x10 ea yellow 2 x 10 ea 2x10 ea Red 2 x 10 ea Red 2 x 10 ea    Standing marching  X 20 x20      Biodex step taps x 20    Standing heel raises  x20 x20 x20 x20 x20 x20 x20 x20    SAQ  2x10 HEP 2 x 10 ea LAQ 2 x 10 ea SAQ & LAQ 2x10ea SAQ&LAQ 2 x 10ea SAQ 1# LAQ 0# x 30ea     R trunk stretch in left sidelying  4 x 15" NP- no pain          SHC   X 20 ea          Biodex- LOS   Static x 3 Static x 3 Lv 12 x 3 Lv 12 x 5 Lv 12 x 5 Lv 11 x 5 Lv 10 x 5    Biodex- weight shifting   NV equal weight 50% 1 min x 2 equal weight 50% 1 5 min x 2        Mini squat    2x10  2x10  2x10 2x10    Bridge    2x10   2x10 2x10     Stairs    NV NV    4" step ups x10ea    Seated TB hamstring curl    NV Red 2 x 10ea yellow 2 x10  Yellow 2 x 10 ea     Ankle TB DF/PF    NV NV PF yellow & 3 way [id number]  PF yellow & 3 way [id number]     Tandem Stance on foam       2 x30"ea  2x30"ea    SLB on foam       2x30"ea  2x30"ea    Seated lumbar flexion w PB     x5 ea C,L,R            Modalities

## 2019-10-30 NOTE — PROGRESS NOTES
Daily Note     Today's date: 10/30/2019  Patient name: Compa Waldron  : 1960  MRN: [id number]  Referring provider: Esteban Osman, *  Dx:   Encounter Diagnosis     ICD-10-CM    1  Trigger finger, left index finger M65 322    2  Trigger finger, left middle finger M65 332    3  Muscle weakness M62 81    4  Multiple sclerosis ([address] ) G35                   Subjective: "I haven't done my exercises for my figners because I've been having really bad headaches   So My fingers feel really stiff "      Objective: See treatment diary below  Manual  9/23 9/25 10/2 10/3 10/9 10/10 10/16 10/17 10/30    stm extensor mass left  4 4 3  2' 2' 2'      IASTM MP volar head IF/LF  4 4 6 6' 5' 5'  5'    IASTM deep tissue massage         5' right hand only                 Trigger finger splint  David for I Adjusted              Exercise Diary  923 9/25 10/2 10/3 10/9 10/10 10/16 10/17 10/30    Extensor mass stretch left  HEP reviewed  3' 3 2' 2' 2'      dexterity HEP/fold paper towel into hand added pen roll  To HEP Pen roll f/b kenn Pen roll f/b kenn  Pen roll f/b       Adaptive equip Built up handle of can to decrease             Chinese balls   5xs clockwise/counterclockwise /KENN 5xs for 2 sets kenn 8xs 2 sets kenn 10xs kenn 10xs kenn   same 10xs R only    Grooved peg board  1 set /kenn 1 set kenn 1 set kenn 1 set kenn w/ static  (only for R hand) same same      Hook fist   10xs/L  10 xs L for 2 sets 12xs fro 2 sets same same      RFB static  50% for 5 sec hold      10xs on R only same same 2x10    Beading       10 items on 2 sets with L hand as assist      Marbles        5 at a time for 1 set same    theraputty         Wrist isometrics with dowel R hand only     9 Hole peg re-eval        32 03sec R hand only     Theraputty with pegs         Using various pinches    RFB up/down         10xs R only    Digi flex         Red 20xs                                                                                      Modalities  left forearm   6' 5' 5'         left forearm and paraffin      7'                        Assessment: Tolerated treatment well  Patient would benefit from continued OT  Patient demonstrated good activity tolerance during new TE and TA, however reported muscle fatigue and soreness following exercises  After IASTM deep tissue massage, patient reported increased sensation in her R hand  Left IF and MF were treated with heat and paraffin, and IASTM as she had increased pain today  Plan: Continue per plan of care        Precautions: MS; COPD

## 2019-10-31 ENCOUNTER — OFFICE VISIT (OUTPATIENT)
Dept: OCCUPATIONAL THERAPY | Facility: CLINIC | Age: 59
End: 2019-10-31
Payer: COMMERCIAL

## 2019-10-31 DIAGNOSIS — M65.322 TRIGGER FINGER, LEFT INDEX FINGER: Primary | ICD-10-CM

## 2019-10-31 PROCEDURE — 97150 GROUP THERAPEUTIC PROCEDURES: CPT

## 2019-10-31 PROCEDURE — 97110 THERAPEUTIC EXERCISES: CPT

## 2019-10-31 NOTE — PROGRESS NOTES
Daily Note     Today's date: 10/31/2019  Patient name: Carlos Milian  : 1960  MRN: [id number]  Referring provider: Bryant Tristan, *  Dx:   Encounter Diagnosis   Name Primary?  Trigger finger, left index finger Yes                  Subjective: "I'm sore from PT yesterday "      Objective: See treatment below  Manual  9/23 9/25 10/2 10/3 10/9 10/10 10/16 10/17 10/30  10/31   stm extensor mass left   4 4 3  2' 2' 2'      5'   IASTM MP volar head IF/LF   4 4 6 6' 5' 5'   5'     IASTM deep tissue massage                 5' right hand only                             Trigger finger splint   David for I Adjusted                       Exercise Diary  923 9/25 10/2 10/3 10/9 10/10 10/16 10/17 10/30  10/31   Extensor mass stretch left  HEP reviewed  3' 3 2' 2' 2'         dexterity HEP/fold paper towel into hand added pen roll  To HEP Pen roll f/b kenn Pen roll f/b kenn   Pen roll f/b           Adaptive equip Built up handle of can to decrease                      Chinese balls    5xs clockwise/counterclockwise /KENN 5xs for 2 sets kenn 8xs 2 sets kenn 10xs kenn 10xs kenn   same 10xs R only  10x r   Grooved peg board   1 set /kenn 1 set kenn 1 set kenn 1 set kenn w/ static  (only for R hand) same same      with marbles in R   Hook fist    10xs/L   10 xs L for 2 sets 12xs fro 2 sets same same         RFB static  50% for 5 sec hold           10xs on R only same same 2x10  2x10   Beading             10 items on 2 sets with L hand as assist         Marbles               5 at a time for 1 set same  same   theraputty                Wrist isometrics with dowel R hand only       9 Hole peg re-eval               32  03sec R hand only       Theraputty with pegs                 Using various pinches  pinches   RFB up/down                 10xs R only     Digi flex                 Red 20xs                                                                                                                                                           Modalities               10/31          left forearm     6' 5' 5'    5          left forearm and paraffin           7'                                           Assessment: Tolerated treatment well  Noted with in left hand this day and tolerated minimal exercises  No difficulty with right handed exercises  Plan: Continued skilled OT per POC      INTERVENTION COMMENTS:  Diagnosis: Trigger finger, left index finger [C79 [id number]]  Precautions: MS; COPD

## 2019-11-01 ENCOUNTER — APPOINTMENT (OUTPATIENT)
Dept: PHYSICAL THERAPY | Facility: CLINIC | Age: 59
End: 2019-11-01
Payer: COMMERCIAL

## 2019-11-04 ENCOUNTER — OFFICE VISIT (OUTPATIENT)
Dept: PHYSICAL THERAPY | Facility: CLINIC | Age: 59
End: 2019-11-04
Payer: COMMERCIAL

## 2019-11-04 ENCOUNTER — OFFICE VISIT (OUTPATIENT)
Dept: OCCUPATIONAL THERAPY | Facility: CLINIC | Age: 59
End: 2019-11-04
Payer: COMMERCIAL

## 2019-11-04 DIAGNOSIS — M65.322 TRIGGER FINGER, LEFT INDEX FINGER: ICD-10-CM

## 2019-11-04 DIAGNOSIS — G35 MULTIPLE SCLEROSIS (HCC): Primary | ICD-10-CM

## 2019-11-04 DIAGNOSIS — M54.16 LUMBAR RADICULOPATHY: Primary | ICD-10-CM

## 2019-11-04 DIAGNOSIS — G35 MULTIPLE SCLEROSIS (HCC): ICD-10-CM

## 2019-11-04 DIAGNOSIS — M62.81 MUSCLE WEAKNESS: ICD-10-CM

## 2019-11-04 DIAGNOSIS — G81.91 RIGHT HEMIPARESIS (HCC): ICD-10-CM

## 2019-11-04 DIAGNOSIS — M65.332 TRIGGER FINGER, LEFT MIDDLE FINGER: ICD-10-CM

## 2019-11-04 PROCEDURE — 97110 THERAPEUTIC EXERCISES: CPT | Performed by: PHYSICAL THERAPIST

## 2019-11-04 PROCEDURE — 97112 NEUROMUSCULAR REEDUCATION: CPT | Performed by: PHYSICAL THERAPIST

## 2019-11-04 PROCEDURE — 97140 MANUAL THERAPY 1/> REGIONS: CPT | Performed by: OCCUPATIONAL THERAPIST

## 2019-11-04 NOTE — PROGRESS NOTES
Daily Note     Today's date: 2019  Patient name: Prince Charles  : 1960  MRN: [id number]  Referring provider: Magdy Bosch MD  Dx:   Encounter Diagnosis     ICD-10-CM    1  Lumbar radiculopathy M54 16    2  Right hemiparesis ([facility] [address] ) G81 91    3  Multiple sclerosis ([facility] [address] ) G35                   Subjective: Patient states she's tired today and increased stress from the grandchildren  Objective: See treatment diary below      Assessment: Tolerated treatment fair  She fatigues during session, but is able to complete listed exercises without increased pain  Patient would benefit from continued PT      Plan: Continue per plan of care  Precautions: MS; COPD        Manual   9/26 10/2 10/3 10/9 10/10 10/16 10/17 10/30 11/4   STR R TSP Paraspinals  8' NP  B 10'  R 8'                                                            Increased time spent on re-evaluation and patient education     Exercise Diary  9/23 9/26 10/2 10/3 10/9 10/10 10/16 10/17 10/30 11/4   Pt Edu KS      KS       Bike NV  5 min 5 min 3 min 5 min 5 min 5 min 4 min 4 min   TUG NV  15 sec      NV    PIERCE NV  34/56      NV    Transverse Abdominus Engagement NV 3"x20 HEP 3"x20   3"x20 3"x20  DLS w PB push in HL 0"I48   Adductor set NV 3"x20 x20 3"x20 3"x20  3"x20 3"x20     Hooklying TB hip abd NV Red x 20 ea Red x20 Red x 20 ea         Standing hip abd/ext  2 x 10ea 2x10 ea abd 2 x 10ea 2x10 ea yellow 2 x 10 ea 2x10 ea Red 2 x 10 ea Red 2 x 10 ea Red 2 x 10 ea   Standing marching  X 20 x20      Biodex step taps x 20    Standing heel raises  x20 x20 x20 x20 x20 x20 x20 x20 x20   SAQ  2x10 HEP 2 x 10 ea LAQ 2 x 10 ea SAQ & LAQ 2x10ea SAQ&LAQ 2 x 10ea SAQ 1# LAQ 0# x 30ea     R trunk stretch in left sidelying  4 x 15" NP- no pain          SHC   X 20 ea          Biodex- LOS   Static x 3 Static x 3 Lv 12 x 3 Lv 12 x 5 Lv 12 x 5 Lv 11 x 5 Lv 10 x 5 Lv 10 x 5   Biodex- weight shifting   NV equal weight 50% 1 min x 2 equal weight 50% 1 5 min x 2 Mini squat    2x10  2x10  2x10 2x10 2x10   Bridge    2x10   2x10 2x10     Stairs    NV NV    4" step ups x10ea    Seated TB hamstring curl    NV Red 2 x 10ea yellow 2 x10  Yellow 2 x 10 ea     Ankle TB DF/PF    NV NV PF yellow & 3 way [id number]  PF yellow & 3 way [id number]     Tandem Stance on foam       2 x30"ea  2x30"ea On floor/foam x30"ea   SLB on foam       2x30"ea  2x30"ea On floor/foam x30"ea   Seated lumbar flexion w PB     x5 ea C,L,R            Modalities

## 2019-11-04 NOTE — PROGRESS NOTES
Daily Note     Today's date: 2019  Patient name: Moriah Bowen  : 1960  MRN: [id number]  Referring provider: Levi Garland, *  Dx:   Encounter Diagnosis     ICD-10-CM    1  Multiple sclerosis ([facility] [address] ) G35    2  Trigger finger, left index finger M65 322    3  Trigger finger, left middle finger M65 332    4  Muscle weakness M62 81                   Subjective: "I'm having 6/10 pain in my L middle finger  It's like a radiating pain and I'm not sure why "      Objective: See treatment diary below  Manual              stm extensor mass left              IASTM MP volar head IF/LF  5'            IASTM deep tissue massage  5' R hand only                           Trigger finger splint                    Exercise Diary              Extensor mass stretch left              dexterity             Adaptive equip             Chinese balls              Grooved peg board             Hook fist              RFB static  50% for 5 sec hold             Beading             Marbles             theraputty              9 Hole peg re-eval             Theraputty with pegs             RFB up/down             Digi flex                                                                                                                               Modalities                        MH left forearm                    MH left forearm and paraffin  7'                                                Assessment: Tolerated treatment well  Patient would benefit from continued OT  Patient late for appointment, only able to be seen for 15 minutes to address pain in L MF, and sensation in R hand  Plan: Continue per plan of care        Precautions: MS; COPD

## 2019-11-13 ENCOUNTER — OFFICE VISIT (OUTPATIENT)
Dept: OCCUPATIONAL THERAPY | Facility: CLINIC | Age: 59
End: 2019-11-13
Payer: COMMERCIAL

## 2019-11-13 ENCOUNTER — OFFICE VISIT (OUTPATIENT)
Dept: PHYSICAL THERAPY | Facility: CLINIC | Age: 59
End: 2019-11-13
Payer: COMMERCIAL

## 2019-11-13 DIAGNOSIS — G35 MULTIPLE SCLEROSIS (HCC): ICD-10-CM

## 2019-11-13 DIAGNOSIS — M65.322 TRIGGER FINGER, LEFT INDEX FINGER: ICD-10-CM

## 2019-11-13 DIAGNOSIS — M62.81 MUSCLE WEAKNESS: Primary | ICD-10-CM

## 2019-11-13 DIAGNOSIS — G81.91 RIGHT HEMIPARESIS (HCC): ICD-10-CM

## 2019-11-13 DIAGNOSIS — M65.332 TRIGGER FINGER, LEFT MIDDLE FINGER: ICD-10-CM

## 2019-11-13 DIAGNOSIS — M54.16 LUMBAR RADICULOPATHY: Primary | ICD-10-CM

## 2019-11-13 PROCEDURE — 97112 NEUROMUSCULAR REEDUCATION: CPT | Performed by: PHYSICAL THERAPIST

## 2019-11-13 PROCEDURE — 97110 THERAPEUTIC EXERCISES: CPT | Performed by: PHYSICAL THERAPIST

## 2019-11-13 PROCEDURE — 97530 THERAPEUTIC ACTIVITIES: CPT | Performed by: OCCUPATIONAL THERAPIST

## 2019-11-13 PROCEDURE — 97140 MANUAL THERAPY 1/> REGIONS: CPT | Performed by: OCCUPATIONAL THERAPIST

## 2019-11-13 NOTE — PROGRESS NOTES
Daily Note     Today's date: 2019  Patient name: Prince Charles  : 1960  MRN: [id number]  Referring provider: Magdy Bosch MD  Dx:   Encounter Diagnosis     ICD-10-CM    1  Lumbar radiculopathy M54 16    2  Right hemiparesis ([facility] [address] ) G81 91    3  Multiple sclerosis ([facility] [address] ) G35                   Subjective: Patient reports overall since starting therapy, less back pain, less back spasms, and improved LE strength and mobility  She reports recent increase in hip soreness when walking a short distance  Objective: See treatment diary below      Assessment: Tolerated treatment well  Appropriate stretch response with BLE stretching; instruction in self piriformis stretch for home to reduce hip tightness  Will re-assess effectiveness at next visit  Patient to continue with HEP and call physician to discuss symptoms and possible sooner infusion, as she has already previously discussed with physician  Patient would benefit from continued PT      Plan: Continue per plan of care        Precautions: MS; COPD        Manual  11/13 9/26 10/2 10/3 10/9 10/10 10/16 10/17 10/30 11/4   STR R TSP Paraspinals  8' NP  B 10'  R 8'                                                              Exercise Diary  11/13 9/26 10/2 10/3 10/9 10/10 10/16 10/17 10/30 11/4   Manual BLE stretching- HS, Piri  8'            Bike 3 min  5 min 5 min 3 min 5 min 5 min 5 min 4 min 4 min   TUG   15 sec      NV    PIERCE   34/56      NV    Transverse Abdominus Engagement  3"x20 HEP 3"x20   3"x20 3"x20  DLS w PB push in HL 8"O67   Adductor set  3"x20 x20 3"x20 3"x20  3"x20 3"x20     Hooklying TB hip abd  Red x 20 ea Red x20 Red x 20 ea         Standing hip abd/ext Red 2 x 10 ea 2 x 10ea 2x10 ea abd 2 x 10ea 2x10 ea yellow 2 x 10 ea 2x10 ea Red 2 x 10 ea Red 2 x 10 ea Red 2 x 10 ea   Standing marching  X 20 x20      Biodex step taps x 20    Standing heel raises x20 x20 x20 x20 x20 x20 x20 x20 x20 x20   SAQ 2x10 2x10 HEP 2 x 10 ea LAQ 2 x 10 ea SAQ & LAQ 2x10ea SAQ&LAQ 2 x 10ea SAQ 1# LAQ 0# x 30ea     R trunk stretch in left sidelying  4 x 15" NP- no pain          SHC x20  X 20 ea          Biodex- LOS Lv 10 x 5  Static x 3 Static x 3 Lv 12 x 3 Lv 12 x 5 Lv 12 x 5 Lv 11 x 5 Lv 10 x 5 Lv 10 x 5   Biodex- weight shifting   NV equal weight 50% 1 min x 2 equal weight 50% 1 5 min x 2        Mini squat 2x10   2x10  2x10  2x10 2x10 2x10   Bridge    2x10   2x10 2x10     Stairs 8" step taps x20   NV NV    4" step ups x10ea    Seated TB hamstring curl Red 2 x 10   NV Red 2 x 10ea yellow 2 x10  Yellow 2 x 10 ea     Ankle TB DF/PF    NV NV PF yellow & 3 way [id number]  PF yellow & 3 way [id number]     Tandem Stance on foam       2 x30"ea  2x30"ea On floor/foam x30"ea   SLB on foam       2x30"ea  2x30"ea On floor/foam x30"ea   Instruction in self piri stretch KS 4 x 15"                Modalities

## 2019-11-20 ENCOUNTER — OFFICE VISIT (OUTPATIENT)
Dept: PHYSICAL THERAPY | Facility: CLINIC | Age: 59
End: 2019-11-20
Payer: COMMERCIAL

## 2019-11-20 ENCOUNTER — OFFICE VISIT (OUTPATIENT)
Dept: OCCUPATIONAL THERAPY | Facility: CLINIC | Age: 59
End: 2019-11-20
Payer: COMMERCIAL

## 2019-11-20 DIAGNOSIS — G35 MULTIPLE SCLEROSIS (HCC): ICD-10-CM

## 2019-11-20 DIAGNOSIS — G81.91 RIGHT HEMIPARESIS (HCC): ICD-10-CM

## 2019-11-20 DIAGNOSIS — M54.16 LUMBAR RADICULOPATHY: Primary | ICD-10-CM

## 2019-11-20 DIAGNOSIS — M65.322 TRIGGER FINGER, LEFT INDEX FINGER: ICD-10-CM

## 2019-11-20 DIAGNOSIS — M62.81 MUSCLE WEAKNESS: Primary | ICD-10-CM

## 2019-11-20 DIAGNOSIS — M65.332 TRIGGER FINGER, LEFT MIDDLE FINGER: ICD-10-CM

## 2019-11-20 PROCEDURE — 97168 OT RE-EVAL EST PLAN CARE: CPT | Performed by: OCCUPATIONAL THERAPIST

## 2019-11-20 PROCEDURE — 97112 NEUROMUSCULAR REEDUCATION: CPT | Performed by: PHYSICAL THERAPIST

## 2019-11-20 PROCEDURE — 97140 MANUAL THERAPY 1/> REGIONS: CPT | Performed by: OCCUPATIONAL THERAPIST

## 2019-11-20 PROCEDURE — 97110 THERAPEUTIC EXERCISES: CPT | Performed by: PHYSICAL THERAPIST

## 2019-11-20 NOTE — PROGRESS NOTES
OT Re-Evaluation  and OT Discharge     Today's date: 2019  Patient name: Deandre Nye  : 1960  MRN: [id number]  Referring provider: Tobi Vallecillo, *  Dx:   Encounter Diagnosis     ICD-10-CM    1  Muscle weakness M62 81    2  Multiple sclerosis ([facility] [address] ) G35    3  Trigger finger, left middle finger M65 332    4  Trigger finger, left index finger M65 322        Start Time: 1000  Stop Time: 1030  Total time in clinic (min): 30 minutes    Assessment  Assessment details: 61 yo female who presents with bilateral hands weakness/decreased dexterity and L TF in IF/LF  Physically, patient presents with decreased strength, dexterity and increased pain in left hand secondary to trigger fingers  Functionally, patient is using compensatory movement patterns in left hand and avoiding MP flexion of IF/LF  She also performs tasks slowly with her right hand secondary to decreased dexterity  Patient would benefit from night splinting with her left hand  She also presents with tightness in extensor mass left greater then right and increased tenderness in L ECRB; mild tenderness in right ECRB  Patient would benefit from skilled OT to perform tasks independently without triggering and with improved dexterity  19:  Currently patient reports since the start of therapy she is able to complete all her ADLs and IADLs without difficulty  She is able to  items better, drops items much less, and is able to use her RUE without difficulty  However, for that past two-three weeks, patient has been having severe migraines and increased fatigue which is affecting her performance  Patient's L IF and MF have improved as they do not trigger very often and she has increase AROM of her digits  Per patient report, she is ready for discharge at this time as she has better function, and she is going to schedule an appointment to get a cortisone injection   She will wait to see if she has improvement with her trigger fingers, and ask the doctor if she will require therapy again after her injection  Physically, patient has increased AROM of her L digits, remains the same with her strength of L hand, but has decreased strength and dexterity of the R hand  This decrease in strength and dexterity is secondary to her recent episodes of migraines and increased fatigue  Per patient report, she is to schedule an appointment with her neurologist to address her concerns  Skilled occupational therapy is not recommended at this time, however has been advised to call the office if she wishes to start therapy again after her cortisone  Patient is in agreement with discharge plan as of 11/20/19  Impairments: abnormal muscle firing, abnormal muscle tone, abnormal or restricted ROM, impaired physical strength, lacks appropriate home exercise program and pain with function  Understanding of Dx/Px/POC: good   Prognosis: fair    Goals  STG:  In 3 weeks  1  Decreased pain to 2-3/10 when performing ADL's in left hand  Met  2  Improved dexterity in right hand as evidenced by decreased time in 9 hole peg test  Progressing  3  Patient will tolerate wearing her left night splint for trigger finger  Met    LTG's 8 weeks  1    Patient will report minimal to no pain with all activities in left hand when grasping  Met  2  Sufficient strength to perform ADL's and IADL's  Progressing  3  Patient will note improved dexterity as evidenced by improved reports of tieing/buttoning /zipping and decreased dropping of items    Met    Plan  Planned modality interventions: TENS, thermotherapy: hydrocollator packs and thermotherapy: paraffin bath  Planned therapy interventions: joint mobilization, manual therapy, massage, orthotic fitting/training, orthotic management and training, neuromuscular re-education, therapeutic activities, therapeutic exercise, home exercise program, graded exercise, functional ROM exercises and fine motor coordination training  Frequency: 2x week  Duration in weeks: 8  Plan of Care beginning date: 2019  Plan of Care expiration date: 2019  Treatment plan discussed with: patient        Subjective Evaluation    History of Present Illness  Date of onset: 2019  Mechanism of injury: Patient reports history of L TF for the past 4 months in her IF/MF  She reports TFR in RF approx 18 months ago  It feels like I have swelling in my left hand and the tips are swelling and hurting  Patient is not demonstrating these symptoms in her R hand just her "normal weakness " Patient was diagnosed about 10 years ago with MS  Patient avoids closing her IF/MF because of the pain,    19:  Patient reports she is able to grab onto things with her left hand now, has less pain gripping her pain, and has improved sensation in her right hand  She was not able to feel different textures with her right hand since the start of therapy  Additionally, patient reports her recent migraines and increased fatigue have made her performance a little slower, but she is still able to complete all her ADLs and IADLs  Recurrent probem    Quality of life: good    Pain  Current pain rating: 3  At best pain ratin  At worst pain ratin  Quality: throbbing and sharp  Progression: improved    Social Support  Lives in: multiple-level home  Lives with: significant other (two grand daughter 8 and 10 )    Employment status: not working  Hand dominance: right  Life stress: medium      Diagnostic Tests  MRI studies: abnormal  Treatments  No previous or current treatments  Patient Goals  Patient goals for therapy: increased strength  Patient goal: to decrease triggering           Objective     Palpation     Additional Palpation Details  Pain with palpation of L ECRB;  Mild pain with palpation of R ECRB  - Tinel's test at MN, UN and RN frandy  - pain with palpation in 1st dorsal compartment frandy      9 hole peg test  R hand 34 1  L hand 27 2    11/20/19:  Denies pain with palpation of L ECRB and EDC     9 hole peg test:  R: 38 7  L: 27 1    Active Range of Motion     Left Wrist   Wrist flexion: 62 degrees   Wrist extension: 65 degrees   Radial deviation: 17 degrees   Ulnar deviation: 30 degrees     Right Wrist   Wrist flexion: 65 degrees   Wrist extension: 65 degrees   Radial deviation: 17 degrees   Ulnar deviation: 30 degrees     Left Thumb   Opposition: Finger to thumb intact with thumb slide to fifth volar head MC joint    Left Digits    Flexion   Index     MCP: 70    PIP: 95    DIP: 65  Middle     MCP: 80    PIP: 100    DIP: 80  Ring     MCP: 75    PIP: 103    DIP: 75  Little     MCP: 91    PIP: 95    DIP: 80    Additional Active Range of Motion Details  R composite fist wnl    Strength/Myotome Testing     Left Wrist/Hand      (2nd hand position)     Trial 1: 29 4    Trial 2: 27 8    Trial 3: 28 5    Thumb Strength  Key/Lateral Pinch     Trail 1: 10 5    Trail 2: 10 3    Trial 3: 10 4    Average: 10 4  Tip/Two-Point Pinch     Trial 1: 6 3    Trial 2: 6 6    Trial 3: 6 6    Average: 6 5  Palmar/Three-Point Pinch     Trial 1: 7 8    Trial 2: 7 1    Trial 3: 8 9    Average: 7 93     Right Wrist/Hand      (2nd hand position)     Trial 1: 29 9    Trial 2: 30    Trial 3: 29 6    Thumb Strength   Key/Lateral Pinch     Trial 1: 9 8    Trial 2: 9 4    Trial 3: 10 4    Average: 9 87  Tip/Two-Point Pinch     Trial 1: 7 3    Trial 2: 6    Trial 3: 7 2    Average: 6 83  Palmar/Three-Point Pinch     Trial 1: 6 7    Trial 2: 6 5    Trial 3: 7 2    Average: 6 8    Additional Strength Details  The first  test finger triggered, and the second  test elicited 5/95 pain  C/o pain middle finger P1 4/10 in l MF with campos pinch   4/10 pain between dorsal aspect of PIP and DIP L MF tip to tip pinch    11/20/19:  Patient had 2/10 pain with L two-point pinch, and L MF triggered during palmar pinch    Patient denied any pain during  testing on the L     Swelling     Left Wrist/Hand   Index     Middle: 5 5 cm  Circumference MCP: 8 3 cm    Additional Swelling Details  Pain with palpation on left volar head of MP of MF  Pain with p    Right Wrist/Hand   Index     Middle: 5 2 cm  Circumference MCP: 7 9 cm      Flowsheet Rows      Most Recent Value   PT/OT G-Codes   Current Score  50   Projected Score  54             Precautions:  Fall risk      Manual  9/23            stm extensor mass left             IASTM MP volar head IF/LF                                       Trigger finger splint                 Exercise Diary  923            Extensor mass stretch left  HEP            dexterity HEP/fold paper towel into hand            Adaptive equip Built up handle of can to decrease                                                                                                                                                                                                                                              Modalities              MH left forearm

## 2019-11-20 NOTE — PROGRESS NOTES
PT Re-Evaluation     Today's date: 2019  Patient name: Moriah Bowen   : 1960  MRN: [id number]  Referring provider: Estela Morfin MD  Dx:   Encounter Diagnosis     ICD-10-CM    1  Lumbar radiculopathy M54 16    2  Right hemiparesis ([facility] [address] ) G81 91    3  Multiple sclerosis ([facility] [address] ) G35                   Assessment  Assessment details: Patient is a 59 y/o female with relapsing remitting multiple sclerosis and lumbar pain  Since starting therapy, she exhibits less pain, improved balance and gait, improved leg strength  She is near maximal potential and will benefit from one additional session to update HEP  At that time, she will be discharged to [facility]  She will benefit from continued exercises at least several times per week, as able  She is in agreement with upcoming discharge plan  Impairments: abnormal gait, abnormal or restricted ROM, activity intolerance, impaired physical strength, lacks appropriate home exercise program, pain with function and safety issue  Understanding of Dx/Px/POC: good   Prognosis: good    Goals  STG within 4 weeks:   1  Patient to be independent in HEP  -met  2  Reduce pain by 50% to improve quality of life  -partially met   3  Improve RLE strength to 4/5  -not met  LTG within 8 weeks:   1  Patient to be independent in ADLs/IADLs without difficulty  -partially met  2  Patient to ambulate community distances with minimal difficulty  -met  3  Patient to perform stairs with minimal difficulty  -partially met    Plan  Plan details: Per Dr Sadia East- 8 weeks of physical therapy  If she continues to have pain, I will schedule her for a right L4, L5 TFESI  Per neuro Dr Kailyn Stacy- "Patient will have PT for right -sided hemiparesis, she ambulates with a cane "  Patient is having an MRI of the brain in October to check for new lesions  Patient will have infusions every two months        Depression screening: Patient is seeing her PCP for depression and has changed her medication, per PCP  Offered patient behavioral health services information and instructed patient to talk her PCP if she needs to talk to someone  (Patient lost her daughter in law this year to brain cancer )   Patient would benefit from: skilled physical therapy and PT eval  Planned modality interventions: cryotherapy, hydrotherapy and unattended electrical stimulation  Planned therapy interventions: therapeutic training, therapeutic exercise, therapeutic activities, stretching, strengthening, postural training, patient education, neuromuscular re-education, manual therapy, joint mobilization, IADL retraining, activity modification, ADL retraining, ADL training, body mechanics training, flexibility, functional ROM exercises, gait training, graded activity, graded exercise, graded motor, home exercise program, balance/weight bearing training and abdominal trunk stabilization  Frequency: 2x week  Duration in weeks: 2  Plan of Care beginning date: 11/20/2019  Plan of Care expiration date: 12/4/2019  Treatment plan discussed with: patient        Subjective Evaluation    History of Present Illness  Onset date: ongoing for years; lumbar pain in February 2019  Mechanism of injury: Patient is a 61 y/o female with relapsing-remitting Multiple sclerosis, diagnosed in 2009  She states she is noting an overall decrease in function  States she had been doing aquatherapy, but she has since stopped exercising due to a lot going on in her personal life  She also reports lumbar pain that occasional radiates into RLE and cervico-thoracic paraspinal muscle spasms  She states her last episode of RLE pain was in February and she was put on medication and rested, and pain eventually decreased  She is starting to notice the pain is coming back  She is referred for evaluation and treatment of low back pain and right hemiparesis  Upon re-evaluation on 11/20/19, patient self reports overall improved function since starting PT   Less back pain and improved leg strength and gait/balance self reported  She is pleased with her progress in therapy and in agreement to d/c to HEP following next week of therapy  At that time, updated HEP to be provided  Pain  Current pain rating: 3  At best pain rating: 3  At worst pain ratin  Location: low back; right leg   Quality: radiating, needle-like and sharp  Aggravating factors: walking, standing and sitting  Progression: improved    Social Support  Steps to enter house: yes  Stairs in house: yes   Lives in: multiple-level home  Lives with: significant other    Employment status: not working  Hand dominance: right  Exercise history: none currently       Diagnostic Tests  Abnormal CT scan: Lumbosacral CT: multilevel spondylitic degenerative changes with severe foraminal narrowing at right L4-L5  Treatments  Previous treatment: physical therapy and medication  Current treatment: medication and occupational therapy  Current treatment comments: infusions; she is starting OT for left trigger finger   Patient Goals  Patient goal: "To be able to do more on my own and to get back to some exercises   To be able to reduce some of my medications "         PIERCE BALANCE SCALE:34/56  Timed Up and Go Test: 15 seconds  (From previous session; to be updated in future sessions)           Precautions: MS; COPD        Exercise Diary  11/13 11/20   10/9 10/10 10/16 10/17 10/30 11/4   Manual BLE stretching- HS, Piri  8'            Bike 3 min 5 min   3 min 5 min 5 min 5 min 4 min 4 min   TUG  NV       NV    PIERCE  NV       NV    Transverse Abdominus Engagement  3"x20     3"x20 3"x20  DLS w PB push in HL 1"J52   Adductor set     3"x20  3"x20 3"x20     Hooklying TB hip abd             Standing hip abd/ext Red 2 x 10 ea Red 2 x 10 ea   2x10 ea yellow 2 x 10 ea 2x10 ea Red 2 x 10 ea Red 2 x 10 ea Red 2 x 10 ea   Standing marching         Biodex step taps x 20    Standing heel raises x20 x20   x20 x20 x20 x20 x20 x20   SAQ 2x10 2x10 LAQ 2 x 10 ea SAQ & LAQ 2x10ea SAQ&LAQ 2 x 10ea SAQ 1# LAQ 0# x 30ea     R trunk stretch in left sidelying             SHC x20 x20           Biodex- LOS Lv 10 x 5 Lv 9 x 5   Lv 12 x 3 Lv 12 x 5 Lv 12 x 5 Lv 11 x 5 Lv 10 x 5 Lv 10 x 5   Biodex- weight shifting     equal weight 50% 1 5 min x 2        Mini squat 2x10 2x10    2x10  2x10 2x10 2x10   Bridge       2x10 2x10     Stairs 8" step taps x20 8" step taps x20 & 4" step ups x 10 ea   NV    4" step ups x10ea    Seated TB hamstring curl Red 2 x 10 Red 2 x 10 ea   Red 2 x 10ea yellow 2 x10  Yellow 2 x 10 ea     Ankle TB DF/PF     NV PF yellow & 3 way [id number]  PF yellow & 3 way [id number]     Tandem Stance on foam  On floor/foam x30"ea     2 x30"ea  2x30"ea On floor/foam x30"ea   SLB on foam  On floor/foam x30"ea     2x30"ea  2x30"ea On floor/foam x30"ea   Instruction in self piri stretch KS 4 x 15" 4x15"

## 2019-11-26 ENCOUNTER — OFFICE VISIT (OUTPATIENT)
Dept: PHYSICAL THERAPY | Facility: CLINIC | Age: 59
End: 2019-11-26
Payer: COMMERCIAL

## 2019-11-26 DIAGNOSIS — M54.16 LUMBAR RADICULOPATHY: Primary | ICD-10-CM

## 2019-11-26 DIAGNOSIS — G35 MULTIPLE SCLEROSIS (HCC): ICD-10-CM

## 2019-11-26 DIAGNOSIS — G81.91 RIGHT HEMIPARESIS (HCC): ICD-10-CM

## 2019-11-26 PROCEDURE — 97112 NEUROMUSCULAR REEDUCATION: CPT | Performed by: PHYSICAL THERAPIST

## 2019-11-26 PROCEDURE — 97110 THERAPEUTIC EXERCISES: CPT | Performed by: PHYSICAL THERAPIST

## 2019-11-26 NOTE — PROGRESS NOTES
PT Discharge    Today's date: 2019  Patient name: Davey Vela   : 1960  MRN: [id number]  Referring provider: Alice Navarro MD and Donya Riley MD   Dx:   Encounter Diagnosis     ICD-10-CM    1  Lumbar radiculopathy M54 16    2  Right hemiparesis ([facility] [address] ) G81 91    3  Multiple sclerosis ([facility] [address] ) G35                   Assessment  Assessment details: Patient is a 59 y/o female with relapsing remitting multiple sclerosis and lumbar pain  Since starting therapy, patient exhibits less back pain and better ways to manage her back pain, along with improved balance, endurance, and LE strength  She is recently concerned with her MS symptoms, as she went off a medication (as suggested by neuro), but is encouraged to follow up with neuro to discuss  At this time, she has been given written HEP and exhibits good understanding of HEP  She will continue with HEP; all questions answered  She is instructed to call clinic with questions or concerns  Impairments: abnormal gait, abnormal or restricted ROM, activity intolerance, impaired physical strength, lacks appropriate home exercise program, pain with function and safety issue  Understanding of Dx/Px/POC: good   Prognosis: good    Goals  STG within 4 weeks:   1  Patient to be independent in HEP  -met   2  Reduce pain by 50% to improve quality of life  -partially met   3  Improve RLE strength to 4/5  -met, except R ankle  LTG within 8 weeks:   1  Patient to be independent in ADLs/IADLs without difficulty  -partially met  2  Patient to ambulate community distances with minimal difficulty  -partially met  3  Patient to perform stairs with minimal difficulty  -partially met    Plan  Plan details: Per Dr Nancy Contreras- 8 weeks of physical therapy  If she continues to have pain, I will schedule her for a right L4, L5 TFESI      Per neuro Dr Clarita Chun- "Patient will have PT for right -sided hemiparesis, she ambulates with a cane "  Patient is having an MRI of the brain in October to check for new lesions  Patient will have infusions every two months  Depression screening: Patient is seeing her PCP for depression and has changed her medication, per PCP  Offered patient behavioral health services information and instructed patient to talk her PCP if she needs to talk to someone  (Patient lost her daughter in law this year to brain cancer )     Patient would benefit from: skilled physical therapy and PT eval    Planned modality interventions: cryotherapy, hydrotherapy and unattended electrical stimulation  Planned therapy interventions: therapeutic training, therapeutic exercise, therapeutic activities, stretching, strengthening, postural training, patient education, neuromuscular re-education, manual therapy, joint mobilization, IADL retraining, activity modification, ADL retraining, ADL training, body mechanics training, flexibility, functional ROM exercises, gait training, graded activity, graded exercise, graded motor, home exercise program, balance/weight bearing training and abdominal trunk stabilization      Treatment plan discussed with: patient        Subjective Evaluation    History of Present Illness  Onset date: ongoing for years; lumbar pain in February 2019  Mechanism of injury: Patient is a 59 y/o female with relapsing-remitting Multiple sclerosis, diagnosed in 2009  She states she is noting an overall decrease in function  States she had been doing aquatherapy, but she has since stopped exercising due to a lot going on in her personal life  She also reports lumbar pain that occasional radiates into RLE and cervico-thoracic paraspinal muscle spasms  She states her last episode of RLE pain was in February and she was put on medication and rested, and pain eventually decreased  She is starting to notice the pain is coming back  She is referred for evaluation and treatment of low back pain and right hemiparesis     Upon discharge on 11/26/19, she reports her back pain is more manageable, her balance has improved, and her LE strength has improved  She is concerned that she may be having an MS flare and that the exercises are masking her symptoms  She will call her neurologist today to discuss current medications/treatment for MS  Overall, she is pleased with her progress in therapy and in agreement to d/c to HEP  Pain  Current pain ratin  At best pain ratin  At worst pain ratin  Location: low back; right leg   Quality: radiating, needle-like and sharp  Aggravating factors: walking, standing and sitting  Progression: worsening    Social Support  Steps to enter house: yes  Stairs in house: yes   Lives in: multiple-level home  Lives with: significant other    Employment status: not working  Hand dominance: right  Exercise history: none currently       Diagnostic Tests  Abnormal CT scan: Lumbosacral CT: multilevel spondylitic degenerative changes with severe foraminal narrowing at right L4-L5  Treatments  Previous treatment: physical therapy and medication  Current treatment: medication and occupational therapy  Current treatment comments: infusions; she is starting OT for left trigger finger   Patient Goals  Patient goal: "To be able to do more on my own and to get back to some exercises   To be able to reduce some of my medications "         Objective     Active Range of Motion     Lumbar   Flexion: 70 degrees   Extension: 20 degrees   Left lateral flexion: 20 degrees       Right lateral flexion: 20 degrees     Strength/Myotome Testing     Left Hip   Planes of Motion   Flexion: 4+    Right Hip   Planes of Motion   Flexion: 4    Left Knee   Flexion: 5  Extension: 5    Right Knee   Flexion: 4  Extension: 4    Left Ankle/Foot   Dorsiflexion: 5  Plantar flexion:5  Great toe extension: 5    Right Ankle/Foot   Dorsiflexion: 4-  Plantar flexion: 4-  Great toe extension: 4    Ambulation     Observational Gait     Additional Observational Gait Details  Patient ambulates with SPC- using it on right side because of left hand pain secondary to trigger finger     Decreased gait speed, decreased step length, increased shuffling pattern  Patient drags her right foot       PIERCE BALANCE SCALE:47/56  Timed Up and Go Test: 12 3 seconds  SLB: L: 15 sec, R: 4 sec       Flowsheet Rows      Most Recent Value   PT/OT G-Codes   Current Score  49   Projected Score  54             Precautions: MS; COPD        Exercise Diary  11/13 11/20 11/26          Manual BLE stretching- HS, Piri  8'            Bike 3 min 5 min           TUG  NV KS          PIERCE  NV KS          Transverse Abdominus Engagement  3"x20 HEP          Adductor set   HEP          Hooklying TB hip abd   HEP          Standing hip abd/ext Red 2 x 10 ea Red 2 x 10 ea Red x 10 ea          Standing marching   HEP          Standing heel raises x20 x20 x20          SAQ 2x10 2x10 HEP          R trunk stretch in left sidelying             SHC x20 x20 HEP          Biodex- LOS Lv 10 x 5 Lv 9 x 5 Lv 8 x 5          Biodex- weight shifting             Mini squat 2x10 2x10 HEP          Bridge   HEP          Stairs 8" step taps x20 8" step taps x20 & 4" step ups x 10 ea           Seated TB hamstring curl Red 2 x 10 Red 2 x 10 ea HEP          Ankle TB DF/PF   HEP          Tandem Stance on foam  On floor/foam x30"ea During PIERCE          SLB on foam  On floor/foam x30"ea During PIERCE          Instruction in self piri stretch KS 4 x 15" 4x15" HEP

## 2019-11-27 ENCOUNTER — OFFICE VISIT (OUTPATIENT)
Dept: OCCUPATIONAL THERAPY | Facility: CLINIC | Age: 59
End: 2019-11-27
Payer: COMMERCIAL

## 2019-11-27 ENCOUNTER — APPOINTMENT (OUTPATIENT)
Dept: PHYSICAL THERAPY | Facility: CLINIC | Age: 59
End: 2019-11-27
Payer: COMMERCIAL

## 2020-02-20 ENCOUNTER — APPOINTMENT (OUTPATIENT)
Dept: RADIOLOGY | Facility: CLINIC | Age: 60
End: 2020-02-20
Payer: COMMERCIAL

## 2020-02-20 ENCOUNTER — OFFICE VISIT (OUTPATIENT)
Dept: NEUROLOGY | Facility: CLINIC | Age: 60
End: 2020-02-20
Payer: COMMERCIAL

## 2020-02-20 VITALS
SYSTOLIC BLOOD PRESSURE: 130 MMHG | HEART RATE: 80 BPM | BODY MASS INDEX: 27.29 KG/M2 | WEIGHT: 139 LBS | HEIGHT: 60 IN | DIASTOLIC BLOOD PRESSURE: 60 MMHG

## 2020-02-20 DIAGNOSIS — R51.9 CHRONIC RIGHT-SIDED HEADACHES: ICD-10-CM

## 2020-02-20 DIAGNOSIS — S99.911S ANKLE INJURY, RIGHT, SEQUELA: ICD-10-CM

## 2020-02-20 DIAGNOSIS — R26.2 AMBULATORY DYSFUNCTION: ICD-10-CM

## 2020-02-20 DIAGNOSIS — G89.29 CHRONIC RIGHT-SIDED HEADACHES: ICD-10-CM

## 2020-02-20 DIAGNOSIS — M47.26 OTHER SPONDYLOSIS WITH RADICULOPATHY, LUMBAR REGION: ICD-10-CM

## 2020-02-20 DIAGNOSIS — G81.91 RIGHT HEMIPARESIS (HCC): ICD-10-CM

## 2020-02-20 DIAGNOSIS — G35 MULTIPLE SCLEROSIS (HCC): Primary | ICD-10-CM

## 2020-02-20 PROCEDURE — 73610 X-RAY EXAM OF ANKLE: CPT

## 2020-02-20 PROCEDURE — 99215 OFFICE O/P EST HI 40 MIN: CPT | Performed by: PSYCHIATRY & NEUROLOGY

## 2020-02-20 RX ORDER — GABAPENTIN 300 MG/1
CAPSULE ORAL
Qty: 630 CAPSULE | Refills: 2 | Status: SHIPPED | OUTPATIENT
Start: 2020-02-20 | End: 2021-12-10 | Stop reason: SDUPTHER

## 2020-02-20 NOTE — PROGRESS NOTES
[facility]  PATIENT:  Katie العراقي  MRN:  [id number]  :  1960  DATE OF SERVICE:  2020    Assessment/Plan:           Problem List Items Addressed This Visit        Nervous and Auditory    Multiple sclerosis ([address] ) - Primary    Relevant Medications    gabapentin (NEURONTIN) 300 mg capsule    Right hemiparesis (HCC)    Relevant Orders    MRI lumbar spine without contrast    Other spondylosis with radiculopathy, lumbar region    Relevant Orders    MRI lumbar spine without contrast       Other    Ambulatory dysfunction    Chronic right-sided headaches      Other Visit Diagnoses     Ankle injury, right, sequela        Relevant Orders    XR ankle 3+ vw right         Mrs Tawny Li has presented for follow up on MS with right hemiparesis and ambulatory dysfunction  Patient also has lumbar spine spondyloarthropathy and radiculopathy  Patient will be off DMT with observational approach advised  Gabapentin increased to 600 mg am and noon and 900 mg HS; Baclofen will be continued PRN as well  MRI lumbar spine will be advised along with right ankle x-ray - sensory-motor dysfunction in LE and now right ankle injury, with concern for fracture  Patient management will be adjusted, based on her imaging findings  FU in 6 months  Greater than 50% of the 30 minutes evaluation was a face-to-face discussion regarding  the pathophysiology of her current symptoms and further plan, as well as counseling, educating, and coordinating the patient's care  Subjective: MS and related issues     HPI History of Present Illness  Mrs Tawny Li has known relapsing remitting multiple sclerosis, neurogenic bladder, ambulatory dysfunction due to right hemiparesis, disbalance, mesenteric panniculitis, cervical disc disease with myelopathy, classic migraine without aura, who presented for follow up on MS and related issues   Prior evaluation was consistent with ambulatory dysfunction which is multifactorial, including MS and lumbar spine radiculopathy;     CT L-spine was consistent with Multilevel spondylotic degenerative changes of the lumbar spine as described above, especially at L4/L5, where there is a diffuse disc bulge with a superimposed right foraminal focal disc protrusion causing severe right neural foraminal narrowing       Patient has mild burden of demyelination in her brain, with single lesion in her upper spine noted, with last imaging in 2017  Based on her clinical and radiographic presentation, I would consider repeating MRI brain WO contrast and hold off DMT at this point  MRI brain 10/2019: Stable scattered areas of white matter FLAIR signal hyperintensity compatible with the patient's history of demyelinating disease  No definite new lesions identified  Pain and numbness in her legs, right worse than left;  and migraines are getting worse; OD described as been fuzzy  Patient has been tripping more with known right sided hemiparesis- and she fell and sprain the ankle, require orthosis  Burning sensation in left groin area to her toes, and it improved  Patient has right ankle sprain, concern for fracture  The following portions of the patient's history were reviewed and updated as appropriate:   She  has a past medical history of Asthma, Cardiac disease, COPD (chronic obstructive pulmonary disease) ([address] ), Depression, Hyperlipidemia, Hypertension, Influenza, Irregular heart beat, Migraine, Migraine, MS (multiple sclerosis) ([address] ), Multiple sclerosis ([address] ), Nephrolithiasis, Neurogenic bladder, and Panniculitis    She   Patient Active Problem List    Diagnosis Date Noted    Other spondylosis with radiculopathy, lumbar region 02/20/2020    Right low back pain 02/21/2019    Skin rash 02/21/2019    Right hemiparesis ([address] ) 02/21/2019    Mesenteric panniculitis ([address] ) 03/15/2018    Ambulatory dysfunction 02/21/2018    Chronic right-sided headaches 02/21/2018    Multiple sclerosis [address] [address] ) 02/21/2018    Panniculitis 02/21/2018    S/P trigger finger release 02/02/2018    Trigger ring finger of left hand 01/17/2018     She  has a past surgical history that includes Dilation and curettage of uterus; Uterine fibroid surgery; pr colonoscopy flx dx w/collj spec when pfrmd (N/A, 1/11/2018); Colonoscopy; and pr incise finger tendon sheath (Left, 1/17/2018)  Her family history includes Breast cancer (age of onset: 76) in her maternal aunt; Diabetes in her father, mother, and sister; Heart disease in her father; Hypertension in her brother, father, and mother; Stomach cancer in her maternal aunt; Stroke in her mother  She  reports that she has never smoked  She has never used smokeless tobacco  She reports that she drinks alcohol  She reports that she has current or past drug history  Drug: Marijuana  Current Outpatient Medications   Medication Sig Dispense Refill    albuterol (2 5 mg/3 mL) 0 083 % nebulizer solution Albuterol Sulfate (2 5 MG/3ML) 0 083% Inhalation Nebulization Solution  USE 1 UNIT DOSE IN NEBULIZER EVERY 6 HOURS AS NEEDED     Quantity: 1;  Refills: 5       Tylene Squibb M D ;  Started 12-July-2016  Active3 ML Plas Cont (125 Plas Conts)      albuterol (VENTOLIN HFA) 90 mcg/act inhaler Inhale 2 puffs every 6 (six) hours as needed for wheezing 3 Inhaler 0    aspirin 81 MG tablet Aspirin 81 MG TABS  Take 1 tablet daily   Refills: 0    Active      atorvastatin (LIPITOR) 20 mg tablet Take 40 mg by mouth Medrol Dose Pack scheduling ONLY        baclofen 20 mg tablet Take 1 tablet (20 mg total) by mouth 3 (three) times a day 270 tablet 2    betamethasone, augmented, (DIPROLENE-AF) 0 05 % cream Apply topically 2 (two) times a day To rash on left thigh until resolved 15 g 0    budesonide (PULMICORT FLEXHALER) 180 MCG/ACT inhaler Inhale 1 puff 2 (two) times a day (Patient taking differently: Inhale 1 puff 2 (two) times a day as needed  ) 3 Inhaler 3    butalbital-acetaminophen-caffeine (FIORICET,ESGIC) -40 mg per tablet Take 1 tablet by mouth every 4 (four) hours as needed for headaches 12 tablet 4    EPINEPHrine (EPIPEN) 0 3 mg/0 3 mL SOAJ Inject 0 3 mg into the shoulder, thigh, or buttocks      ergocalciferol (VITAMIN D2) 50,000 units TAKE ONE CAPSULE WEEKLY WITH FOOD  1    famotidine (PEPCID) 20 mg tablet Famotidine 20 MG Oral Tablet  TAKE 1 TABLET DAILY PRN AS DIRECTED  Refills: 0    Active      fluticasone (FLONASE) 50 mcg/act nasal spray 2 sprays into each nostril daily 1 Bottle 3    gabapentin (NEURONTIN) 300 mg capsule Take 2 caps in am and noon and 3 caps  capsule 2    levocetirizine (XYZAL) 5 MG tablet TAKE 1 TABLET (5 MG TOTAL) BY MOUTH DAILY AS NEEDED (HIVES) 30 tablet 0    NIFEdipine (ADALAT CC) 90 mg 24 hr tablet Take 1 tablet by mouth daily (Patient taking differently: Take 60 mg by mouth daily ) 7 tablet 0    sertraline (ZOLOFT) 50 mg tablet Sertraline HCl - 50 MG Oral Tablet  TAKE 3 TABLETS DAILY   Refills: 0    Active      tolterodine (DETROL LA) 4 mg 24 hr capsule Take 1 capsule by mouth daily      valACYclovir (VALTREX) 1,000 mg tablet Take 1,000 mg by mouth as needed       No current facility-administered medications for this visit  Current Outpatient Medications on File Prior to Visit   Medication Sig    albuterol (2 5 mg/3 mL) 0 083 % nebulizer solution Albuterol Sulfate (2 5 MG/3ML) 0 083% Inhalation Nebulization Solution  USE 1 UNIT DOSE IN NEBULIZER EVERY 6 HOURS AS NEEDED     Quantity: 1;  Refills: 5       Suzan GARCIA ;  Started 12-July-2016  Active3 ML Plas Cont (125 Plas Conts)    albuterol (VENTOLIN HFA) 90 mcg/act inhaler Inhale 2 puffs every 6 (six) hours as needed for wheezing    aspirin 81 MG tablet Aspirin 81 MG TABS  Take 1 tablet daily   Refills: 0    Active    atorvastatin (LIPITOR) 20 mg tablet Take 40 mg by mouth Medrol Dose Pack scheduling ONLY      baclofen 20 mg tablet Take 1 tablet (20 mg total) by mouth 3 (three) times a day    betamethasone, augmented, (DIPROLENE-AF) 0 05 % cream Apply topically 2 (two) times a day To rash on left thigh until resolved    budesonide (PULMICORT FLEXHALER) 180 MCG/ACT inhaler Inhale 1 puff 2 (two) times a day (Patient taking differently: Inhale 1 puff 2 (two) times a day as needed  )    butalbital-acetaminophen-caffeine (FIORICET,ESGIC) -40 mg per tablet Take 1 tablet by mouth every 4 (four) hours as needed for headaches    EPINEPHrine (EPIPEN) 0 3 mg/0 3 mL SOAJ Inject 0 3 mg into the shoulder, thigh, or buttocks    ergocalciferol (VITAMIN D2) 50,000 units TAKE ONE CAPSULE WEEKLY WITH FOOD    famotidine (PEPCID) 20 mg tablet Famotidine 20 MG Oral Tablet  TAKE 1 TABLET DAILY PRN AS DIRECTED  Refills: 0    Active    fluticasone (FLONASE) 50 mcg/act nasal spray 2 sprays into each nostril daily    levocetirizine (XYZAL) 5 MG tablet TAKE 1 TABLET (5 MG TOTAL) BY MOUTH DAILY AS NEEDED (HIVES)    NIFEdipine (ADALAT CC) 90 mg 24 hr tablet Take 1 tablet by mouth daily (Patient taking differently: Take 60 mg by mouth daily )    sertraline (ZOLOFT) 50 mg tablet Sertraline HCl - 50 MG Oral Tablet  TAKE 3 TABLETS DAILY   Refills: 0    Active    tolterodine (DETROL LA) 4 mg 24 hr capsule Take 1 capsule by mouth daily    valACYclovir (VALTREX) 1,000 mg tablet Take 1,000 mg by mouth as needed    [DISCONTINUED] gabapentin (NEURONTIN) 300 mg capsule Take 1 caps in am and noon and 2 caps HS     No current facility-administered medications on file prior to visit  She is allergic to imitrex [sumatriptan]; keflex [cephalexin]; nuts; other; peanut oil; shellfish allergy; shellfish-derived products; copaxone [glatiramer acetate]; cat hair extract; glatiramer; pollen extract; and seasonal ic [cholestatin]            Objective:    Blood pressure 130/60, pulse 80, height 5' (1 524 m), weight 63 kg (139 lb), not currently breastfeeding      Physical Exam/Neurological Exam  CARDIOVASCULAR: RRR, normal S1S2, no murmurs, no rubs  RESP: clear to auscultation bilaterally, no wheezes/rhonchi/rales  ABDOMEN: soft, non tender, non distended  SKIN: no rash or skin lesions  EXTREMITIES: no edema, pulses 2+bilaterally  PSYCH: appropriate mood and affect  NEUROLOGIC COMPREHENSIVE EXAM: Patient is oriented to person, place and time, NAD; appropriate affect  CN II, III, IV, V, VI, VII,VIII,IX,X,XI-XII intact with EOMI, PERRLA, OKN intact, VF grossly intact, fundi poorly visualized secondary to pupillary constriction; symmetric face noted  Motor: 5/5 UE/LE bilateral symmetric, RUE 4/5 shoulder abduction and finger flexion; RLU 4-/5 hip flexion and knee flexion; Sensory: intact to light touch and pinprick bilaterally; normal vibration sensation feet bilaterally; Coordination within normal limits on FTN and ROLANDO testing; DTR: 2/4 through, no Babinski, no clonus  Tandem gait is abnormal  Romberg: negative    ROS:  12 points of review of system was reviewed with the patient and was unremarkable with exception: see HPI  Review of Systems   Constitutional: Negative  Negative for appetite change and fever  HENT: Negative  Negative for hearing loss, tinnitus, trouble swallowing and voice change  Eyes: Negative  Negative for photophobia and pain  Respiratory: Negative  Negative for shortness of breath  Cardiovascular: Negative  Negative for palpitations  Gastrointestinal: Negative  Negative for nausea and vomiting  Endocrine: Negative  Negative for cold intolerance and heat intolerance  Genitourinary: Negative  Negative for dysuria, frequency and urgency  Musculoskeletal: Negative  Negative for myalgias and neck pain  Skin: Negative  Negative for rash  Neurological: Positive for numbness and headaches  Negative for dizziness, tremors, seizures, syncope, facial asymmetry, speech difficulty, weakness and light-headedness          Patient states that her right leg is a lot pain  Patient also stated that her left has numbness and tingling down to her toes  Patient stated that she also has headaches every two days  Hematological: Negative  Does not bruise/bleed easily  Psychiatric/Behavioral: Negative  Negative for confusion, hallucinations and sleep disturbance

## 2020-02-27 NOTE — PROGRESS NOTES
Patient asking for xray results  Reviewed this with the patient, she verbalizes understanding  She is asking for this to be released to her mychart  Made her aware this is set to be automatically released to her mychart after 2/29/20  She is agreeable to this  No further questions at this time

## 2020-03-17 ENCOUNTER — HOSPITAL ENCOUNTER (OUTPATIENT)
Dept: MRI IMAGING | Facility: CLINIC | Age: 60
Discharge: HOME | End: 2020-03-17
Payer: COMMERCIAL

## 2020-03-17 DIAGNOSIS — G81.91 RIGHT HEMIPARESIS (HCC): ICD-10-CM

## 2020-03-17 DIAGNOSIS — M47.26 OTHER SPONDYLOSIS WITH RADICULOPATHY, LUMBAR REGION: ICD-10-CM

## 2020-03-17 PROCEDURE — 72148 MRI LUMBAR SPINE W/O DYE: CPT

## 2020-05-13 DIAGNOSIS — G35 MULTIPLE SCLEROSIS (HCC): ICD-10-CM

## 2020-05-14 RX ORDER — BACLOFEN 20 MG/1
20 TABLET ORAL 3 TIMES DAILY
Qty: 270 TABLET | Refills: 2 | Status: SHIPPED | OUTPATIENT
Start: 2020-05-14 | End: 2020-09-17 | Stop reason: SDUPTHER

## 2020-07-06 ENCOUNTER — OFFICE VISIT (OUTPATIENT)
Dept: OBGYN CLINIC | Facility: CLINIC | Age: 60
End: 2020-07-06
Payer: COMMERCIAL

## 2020-07-06 VITALS
SYSTOLIC BLOOD PRESSURE: 123 MMHG | WEIGHT: 141 LBS | BODY MASS INDEX: 27.68 KG/M2 | HEART RATE: 73 BPM | DIASTOLIC BLOOD PRESSURE: 75 MMHG | HEIGHT: 60 IN | TEMPERATURE: 97.4 F

## 2020-07-06 DIAGNOSIS — M25.471 PAIN AND SWELLING OF RIGHT ANKLE: Primary | ICD-10-CM

## 2020-07-06 DIAGNOSIS — M25.571 PAIN AND SWELLING OF RIGHT ANKLE: Primary | ICD-10-CM

## 2020-07-06 PROCEDURE — 99243 OFF/OP CNSLTJ NEW/EST LOW 30: CPT | Performed by: FAMILY MEDICINE

## 2020-07-06 NOTE — PROGRESS NOTES
Assessment/Plan:  Assessment/Plan   Diagnoses and all orders for this visit:    Pain and swelling of right ankle  -     MRI ankle/heel right  wo contrast; Future  -     Cancel: Cam Boot  -     Brace      48-year-old female right right ankle pain and swelling of onset from injury nearly 6 months ago  Discussed with patient physical exam, radiographs, impression and plan  X-rays of the right ankle are unremarkable for acute osseous abnormality  Physical exam noted for swelling lateral and medial aspect the ankle  She has tenderness at the anterior ankle joint, over the ATFL, and lateral malleolus  He has limited range of motion, decreased sensation, and weakness in the ankle due to MS with right-sided hemiparesis  She has normal dorsalis pedal pulse  Clinical impression that she may have sustained cartilage injury  At this time I will refer her for MRI of the ankle to evaluate for occult osseous and soft tissue abnormality, as more invasive management may be warranted  She will follow up after getting MRI done  In the Interim I offered Cam boot, however due to hemiparesis he is unable to safely ambulate with the boot, so I will provide her with lace-up ankle brace  Subjective:   Patient ID: Kenisha Zaman is a 63 y o  female  Chief Complaint   Patient presents with    Right Ankle - Pain       48-year-old female with MS and right-sided hemiparesis presents for evaluation of right ankle pain and swelling of onset after fall injury more than 6 months ago  She states that due to her MS has multiple falls without tripping  She reports having fallen and in the process twisted her right ankle  She had pain that was described as sudden onset, generalized to the ankle but worse at the anterior and lateral aspects, aching and throbbing, radiating proximally along the lateral aspect of the lower leg, worse with standing and ambulation, associated with swelling, and improved with resting    Due to her underlying condition she has right lower extremity weakness and numbness  She ambulates with a walking cane  She had x-rays done which were unremarkable for acute osseous abnormality of the ankle  Pain and swelling have persisted and she was referred to orthopedic care  She has been wearing ankle brace which she purchased over-the-counter  Ankle Pain   This is a chronic problem  The current episode started more than 1 month ago  The problem occurs constantly  Associated symptoms include arthralgias, joint swelling, numbness and weakness  Pertinent negatives include no abdominal pain, chest pain, chills, fever, rash or sore throat  The symptoms are aggravated by standing and walking  She has tried rest and position changes for the symptoms  The treatment provided mild relief  The following portions of the patient's history were reviewed and updated as appropriate: She  has a past medical history of Asthma, Cardiac disease, COPD (chronic obstructive pulmonary disease) ([facility] [address] ), Depression, Hyperlipidemia, Hypertension, Influenza, Irregular heart beat, Migraine, Migraine, MS (multiple sclerosis) ([facility] [address] ), Multiple sclerosis ([facility] [address] ), Nephrolithiasis, Neurogenic bladder, and Panniculitis  She  has a past surgical history that includes Dilation and curettage of uterus; Uterine fibroid surgery; pr colonoscopy flx dx w/collj spec when pfrmd (N/A, 1/11/2018); Colonoscopy; and pr incise finger tendon sheath (Left, 1/17/2018)  Her family history includes Breast cancer (age of onset: 76) in her maternal aunt; Diabetes in her father, mother, and sister; Heart disease in her father; Hypertension in her brother, father, and mother; Stomach cancer in her maternal aunt; Stroke in her mother  She  reports that she has never smoked  She has never used smokeless tobacco  She reports that she drinks alcohol  She reports that she has current or past drug history  Drug: Marijuana    She is allergic to imitrex [sumatriptan]; keflex [cephalexin]; nuts; other; peanut oil; shellfish allergy; shellfish-derived products; copaxone [glatiramer acetate]; cat hair extract; glatiramer; pollen extract; and seasonal ic [cholestatin]       Review of Systems   Constitutional: Negative for chills and fever  HENT: Negative for sore throat  Eyes: Negative for visual disturbance  Respiratory: Negative for shortness of breath  Cardiovascular: Negative for chest pain  Gastrointestinal: Negative for abdominal pain  Genitourinary: Negative for difficulty urinating and flank pain  Musculoskeletal: Positive for arthralgias and joint swelling  Skin: Negative for rash and wound  Neurological: Positive for weakness and numbness  Hematological: Does not bruise/bleed easily  Psychiatric/Behavioral: Negative for self-injury  Objective:  Vitals:    07/06/20 1527   BP: 123/75   Pulse: 73   Temp: (!) 97 4 °F (36 3 °C)   Weight: 64 kg (141 lb)   Height: 5' (1 524 m)     Right Ankle Exam     Muscle Strength   Dorsiflexion:  4/5  Plantar flexion:  4/5    Other   Sensation: decreased  Pulse: present           Observations     Right Ankle/Foot   Negative for atrophy  Tenderness     Right Ankle/Foot   Tenderness in the anterior ankle, anterior talofibular ligament, lateral malleolus, medial malleolus and talar dome  No tenderness in the Achilles insertion, fifth metatarsal base, calcaneofibular ligament, deltoid ligament, dorsum foot, navicular, peroneal tendon, posterior talofibular ligament and proximal Achilles  Active Range of Motion     Right Ankle/Foot   Dorsiflexion (kf): 5 degrees   Plantar flexion: 20 degrees   Inversion: 0 degrees   Eversion: 0 degrees     Strength/Myotome Testing     Right Ankle/Foot   Dorsiflexion: 4  Plantar flexion: 4  Inversion: 4-  Eversion: 4-    Tests     Right Ankle/Foot   Negative for anterior drawer, metatarsal squeeze, posterior drawer and syndesmosis external rotation         Physical Exam Constitutional: She is oriented to person, place, and time  She appears well-developed  No distress  HENT:   Head: Normocephalic  Eyes: Conjunctivae are normal    Neck: No tracheal deviation present  Cardiovascular: Normal rate  Pulmonary/Chest: Effort normal  No respiratory distress  Abdominal: She exhibits no distension  Musculoskeletal:        Right ankle: Lateral malleolus and medial malleolus tenderness found  No CF ligament and no posterior TFL tenderness found  Neurological: She is alert and oriented to person, place, and time  Skin: Skin is warm and dry  Psychiatric: She has a normal mood and affect  Her behavior is normal    Nursing note and vitals reviewed  I have personally reviewed pertinent films in PACS and my interpretation is No acute osseous abnormality of the right ankle

## 2020-07-06 NOTE — LETTER
July 6, 2020     Triny Poole DO  [address] [address]    Patient: Yuan Cheatham   YOB: 1960   Date of Visit: 7/6/2020       Dear Dr Kaiden Quiñones:    Thank you for referring Jamshid December to me for evaluation  Below are my notes for this consultation  If you have questions, please do not hesitate to call me  I look forward to following your patient along with you  Sincerely,        Tigre Collins DO        CC: No Recipients  Tigre Collins DO  7/6/2020  4:29 PM  Sign at close encounter  Assessment/Plan:  Assessment/Plan   Diagnoses and all orders for this visit:    Pain and swelling of right ankle  -     MRI ankle/heel right  wo contrast; Future  -     Cancel: Cam Boot  -     Brace      69-year-old female right right ankle pain and swelling of onset from injury nearly 6 months ago  Discussed with patient physical exam, radiographs, impression and plan  X-rays of the right ankle are unremarkable for acute osseous abnormality  Physical exam noted for swelling lateral and medial aspect the ankle  She has tenderness at the anterior ankle joint, over the ATFL, and lateral malleolus  He has limited range of motion, decreased sensation, and weakness in the ankle due to MS with right-sided hemiparesis  She has normal dorsalis pedal pulse  Clinical impression that she may have sustained cartilage injury  At this time I will refer her for MRI of the ankle to evaluate for occult osseous and soft tissue abnormality, as more invasive management may be warranted  She will follow up after getting MRI done  In the Interim I offered Cam boot, however due to hemiparesis he is unable to safely ambulate with the boot, so I will provide her with lace-up ankle brace  Subjective:   Patient ID: Yuan Cheatham is a 63 y o  female    Chief Complaint   Patient presents with    Right Ankle - Pain       69-year-old female with MS and right-sided hemiparesis presents for evaluation of right ankle pain and swelling of onset after fall injury more than 6 months ago  She states that due to her MS has multiple falls without tripping  She reports having fallen and in the process twisted her right ankle  She had pain that was described as sudden onset, generalized to the ankle but worse at the anterior and lateral aspects, aching and throbbing, radiating proximally along the lateral aspect of the lower leg, worse with standing and ambulation, associated with swelling, and improved with resting  Due to her underlying condition she has right lower extremity weakness and numbness  She ambulates with a walking cane  She had x-rays done which were unremarkable for acute osseous abnormality of the ankle  Pain and swelling have persisted and she was referred to orthopedic care  She has been wearing ankle brace which she purchased over-the-counter  Ankle Pain   This is a chronic problem  The current episode started more than 1 month ago  The problem occurs constantly  Associated symptoms include arthralgias, joint swelling, numbness and weakness  Pertinent negatives include no abdominal pain, chest pain, chills, fever, rash or sore throat  The symptoms are aggravated by standing and walking  She has tried rest and position changes for the symptoms  The treatment provided mild relief  The following portions of the patient's history were reviewed and updated as appropriate: She  has a past medical history of Asthma, Cardiac disease, COPD (chronic obstructive pulmonary disease) ([facility] [address] ), Depression, Hyperlipidemia, Hypertension, Influenza, Irregular heart beat, Migraine, Migraine, MS (multiple sclerosis) ([facility] ), Multiple sclerosis ([facility] [address] ), Nephrolithiasis, Neurogenic bladder, and Panniculitis  She  has a past surgical history that includes Dilation and curettage of uterus; Uterine fibroid surgery; pr colonoscopy flx dx w/collj spec when pfrmd (N/A, 1/11/2018);  Colonoscopy; and pr incise finger tendon sheath (Left, 1/17/2018)  Her family history includes Breast cancer (age of onset: 76) in her maternal aunt; Diabetes in her father, mother, and sister; Heart disease in her father; Hypertension in her brother, father, and mother; Stomach cancer in her maternal aunt; Stroke in her mother  She  reports that she has never smoked  She has never used smokeless tobacco  She reports that she drinks alcohol  She reports that she has current or past drug history  Drug: Marijuana  She is allergic to imitrex [sumatriptan]; keflex [cephalexin]; nuts; other; peanut oil; shellfish allergy; shellfish-derived products; copaxone [glatiramer acetate]; cat hair extract; glatiramer; pollen extract; and seasonal ic [cholestatin]       Review of Systems   Constitutional: Negative for chills and fever  HENT: Negative for sore throat  Eyes: Negative for visual disturbance  Respiratory: Negative for shortness of breath  Cardiovascular: Negative for chest pain  Gastrointestinal: Negative for abdominal pain  Genitourinary: Negative for difficulty urinating and flank pain  Musculoskeletal: Positive for arthralgias and joint swelling  Skin: Negative for rash and wound  Neurological: Positive for weakness and numbness  Hematological: Does not bruise/bleed easily  Psychiatric/Behavioral: Negative for self-injury  Objective:  Vitals:    07/06/20 1527   BP: 123/75   Pulse: 73   Temp: (!) 97 4 °F (36 3 °C)   Weight: 64 kg (141 lb)   Height: 5' (1 524 m)     Right Ankle Exam     Muscle Strength   Dorsiflexion:  4/5  Plantar flexion:  4/5    Other   Sensation: decreased  Pulse: present           Observations     Right Ankle/Foot   Negative for atrophy  Tenderness     Right Ankle/Foot   Tenderness in the anterior ankle, anterior talofibular ligament, lateral malleolus, medial malleolus and talar dome   No tenderness in the Achilles insertion, fifth metatarsal base, calcaneofibular ligament, deltoid ligament, dorsum foot, navicular, peroneal tendon, posterior talofibular ligament and proximal Achilles  Active Range of Motion     Right Ankle/Foot   Dorsiflexion (kf): 5 degrees   Plantar flexion: 20 degrees   Inversion: 0 degrees   Eversion: 0 degrees     Strength/Myotome Testing     Right Ankle/Foot   Dorsiflexion: 4  Plantar flexion: 4  Inversion: 4-  Eversion: 4-    Tests     Right Ankle/Foot   Negative for anterior drawer, metatarsal squeeze, posterior drawer and syndesmosis external rotation  Physical Exam   Constitutional: She is oriented to person, place, and time  She appears well-developed  No distress  HENT:   Head: Normocephalic  Eyes: Conjunctivae are normal    Neck: No tracheal deviation present  Cardiovascular: Normal rate  Pulmonary/Chest: Effort normal  No respiratory distress  Abdominal: She exhibits no distension  Musculoskeletal:        Right ankle: Lateral malleolus and medial malleolus tenderness found  No CF ligament and no posterior TFL tenderness found  Neurological: She is alert and oriented to person, place, and time  Skin: Skin is warm and dry  Psychiatric: She has a normal mood and affect  Her behavior is normal    Nursing note and vitals reviewed  I have personally reviewed pertinent films in PACS and my interpretation is No acute osseous abnormality of the right ankle

## 2020-07-20 ENCOUNTER — HOSPITAL ENCOUNTER (OUTPATIENT)
Dept: MRI IMAGING | Facility: CLINIC | Age: 60
Discharge: HOME | End: 2020-07-20
Payer: COMMERCIAL

## 2020-07-20 DIAGNOSIS — M25.571 PAIN AND SWELLING OF RIGHT ANKLE: ICD-10-CM

## 2020-07-20 DIAGNOSIS — M25.471 PAIN AND SWELLING OF RIGHT ANKLE: ICD-10-CM

## 2020-07-20 PROCEDURE — 73721 MRI JNT OF LWR EXTRE W/O DYE: CPT

## 2020-07-23 ENCOUNTER — OFFICE VISIT (OUTPATIENT)
Dept: OBGYN CLINIC | Facility: CLINIC | Age: 60
End: 2020-07-23
Payer: COMMERCIAL

## 2020-07-23 VITALS
BODY MASS INDEX: 27.09 KG/M2 | HEART RATE: 66 BPM | TEMPERATURE: 96.8 F | WEIGHT: 138 LBS | SYSTOLIC BLOOD PRESSURE: 133 MMHG | HEIGHT: 60 IN | DIASTOLIC BLOOD PRESSURE: 81 MMHG

## 2020-07-23 DIAGNOSIS — S93.402D SEVERE ANKLE SPRAIN, LEFT, SUBSEQUENT ENCOUNTER: Primary | ICD-10-CM

## 2020-07-23 DIAGNOSIS — M25.471 ANKLE EFFUSION, RIGHT: ICD-10-CM

## 2020-07-23 PROCEDURE — 20605 DRAIN/INJ JOINT/BURSA W/O US: CPT | Performed by: FAMILY MEDICINE

## 2020-07-23 PROCEDURE — 99213 OFFICE O/P EST LOW 20 MIN: CPT | Performed by: FAMILY MEDICINE

## 2020-07-23 RX ORDER — LIDOCAINE HYDROCHLORIDE 10 MG/ML
2 INJECTION, SOLUTION INFILTRATION; PERINEURAL
Status: COMPLETED | OUTPATIENT
Start: 2020-07-23 | End: 2020-07-23

## 2020-07-23 RX ORDER — TRIAMCINOLONE ACETONIDE 40 MG/ML
20 INJECTION, SUSPENSION INTRA-ARTICULAR; INTRAMUSCULAR
Status: COMPLETED | OUTPATIENT
Start: 2020-07-23 | End: 2020-07-23

## 2020-07-23 RX ADMIN — LIDOCAINE HYDROCHLORIDE 2 ML: 10 INJECTION, SOLUTION INFILTRATION; PERINEURAL at 10:12

## 2020-07-23 RX ADMIN — TRIAMCINOLONE ACETONIDE 20 MG: 40 INJECTION, SUSPENSION INTRA-ARTICULAR; INTRAMUSCULAR at 10:12

## 2020-07-23 NOTE — PROGRESS NOTES
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, with moderate tibiotalar joint effusion  Clinical impression that she may be symptomatic from inflammation within the ankle joint  I discussed treatment in form of cortisone injection to which she agreed  I administered mixture of 1 cc 1% lidocaine and 0 5 cc Kenalog to the right tibiotalar joint without complication  She will follow up in 4 weeks at which point she will be re-evaluated  Subjective:   Patient ID: Sima Anton is a 63 y o  female  Chief Complaint   Patient presents with    Right Ankle - Follow-up       28-year-old female follow-up for right ankle pain and swelling of onset from injury more than 6 months ago  She was last seen by me more than 2 weeks ago at which point she was referred for MRI of the right ankle and she was provided with lace-up ankle brace  She has been wearing lace-up ankle brace, as she has been unable to wear Cam boot  She reports having pain described as generalized ankle worse at the anterior and lateral aspects, constant, achy and throbbing, symptoms radiating proximally along the lateral aspect of lower leg, worse with standing ambulation, associated swelling, and improved with resting  Ankle Pain   This is a chronic problem  The current episode started more than 1 month ago  The problem occurs constantly  The problem has been unchanged  Associated symptoms include arthralgias, joint swelling, numbness and weakness  The symptoms are aggravated by standing and walking   She has tried rest and position changes (Gabapentin) for the symptoms  Review of Systems   Musculoskeletal: Positive for arthralgias and joint swelling  Neurological: Positive for weakness and numbness  Objective:  Vitals:    07/23/20 0945   BP: 133/81   Pulse: 66   Temp: (!) 96 8 °F (36 °C)   Weight: 62 6 kg (138 lb)   Height: 5' (1 524 m)         Observations     Right Ankle/Foot   Negative for deformity  Tenderness     Right Ankle/Foot   Tenderness in the anterior ankle and anterior talofibular ligament  Physical Exam   Constitutional: She is oriented to person, place, and time  She appears well-developed  No distress  HENT:   Head: Normocephalic  Eyes: Conjunctivae are normal    Neck: No tracheal deviation present  Cardiovascular: Normal rate  Pulmonary/Chest: Effort normal  No respiratory distress  Abdominal: She exhibits no distension  Musculoskeletal:        Right foot: There is no deformity  Neurological: She is alert and oriented to person, place, and time  Skin: Skin is warm and dry  Psychiatric: She has a normal mood and affect  Her behavior is normal    Nursing note and vitals reviewed  I have personally reviewed pertinent films in PACS and my interpretation is Tibiotalar joint effusion      Medium joint arthrocentesis: R ankle  Date/Time: 7/23/2020 10:12 AM  Consent given by: patient  Site marked: site marked  Timeout: Immediately prior to procedure a time out was called to verify the correct patient, procedure, equipment, support staff and site/side marked as required   Supporting Documentation  Indications: pain   Procedure Details  Location: ankle - R ankle  Preparation: Patient was prepped and draped in the usual sterile fashion  Needle size: 27 G  Ultrasound guidance: no  Approach: dorsal  Medications administered: 2 mL lidocaine 1 %; 20 mg triamcinolone acetonide 40 mg/mL    Patient tolerance: patient tolerated the procedure well with no immediate complications  Dressing:  Sterile dressing applied

## 2020-07-24 ENCOUNTER — OFFICE VISIT (OUTPATIENT)
Dept: OBGYN CLINIC | Facility: HOSPITAL | Age: 60
End: 2020-07-24
Payer: COMMERCIAL

## 2020-07-24 VITALS
WEIGHT: 140.6 LBS | SYSTOLIC BLOOD PRESSURE: 154 MMHG | HEART RATE: 77 BPM | DIASTOLIC BLOOD PRESSURE: 69 MMHG | HEIGHT: 60 IN | BODY MASS INDEX: 27.61 KG/M2

## 2020-07-24 DIAGNOSIS — M65.322 TRIGGER FINGER, LEFT INDEX FINGER: Primary | ICD-10-CM

## 2020-07-24 DIAGNOSIS — M65.332 TRIGGER FINGER, LEFT MIDDLE FINGER: ICD-10-CM

## 2020-07-24 DIAGNOSIS — M77.12 LATERAL EPICONDYLITIS OF LEFT ELBOW: ICD-10-CM

## 2020-07-24 PROCEDURE — 99214 OFFICE O/P EST MOD 30 MIN: CPT | Performed by: ORTHOPAEDIC SURGERY

## 2020-07-24 PROCEDURE — 20550 NJX 1 TENDON SHEATH/LIGAMENT: CPT | Performed by: ORTHOPAEDIC SURGERY

## 2020-07-24 RX ORDER — LIDOCAINE HYDROCHLORIDE 10 MG/ML
1 INJECTION, SOLUTION INFILTRATION; PERINEURAL
Status: COMPLETED | OUTPATIENT
Start: 2020-07-24 | End: 2020-07-24

## 2020-07-24 RX ORDER — BETAMETHASONE SODIUM PHOSPHATE AND BETAMETHASONE ACETATE 3; 3 MG/ML; MG/ML
6 INJECTION, SUSPENSION INTRA-ARTICULAR; INTRALESIONAL; INTRAMUSCULAR; SOFT TISSUE
Status: COMPLETED | OUTPATIENT
Start: 2020-07-24 | End: 2020-07-24

## 2020-07-24 RX ADMIN — LIDOCAINE HYDROCHLORIDE 1 ML: 10 INJECTION, SOLUTION INFILTRATION; PERINEURAL at 08:48

## 2020-07-24 RX ADMIN — BETAMETHASONE SODIUM PHOSPHATE AND BETAMETHASONE ACETATE 6 MG: 3; 3 INJECTION, SUSPENSION INTRA-ARTICULAR; INTRALESIONAL; INTRAMUSCULAR; SOFT TISSUE at 08:48

## 2020-07-24 NOTE — PROGRESS NOTES
ASSESSMENT/PLAN:    Assessment:   L index and long TFs  L lateral epicondylitis (mild)    Plan:   Pt elects index and long TF injxs today  Pt has already done therapy for arm, encouraged to c/w exercises    Follow Up:  6-8 weeks    General Discussions:  Lateral Epicondylitis: The anatomy and physiology of lateral epicondylitis was discussed with the patient today  Typically, a traumatic injury or repetitive use may cause a partial or complete tear of the extensor carpi radialis brevis muscle  This creates pain over the lateral epicondyle  This pain typically is made worse with palm down lifting activities as well as anything that involves strength and stability of the wrist   The pain may radiate from the wrist up to the elbow  At times, the shoulder may be weak as well which can predispose or cause continuation of the problem  Conservative treatment usually cures a majority of patients; however, this may take up to 6-9 months  Conservative treatment options typically include activity modification, therapy for strengthening of the shoulder and elbow, nocturnal wrist support splints, tennis elbow straps, and possible corticosteroid injections  Corticosteroid injections do not change the natural history of this process, may decrease the pain temporarily, and may increase the risk of recurrence  Surgery is required in fewer than 10% of patients  Trigger FInger: The anatomy and physiology of trigger finger was discussed with the patient today in the office  Edema and increased contact pressure within the flexor tendons at the A1 pulley can cause pain, crepitation, and limitation of function  Treatment options include resting MP blocking splints to decrease edema, oral anti-inflammatory medications, home or formal therapy exercises, up to 2 steroid injections within the tendon sheath, or surgical release  While majority of patients do respond to conservative treatment, up to 20% may require surgical release  _____________________________________________________  CHIEF COMPLAINT:  No chief complaint on file  SUBJECTIVE:  Yuan Cheatham is a 63 y o  female who presents with c/o L index and long trigger fingers  She had ring finger previously released and this is doing well  She states she has pain and clicking, long finger occasionally locks as well  She also describes some slight discomfort that can radiate to her elbow  PAST MEDICAL HISTORY:  Past Medical History:   Diagnosis Date    Asthma     Cardiac disease     COPD (chronic obstructive pulmonary disease) ([address] )     Depression     Hyperlipidemia     Hypertension     Influenza     December 2017    Irregular heart beat     Migraine     Migraine     [facility])     Multiple sclerosis (HCC)     Nephrolithiasis     Neurogenic bladder     Panniculitis        PAST SURGICAL HISTORY:  Past Surgical History:   Procedure Laterality Date    COLONOSCOPY      DILATION AND CURETTAGE OF UTERUS      VT COLONOSCOPY FLX DX W/COLLJ SPEC WHEN PFRMD N/A 1/11/2018    Procedure: COLONOSCOPY;  Surgeon: Aysha Holbrook MD;  Location: [facility];   Service: Gastroenterology    VT INCISE FINGER TENDON SHEATH Left 1/17/2018    Procedure: RING TRIGGER FINGER RELEASE;  Surgeon: Alberta Lee MD;  [facility];  Service: Orthopedics    UTERINE FIBROID SURGERY         FAMILY HISTORY:  Family History   Problem Relation Age of Onset    Stroke Mother     Diabetes Mother     Hypertension Mother     Heart disease Father     Diabetes Father     Hypertension Father     Diabetes Sister     Hypertension Brother     Breast cancer Maternal Aunt 76    Stomach cancer Maternal Aunt        SOCIAL HISTORY:  Social History     Tobacco Use    Smoking status: Never Smoker    Smokeless tobacco: Never Used   Substance Use Topics    Alcohol use: Yes     Frequency: Monthly or less     Drinks per session: 1 or 2     Binge frequency: Never Comment: social    Drug use: Yes     Types: Marijuana     Comment: last use 4 days ago  MEDICATIONS:    Current Outpatient Medications:     albuterol (2 5 mg/3 mL) 0 083 % nebulizer solution, Albuterol Sulfate (2 5 MG/3ML) 0 083% Inhalation Nebulization Solution USE 1 UNIT DOSE IN NEBULIZER EVERY 6 HOURS AS NEEDED  Quantity: 1;  Refills: 5    Bebe GARCIA ;  Started 12-July-2016 Active3 ML Plas Cont (125 Plas Conts), Disp: , Rfl:     albuterol (VENTOLIN HFA) 90 mcg/act inhaler, Inhale 2 puffs every 6 (six) hours as needed for wheezing, Disp: 3 Inhaler, Rfl: 0    aspirin 81 MG tablet, Aspirin 81 MG TABS Take 1 tablet daily  Refills: 0  Active, Disp: , Rfl:     atorvastatin (LIPITOR) 20 mg tablet, Take 40 mg by mouth Medrol Dose Pack scheduling ONLY  , Disp: , Rfl:     baclofen 20 mg tablet, Take 1 tablet (20 mg total) by mouth 3 (three) times a day, Disp: 270 tablet, Rfl: 2    betamethasone, augmented, (DIPROLENE-AF) 0 05 % cream, Apply topically 2 (two) times a day To rash on left thigh until resolved, Disp: 15 g, Rfl: 0    budesonide (PULMICORT FLEXHALER) 180 MCG/ACT inhaler, Inhale 1 puff 2 (two) times a day (Patient taking differently: Inhale 1 puff 2 (two) times a day as needed  ), Disp: 3 Inhaler, Rfl: 3    butalbital-acetaminophen-caffeine (FIORICET,ESGIC) -40 mg per tablet, Take 1 tablet by mouth every 4 (four) hours as needed for headaches, Disp: 12 tablet, Rfl: 4    EPINEPHrine (EPIPEN) 0 3 mg/0 3 mL SOAJ, Inject 0 3 mg into the shoulder, thigh, or buttocks, Disp: , Rfl:     ergocalciferol (VITAMIN D2) 50,000 units, TAKE ONE CAPSULE WEEKLY WITH FOOD, Disp: , Rfl: 1    famotidine (PEPCID) 20 mg tablet, Famotidine 20 MG Oral Tablet TAKE 1 TABLET DAILY PRN AS DIRECTED    Refills: 0  Active, Disp: , Rfl:     fluticasone (FLONASE) 50 mcg/act nasal spray, 2 sprays into each nostril daily, Disp: 1 Bottle, Rfl: 3    gabapentin (NEURONTIN) 300 mg capsule, Take 2 caps in am and noon and 3 caps HS, Disp: 630 capsule, Rfl: 2    levocetirizine (XYZAL) 5 MG tablet, TAKE 1 TABLET (5 MG TOTAL) BY MOUTH DAILY AS NEEDED (HIVES), Disp: 30 tablet, Rfl: 0    NIFEdipine (ADALAT CC) 90 mg 24 hr tablet, Take 1 tablet by mouth daily (Patient taking differently: Take 60 mg by mouth daily ), Disp: 7 tablet, Rfl: 0    sertraline (ZOLOFT) 50 mg tablet, Sertraline HCl - 50 MG Oral Tablet TAKE 3 TABLETS DAILY  Refills: 0  Active, Disp: , Rfl:     tolterodine (DETROL LA) 4 mg 24 hr capsule, Take 1 capsule by mouth daily, Disp: , Rfl:     valACYclovir (VALTREX) 1,000 mg tablet, Take 1,000 mg by mouth as needed, Disp: , Rfl:   No current facility-administered medications for this visit  ALLERGIES:  Allergies   Allergen Reactions    Imitrex [Sumatriptan] Headache    Keflex [Cephalexin] Rash    Nuts Shortness Of Breath and Anaphylaxis    Other Anaphylaxis, Hives and Itching     Annotation - [id number]: POISON IVY   walnuts & cashews  Cats    Peanut Oil      Hives anaphylaxis    Shellfish Allergy Shortness Of Breath and Swelling    Shellfish-Derived Products Shortness Of Breath and Swelling    Copaxone [Glatiramer Acetate] Hives    Cat Hair Extract     Glatiramer      Other reaction(s): rash    Pollen Extract Sneezing    Seasonal Ic [Cholestatin]        REVIEW OF SYSTEMS:  Pertinent items are noted in HPI  A comprehensive review of systems was negative      LABS:  HgA1c: No results found for: HGBA1C  BMP:   Lab Results   Component Value Date    CALCIUM 9 2 03/25/2019     12/21/2017    K 3 8 03/25/2019    CO2 30 03/25/2019     03/25/2019    BUN 12 03/25/2019    CREATININE 0 65 03/25/2019         _____________________________________________________  PHYSICAL EXAMINATION:  Vital signs: /69   Pulse 77   Ht 5' (1 524 m)   Wt 63 8 kg (140 lb 9 6 oz)   BMI 27 46 kg/m²   General: well developed and well nourished, alert, oriented times 3 and appears comfortable  Psychiatric: Normal  HEENT: Trachea Midline, No torticollis  Cardiovascular: No discernable arrhythmia  Pulmonary: No wheezing or stridor  Skin: No masses, erythema, lacerations, fluctation, ulcerations  Neurovascular: Sensation Intact to the Median, Ulnar, Radial Nerve, Motor Intact to the Median, Ulnar, Radial Nerve and Pulses Intact    MUSCULOSKELETAL EXAMINATION:  LEFT SIDE:  Epicondylitis: Positive tenderness Laterally and Positive pain on passive wrist extension and Finger:  Tenderness A1 pulley index and long, Triggering  index finger, long finger, Palpable clicking index finger, long finger, Crepitation index finger, long finger and Nodules  index finger, long finger    _____________________________________________________  STUDIES REVIEWED:  No Studies to review      PROCEDURES PERFORMED:  Hand/upper extremity injection: L index A1  Date/Time: 7/24/2020 8:48 AM  Consent given by: patient  Site marked: site marked  Supporting Documentation  Indications: pain   Procedure Details  Condition:trigger finger Location: index finger - L index A1   Needle size: 25 G  Ultrasound guidance: no  Approach: volar  Medications administered: 1 mL lidocaine 1 %; 6 mg betamethasone acetate-betamethasone sodium phosphate 6 (3-3) mg/mL    Patient tolerance: patient tolerated the procedure well with no immediate complications  Dressing:  Sterile dressing applied     Hand/upper extremity injection: L long A1  Date/Time: 7/24/2020 8:48 AM  Consent given by: patient  Site marked: site marked  Supporting Documentation  Indications: pain   Procedure Details  Condition:trigger finger Location: long finger - L long A1   Needle size: 25 G  Ultrasound guidance: no  Approach: volar  Medications administered: 1 mL lidocaine 1 %; 6 mg betamethasone acetate-betamethasone sodium phosphate 6 (3-3) mg/mL    Patient tolerance: patient tolerated the procedure well with no immediate complications  Dressing:  Sterile dressing applied             Scribe Attestation    I,:   Shaun Cleveland PA-C am acting as a scribe while in the presence of the attending physician :        I,:   Thelma Walsh MD personally performed the services described in this documentation    as scribed in my presence :

## 2020-07-24 NOTE — PATIENT INSTRUCTIONS
What is it TRIGGER FINGER? Stenosing tenosynovitis, commonly known as trigger finger or trigger thumb, involves the pulleys and tendons in the hand that bend the fingers  The tendons work like long ropes connecting the muscles of the forearm with the bones of the fingers and thumb  In the finger, the pulleys are a series of rings that form a tunnel through which the tendons must glide, much like the guides on a fishing [place] through which the line (or tendon) must pass  These pulleys hold the tendons close against the bone  The tendons and the tunnel have a slick lining that allows easy gliding of the tendon through the pulleys (see Figure 1)  Trigger finger/thumb occurs when the pulley at the base of the finger becomes too thick and constricting around the tendon, making it hard for the tendon to move freely through the pulley  Sometimes the tendon develops a nodule (knot) or swelling of its lining  Because of the increased resistance to the gliding of the tendon through the  pulley, one may feel pain, popping, or a catching feeling in the finger or thumb (see Figure 2)  When the tendon catches, it produces irritation and more swelling  This causes a vicious cycle of triggering, irritation, and swelling  Sometimes the finger becomes stuck or locked, and is hard to straighten or bend  What causes it? Causes for this condition are not always clear  Some trigger fingers are associated with medical conditions such as rheumatoid arthritis, gout, and diabetes  Local trauma to the palm/base of the finger may be a factor on occasion, but in most cases there is not a clear cause  Signs and symptoms   Trigger finger/thumb may start with discomfort felt at the base of the finger or thumb, where they join the palm  This area is often tender to local pressure  A nodule may sometimes be found in this area   When the finger begins to trigger or lock, the patient may think the  problem is at the middle knuckle of the finger or the tip knuckle of the thumb, since the tendon that is sticking is the one that moves these joints  Treatment  The goal of treatment in trigger finger/thumb is to eliminate the catching or locking and allow full movement of the finger or thumb without discomfort  Swelling around the flexor tendon and tendon sheath must be reduced to allow smooth gliding of the tendon  The wearing of a splint or taking an oral anti-inflammatory medication may sometimes  help  Treatment may also include changing activities to reduce swelling  An injection of steroid into the area around the tendon and pulley is often effective in relieving the trigger finger/thumb  If non-surgical forms of treatment do not relieve the symptoms, surgery may be recommended  This surgery is performed as an outpatient, usually with simple local anesthesia  The goal of surgery is to open the pulley at the base of the finger so that the tendon can glide more freely (see Figure 3)  Active motion of the finger generally begins immediately after surgery  Normal use of the hand can usually be resumed once comfort permits  Some patients may feel tenderness, discomfort, and swelling about the area of their surgery longer than others  Occasionally, hand therapy is required after surgery to regain  better use  © 2012 [employer]  www handcare  org         Lateral Epicondylitis  (Tennis Elbow)  What is it? Lateral epicondylitis, commonly known as tennis elbow, is a painful condition involving the tendons that attach to the bone on the outside (lateral) part of the elbow  Tendons anchor the muscle to bone  The muscle involved in this condition, the extensor carpi radialis brevis, helps to extend and stabilize the wrist (see Figure 1)  With lateral epicondylitis, there is degeneration of the tendon's attachment, weakening the anchor site and placing greater stress on the area   This can then lead to pain associated with activities in which this muscle is active, such as lifting, gripping, and/or grasping  Sports such as tennis are commonly associated with this, but the problem can occur with many different types of activities, athletic and otherwise  What causes it? Overuse  The cause can be both non-work and work related  An activity that places stress on the tendon attachments, through stress on the extensor muscle-tendon unit, increases the strain on the tendon  These stresses can be from holding too large a racquet  or from repetitive gripping and grasping activities, i e  meat-cutting, plumbing, painting, weaving, etc   Trauma  A direct blow to the elbow may result in swelling of the tendon that can lead to degeneration  A sudden extreme action, force, or activity could also injure the tendon  Who gets it? The most common age group that this condition affects is between 27to 48years old, but it may occur in younger and older age groups, and in both men and women  Signs and symptoms  Pain is the primary reason for patients to seek medical evaluation  The pain is located over the outside aspect of the elbow, over the bone region known as the lateral epicondyle  This area becomes tender to touch  Pain is also produced by any activity which places stress on the tendon, such as gripping or lifting  With activity, the pain usually starts at the elbow and may travel down the forearm to the hand  Occasionally, any motion of the elbow can be painful  Treatment  Conservative (non-surgical)  Activity modification  Initially, the activity causing the condition should be limited  Limiting the aggravating activity, not total rest, is recommended  Modifying  or techniques, such as use of a different size racket and/or use of 2-handed backhands in tennis, may relieve the problem  Medication  anti-inflammatory medications may help alleviate the pain    Brace  a tennis elbow brace, a band worn over the muscle of the forearm, just below the elbow, can reduce the tension on the tendon and allow it to heal   Physical Therapy may be helpful, providing stretching and/or strengthening exercises  Modalities such as ultrasound or heat treatments may be helpful  Steroid injections  A steroid is a strong anti-inflammatory medication that can be injected into the area  No more than (3) injections should  Shockwave treatment  A new type of treatment, available in the office setting, has shown some success in 5060% of patients  This is a shock wave delivered to the affected area around the elbow, which can be used as a last resort prior to the consideration of surgery  Surgery  Surgery is only considered when the pain is incapacitating and has not responded to conservative care, and symptoms have lasted more than six months  Surgery involves removing the diseased, degenerated tendon tissue  Two surgical approaches are available; traditional open surgery (incision), and arthroscopya procedure performed with instruments inserted into the joint through small incisions  Both options are performed in the outpatient setting  Recovery  Recovery from surgery includes physical therapy to regain motion of the arm  A strengthening program will be necessary in order to return to prior activities  Recovery can be expected to take 46 months  [employer] Hand  www handcare  org

## 2020-08-20 ENCOUNTER — OFFICE VISIT (OUTPATIENT)
Dept: OBGYN CLINIC | Facility: CLINIC | Age: 60
End: 2020-08-20
Payer: COMMERCIAL

## 2020-08-20 VITALS
BODY MASS INDEX: 27.48 KG/M2 | DIASTOLIC BLOOD PRESSURE: 74 MMHG | HEART RATE: 73 BPM | SYSTOLIC BLOOD PRESSURE: 128 MMHG | WEIGHT: 140 LBS | HEIGHT: 60 IN

## 2020-08-20 DIAGNOSIS — S93.491D SPRAIN OF ANTERIOR TALOFIBULAR LIGAMENT, RIGHT, SUBSEQUENT ENCOUNTER: ICD-10-CM

## 2020-08-20 DIAGNOSIS — M25.371 ANKLE INSTABILITY, RIGHT: ICD-10-CM

## 2020-08-20 DIAGNOSIS — R29.898 ANKLE WEAKNESS: ICD-10-CM

## 2020-08-20 DIAGNOSIS — S93.411D SPRAIN OF CALCANEOFIBULAR LIGAMENT OF RIGHT ANKLE, SUBSEQUENT ENCOUNTER: ICD-10-CM

## 2020-08-20 DIAGNOSIS — S93.401D SEVERE SPRAIN OF RIGHT ANKLE, SUBSEQUENT ENCOUNTER: Primary | ICD-10-CM

## 2020-08-20 PROCEDURE — 99213 OFFICE O/P EST LOW 20 MIN: CPT | Performed by: FAMILY MEDICINE

## 2020-09-04 ENCOUNTER — OFFICE VISIT (OUTPATIENT)
Dept: OBGYN CLINIC | Facility: HOSPITAL | Age: 60
End: 2020-09-04
Payer: COMMERCIAL

## 2020-09-04 VITALS
SYSTOLIC BLOOD PRESSURE: 134 MMHG | DIASTOLIC BLOOD PRESSURE: 72 MMHG | HEART RATE: 77 BPM | BODY MASS INDEX: 28.21 KG/M2 | HEIGHT: 60 IN | WEIGHT: 143.7 LBS

## 2020-09-04 DIAGNOSIS — M65.332 TRIGGER FINGER, LEFT MIDDLE FINGER: ICD-10-CM

## 2020-09-04 DIAGNOSIS — M65.322 TRIGGER FINGER, LEFT INDEX FINGER: Primary | ICD-10-CM

## 2020-09-04 PROCEDURE — 99213 OFFICE O/P EST LOW 20 MIN: CPT | Performed by: ORTHOPAEDIC SURGERY

## 2020-09-04 NOTE — PROGRESS NOTES
ASSESSMENT/PLAN:    Assessment:   Left long and index trigger fingers  Plan:   If symptoms worsen we can provide a 2nd injection    Follow Up:  PRN      _____________________________________________________  CHIEF COMPLAINT:  Chief Complaint   Patient presents with    Left Index Finger - Follow-up    Left Middle Finger - Follow-up         SUBJECTIVE:  Carmenza Schmidt is a 63y o  year old female who presents for follow up regarding Trigger Finger  left  index finger, long finger  Since last visit, Carmenza Schmidt has tried steroid injections with only partial relief  She states overall the fingers are feeling much better with minimal pain and locking  PAST MEDICAL HISTORY:  Past Medical History:   Diagnosis Date    Asthma     Cardiac disease     COPD (chronic obstructive pulmonary disease) ([address] )     Depression     Hyperlipidemia     Hypertension     Influenza     December 2017    Irregular heart beat     Migraine     Migraine     MS (multiple sclerosis) (HCC)     Multiple sclerosis (HCC)     Nephrolithiasis     Neurogenic bladder     Panniculitis        PAST SURGICAL HISTORY:  Past Surgical History:   Procedure Laterality Date    COLONOSCOPY      DILATION AND CURETTAGE OF UTERUS      NV COLONOSCOPY FLX DX W/COLLJ SPEC WHEN PFRMD N/A 1/11/2018    Procedure: COLONOSCOPY;  Surgeon: Navi Membreno MD;  Location: [facility];   Service: Gastroenterology    NV INCISE FINGER TENDON SHEATH Left 1/17/2018    Procedure: RING TRIGGER FINGER RELEASE;  Surgeon: Maura Loredo MD;  Location: [facility];  Service: Orthopedics    UTERINE FIBROID SURGERY         FAMILY HISTORY:  Family History   Problem Relation Age of Onset    Stroke Mother     Diabetes Mother     Hypertension Mother     Heart disease Father     Diabetes Father     Hypertension Father     Diabetes Sister     Hypertension Brother     Breast cancer Maternal Aunt 76    Stomach cancer Maternal Aunt        SOCIAL HISTORY:  Social History     Tobacco Use    Smoking status: Never Smoker    Smokeless tobacco: Never Used   Substance Use Topics    Alcohol use: Yes     Frequency: Monthly or less     Drinks per session: 1 or 2     Binge frequency: Never     Comment: social    Drug use: Yes     Types: Marijuana     Comment: last use 4 days ago  MEDICATIONS:    Current Outpatient Medications:     albuterol (2 5 mg/3 mL) 0 083 % nebulizer solution, Albuterol Sulfate (2 5 MG/3ML) 0 083% Inhalation Nebulization Solution USE 1 UNIT DOSE IN NEBULIZER EVERY 6 HOURS AS NEEDED    Quantity: 1;  Refills: 5    Nicole GARCIA ;  Started 12-July-2016 Active3 ML Plas Cont (125 Plas Conts), Disp: , Rfl:     albuterol (VENTOLIN HFA) 90 mcg/act inhaler, Inhale 2 puffs every 6 (six) hours as needed for wheezing, Disp: 3 Inhaler, Rfl: 0    aspirin 81 MG tablet, Aspirin 81 MG TABS Take 1 tablet daily  Refills: 0  Active, Disp: , Rfl:     atorvastatin (LIPITOR) 20 mg tablet, Take 40 mg by mouth Medrol Dose Pack scheduling ONLY  , Disp: , Rfl:     baclofen 20 mg tablet, Take 1 tablet (20 mg total) by mouth 3 (three) times a day, Disp: 270 tablet, Rfl: 2    betamethasone, augmented, (DIPROLENE-AF) 0 05 % cream, Apply topically 2 (two) times a day To rash on left thigh until resolved, Disp: 15 g, Rfl: 0    budesonide (PULMICORT FLEXHALER) 180 MCG/ACT inhaler, Inhale 1 puff 2 (two) times a day (Patient taking differently: Inhale 1 puff 2 (two) times a day as needed  ), Disp: 3 Inhaler, Rfl: 3    butalbital-acetaminophen-caffeine (FIORICET,ESGIC) -40 mg per tablet, Take 1 tablet by mouth every 4 (four) hours as needed for headaches, Disp: 12 tablet, Rfl: 4    EPINEPHrine (EPIPEN) 0 3 mg/0 3 mL SOAJ, Inject 0 3 mg into the shoulder, thigh, or buttocks, Disp: , Rfl:     ergocalciferol (VITAMIN D2) 50,000 units, TAKE ONE CAPSULE WEEKLY WITH FOOD, Disp: , Rfl: 1    famotidine (PEPCID) 20 mg tablet, Famotidine 20 MG Oral Tablet TAKE 1 TABLET DAILY PRN AS DIRECTED  Refills: 0  Active, Disp: , Rfl:     fluticasone (FLONASE) 50 mcg/act nasal spray, 2 sprays into each nostril daily, Disp: 1 Bottle, Rfl: 3    gabapentin (NEURONTIN) 300 mg capsule, Take 2 caps in am and noon and 3 caps HS, Disp: 630 capsule, Rfl: 2    levocetirizine (XYZAL) 5 MG tablet, TAKE 1 TABLET (5 MG TOTAL) BY MOUTH DAILY AS NEEDED (HIVES), Disp: 30 tablet, Rfl: 0    NIFEdipine (ADALAT CC) 90 mg 24 hr tablet, Take 1 tablet by mouth daily (Patient taking differently: Take 60 mg by mouth daily ), Disp: 7 tablet, Rfl: 0    sertraline (ZOLOFT) 50 mg tablet, Sertraline HCl - 50 MG Oral Tablet TAKE 3 TABLETS DAILY  Refills: 0  Active, Disp: , Rfl:     tolterodine (DETROL LA) 4 mg 24 hr capsule, Take 1 capsule by mouth daily, Disp: , Rfl:     valACYclovir (VALTREX) 1,000 mg tablet, Take 1,000 mg by mouth as needed, Disp: , Rfl:     ALLERGIES:  Allergies   Allergen Reactions    Imitrex [Sumatriptan] Headache    Keflex [Cephalexin] Rash    Nuts Shortness Of Breath and Anaphylaxis    Other Anaphylaxis, Hives and Itching     Annotation - [id number]: POISON IVY   walnuts & cashews  Cats    Peanut Oil      Hives anaphylaxis    Shellfish Allergy Shortness Of Breath and Swelling    Shellfish-Derived Products Shortness Of Breath and Swelling    Copaxone [Glatiramer Acetate] Hives    Cat Hair Extract     Glatiramer      Other reaction(s): rash    Pollen Extract Sneezing    Seasonal Ic [Cholestatin]        REVIEW OF SYSTEMS:  Pertinent items are noted in HPI  A comprehensive review of systems was negative      LABS:  HgA1c: No results found for: HGBA1C  BMP:   Lab Results   Component Value Date    CALCIUM 9 2 03/25/2019     12/21/2017    K 3 8 03/25/2019    CO2 30 03/25/2019     03/25/2019    BUN 12 03/25/2019    CREATININE 0 65 03/25/2019           _____________________________________________________    PHYSICAL EXAMINATION:  /72   Pulse 77   Ht 5' (1 524 m)   Wt 65 2 kg (143 lb 11 2 oz)   BMI 28 06 kg/m²   General: well developed and well nourished, alert, oriented times 3 and appears comfortable  Psychiatric: Normal  HEENT: Trachea Midline, No torticollis  Cardiovascular: No discernable arrhythmia  Pulmonary: No wheezing or stridor  Skin: No masses, erythema, lacerations, fluctation, ulcerations  Neurovascular: Sensation Intact to the Median, Ulnar, Radial Nerve, Motor Intact to the Median, Ulnar, Radial Nerve and Pulses Intact    MUSCULOSKELETAL EXAMINATION:  left long and index finger:  Positive palpable nodule over the A1 pulley  Positive tenderness to palpation over A1 pulley  Negative catching   Negative clicking       _____________________________________________________  STUDIES REVIEWED:  No Studies to review      PROCEDURES PERFORMED:  Procedures  No Procedures performed today      Scribe Attestation    I,:   Ismael Rodriguez am acting as a scribe while in the presence of the attending physician :        I,:   Rolando Marion MD personally performed the services described in this documentation    as scribed in my presence :

## 2020-09-13 NOTE — PROGRESS NOTES
[facility] MULTIPLE SCLEROSIS CENTER  PATIENT:  Yudi Rodriguez  MRN:  [id number]  :  1960  DATE OF SERVICE:  2020    Assessment/Plan:           Problem List Items Addressed This Visit        Nervous and Auditory    Multiple sclerosis ([address] )    Relevant Medications    baclofen 20 mg tablet    methylPREDNISolone 4 MG tablet therapy pack    Other Relevant Orders    SARS-CoV2 Antibody, Total (IgG, IgA, IgM) [facility]    Right hemiparesis (HCC) - Primary       Other    Ambulatory dysfunction    Right low back pain    Relevant Medications    baclofen 20 mg tablet      Other Visit Diagnoses     Chronic coughing        Relevant Medications    methylPREDNISolone 4 MG tablet therapy pack    Other Relevant Orders    XR chest pa & lateral    SARS-CoV2 Antibody, Total (IgG, IgA, IgM) [facility]    Chills        Relevant Medications    methylPREDNISolone 4 MG tablet therapy pack    Other Relevant Orders    XR chest pa & lateral    SARS-CoV2 Antibody, Total (IgG, IgA, IgM) [facility]            Mrs Yojana Kaplan has presented to [address] [place] [address] for follow up:  - MRI L-spine with multilevel degenerative changes, no central canal stenosis   - no DMT has been started and will no longer be advised  - patient is to continue with Dr Capri Yu for left long and index trigger fingers   - spider bite with cellulitis has been noted, with prednisone and keflex   - chronic coughing, increased fatigue, body aches, diarrhea resolved; really bad headaches reported 2 months ago, with concerns for COVID 19, with negative SARS-CoV-2 PCR  Chest x-ray and SARS-CoV-2 antibodies now  - left-sided sciatica with baclofen 20  mg tid, and gabapentin 2100 mg a day; Medrol pack provided;   - nocturia with neurogenic bladder is stable while using Detrol LA    Patient is to continue practicing safety measures during the novel coronovirus public health emergency  Subjective: MS     HPI  Mrs Brady has known relapsing remitting multiple sclerosis, neurogenic bladder, ambulatory dysfunction due to right hemiparesis, disbalance, mesenteric panniculitis, cervical disc disease with myelopathy, classic migraine without aura, who presented for follow up on MS and related issues  LOV was 2/2020  MRI L-spine 3/2020: Multilevel spondylotic changes are similar to the previous study  Patient described no new focal neurologic deficit, persistent right hemiparesis noted on exam; cortisone shots provided for trigger finger and right ankle tendon tear;   Persistent cough for the last 2 months, not improving, was diagnosed with COPD;   Spider bite left side of her neck with cellulitis developed, requiring prednisone and antibacterial therapy  Intermittent room spinning sensation and worsening fatigue reported  The following portions of the patient's history were reviewed and updated as appropriate: She  has a past medical history of Asthma, Cardiac disease, COPD (chronic obstructive pulmonary disease) ([address] ), Depression, Hyperlipidemia, Hypertension, Influenza, Irregular heart beat, Migraine, Migraine, MS (multiple sclerosis) ([facility] ), Multiple sclerosis ([facility] ), Nephrolithiasis, Neurogenic bladder, and Panniculitis  She   Patient Active Problem List    Diagnosis Date Noted    Other spondylosis with radiculopathy, lumbar region 02/20/2020    Right low back pain 02/21/2019    Skin rash 02/21/2019    Right hemiparesis ([address] ) 02/21/2019    Mesenteric panniculitis ([facility] ) 03/15/2018    Ambulatory dysfunction 02/21/2018    Chronic right-sided headaches 02/21/2018    Multiple sclerosis ([facility] ) 02/21/2018    Panniculitis 02/21/2018    S/P trigger finger release 02/02/2018    Trigger ring finger of left hand 01/17/2018     She  has a past surgical history that includes Dilation and curettage of uterus; Uterine fibroid surgery; pr colonoscopy flx dx w/collj spec when pfrmd (N/A, 1/11/2018); Colonoscopy; and pr incise finger tendon sheath (Left, 1/17/2018)    Her family history includes Breast cancer (age of onset: 76) in her maternal aunt; Diabetes in her father, mother, and sister; Heart disease in her father; Hypertension in her brother, father, and mother; Stomach cancer in her maternal aunt; Stroke in her mother  She  reports that she has never smoked  She has never used smokeless tobacco  She reports current alcohol use  She reports current drug use  Drug: Marijuana  Current Outpatient Medications   Medication Sig Dispense Refill    albuterol (2 5 mg/3 mL) 0 083 % nebulizer solution Albuterol Sulfate (2 5 MG/3ML) 0 083% Inhalation Nebulization Solution  USE 1 UNIT DOSE IN NEBULIZER EVERY 6 HOURS AS NEEDED  Quantity: 1;  Refills: 5       Janae Dk M D ;  Started 12-July-2016  Active3 ML Plas Cont (125 Plas Conts)      albuterol (VENTOLIN HFA) 90 mcg/act inhaler Inhale 2 puffs every 6 (six) hours as needed for wheezing 3 Inhaler 0    aspirin 81 MG tablet Aspirin 81 MG TABS  Take 1 tablet daily   Refills: 0    Active      atorvastatin (LIPITOR) 20 mg tablet Take 40 mg by mouth Medrol Dose Pack scheduling ONLY        baclofen 20 mg tablet Take 1 tablet (20 mg total) by mouth 3 (three) times a day 270 tablet 2    budesonide (PULMICORT FLEXHALER) 180 MCG/ACT inhaler Inhale 1 puff 2 (two) times a day (Patient taking differently: Inhale 1 puff 2 (two) times a day as needed  ) 3 Inhaler 3    butalbital-acetaminophen-caffeine (FIORICET,ESGIC) -40 mg per tablet Take 1 tablet by mouth every 4 (four) hours as needed for headaches 12 tablet 4    EPINEPHrine (EPIPEN) 0 3 mg/0 3 mL SOAJ Inject 0 3 mg into the shoulder, thigh, or buttocks      ergocalciferol (VITAMIN D2) 50,000 units TAKE ONE CAPSULE WEEKLY WITH FOOD  1    famotidine (PEPCID) 20 mg tablet Famotidine 20 MG Oral Tablet  TAKE 1 TABLET DAILY PRN AS DIRECTED     Refills: 0    Active      fluticasone (FLONASE) 50 mcg/act nasal spray 2 sprays into each nostril daily 1 Bottle 3    gabapentin (NEURONTIN) 300 mg capsule Take 2 caps in am and noon and 3 caps  capsule 2    levocetirizine (XYZAL) 5 MG tablet TAKE 1 TABLET (5 MG TOTAL) BY MOUTH DAILY AS NEEDED (HIVES) 30 tablet 0    NIFEdipine (ADALAT CC) 90 mg 24 hr tablet Take 1 tablet by mouth daily (Patient taking differently: Take 60 mg by mouth daily ) 7 tablet 0    sertraline (ZOLOFT) 50 mg tablet Sertraline HCl - 50 MG Oral Tablet  TAKE 3 TABLETS DAILY   Refills: 0    Active      tolterodine (DETROL LA) 4 mg 24 hr capsule Take 1 capsule by mouth daily      valACYclovir (VALTREX) 1,000 mg tablet Take 1,000 mg by mouth as needed      betamethasone, augmented, (DIPROLENE-AF) 0 05 % cream Apply topically 2 (two) times a day To rash on left thigh until resolved (Patient not taking: Reported on 9/17/2020) 15 g 0    methylPREDNISolone 4 MG tablet therapy pack Use as directed on package 1 each 0     No current facility-administered medications for this visit  Current Outpatient Medications on File Prior to Visit   Medication Sig    albuterol (2 5 mg/3 mL) 0 083 % nebulizer solution Albuterol Sulfate (2 5 MG/3ML) 0 083% Inhalation Nebulization Solution  USE 1 UNIT DOSE IN NEBULIZER EVERY 6 HOURS AS NEEDED     Quantity: 1;  Refills: 5       Malia GARCIA ;  Started 12-July-2016  Active3 ML Plas Cont (125 Plas Conts)    albuterol (VENTOLIN HFA) 90 mcg/act inhaler Inhale 2 puffs every 6 (six) hours as needed for wheezing    aspirin 81 MG tablet Aspirin 81 MG TABS  Take 1 tablet daily   Refills: 0    Active    atorvastatin (LIPITOR) 20 mg tablet Take 40 mg by mouth Medrol Dose Pack scheduling ONLY      budesonide (PULMICORT FLEXHALER) 180 MCG/ACT inhaler Inhale 1 puff 2 (two) times a day (Patient taking differently: Inhale 1 puff 2 (two) times a day as needed  )    butalbital-acetaminophen-caffeine (FIORICET,ESGIC) -40 mg per tablet Take 1 tablet by mouth every 4 (four) hours as needed for headaches    EPINEPHrine (EPIPEN) 0 3 mg/0 3 mL SOAJ Inject 0 3 mg into the shoulder, thigh, or buttocks    ergocalciferol (VITAMIN D2) 50,000 units TAKE ONE CAPSULE WEEKLY WITH FOOD    famotidine (PEPCID) 20 mg tablet Famotidine 20 MG Oral Tablet  TAKE 1 TABLET DAILY PRN AS DIRECTED  Refills: 0    Active    fluticasone (FLONASE) 50 mcg/act nasal spray 2 sprays into each nostril daily    gabapentin (NEURONTIN) 300 mg capsule Take 2 caps in am and noon and 3 caps HS    levocetirizine (XYZAL) 5 MG tablet TAKE 1 TABLET (5 MG TOTAL) BY MOUTH DAILY AS NEEDED (HIVES)    NIFEdipine (ADALAT CC) 90 mg 24 hr tablet Take 1 tablet by mouth daily (Patient taking differently: Take 60 mg by mouth daily )    sertraline (ZOLOFT) 50 mg tablet Sertraline HCl - 50 MG Oral Tablet  TAKE 3 TABLETS DAILY   Refills: 0    Active    tolterodine (DETROL LA) 4 mg 24 hr capsule Take 1 capsule by mouth daily    valACYclovir (VALTREX) 1,000 mg tablet Take 1,000 mg by mouth as needed    [DISCONTINUED] baclofen 20 mg tablet Take 1 tablet (20 mg total) by mouth 3 (three) times a day    betamethasone, augmented, (DIPROLENE-AF) 0 05 % cream Apply topically 2 (two) times a day To rash on left thigh until resolved (Patient not taking: Reported on 9/17/2020)     No current facility-administered medications on file prior to visit  She is allergic to imitrex [sumatriptan]; keflex [cephalexin]; nuts; other; peanut oil; shellfish allergy; shellfish-derived products; copaxone [glatiramer acetate]; cat hair extract; glatiramer; pollen extract; and seasonal ic [cholestatin]            Objective:    Blood pressure 110/70, pulse 75, temperature 98 6 °F (37 °C), temperature source Temporal, height 5' (1 524 m), weight 64 4 kg (142 lb), not currently breastfeeding  Physical Exam    Neurological Exam  CARDIOVASCULAR: RRR, normal S1S2, no murmurs, no rubs  RESP: clear to auscultation bilaterally, no wheezes/rhonchi/rales  ABDOMEN: soft, non tender, non distended  SKIN: no rash or skin lesions   EXTREMITIES: no edema, pulses 2+bilaterally  PSYCH: appropriate mood and affect  NEUROLOGIC COMPREHENSIVE EXAM: Patient is oriented to person, place and time, NAD; appropriate affect  CN II, III, IV, V, VI, VII,VIII,IX,X,XI-XII intact with EOMI, PERRLA, OKN intact, VF grossly intact, fundi poorly visualized secondary to pupillary constriction; symmetric face noted  Motor: 5/5 UE/LE bilateral symmetric, RUE 4/5 shoulder abduction and finger flexion; RLU 4-/5 hip flexion and knee flexion; Sensory: intact to light touch and pinprick bilaterally; normal vibration sensation feet bilaterally; Coordination within normal limits on FTN and ROLANDO testing; DTR: 2/4 through, no Babinski, no clonus  Tandem gait is abnormal  Romberg: negative       ROS:  12 points of review of system was reviewed with the patient and was unremarkable with exception: see HPI  Review of Systems    Constitutional: Negative  Negative for appetite change and fever  HENT: Negative  Negative for hearing loss, tinnitus, trouble swallowing and voice change  Eyes: Negative  Negative for photophobia and pain  Respiratory: Negative  Negative for shortness of breath  Cardiovascular: Negative  Negative for palpitations  Gastrointestinal: Negative  Negative for nausea and vomiting  Endocrine: Negative  Negative for cold intolerance  Genitourinary: Negative  Negative for dysuria, frequency and urgency  Musculoskeletal: Negative  Negative for myalgias and neck pain  Skin: Negative  Negative for rash  Neurological: Positive for headaches  Negative for dizziness, tremors, seizures, syncope, facial asymmetry, speech difficulty, weakness, light-headedness and numbness  Hematological: Negative  Does not bruise/bleed easily  Psychiatric/Behavioral: Negative  Negative for confusion, hallucinations and sleep disturbance

## 2020-09-17 ENCOUNTER — APPOINTMENT (OUTPATIENT)
Dept: RADIOLOGY | Facility: CLINIC | Age: 60
End: 2020-09-17
Payer: COMMERCIAL

## 2020-09-17 ENCOUNTER — OFFICE VISIT (OUTPATIENT)
Dept: NEUROLOGY | Facility: CLINIC | Age: 60
End: 2020-09-17
Payer: COMMERCIAL

## 2020-09-17 VITALS
HEIGHT: 60 IN | TEMPERATURE: 98.6 F | HEART RATE: 75 BPM | SYSTOLIC BLOOD PRESSURE: 110 MMHG | DIASTOLIC BLOOD PRESSURE: 70 MMHG | WEIGHT: 142 LBS | BODY MASS INDEX: 27.88 KG/M2

## 2020-09-17 DIAGNOSIS — R68.83 CHILLS: ICD-10-CM

## 2020-09-17 DIAGNOSIS — G35 MULTIPLE SCLEROSIS (HCC): ICD-10-CM

## 2020-09-17 DIAGNOSIS — M54.50 RIGHT LOW BACK PAIN, UNSPECIFIED CHRONICITY, UNSPECIFIED WHETHER SCIATICA PRESENT: ICD-10-CM

## 2020-09-17 DIAGNOSIS — G81.91 RIGHT HEMIPARESIS (HCC): Primary | ICD-10-CM

## 2020-09-17 DIAGNOSIS — R05.3 CHRONIC COUGHING: ICD-10-CM

## 2020-09-17 DIAGNOSIS — R26.2 AMBULATORY DYSFUNCTION: ICD-10-CM

## 2020-09-17 PROCEDURE — 99214 OFFICE O/P EST MOD 30 MIN: CPT | Performed by: PSYCHIATRY & NEUROLOGY

## 2020-09-17 PROCEDURE — 71046 X-RAY EXAM CHEST 2 VIEWS: CPT

## 2020-09-17 RX ORDER — BACLOFEN 20 MG/1
20 TABLET ORAL 3 TIMES DAILY
Qty: 270 TABLET | Refills: 2 | Status: SHIPPED | OUTPATIENT
Start: 2020-09-17 | End: 2021-10-05 | Stop reason: SDUPTHER

## 2020-09-17 RX ORDER — METHYLPREDNISOLONE 4 MG/1
TABLET ORAL
Qty: 1 EACH | Refills: 0 | Status: SHIPPED | OUTPATIENT
Start: 2020-09-17 | End: 2020-11-11 | Stop reason: HOSPADM

## 2020-09-17 NOTE — PROGRESS NOTES
Patient ID: Ivan Klein is a 63 y o  female  Assessment/Plan:    No problem-specific Assessment & Plan notes found for this encounter  {Assess/PlanSmartLinks:29898}       Subjective:    HPI    {[facility] HPI texts:84387}    {Common ambulatory SmartLinks:05268}         Objective:    Blood pressure 110/70, pulse 75, temperature 98 6 °F (37 °C), temperature source Temporal, height 5' (1 524 m), weight 64 4 kg (142 lb), not currently breastfeeding  Physical Exam    Neurological Exam    Gait  T25FW: 11 12 Sec  ROS:    Review of Systems   Constitutional: Negative  Negative for appetite change and fever  HENT: Negative  Negative for hearing loss, tinnitus, trouble swallowing and voice change  Eyes: Negative  Negative for photophobia and pain  Respiratory: Negative  Negative for shortness of breath  Cardiovascular: Negative  Negative for palpitations  Gastrointestinal: Negative  Negative for nausea and vomiting  Endocrine: Negative  Negative for cold intolerance  Genitourinary: Negative  Negative for dysuria, frequency and urgency  Musculoskeletal: Negative  Negative for myalgias and neck pain  Skin: Negative  Negative for rash  Neurological: Positive for headaches  Negative for dizziness, tremors, seizures, syncope, facial asymmetry, speech difficulty, weakness, light-headedness and numbness  Hematological: Negative  Does not bruise/bleed easily  Psychiatric/Behavioral: Negative  Negative for confusion, hallucinations and sleep disturbance

## 2020-09-21 ENCOUNTER — TELEPHONE (OUTPATIENT)
Dept: NEUROLOGY | Facility: CLINIC | Age: 60
End: 2020-09-21

## 2020-09-21 NOTE — TELEPHONE ENCOUNTER
Xray results have been mailed to pcp Jessie Stoddard at      [facility]  [address] [facility]    [phone]

## 2020-09-21 NOTE — TELEPHONE ENCOUNTER
----- Message from Jazmine Gracia MD sent at 9/19/2020  5:07 PM EDT -----  Please mail this report to PCP Dr Ruperto Nielsen    ----- Message -----  From: Interface, Radiology Results In  Sent: 9/19/2020   7:43 AM EDT  To: Jazmine Gracia MD

## 2020-10-09 ENCOUNTER — TRANSCRIBE ORDERS (OUTPATIENT)
Dept: MAMMOGRAPHY | Facility: CLINIC | Age: 60
End: 2020-10-09

## 2020-10-09 DIAGNOSIS — Z12.31 SCREENING MAMMOGRAM FOR HIGH-RISK PATIENT: ICD-10-CM

## 2020-10-09 DIAGNOSIS — Z12.39 OTHER SCREENING BREAST EXAMINATION: Primary | ICD-10-CM

## 2020-10-26 ENCOUNTER — EVALUATION (OUTPATIENT)
Dept: PHYSICAL THERAPY | Facility: CLINIC | Age: 60
End: 2020-10-26
Payer: COMMERCIAL

## 2020-10-26 DIAGNOSIS — S93.401D SEVERE SPRAIN OF RIGHT ANKLE, SUBSEQUENT ENCOUNTER: ICD-10-CM

## 2020-10-26 DIAGNOSIS — R29.898 ANKLE WEAKNESS: ICD-10-CM

## 2020-10-26 DIAGNOSIS — S93.491D SPRAIN OF ANTERIOR TALOFIBULAR LIGAMENT, RIGHT, SUBSEQUENT ENCOUNTER: ICD-10-CM

## 2020-10-26 DIAGNOSIS — S93.411D SPRAIN OF CALCANEOFIBULAR LIGAMENT OF RIGHT ANKLE, SUBSEQUENT ENCOUNTER: ICD-10-CM

## 2020-10-26 DIAGNOSIS — M25.371 ANKLE INSTABILITY, RIGHT: ICD-10-CM

## 2020-10-26 PROCEDURE — 97162 PT EVAL MOD COMPLEX 30 MIN: CPT | Performed by: PHYSICAL THERAPIST

## 2020-10-26 PROCEDURE — 97140 MANUAL THERAPY 1/> REGIONS: CPT | Performed by: PHYSICAL THERAPIST

## 2020-10-27 ENCOUNTER — OFFICE VISIT (OUTPATIENT)
Dept: PHYSICAL THERAPY | Facility: CLINIC | Age: 60
End: 2020-10-27
Payer: COMMERCIAL

## 2020-10-27 ENCOUNTER — TRANSCRIBE ORDERS (OUTPATIENT)
Dept: PHYSICAL THERAPY | Facility: CLINIC | Age: 60
End: 2020-10-27

## 2020-10-27 DIAGNOSIS — R29.898 ANKLE WEAKNESS: ICD-10-CM

## 2020-10-27 DIAGNOSIS — S93.401D SEVERE SPRAIN OF RIGHT ANKLE, SUBSEQUENT ENCOUNTER: Primary | ICD-10-CM

## 2020-10-27 DIAGNOSIS — S93.411D SPRAIN OF CALCANEOFIBULAR LIGAMENT OF RIGHT ANKLE, SUBSEQUENT ENCOUNTER: ICD-10-CM

## 2020-10-27 DIAGNOSIS — S93.491D SPRAIN OF ANTERIOR TALOFIBULAR LIGAMENT, RIGHT, SUBSEQUENT ENCOUNTER: ICD-10-CM

## 2020-10-27 DIAGNOSIS — M25.371 ANKLE INSTABILITY, RIGHT: ICD-10-CM

## 2020-10-27 PROCEDURE — 97140 MANUAL THERAPY 1/> REGIONS: CPT | Performed by: PHYSICAL THERAPIST

## 2020-10-27 PROCEDURE — 97110 THERAPEUTIC EXERCISES: CPT | Performed by: PHYSICAL THERAPIST

## 2020-10-27 PROCEDURE — 97010 HOT OR COLD PACKS THERAPY: CPT | Performed by: PHYSICAL THERAPIST

## 2020-11-02 ENCOUNTER — OFFICE VISIT (OUTPATIENT)
Dept: PHYSICAL THERAPY | Facility: CLINIC | Age: 60
End: 2020-11-02
Payer: COMMERCIAL

## 2020-11-02 DIAGNOSIS — R29.898 ANKLE WEAKNESS: ICD-10-CM

## 2020-11-02 DIAGNOSIS — S93.411D SPRAIN OF CALCANEOFIBULAR LIGAMENT OF RIGHT ANKLE, SUBSEQUENT ENCOUNTER: ICD-10-CM

## 2020-11-02 DIAGNOSIS — S93.491D SPRAIN OF ANTERIOR TALOFIBULAR LIGAMENT, RIGHT, SUBSEQUENT ENCOUNTER: ICD-10-CM

## 2020-11-02 DIAGNOSIS — M25.371 ANKLE INSTABILITY, RIGHT: ICD-10-CM

## 2020-11-02 DIAGNOSIS — S93.401D SEVERE SPRAIN OF RIGHT ANKLE, SUBSEQUENT ENCOUNTER: Primary | ICD-10-CM

## 2020-11-02 PROCEDURE — 97140 MANUAL THERAPY 1/> REGIONS: CPT | Performed by: PHYSICAL THERAPIST

## 2020-11-02 PROCEDURE — 97110 THERAPEUTIC EXERCISES: CPT | Performed by: PHYSICAL THERAPIST

## 2020-11-03 ENCOUNTER — OFFICE VISIT (OUTPATIENT)
Dept: PHYSICAL THERAPY | Facility: CLINIC | Age: 60
End: 2020-11-03
Payer: COMMERCIAL

## 2020-11-03 DIAGNOSIS — M25.371 ANKLE INSTABILITY, RIGHT: ICD-10-CM

## 2020-11-03 DIAGNOSIS — S93.491D SPRAIN OF ANTERIOR TALOFIBULAR LIGAMENT, RIGHT, SUBSEQUENT ENCOUNTER: ICD-10-CM

## 2020-11-03 DIAGNOSIS — S93.411D SPRAIN OF CALCANEOFIBULAR LIGAMENT OF RIGHT ANKLE, SUBSEQUENT ENCOUNTER: ICD-10-CM

## 2020-11-03 DIAGNOSIS — R29.898 ANKLE WEAKNESS: ICD-10-CM

## 2020-11-03 DIAGNOSIS — S93.401D SEVERE SPRAIN OF RIGHT ANKLE, SUBSEQUENT ENCOUNTER: Primary | ICD-10-CM

## 2020-11-03 PROCEDURE — 97110 THERAPEUTIC EXERCISES: CPT | Performed by: PHYSICAL THERAPIST

## 2020-11-03 PROCEDURE — 97140 MANUAL THERAPY 1/> REGIONS: CPT | Performed by: PHYSICAL THERAPIST

## 2020-11-06 ENCOUNTER — HOSPITAL ENCOUNTER (INPATIENT)
Facility: HOSPITAL | Age: 60
LOS: 4 days | Discharge: HOME HEALTH | DRG: 177 | End: 2020-11-11
Attending: EMERGENCY MEDICINE | Admitting: STUDENT IN AN ORGANIZED HEALTH CARE EDUCATION/TRAINING PROGRAM
Payer: COMMERCIAL

## 2020-11-06 ENCOUNTER — APPOINTMENT (EMERGENCY)
Dept: RADIOLOGY | Facility: HOSPITAL | Age: 60
DRG: 177 | End: 2020-11-06
Payer: COMMERCIAL

## 2020-11-06 DIAGNOSIS — J12.82 PNEUMONIA DUE TO COVID-19 VIRUS: ICD-10-CM

## 2020-11-06 DIAGNOSIS — G35 MULTIPLE SCLEROSIS (HCC): ICD-10-CM

## 2020-11-06 DIAGNOSIS — U07.1 PNEUMONIA DUE TO COVID-19 VIRUS: ICD-10-CM

## 2020-11-06 DIAGNOSIS — J44.1 CHRONIC OBSTRUCTIVE PULMONARY DISEASE WITH ACUTE EXACERBATION (HCC): ICD-10-CM

## 2020-11-06 DIAGNOSIS — E87.6 HYPOKALEMIA: ICD-10-CM

## 2020-11-06 DIAGNOSIS — U07.1 COVID-19: Primary | ICD-10-CM

## 2020-11-06 PROBLEM — I10 HYPERTENSION: Status: ACTIVE | Noted: 2017-04-06

## 2020-11-06 LAB
ALBUMIN SERPL BCP-MCNC: 4.3 G/DL (ref 3.5–5)
ALP SERPL-CCNC: 139 U/L (ref 46–116)
ALT SERPL W P-5'-P-CCNC: 46 U/L (ref 12–78)
ANION GAP SERPL CALCULATED.3IONS-SCNC: 11 MMOL/L (ref 4–13)
AST SERPL W P-5'-P-CCNC: 24 U/L (ref 5–45)
BASOPHILS # BLD AUTO: 0.03 THOUSANDS/ΜL (ref 0–0.1)
BASOPHILS NFR BLD AUTO: 1 % (ref 0–1)
BILIRUB SERPL-MCNC: 0.5 MG/DL (ref 0.2–1)
BUN SERPL-MCNC: 9 MG/DL (ref 5–25)
CALCIUM SERPL-MCNC: 9.3 MG/DL (ref 8.3–10.1)
CHLORIDE SERPL-SCNC: 104 MMOL/L (ref 100–108)
CO2 SERPL-SCNC: 27 MMOL/L (ref 21–32)
CREAT SERPL-MCNC: 0.75 MG/DL (ref 0.6–1.3)
EOSINOPHIL # BLD AUTO: 0.06 THOUSAND/ΜL (ref 0–0.61)
EOSINOPHIL NFR BLD AUTO: 1 % (ref 0–6)
ERYTHROCYTE [DISTWIDTH] IN BLOOD BY AUTOMATED COUNT: 12.5 % (ref 11.6–15.1)
GFR SERPL CREATININE-BSD FRML MDRD: 86 ML/MIN/1.73SQ M
GLUCOSE SERPL-MCNC: 79 MG/DL (ref 65–140)
HCT VFR BLD AUTO: 44.6 % (ref 34.8–46.1)
HGB BLD-MCNC: 14.7 G/DL (ref 11.5–15.4)
IMM GRANULOCYTES # BLD AUTO: 0.03 THOUSAND/UL (ref 0–0.2)
IMM GRANULOCYTES NFR BLD AUTO: 1 % (ref 0–2)
LYMPHOCYTES # BLD AUTO: 0.69 THOUSANDS/ΜL (ref 0.6–4.47)
LYMPHOCYTES NFR BLD AUTO: 11 % (ref 14–44)
MCH RBC QN AUTO: 28.7 PG (ref 26.8–34.3)
MCHC RBC AUTO-ENTMCNC: 33 G/DL (ref 31.4–37.4)
MCV RBC AUTO: 87 FL (ref 82–98)
MONOCYTES # BLD AUTO: 0.77 THOUSAND/ΜL (ref 0.17–1.22)
MONOCYTES NFR BLD AUTO: 12 % (ref 4–12)
NEUTROPHILS # BLD AUTO: 4.61 THOUSANDS/ΜL (ref 1.85–7.62)
NEUTS SEG NFR BLD AUTO: 74 % (ref 43–75)
NRBC BLD AUTO-RTO: 0 /100 WBCS
PLATELET # BLD AUTO: 223 THOUSANDS/UL (ref 149–390)
PMV BLD AUTO: 10.6 FL (ref 8.9–12.7)
POTASSIUM SERPL-SCNC: 3 MMOL/L (ref 3.5–5.3)
PROT SERPL-MCNC: 7.8 G/DL (ref 6.4–8.2)
RBC # BLD AUTO: 5.13 MILLION/UL (ref 3.81–5.12)
SARS-COV-2 RNA RESP QL NAA+PROBE: POSITIVE
SODIUM SERPL-SCNC: 142 MMOL/L (ref 136–145)
TROPONIN I SERPL-MCNC: <0.02 NG/ML
WBC # BLD AUTO: 6.19 THOUSAND/UL (ref 4.31–10.16)

## 2020-11-06 PROCEDURE — 36415 COLL VENOUS BLD VENIPUNCTURE: CPT | Performed by: EMERGENCY MEDICINE

## 2020-11-06 PROCEDURE — 99220 PR INITIAL OBSERVATION CARE/DAY 70 MINUTES: CPT | Performed by: INTERNAL MEDICINE

## 2020-11-06 PROCEDURE — 94664 DEMO&/EVAL PT USE INHALER: CPT

## 2020-11-06 PROCEDURE — 84484 ASSAY OF TROPONIN QUANT: CPT | Performed by: EMERGENCY MEDICINE

## 2020-11-06 PROCEDURE — 80053 COMPREHEN METABOLIC PANEL: CPT | Performed by: EMERGENCY MEDICINE

## 2020-11-06 PROCEDURE — 99285 EMERGENCY DEPT VISIT HI MDM: CPT

## 2020-11-06 PROCEDURE — 96374 THER/PROPH/DIAG INJ IV PUSH: CPT

## 2020-11-06 PROCEDURE — 99285 EMERGENCY DEPT VISIT HI MDM: CPT | Performed by: EMERGENCY MEDICINE

## 2020-11-06 PROCEDURE — 71045 X-RAY EXAM CHEST 1 VIEW: CPT

## 2020-11-06 PROCEDURE — 85025 COMPLETE CBC W/AUTO DIFF WBC: CPT | Performed by: EMERGENCY MEDICINE

## 2020-11-06 PROCEDURE — 87635 SARS-COV-2 COVID-19 AMP PRB: CPT | Performed by: EMERGENCY MEDICINE

## 2020-11-06 RX ORDER — FAMOTIDINE 20 MG/1
20 TABLET, FILM COATED ORAL DAILY
Status: DISCONTINUED | OUTPATIENT
Start: 2020-11-07 | End: 2020-11-11 | Stop reason: HOSPADM

## 2020-11-06 RX ORDER — MELATONIN
2000 DAILY
Status: DISCONTINUED | OUTPATIENT
Start: 2020-11-07 | End: 2020-11-11 | Stop reason: HOSPADM

## 2020-11-06 RX ORDER — ACETAMINOPHEN 325 MG/1
650 TABLET ORAL EVERY 6 HOURS PRN
Status: DISCONTINUED | OUTPATIENT
Start: 2020-11-06 | End: 2020-11-07

## 2020-11-06 RX ORDER — POTASSIUM CHLORIDE 14.9 MG/ML
20 INJECTION INTRAVENOUS ONCE
Status: COMPLETED | OUTPATIENT
Start: 2020-11-06 | End: 2020-11-06

## 2020-11-06 RX ORDER — BENZONATATE 200 MG/1
200 CAPSULE ORAL 3 TIMES DAILY
COMMUNITY
Start: 2020-08-12 | End: 2022-05-05 | Stop reason: ALTCHOICE

## 2020-11-06 RX ORDER — BUDESONIDE 0.25 MG/2ML
0.25 INHALANT ORAL
Status: DISCONTINUED | OUTPATIENT
Start: 2020-11-07 | End: 2020-11-06

## 2020-11-06 RX ORDER — BENZONATATE 100 MG/1
200 CAPSULE ORAL 3 TIMES DAILY
Status: DISCONTINUED | OUTPATIENT
Start: 2020-11-06 | End: 2020-11-11 | Stop reason: HOSPADM

## 2020-11-06 RX ORDER — GABAPENTIN 300 MG/1
300 CAPSULE ORAL 2 TIMES DAILY
Status: DISCONTINUED | OUTPATIENT
Start: 2020-11-06 | End: 2020-11-11 | Stop reason: HOSPADM

## 2020-11-06 RX ORDER — IPRATROPIUM BROMIDE AND ALBUTEROL SULFATE 2.5; .5 MG/3ML; MG/3ML
3 SOLUTION RESPIRATORY (INHALATION) ONCE
Status: COMPLETED | OUTPATIENT
Start: 2020-11-06 | End: 2020-11-06

## 2020-11-06 RX ORDER — NIFEDIPINE 30 MG/1
60 TABLET, EXTENDED RELEASE ORAL DAILY
Status: DISCONTINUED | OUTPATIENT
Start: 2020-11-07 | End: 2020-11-08

## 2020-11-06 RX ORDER — FLUTICASONE PROPIONATE 50 MCG
2 SPRAY, SUSPENSION (ML) NASAL DAILY
Status: DISCONTINUED | OUTPATIENT
Start: 2020-11-07 | End: 2020-11-11 | Stop reason: HOSPADM

## 2020-11-06 RX ORDER — HEPARIN SODIUM 5000 [USP'U]/ML
5000 INJECTION, SOLUTION INTRAVENOUS; SUBCUTANEOUS EVERY 8 HOURS SCHEDULED
Status: DISCONTINUED | OUTPATIENT
Start: 2020-11-06 | End: 2020-11-11 | Stop reason: HOSPADM

## 2020-11-06 RX ORDER — ALBUTEROL SULFATE 90 UG/1
2 AEROSOL, METERED RESPIRATORY (INHALATION) EVERY 6 HOURS PRN
Status: DISCONTINUED | OUTPATIENT
Start: 2020-11-06 | End: 2020-11-11 | Stop reason: HOSPADM

## 2020-11-06 RX ORDER — OXYBUTYNIN CHLORIDE 5 MG/1
10 TABLET, EXTENDED RELEASE ORAL DAILY
Status: DISCONTINUED | OUTPATIENT
Start: 2020-11-07 | End: 2020-11-11 | Stop reason: HOSPADM

## 2020-11-06 RX ORDER — ALBUTEROL SULFATE 2.5 MG/3ML
2.5 SOLUTION RESPIRATORY (INHALATION) EVERY 6 HOURS PRN
Status: DISCONTINUED | OUTPATIENT
Start: 2020-11-06 | End: 2020-11-06

## 2020-11-06 RX ORDER — ATORVASTATIN CALCIUM 40 MG/1
40 TABLET, FILM COATED ORAL
Status: DISCONTINUED | OUTPATIENT
Start: 2020-11-06 | End: 2020-11-11 | Stop reason: HOSPADM

## 2020-11-06 RX ORDER — ASPIRIN 81 MG/1
81 TABLET, CHEWABLE ORAL DAILY
Status: DISCONTINUED | OUTPATIENT
Start: 2020-11-07 | End: 2020-11-11 | Stop reason: HOSPADM

## 2020-11-06 RX ORDER — BACLOFEN 10 MG/1
20 TABLET ORAL 3 TIMES DAILY
Status: DISCONTINUED | OUTPATIENT
Start: 2020-11-06 | End: 2020-11-11 | Stop reason: HOSPADM

## 2020-11-06 RX ORDER — METHYLPREDNISOLONE SODIUM SUCCINATE 125 MG/2ML
125 INJECTION, POWDER, LYOPHILIZED, FOR SOLUTION INTRAMUSCULAR; INTRAVENOUS ONCE
Status: COMPLETED | OUTPATIENT
Start: 2020-11-06 | End: 2020-11-06

## 2020-11-06 RX ADMIN — GABAPENTIN 300 MG: 300 CAPSULE ORAL at 21:32

## 2020-11-06 RX ADMIN — POTASSIUM CHLORIDE 20 MEQ: 200 INJECTION, SOLUTION INTRAVENOUS at 20:50

## 2020-11-06 RX ADMIN — ACETAMINOPHEN 650 MG: 325 TABLET, FILM COATED ORAL at 21:32

## 2020-11-06 RX ADMIN — BACLOFEN 20 MG: 10 TABLET ORAL at 21:32

## 2020-11-06 RX ADMIN — ATORVASTATIN CALCIUM 40 MG: 40 TABLET, FILM COATED ORAL at 21:32

## 2020-11-06 RX ADMIN — BENZONATATE 200 MG: 100 CAPSULE ORAL at 21:32

## 2020-11-06 RX ADMIN — IPRATROPIUM BROMIDE AND ALBUTEROL SULFATE 3 ML: 2.5; .5 SOLUTION RESPIRATORY (INHALATION) at 19:03

## 2020-11-06 RX ADMIN — METHYLPREDNISOLONE SODIUM SUCCINATE 125 MG: 125 INJECTION, POWDER, FOR SOLUTION INTRAMUSCULAR; INTRAVENOUS at 19:03

## 2020-11-07 LAB
ALBUMIN SERPL BCP-MCNC: 3.6 G/DL (ref 3.5–5)
ALP SERPL-CCNC: 122 U/L (ref 46–116)
ALT SERPL W P-5'-P-CCNC: 38 U/L (ref 12–78)
ANION GAP SERPL CALCULATED.3IONS-SCNC: 13 MMOL/L (ref 4–13)
AST SERPL W P-5'-P-CCNC: 20 U/L (ref 5–45)
BASOPHILS # BLD AUTO: 0.01 THOUSANDS/ΜL (ref 0–0.1)
BASOPHILS NFR BLD AUTO: 0 % (ref 0–1)
BILIRUB SERPL-MCNC: 0.4 MG/DL (ref 0.2–1)
BUN SERPL-MCNC: 10 MG/DL (ref 5–25)
CALCIUM SERPL-MCNC: 9.2 MG/DL (ref 8.3–10.1)
CHLORIDE SERPL-SCNC: 107 MMOL/L (ref 100–108)
CO2 SERPL-SCNC: 23 MMOL/L (ref 21–32)
CREAT SERPL-MCNC: 0.89 MG/DL (ref 0.6–1.3)
CRP SERPL QL: 23.8 MG/L
D DIMER PPP FEU-MCNC: 0.32 UG/ML FEU
EOSINOPHIL # BLD AUTO: 0 THOUSAND/ΜL (ref 0–0.61)
EOSINOPHIL NFR BLD AUTO: 0 % (ref 0–6)
ERYTHROCYTE [DISTWIDTH] IN BLOOD BY AUTOMATED COUNT: 12.6 % (ref 11.6–15.1)
FERRITIN SERPL-MCNC: 133 NG/ML (ref 8–388)
GFR SERPL CREATININE-BSD FRML MDRD: 70 ML/MIN/1.73SQ M
GLUCOSE P FAST SERPL-MCNC: 196 MG/DL (ref 65–99)
GLUCOSE SERPL-MCNC: 196 MG/DL (ref 65–140)
GLUCOSE SERPL-MCNC: 223 MG/DL (ref 65–140)
HCT VFR BLD AUTO: 40.9 % (ref 34.8–46.1)
HGB BLD-MCNC: 13.3 G/DL (ref 11.5–15.4)
IMM GRANULOCYTES # BLD AUTO: 0.02 THOUSAND/UL (ref 0–0.2)
IMM GRANULOCYTES NFR BLD AUTO: 0 % (ref 0–2)
LYMPHOCYTES # BLD AUTO: 0.71 THOUSANDS/ΜL (ref 0.6–4.47)
LYMPHOCYTES NFR BLD AUTO: 15 % (ref 14–44)
MCH RBC QN AUTO: 28.4 PG (ref 26.8–34.3)
MCHC RBC AUTO-ENTMCNC: 32.5 G/DL (ref 31.4–37.4)
MCV RBC AUTO: 87 FL (ref 82–98)
MONOCYTES # BLD AUTO: 0.14 THOUSAND/ΜL (ref 0.17–1.22)
MONOCYTES NFR BLD AUTO: 3 % (ref 4–12)
NEUTROPHILS # BLD AUTO: 3.76 THOUSANDS/ΜL (ref 1.85–7.62)
NEUTS SEG NFR BLD AUTO: 82 % (ref 43–75)
NRBC BLD AUTO-RTO: 0 /100 WBCS
PLATELET # BLD AUTO: 233 THOUSANDS/UL (ref 149–390)
PMV BLD AUTO: 11 FL (ref 8.9–12.7)
POTASSIUM SERPL-SCNC: 3.7 MMOL/L (ref 3.5–5.3)
PROCALCITONIN SERPL-MCNC: <0.05 NG/ML
PROT SERPL-MCNC: 7.1 G/DL (ref 6.4–8.2)
RBC # BLD AUTO: 4.69 MILLION/UL (ref 3.81–5.12)
SODIUM SERPL-SCNC: 143 MMOL/L (ref 136–145)
WBC # BLD AUTO: 4.64 THOUSAND/UL (ref 4.31–10.16)

## 2020-11-07 PROCEDURE — 84145 PROCALCITONIN (PCT): CPT | Performed by: INTERNAL MEDICINE

## 2020-11-07 PROCEDURE — 99232 SBSQ HOSP IP/OBS MODERATE 35: CPT | Performed by: STUDENT IN AN ORGANIZED HEALTH CARE EDUCATION/TRAINING PROGRAM

## 2020-11-07 PROCEDURE — 86140 C-REACTIVE PROTEIN: CPT | Performed by: INTERNAL MEDICINE

## 2020-11-07 PROCEDURE — 85025 COMPLETE CBC W/AUTO DIFF WBC: CPT | Performed by: INTERNAL MEDICINE

## 2020-11-07 PROCEDURE — 80053 COMPREHEN METABOLIC PANEL: CPT | Performed by: INTERNAL MEDICINE

## 2020-11-07 PROCEDURE — 82948 REAGENT STRIP/BLOOD GLUCOSE: CPT

## 2020-11-07 PROCEDURE — 82728 ASSAY OF FERRITIN: CPT | Performed by: INTERNAL MEDICINE

## 2020-11-07 PROCEDURE — XW033E5 INTRODUCTION OF REMDESIVIR ANTI-INFECTIVE INTO PERIPHERAL VEIN, PERCUTANEOUS APPROACH, NEW TECHNOLOGY GROUP 5: ICD-10-PCS | Performed by: STUDENT IN AN ORGANIZED HEALTH CARE EDUCATION/TRAINING PROGRAM

## 2020-11-07 PROCEDURE — 85379 FIBRIN DEGRADATION QUANT: CPT | Performed by: INTERNAL MEDICINE

## 2020-11-07 RX ORDER — ACETAMINOPHEN 325 MG/1
650 TABLET ORAL EVERY 6 HOURS PRN
Status: DISCONTINUED | OUTPATIENT
Start: 2020-11-07 | End: 2020-11-11 | Stop reason: HOSPADM

## 2020-11-07 RX ORDER — BUTALBITAL, ACETAMINOPHEN AND CAFFEINE 50; 325; 40 MG/1; MG/1; MG/1
1 TABLET ORAL EVERY 4 HOURS PRN
Status: DISCONTINUED | OUTPATIENT
Start: 2020-11-07 | End: 2020-11-11 | Stop reason: HOSPADM

## 2020-11-07 RX ADMIN — REMDESIVIR 200 MG: 100 INJECTION, POWDER, LYOPHILIZED, FOR SOLUTION INTRAVENOUS at 15:27

## 2020-11-07 RX ADMIN — ALBUTEROL SULFATE 2 PUFF: 90 AEROSOL, METERED RESPIRATORY (INHALATION) at 20:31

## 2020-11-07 RX ADMIN — BUTALBITAL, ACETAMINOPHEN AND CAFFEINE 1 TABLET: 50; 325; 40 TABLET ORAL at 20:30

## 2020-11-07 RX ADMIN — BENZONATATE 200 MG: 100 CAPSULE ORAL at 15:32

## 2020-11-07 RX ADMIN — GABAPENTIN 300 MG: 300 CAPSULE ORAL at 08:10

## 2020-11-07 RX ADMIN — BACLOFEN 20 MG: 10 TABLET ORAL at 20:30

## 2020-11-07 RX ADMIN — NIFEDIPINE 60 MG: 30 TABLET, EXTENDED RELEASE ORAL at 08:23

## 2020-11-07 RX ADMIN — FAMOTIDINE 20 MG: 20 TABLET, FILM COATED ORAL at 08:10

## 2020-11-07 RX ADMIN — BENZONATATE 200 MG: 100 CAPSULE ORAL at 20:30

## 2020-11-07 RX ADMIN — BENZONATATE 200 MG: 100 CAPSULE ORAL at 08:10

## 2020-11-07 RX ADMIN — BUTALBITAL, ACETAMINOPHEN AND CAFFEINE 1 TABLET: 50; 325; 40 TABLET ORAL at 15:32

## 2020-11-07 RX ADMIN — Medication 2000 UNITS: at 08:09

## 2020-11-07 RX ADMIN — ATORVASTATIN CALCIUM 40 MG: 40 TABLET, FILM COATED ORAL at 20:31

## 2020-11-07 RX ADMIN — SERTRALINE HYDROCHLORIDE 50 MG: 50 TABLET ORAL at 08:09

## 2020-11-07 RX ADMIN — BACLOFEN 20 MG: 10 TABLET ORAL at 08:10

## 2020-11-07 RX ADMIN — OXYBUTYNIN 10 MG: 5 TABLET, FILM COATED, EXTENDED RELEASE ORAL at 08:11

## 2020-11-07 RX ADMIN — BUTALBITAL, ACETAMINOPHEN AND CAFFEINE 1 TABLET: 50; 325; 40 TABLET ORAL at 08:09

## 2020-11-07 RX ADMIN — GABAPENTIN 300 MG: 300 CAPSULE ORAL at 18:07

## 2020-11-07 RX ADMIN — ACETAMINOPHEN 650 MG: 325 TABLET, FILM COATED ORAL at 04:08

## 2020-11-07 RX ADMIN — ASPIRIN 81 MG: 81 TABLET, CHEWABLE ORAL at 08:10

## 2020-11-07 RX ADMIN — BACLOFEN 20 MG: 10 TABLET ORAL at 15:32

## 2020-11-08 ENCOUNTER — APPOINTMENT (INPATIENT)
Dept: RADIOLOGY | Facility: HOSPITAL | Age: 60
DRG: 177 | End: 2020-11-08
Payer: COMMERCIAL

## 2020-11-08 LAB
ALBUMIN SERPL BCP-MCNC: 3.3 G/DL (ref 3.5–5)
ALP SERPL-CCNC: 108 U/L (ref 46–116)
ALT SERPL W P-5'-P-CCNC: 37 U/L (ref 12–78)
ANION GAP SERPL CALCULATED.3IONS-SCNC: 10 MMOL/L (ref 4–13)
AST SERPL W P-5'-P-CCNC: 21 U/L (ref 5–45)
ATRIAL RATE: 81 BPM
BILIRUB SERPL-MCNC: 0.3 MG/DL (ref 0.2–1)
BUN SERPL-MCNC: 10 MG/DL (ref 5–25)
CALCIUM ALBUM COR SERPL-MCNC: 8.7 MG/DL (ref 8.3–10.1)
CALCIUM SERPL-MCNC: 8.1 MG/DL (ref 8.3–10.1)
CHLORIDE SERPL-SCNC: 111 MMOL/L (ref 100–108)
CK MB SERPL-MCNC: 0.8 NG/ML (ref 0–5)
CK MB SERPL-MCNC: <1 % (ref 0–2.5)
CK SERPL-CCNC: 185 U/L (ref 26–192)
CO2 SERPL-SCNC: 26 MMOL/L (ref 21–32)
CREAT SERPL-MCNC: 0.69 MG/DL (ref 0.6–1.3)
CRP SERPL QL: 10.7 MG/L
D DIMER PPP FEU-MCNC: 0.31 UG/ML FEU
ERYTHROCYTE [DISTWIDTH] IN BLOOD BY AUTOMATED COUNT: 13.1 % (ref 11.6–15.1)
GFR SERPL CREATININE-BSD FRML MDRD: 94 ML/MIN/1.73SQ M
GLUCOSE SERPL-MCNC: 102 MG/DL (ref 65–140)
HCT VFR BLD AUTO: 40.6 % (ref 34.8–46.1)
HGB BLD-MCNC: 13.2 G/DL (ref 11.5–15.4)
MCH RBC QN AUTO: 28.6 PG (ref 26.8–34.3)
MCHC RBC AUTO-ENTMCNC: 32.5 G/DL (ref 31.4–37.4)
MCV RBC AUTO: 88 FL (ref 82–98)
P AXIS: 48 DEGREES
PLATELET # BLD AUTO: 220 THOUSANDS/UL (ref 149–390)
PMV BLD AUTO: 11.6 FL (ref 8.9–12.7)
POTASSIUM SERPL-SCNC: 3 MMOL/L (ref 3.5–5.3)
PR INTERVAL: 126 MS
PROCALCITONIN SERPL-MCNC: <0.05 NG/ML
PROT SERPL-MCNC: 6.3 G/DL (ref 6.4–8.2)
QRS AXIS: 12 DEGREES
QRSD INTERVAL: 80 MS
QT INTERVAL: 394 MS
QTC INTERVAL: 457 MS
RBC # BLD AUTO: 4.62 MILLION/UL (ref 3.81–5.12)
SODIUM SERPL-SCNC: 147 MMOL/L (ref 136–145)
T WAVE AXIS: 30 DEGREES
TROPONIN I SERPL-MCNC: <0.02 NG/ML
TROPONIN I SERPL-MCNC: <0.02 NG/ML
VENTRICULAR RATE: 81 BPM
WBC # BLD AUTO: 6.82 THOUSAND/UL (ref 4.31–10.16)

## 2020-11-08 PROCEDURE — 80053 COMPREHEN METABOLIC PANEL: CPT | Performed by: STUDENT IN AN ORGANIZED HEALTH CARE EDUCATION/TRAINING PROGRAM

## 2020-11-08 PROCEDURE — 84145 PROCALCITONIN (PCT): CPT | Performed by: INTERNAL MEDICINE

## 2020-11-08 PROCEDURE — 85027 COMPLETE CBC AUTOMATED: CPT | Performed by: STUDENT IN AN ORGANIZED HEALTH CARE EDUCATION/TRAINING PROGRAM

## 2020-11-08 PROCEDURE — 84484 ASSAY OF TROPONIN QUANT: CPT | Performed by: STUDENT IN AN ORGANIZED HEALTH CARE EDUCATION/TRAINING PROGRAM

## 2020-11-08 PROCEDURE — 99233 SBSQ HOSP IP/OBS HIGH 50: CPT | Performed by: STUDENT IN AN ORGANIZED HEALTH CARE EDUCATION/TRAINING PROGRAM

## 2020-11-08 PROCEDURE — 86140 C-REACTIVE PROTEIN: CPT | Performed by: INTERNAL MEDICINE

## 2020-11-08 PROCEDURE — 93005 ELECTROCARDIOGRAM TRACING: CPT

## 2020-11-08 PROCEDURE — 83735 ASSAY OF MAGNESIUM: CPT | Performed by: INTERNAL MEDICINE

## 2020-11-08 PROCEDURE — 93010 ELECTROCARDIOGRAM REPORT: CPT | Performed by: INTERNAL MEDICINE

## 2020-11-08 PROCEDURE — 82553 CREATINE MB FRACTION: CPT | Performed by: INTERNAL MEDICINE

## 2020-11-08 PROCEDURE — 85379 FIBRIN DEGRADATION QUANT: CPT | Performed by: INTERNAL MEDICINE

## 2020-11-08 PROCEDURE — 82550 ASSAY OF CK (CPK): CPT | Performed by: INTERNAL MEDICINE

## 2020-11-08 PROCEDURE — 82728 ASSAY OF FERRITIN: CPT | Performed by: INTERNAL MEDICINE

## 2020-11-08 PROCEDURE — 71045 X-RAY EXAM CHEST 1 VIEW: CPT

## 2020-11-08 RX ORDER — POTASSIUM CHLORIDE 20 MEQ/1
40 TABLET, EXTENDED RELEASE ORAL ONCE
Status: COMPLETED | OUTPATIENT
Start: 2020-11-08 | End: 2020-11-08

## 2020-11-08 RX ORDER — SODIUM CHLORIDE, SODIUM GLUCONATE, SODIUM ACETATE, POTASSIUM CHLORIDE, MAGNESIUM CHLORIDE, SODIUM PHOSPHATE, DIBASIC, AND POTASSIUM PHOSPHATE .53; .5; .37; .037; .03; .012; .00082 G/100ML; G/100ML; G/100ML; G/100ML; G/100ML; G/100ML; G/100ML
100 INJECTION, SOLUTION INTRAVENOUS CONTINUOUS
Status: DISCONTINUED | OUTPATIENT
Start: 2020-11-08 | End: 2020-11-08

## 2020-11-08 RX ORDER — IPRATROPIUM BROMIDE AND ALBUTEROL SULFATE 2.5; .5 MG/3ML; MG/3ML
3 SOLUTION RESPIRATORY (INHALATION)
Status: DISCONTINUED | OUTPATIENT
Start: 2020-11-08 | End: 2020-11-08

## 2020-11-08 RX ORDER — DEXAMETHASONE SODIUM PHOSPHATE 4 MG/ML
6 INJECTION, SOLUTION INTRA-ARTICULAR; INTRALESIONAL; INTRAMUSCULAR; INTRAVENOUS; SOFT TISSUE DAILY
Status: DISCONTINUED | OUTPATIENT
Start: 2020-11-08 | End: 2020-11-10

## 2020-11-08 RX ORDER — SODIUM CHLORIDE, SODIUM GLUCONATE, SODIUM ACETATE, POTASSIUM CHLORIDE, MAGNESIUM CHLORIDE, SODIUM PHOSPHATE, DIBASIC, AND POTASSIUM PHOSPHATE .53; .5; .37; .037; .03; .012; .00082 G/100ML; G/100ML; G/100ML; G/100ML; G/100ML; G/100ML; G/100ML
100 INJECTION, SOLUTION INTRAVENOUS CONTINUOUS
Status: ACTIVE | OUTPATIENT
Start: 2020-11-08 | End: 2020-11-08

## 2020-11-08 RX ORDER — ONDANSETRON 2 MG/ML
4 INJECTION INTRAMUSCULAR; INTRAVENOUS EVERY 6 HOURS PRN
Status: DISCONTINUED | OUTPATIENT
Start: 2020-11-08 | End: 2020-11-11 | Stop reason: HOSPADM

## 2020-11-08 RX ORDER — POTASSIUM CHLORIDE 14.9 MG/ML
20 INJECTION INTRAVENOUS ONCE
Status: COMPLETED | OUTPATIENT
Start: 2020-11-08 | End: 2020-11-08

## 2020-11-08 RX ORDER — MAGNESIUM SULFATE HEPTAHYDRATE 40 MG/ML
2 INJECTION, SOLUTION INTRAVENOUS ONCE
Status: COMPLETED | OUTPATIENT
Start: 2020-11-08 | End: 2020-11-08

## 2020-11-08 RX ADMIN — BUTALBITAL, ACETAMINOPHEN AND CAFFEINE 1 TABLET: 50; 325; 40 TABLET ORAL at 18:08

## 2020-11-08 RX ADMIN — ACETAMINOPHEN 650 MG: 325 TABLET, FILM COATED ORAL at 15:36

## 2020-11-08 RX ADMIN — ONDANSETRON 4 MG: 2 INJECTION INTRAMUSCULAR; INTRAVENOUS at 18:08

## 2020-11-08 RX ADMIN — BENZONATATE 200 MG: 100 CAPSULE ORAL at 08:45

## 2020-11-08 RX ADMIN — MAGNESIUM SULFATE HEPTAHYDRATE 2 G: 40 INJECTION, SOLUTION INTRAVENOUS at 11:13

## 2020-11-08 RX ADMIN — GABAPENTIN 300 MG: 300 CAPSULE ORAL at 17:57

## 2020-11-08 RX ADMIN — ACETAMINOPHEN 650 MG: 325 TABLET, FILM COATED ORAL at 23:18

## 2020-11-08 RX ADMIN — BUTALBITAL, ACETAMINOPHEN AND CAFFEINE 1 TABLET: 50; 325; 40 TABLET ORAL at 21:40

## 2020-11-08 RX ADMIN — SODIUM CHLORIDE, SODIUM GLUCONATE, SODIUM ACETATE, POTASSIUM CHLORIDE, MAGNESIUM CHLORIDE, SODIUM PHOSPHATE, DIBASIC, AND POTASSIUM PHOSPHATE 100 ML/HR: .53; .5; .37; .037; .03; .012; .00082 INJECTION, SOLUTION INTRAVENOUS at 12:30

## 2020-11-08 RX ADMIN — POTASSIUM CHLORIDE 20 MEQ: 14.9 INJECTION, SOLUTION INTRAVENOUS at 11:14

## 2020-11-08 RX ADMIN — ASPIRIN 81 MG: 81 TABLET, CHEWABLE ORAL at 08:46

## 2020-11-08 RX ADMIN — BUTALBITAL, ACETAMINOPHEN AND CAFFEINE 1 TABLET: 50; 325; 40 TABLET ORAL at 05:29

## 2020-11-08 RX ADMIN — GABAPENTIN 300 MG: 300 CAPSULE ORAL at 08:46

## 2020-11-08 RX ADMIN — SODIUM CHLORIDE, SODIUM GLUCONATE, SODIUM ACETATE, POTASSIUM CHLORIDE, MAGNESIUM CHLORIDE, SODIUM PHOSPHATE, DIBASIC, AND POTASSIUM PHOSPHATE 100 ML/HR: .53; .5; .37; .037; .03; .012; .00082 INJECTION, SOLUTION INTRAVENOUS at 11:44

## 2020-11-08 RX ADMIN — FAMOTIDINE 20 MG: 20 TABLET, FILM COATED ORAL at 08:45

## 2020-11-08 RX ADMIN — Medication 2000 UNITS: at 08:45

## 2020-11-08 RX ADMIN — REMDESIVIR 100 MG: 100 INJECTION, POWDER, LYOPHILIZED, FOR SOLUTION INTRAVENOUS at 13:21

## 2020-11-08 RX ADMIN — ALBUTEROL SULFATE 2 PUFF: 90 AEROSOL, METERED RESPIRATORY (INHALATION) at 08:54

## 2020-11-08 RX ADMIN — BUTALBITAL, ACETAMINOPHEN AND CAFFEINE 1 TABLET: 50; 325; 40 TABLET ORAL at 09:19

## 2020-11-08 RX ADMIN — FLUTICASONE PROPIONATE 2 SPRAY: 50 SPRAY, METERED NASAL at 08:46

## 2020-11-08 RX ADMIN — POTASSIUM CHLORIDE 40 MEQ: 1500 TABLET, EXTENDED RELEASE ORAL at 11:15

## 2020-11-08 RX ADMIN — DEXAMETHASONE SODIUM PHOSPHATE 6 MG: 4 INJECTION, SOLUTION INTRAMUSCULAR; INTRAVENOUS at 13:42

## 2020-11-08 RX ADMIN — BENZONATATE 200 MG: 100 CAPSULE ORAL at 21:03

## 2020-11-08 RX ADMIN — BENZONATATE 200 MG: 100 CAPSULE ORAL at 15:36

## 2020-11-08 RX ADMIN — BACLOFEN 20 MG: 10 TABLET ORAL at 21:03

## 2020-11-08 RX ADMIN — NIFEDIPINE 60 MG: 30 TABLET, EXTENDED RELEASE ORAL at 08:56

## 2020-11-08 RX ADMIN — BUTALBITAL, ACETAMINOPHEN AND CAFFEINE 1 TABLET: 50; 325; 40 TABLET ORAL at 13:42

## 2020-11-08 RX ADMIN — BACLOFEN 20 MG: 10 TABLET ORAL at 08:45

## 2020-11-08 RX ADMIN — BACLOFEN 20 MG: 10 TABLET ORAL at 15:36

## 2020-11-08 RX ADMIN — SERTRALINE HYDROCHLORIDE 50 MG: 50 TABLET ORAL at 08:44

## 2020-11-08 RX ADMIN — ATORVASTATIN CALCIUM 40 MG: 40 TABLET, FILM COATED ORAL at 21:11

## 2020-11-08 RX ADMIN — ACETAMINOPHEN 650 MG: 325 TABLET, FILM COATED ORAL at 08:44

## 2020-11-08 RX ADMIN — OXYBUTYNIN 10 MG: 5 TABLET, FILM COATED, EXTENDED RELEASE ORAL at 08:45

## 2020-11-08 RX ADMIN — BUTALBITAL, ACETAMINOPHEN AND CAFFEINE 1 TABLET: 50; 325; 40 TABLET ORAL at 01:45

## 2020-11-09 ENCOUNTER — APPOINTMENT (OUTPATIENT)
Dept: PHYSICAL THERAPY | Facility: CLINIC | Age: 60
End: 2020-11-09
Payer: COMMERCIAL

## 2020-11-09 PROBLEM — I95.9 HYPOTENSION: Status: ACTIVE | Noted: 2020-11-09

## 2020-11-09 LAB
ALBUMIN SERPL BCP-MCNC: 3.2 G/DL (ref 3.5–5)
ALP SERPL-CCNC: 96 U/L (ref 46–116)
ALT SERPL W P-5'-P-CCNC: 35 U/L (ref 12–78)
ANION GAP SERPL CALCULATED.3IONS-SCNC: 9 MMOL/L (ref 4–13)
AST SERPL W P-5'-P-CCNC: 19 U/L (ref 5–45)
BASOPHILS # BLD AUTO: 0.01 THOUSANDS/ΜL (ref 0–0.1)
BASOPHILS NFR BLD AUTO: 0 % (ref 0–1)
BILIRUB SERPL-MCNC: 0.3 MG/DL (ref 0.2–1)
BUN SERPL-MCNC: 10 MG/DL (ref 5–25)
CALCIUM ALBUM COR SERPL-MCNC: 9 MG/DL (ref 8.3–10.1)
CALCIUM SERPL-MCNC: 8.4 MG/DL (ref 8.3–10.1)
CHLORIDE SERPL-SCNC: 108 MMOL/L (ref 100–108)
CO2 SERPL-SCNC: 26 MMOL/L (ref 21–32)
CREAT SERPL-MCNC: 0.68 MG/DL (ref 0.6–1.3)
CRP SERPL QL: 12.5 MG/L
D DIMER PPP FEU-MCNC: 0.29 UG/ML FEU
EOSINOPHIL # BLD AUTO: 0 THOUSAND/ΜL (ref 0–0.61)
EOSINOPHIL NFR BLD AUTO: 0 % (ref 0–6)
ERYTHROCYTE [DISTWIDTH] IN BLOOD BY AUTOMATED COUNT: 12.8 % (ref 11.6–15.1)
FERRITIN SERPL-MCNC: 181 NG/ML (ref 8–388)
FERRITIN SERPL-MCNC: 199 NG/ML (ref 8–388)
GFR SERPL CREATININE-BSD FRML MDRD: 95 ML/MIN/1.73SQ M
GLUCOSE SERPL-MCNC: 123 MG/DL (ref 65–140)
HCT VFR BLD AUTO: 40.1 % (ref 34.8–46.1)
HGB BLD-MCNC: 13 G/DL (ref 11.5–15.4)
IMM GRANULOCYTES # BLD AUTO: 0.02 THOUSAND/UL (ref 0–0.2)
IMM GRANULOCYTES NFR BLD AUTO: 0 % (ref 0–2)
LYMPHOCYTES # BLD AUTO: 1.39 THOUSANDS/ΜL (ref 0.6–4.47)
LYMPHOCYTES NFR BLD AUTO: 23 % (ref 14–44)
MAGNESIUM SERPL-MCNC: 3 MG/DL (ref 1.6–2.6)
MCH RBC QN AUTO: 28.5 PG (ref 26.8–34.3)
MCHC RBC AUTO-ENTMCNC: 32.4 G/DL (ref 31.4–37.4)
MCV RBC AUTO: 88 FL (ref 82–98)
MONOCYTES # BLD AUTO: 0.56 THOUSAND/ΜL (ref 0.17–1.22)
MONOCYTES NFR BLD AUTO: 9 % (ref 4–12)
NEUTROPHILS # BLD AUTO: 4.06 THOUSANDS/ΜL (ref 1.85–7.62)
NEUTS SEG NFR BLD AUTO: 68 % (ref 43–75)
NRBC BLD AUTO-RTO: 0 /100 WBCS
PLATELET # BLD AUTO: 221 THOUSANDS/UL (ref 149–390)
PMV BLD AUTO: 11.3 FL (ref 8.9–12.7)
POTASSIUM SERPL-SCNC: 3.8 MMOL/L (ref 3.5–5.3)
PROCALCITONIN SERPL-MCNC: <0.05 NG/ML
PROCALCITONIN SERPL-MCNC: <0.05 NG/ML
PROT SERPL-MCNC: 6.3 G/DL (ref 6.4–8.2)
RBC # BLD AUTO: 4.56 MILLION/UL (ref 3.81–5.12)
SODIUM SERPL-SCNC: 143 MMOL/L (ref 136–145)
WBC # BLD AUTO: 6.04 THOUSAND/UL (ref 4.31–10.16)

## 2020-11-09 PROCEDURE — 82728 ASSAY OF FERRITIN: CPT | Performed by: INTERNAL MEDICINE

## 2020-11-09 PROCEDURE — 85025 COMPLETE CBC W/AUTO DIFF WBC: CPT | Performed by: INTERNAL MEDICINE

## 2020-11-09 PROCEDURE — 80053 COMPREHEN METABOLIC PANEL: CPT | Performed by: INTERNAL MEDICINE

## 2020-11-09 PROCEDURE — 85379 FIBRIN DEGRADATION QUANT: CPT | Performed by: INTERNAL MEDICINE

## 2020-11-09 PROCEDURE — 99232 SBSQ HOSP IP/OBS MODERATE 35: CPT | Performed by: STUDENT IN AN ORGANIZED HEALTH CARE EDUCATION/TRAINING PROGRAM

## 2020-11-09 PROCEDURE — 86140 C-REACTIVE PROTEIN: CPT | Performed by: INTERNAL MEDICINE

## 2020-11-09 PROCEDURE — 84145 PROCALCITONIN (PCT): CPT | Performed by: INTERNAL MEDICINE

## 2020-11-09 RX ADMIN — BACLOFEN 20 MG: 10 TABLET ORAL at 16:57

## 2020-11-09 RX ADMIN — BENZONATATE 200 MG: 100 CAPSULE ORAL at 16:57

## 2020-11-09 RX ADMIN — BUTALBITAL, ACETAMINOPHEN AND CAFFEINE 1 TABLET: 50; 325; 40 TABLET ORAL at 22:53

## 2020-11-09 RX ADMIN — FLUTICASONE PROPIONATE 2 SPRAY: 50 SPRAY, METERED NASAL at 08:04

## 2020-11-09 RX ADMIN — ASPIRIN 81 MG: 81 TABLET, CHEWABLE ORAL at 08:02

## 2020-11-09 RX ADMIN — BUTALBITAL, ACETAMINOPHEN AND CAFFEINE 1 TABLET: 50; 325; 40 TABLET ORAL at 08:07

## 2020-11-09 RX ADMIN — BENZONATATE 200 MG: 100 CAPSULE ORAL at 08:03

## 2020-11-09 RX ADMIN — ATORVASTATIN CALCIUM 40 MG: 40 TABLET, FILM COATED ORAL at 21:01

## 2020-11-09 RX ADMIN — ACETAMINOPHEN 650 MG: 325 TABLET, FILM COATED ORAL at 21:00

## 2020-11-09 RX ADMIN — REMDESIVIR 100 MG: 100 INJECTION, POWDER, LYOPHILIZED, FOR SOLUTION INTRAVENOUS at 14:06

## 2020-11-09 RX ADMIN — OXYBUTYNIN 10 MG: 5 TABLET, FILM COATED, EXTENDED RELEASE ORAL at 08:04

## 2020-11-09 RX ADMIN — DEXAMETHASONE SODIUM PHOSPHATE 6 MG: 4 INJECTION, SOLUTION INTRAMUSCULAR; INTRAVENOUS at 08:02

## 2020-11-09 RX ADMIN — SERTRALINE HYDROCHLORIDE 50 MG: 50 TABLET ORAL at 08:03

## 2020-11-09 RX ADMIN — BUTALBITAL, ACETAMINOPHEN AND CAFFEINE 1 TABLET: 50; 325; 40 TABLET ORAL at 03:21

## 2020-11-09 RX ADMIN — BENZONATATE 200 MG: 100 CAPSULE ORAL at 21:00

## 2020-11-09 RX ADMIN — BUTALBITAL, ACETAMINOPHEN AND CAFFEINE 1 TABLET: 50; 325; 40 TABLET ORAL at 13:44

## 2020-11-09 RX ADMIN — BUTALBITAL, ACETAMINOPHEN AND CAFFEINE 1 TABLET: 50; 325; 40 TABLET ORAL at 17:49

## 2020-11-09 RX ADMIN — FAMOTIDINE 20 MG: 20 TABLET, FILM COATED ORAL at 08:02

## 2020-11-09 RX ADMIN — ONDANSETRON 4 MG: 2 INJECTION INTRAMUSCULAR; INTRAVENOUS at 09:00

## 2020-11-09 RX ADMIN — GABAPENTIN 300 MG: 300 CAPSULE ORAL at 08:03

## 2020-11-09 RX ADMIN — BACLOFEN 20 MG: 10 TABLET ORAL at 21:00

## 2020-11-09 RX ADMIN — BACLOFEN 20 MG: 10 TABLET ORAL at 08:02

## 2020-11-09 RX ADMIN — Medication 2000 UNITS: at 08:03

## 2020-11-09 RX ADMIN — GABAPENTIN 300 MG: 300 CAPSULE ORAL at 17:00

## 2020-11-10 ENCOUNTER — APPOINTMENT (OUTPATIENT)
Dept: PHYSICAL THERAPY | Facility: CLINIC | Age: 60
End: 2020-11-10
Payer: COMMERCIAL

## 2020-11-10 LAB
ANION GAP SERPL CALCULATED.3IONS-SCNC: 10 MMOL/L (ref 4–13)
BASOPHILS # BLD AUTO: 0.01 THOUSANDS/ΜL (ref 0–0.1)
BASOPHILS NFR BLD AUTO: 0 % (ref 0–1)
BUN SERPL-MCNC: 12 MG/DL (ref 5–25)
CALCIUM SERPL-MCNC: 8.8 MG/DL (ref 8.3–10.1)
CHLORIDE SERPL-SCNC: 109 MMOL/L (ref 100–108)
CO2 SERPL-SCNC: 26 MMOL/L (ref 21–32)
CREAT SERPL-MCNC: 0.87 MG/DL (ref 0.6–1.3)
EOSINOPHIL # BLD AUTO: 0 THOUSAND/ΜL (ref 0–0.61)
EOSINOPHIL NFR BLD AUTO: 0 % (ref 0–6)
ERYTHROCYTE [DISTWIDTH] IN BLOOD BY AUTOMATED COUNT: 13 % (ref 11.6–15.1)
GFR SERPL CREATININE-BSD FRML MDRD: 72 ML/MIN/1.73SQ M
GLUCOSE SERPL-MCNC: 115 MG/DL (ref 65–140)
GLUCOSE SERPL-MCNC: 211 MG/DL (ref 65–140)
HCT VFR BLD AUTO: 42.1 % (ref 34.8–46.1)
HGB BLD-MCNC: 13.5 G/DL (ref 11.5–15.4)
IMM GRANULOCYTES # BLD AUTO: 0.02 THOUSAND/UL (ref 0–0.2)
IMM GRANULOCYTES NFR BLD AUTO: 0 % (ref 0–2)
LYMPHOCYTES # BLD AUTO: 3.12 THOUSANDS/ΜL (ref 0.6–4.47)
LYMPHOCYTES NFR BLD AUTO: 52 % (ref 14–44)
MCH RBC QN AUTO: 28.2 PG (ref 26.8–34.3)
MCHC RBC AUTO-ENTMCNC: 32.1 G/DL (ref 31.4–37.4)
MCV RBC AUTO: 88 FL (ref 82–98)
MONOCYTES # BLD AUTO: 0.47 THOUSAND/ΜL (ref 0.17–1.22)
MONOCYTES NFR BLD AUTO: 8 % (ref 4–12)
NEUTROPHILS # BLD AUTO: 2.39 THOUSANDS/ΜL (ref 1.85–7.62)
NEUTS SEG NFR BLD AUTO: 40 % (ref 43–75)
NRBC BLD AUTO-RTO: 0 /100 WBCS
PLATELET # BLD AUTO: 238 THOUSANDS/UL (ref 149–390)
PMV BLD AUTO: 11.6 FL (ref 8.9–12.7)
POTASSIUM SERPL-SCNC: 3.5 MMOL/L (ref 3.5–5.3)
RBC # BLD AUTO: 4.78 MILLION/UL (ref 3.81–5.12)
SODIUM SERPL-SCNC: 145 MMOL/L (ref 136–145)
WBC # BLD AUTO: 6.01 THOUSAND/UL (ref 4.31–10.16)

## 2020-11-10 PROCEDURE — 82948 REAGENT STRIP/BLOOD GLUCOSE: CPT

## 2020-11-10 PROCEDURE — 99233 SBSQ HOSP IP/OBS HIGH 50: CPT | Performed by: INTERNAL MEDICINE

## 2020-11-10 PROCEDURE — 85025 COMPLETE CBC W/AUTO DIFF WBC: CPT | Performed by: INTERNAL MEDICINE

## 2020-11-10 PROCEDURE — 80048 BASIC METABOLIC PNL TOTAL CA: CPT | Performed by: INTERNAL MEDICINE

## 2020-11-10 PROCEDURE — 94761 N-INVAS EAR/PLS OXIMETRY MLT: CPT

## 2020-11-10 RX ORDER — GUAIFENESIN 100 MG/5ML
200 SOLUTION ORAL EVERY 4 HOURS PRN
Status: DISCONTINUED | OUTPATIENT
Start: 2020-11-10 | End: 2020-11-11 | Stop reason: HOSPADM

## 2020-11-10 RX ORDER — POLYETHYLENE GLYCOL 3350 17 G/17G
17 POWDER, FOR SOLUTION ORAL ONCE
Status: COMPLETED | OUTPATIENT
Start: 2020-11-10 | End: 2020-11-10

## 2020-11-10 RX ORDER — PREDNISONE 20 MG/1
40 TABLET ORAL DAILY
Status: DISCONTINUED | OUTPATIENT
Start: 2020-11-10 | End: 2020-11-11 | Stop reason: HOSPADM

## 2020-11-10 RX ADMIN — ATORVASTATIN CALCIUM 40 MG: 40 TABLET, FILM COATED ORAL at 22:07

## 2020-11-10 RX ADMIN — POLYETHYLENE GLYCOL 3350 17 G: 17 POWDER, FOR SOLUTION ORAL at 09:34

## 2020-11-10 RX ADMIN — ONDANSETRON 4 MG: 2 INJECTION INTRAMUSCULAR; INTRAVENOUS at 09:28

## 2020-11-10 RX ADMIN — GABAPENTIN 300 MG: 300 CAPSULE ORAL at 09:32

## 2020-11-10 RX ADMIN — BACLOFEN 20 MG: 10 TABLET ORAL at 17:00

## 2020-11-10 RX ADMIN — ALBUTEROL SULFATE 2 PUFF: 90 AEROSOL, METERED RESPIRATORY (INHALATION) at 09:36

## 2020-11-10 RX ADMIN — BUTALBITAL, ACETAMINOPHEN AND CAFFEINE 1 TABLET: 50; 325; 40 TABLET ORAL at 17:00

## 2020-11-10 RX ADMIN — BACLOFEN 20 MG: 10 TABLET ORAL at 09:29

## 2020-11-10 RX ADMIN — REMDESIVIR 100 MG: 100 INJECTION, POWDER, LYOPHILIZED, FOR SOLUTION INTRAVENOUS at 13:47

## 2020-11-10 RX ADMIN — BENZONATATE 200 MG: 100 CAPSULE ORAL at 17:00

## 2020-11-10 RX ADMIN — BENZONATATE 200 MG: 100 CAPSULE ORAL at 22:07

## 2020-11-10 RX ADMIN — BUTALBITAL, ACETAMINOPHEN AND CAFFEINE 1 TABLET: 50; 325; 40 TABLET ORAL at 09:29

## 2020-11-10 RX ADMIN — BUTALBITAL, ACETAMINOPHEN AND CAFFEINE 1 TABLET: 50; 325; 40 TABLET ORAL at 22:07

## 2020-11-10 RX ADMIN — GABAPENTIN 300 MG: 300 CAPSULE ORAL at 17:00

## 2020-11-10 RX ADMIN — FAMOTIDINE 20 MG: 20 TABLET, FILM COATED ORAL at 09:29

## 2020-11-10 RX ADMIN — Medication 2000 UNITS: at 09:29

## 2020-11-10 RX ADMIN — ASPIRIN 81 MG: 81 TABLET, CHEWABLE ORAL at 09:29

## 2020-11-10 RX ADMIN — OXYBUTYNIN 10 MG: 5 TABLET, FILM COATED, EXTENDED RELEASE ORAL at 09:36

## 2020-11-10 RX ADMIN — FLUTICASONE PROPIONATE 2 SPRAY: 50 SPRAY, METERED NASAL at 09:35

## 2020-11-10 RX ADMIN — BENZONATATE 200 MG: 100 CAPSULE ORAL at 09:29

## 2020-11-10 RX ADMIN — SERTRALINE HYDROCHLORIDE 50 MG: 50 TABLET ORAL at 09:29

## 2020-11-10 RX ADMIN — BACLOFEN 20 MG: 10 TABLET ORAL at 22:08

## 2020-11-10 RX ADMIN — PREDNISONE 40 MG: 20 TABLET ORAL at 09:29

## 2020-11-11 VITALS
BODY MASS INDEX: 26.92 KG/M2 | RESPIRATION RATE: 17 BRPM | OXYGEN SATURATION: 96 % | DIASTOLIC BLOOD PRESSURE: 52 MMHG | SYSTOLIC BLOOD PRESSURE: 102 MMHG | HEIGHT: 60 IN | WEIGHT: 137.13 LBS | HEART RATE: 64 BPM | TEMPERATURE: 97.5 F

## 2020-11-11 LAB
ANION GAP SERPL CALCULATED.3IONS-SCNC: 11 MMOL/L (ref 4–13)
BUN SERPL-MCNC: 10 MG/DL (ref 5–25)
CALCIUM SERPL-MCNC: 8.4 MG/DL (ref 8.3–10.1)
CHLORIDE SERPL-SCNC: 109 MMOL/L (ref 100–108)
CO2 SERPL-SCNC: 25 MMOL/L (ref 21–32)
CREAT SERPL-MCNC: 0.8 MG/DL (ref 0.6–1.3)
GFR SERPL CREATININE-BSD FRML MDRD: 80 ML/MIN/1.73SQ M
GLUCOSE SERPL-MCNC: 98 MG/DL (ref 65–140)
POTASSIUM SERPL-SCNC: 3.2 MMOL/L (ref 3.5–5.3)
SODIUM SERPL-SCNC: 145 MMOL/L (ref 136–145)

## 2020-11-11 PROCEDURE — 99239 HOSP IP/OBS DSCHRG MGMT >30: CPT | Performed by: INTERNAL MEDICINE

## 2020-11-11 PROCEDURE — 80048 BASIC METABOLIC PNL TOTAL CA: CPT | Performed by: INTERNAL MEDICINE

## 2020-11-11 RX ORDER — MELATONIN
2000 DAILY
Qty: 2 TABLET | Refills: 0 | Status: SHIPPED | OUTPATIENT
Start: 2020-11-12 | End: 2022-06-22

## 2020-11-11 RX ORDER — PREDNISONE 20 MG/1
TABLET ORAL
Qty: 11 TABLET | Refills: 0 | Status: SHIPPED | OUTPATIENT
Start: 2020-11-12 | End: 2020-11-22

## 2020-11-11 RX ADMIN — BENZONATATE 200 MG: 100 CAPSULE ORAL at 09:28

## 2020-11-11 RX ADMIN — SERTRALINE HYDROCHLORIDE 50 MG: 50 TABLET ORAL at 09:28

## 2020-11-11 RX ADMIN — ONDANSETRON 4 MG: 2 INJECTION INTRAMUSCULAR; INTRAVENOUS at 10:43

## 2020-11-11 RX ADMIN — FAMOTIDINE 20 MG: 20 TABLET, FILM COATED ORAL at 09:28

## 2020-11-11 RX ADMIN — FLUTICASONE PROPIONATE 2 SPRAY: 50 SPRAY, METERED NASAL at 09:29

## 2020-11-11 RX ADMIN — PREDNISONE 40 MG: 20 TABLET ORAL at 09:28

## 2020-11-11 RX ADMIN — GABAPENTIN 300 MG: 300 CAPSULE ORAL at 09:28

## 2020-11-11 RX ADMIN — BUTALBITAL, ACETAMINOPHEN AND CAFFEINE 1 TABLET: 50; 325; 40 TABLET ORAL at 06:00

## 2020-11-11 RX ADMIN — ONDANSETRON 4 MG: 2 INJECTION INTRAMUSCULAR; INTRAVENOUS at 00:56

## 2020-11-11 RX ADMIN — BACLOFEN 20 MG: 10 TABLET ORAL at 09:28

## 2020-11-11 RX ADMIN — BUTALBITAL, ACETAMINOPHEN AND CAFFEINE 1 TABLET: 50; 325; 40 TABLET ORAL at 10:43

## 2020-11-11 RX ADMIN — OXYBUTYNIN 10 MG: 5 TABLET, FILM COATED, EXTENDED RELEASE ORAL at 09:29

## 2020-11-11 RX ADMIN — ASPIRIN 81 MG: 81 TABLET, CHEWABLE ORAL at 09:28

## 2020-11-11 RX ADMIN — Medication 2000 UNITS: at 09:28

## 2020-11-16 ENCOUNTER — APPOINTMENT (OUTPATIENT)
Dept: PHYSICAL THERAPY | Facility: CLINIC | Age: 60
End: 2020-11-16
Payer: COMMERCIAL

## 2020-11-17 ENCOUNTER — APPOINTMENT (OUTPATIENT)
Dept: PHYSICAL THERAPY | Facility: CLINIC | Age: 60
End: 2020-11-17
Payer: COMMERCIAL

## 2020-11-23 ENCOUNTER — APPOINTMENT (OUTPATIENT)
Dept: PHYSICAL THERAPY | Facility: CLINIC | Age: 60
End: 2020-11-23
Payer: COMMERCIAL

## 2020-11-24 ENCOUNTER — APPOINTMENT (OUTPATIENT)
Dept: PHYSICAL THERAPY | Facility: CLINIC | Age: 60
End: 2020-11-24
Payer: COMMERCIAL

## 2020-12-17 ENCOUNTER — TELEMEDICINE (OUTPATIENT)
Dept: NEUROLOGY | Facility: CLINIC | Age: 60
End: 2020-12-17
Payer: COMMERCIAL

## 2020-12-17 DIAGNOSIS — G81.91 RIGHT HEMIPARESIS (HCC): ICD-10-CM

## 2020-12-17 DIAGNOSIS — M47.26 OTHER SPONDYLOSIS WITH RADICULOPATHY, LUMBAR REGION: ICD-10-CM

## 2020-12-17 DIAGNOSIS — J12.82 PNEUMONIA DUE TO COVID-19 VIRUS: ICD-10-CM

## 2020-12-17 DIAGNOSIS — G35 MULTIPLE SCLEROSIS (HCC): Primary | ICD-10-CM

## 2020-12-17 DIAGNOSIS — U07.1 PNEUMONIA DUE TO COVID-19 VIRUS: ICD-10-CM

## 2020-12-17 PROCEDURE — 99214 OFFICE O/P EST MOD 30 MIN: CPT | Performed by: PSYCHIATRY & NEUROLOGY

## 2020-12-18 ENCOUNTER — TELEPHONE (OUTPATIENT)
Dept: NEUROLOGY | Facility: CLINIC | Age: 60
End: 2020-12-18

## 2020-12-28 ENCOUNTER — TELEPHONE (OUTPATIENT)
Dept: NEUROLOGY | Facility: CLINIC | Age: 60
End: 2020-12-28

## 2020-12-28 ENCOUNTER — TRANSCRIBE ORDERS (OUTPATIENT)
Dept: ADMINISTRATIVE | Facility: HOSPITAL | Age: 60
End: 2020-12-28

## 2020-12-28 DIAGNOSIS — H53.8 BLURRY VISION, RIGHT EYE: ICD-10-CM

## 2020-12-28 DIAGNOSIS — G35 MULTIPLE SCLEROSIS (HCC): Primary | ICD-10-CM

## 2020-12-28 DIAGNOSIS — I82.4Z1 DVT, LOWER EXTREMITY, DISTAL, ACUTE, RIGHT (HCC): Primary | ICD-10-CM

## 2021-01-05 ENCOUNTER — HOSPITAL ENCOUNTER (OUTPATIENT)
Dept: ULTRASOUND IMAGING | Facility: HOSPITAL | Age: 61
Discharge: HOME | End: 2021-01-05
Payer: COMMERCIAL

## 2021-01-05 DIAGNOSIS — I82.4Z1 DVT, LOWER EXTREMITY, DISTAL, ACUTE, RIGHT (HCC): ICD-10-CM

## 2021-01-05 PROCEDURE — 93971 EXTREMITY STUDY: CPT | Performed by: SURGERY

## 2021-01-05 PROCEDURE — 93971 EXTREMITY STUDY: CPT

## 2021-01-11 ENCOUNTER — HOSPITAL ENCOUNTER (OUTPATIENT)
Dept: MRI IMAGING | Facility: HOSPITAL | Age: 61
Discharge: HOME | End: 2021-01-11
Attending: PSYCHIATRY & NEUROLOGY
Payer: COMMERCIAL

## 2021-01-11 DIAGNOSIS — U07.1 PNEUMONIA DUE TO COVID-19 VIRUS: ICD-10-CM

## 2021-01-11 DIAGNOSIS — G81.91 RIGHT HEMIPARESIS (HCC): ICD-10-CM

## 2021-01-11 DIAGNOSIS — J12.82 PNEUMONIA DUE TO COVID-19 VIRUS: ICD-10-CM

## 2021-01-11 DIAGNOSIS — G35 MULTIPLE SCLEROSIS (HCC): ICD-10-CM

## 2021-01-11 PROCEDURE — G1004 CDSM NDSC: HCPCS

## 2021-01-11 PROCEDURE — 70551 MRI BRAIN STEM W/O DYE: CPT

## 2021-02-09 ENCOUNTER — HOSPITAL ENCOUNTER (OUTPATIENT)
Dept: MAMMOGRAPHY | Facility: CLINIC | Age: 61
Discharge: HOME | End: 2021-02-09
Payer: COMMERCIAL

## 2021-02-09 ENCOUNTER — HOSPITAL ENCOUNTER (OUTPATIENT)
Dept: ULTRASOUND IMAGING | Facility: CLINIC | Age: 61
Discharge: HOME | End: 2021-02-09
Payer: COMMERCIAL

## 2021-02-09 VITALS — BODY MASS INDEX: 26.9 KG/M2 | WEIGHT: 137 LBS | HEIGHT: 60 IN

## 2021-02-09 DIAGNOSIS — Z12.39 OTHER SCREENING BREAST EXAMINATION: ICD-10-CM

## 2021-02-09 DIAGNOSIS — Z12.31 SCREENING MAMMOGRAM FOR HIGH-RISK PATIENT: ICD-10-CM

## 2021-02-09 PROCEDURE — 77067 SCR MAMMO BI INCL CAD: CPT

## 2021-02-09 PROCEDURE — 77063 BREAST TOMOSYNTHESIS BI: CPT

## 2021-02-09 PROCEDURE — 76377 3D RENDER W/INTRP POSTPROCES: CPT

## 2021-02-09 PROCEDURE — 76641 ULTRASOUND BREAST COMPLETE: CPT

## 2021-02-26 ENCOUNTER — OFFICE VISIT (OUTPATIENT)
Dept: VASCULAR SURGERY | Facility: CLINIC | Age: 61
End: 2021-02-26
Payer: COMMERCIAL

## 2021-02-26 VITALS
SYSTOLIC BLOOD PRESSURE: 142 MMHG | HEIGHT: 60 IN | DIASTOLIC BLOOD PRESSURE: 80 MMHG | WEIGHT: 141 LBS | HEART RATE: 77 BPM | BODY MASS INDEX: 27.68 KG/M2 | RESPIRATION RATE: 18 BRPM

## 2021-02-26 DIAGNOSIS — R60.0 EDEMA OF RIGHT LOWER EXTREMITY: Primary | ICD-10-CM

## 2021-02-26 PROCEDURE — 99212 OFFICE O/P EST SF 10 MIN: CPT | Performed by: SURGERY

## 2021-02-26 PROCEDURE — 1036F TOBACCO NON-USER: CPT | Performed by: SURGERY

## 2021-02-26 PROCEDURE — 3008F BODY MASS INDEX DOCD: CPT | Performed by: SURGERY

## 2021-02-26 RX ORDER — METFORMIN HYDROCHLORIDE 500 MG/1
TABLET, EXTENDED RELEASE ORAL
COMMUNITY
Start: 2021-02-02 | End: 2022-06-15 | Stop reason: SDUPTHER

## 2021-02-26 NOTE — PATIENT INSTRUCTIONS
1) Edema  -for your leg swelling in general, you can use compression and continue your leg elevation  -please followup with your orthopedist for your ankle pain  -the ultrasound you had done did not show any blood clots in the veins  -I do not think that you have a vascular issue causing your symptoms and I do not need to get any further testing

## 2021-02-26 NOTE — PROGRESS NOTES
Assessment/Plan:    Pt is a 56 yo F w/ MS, recurrent mesenteric panniculitis, COPD, HTN, HLD, RLE edema, RUE edema, L shoulder cellulitis, B calf pain    Edema of right lower extremity  -patient with multiple complaints of intermittent edema, calf pain, calf lumps  -reviewed LEV which was neg for DVT; no evidence of venous disease on exam  -pulse exam suggests excellent BLE perfusion  -patient does not seem to have a vascular issue; no further vascular testing recommended  -in regards to her R ankle swelling, this seems to be related to an old sprain that has never completely healed; recommended f/u with her orthopedist who she is already scheduled to see for her L shoulder and elevation/compression for symptom management  -patient also thought she had an aortic aneurysm from a 11year old test, but CT from 11/19 shows normal aortic caliber and nothing suspicious on exam today  -f/u PRN        Subjective:      Patient ID: Justen Mcdonald is a 57 y o  female  Patient is new to our practice and was referred by Dr Angelito Leonard  Pt had a LEV on 1/5/21  Pt c/o BLE edema  Pt states that she has Rt ankle swelling since Feb 2020 after a sprain and recently has developed a lump in the back of her left leg  Pt c/o stiffness in the legs  Pt first noticed this start in November after she was released from the hospital due to [place]  Pt does not wear compression stockings, but does elevate her legs  HPI:    Patient referred by PCP in [facility]    She has multiple complaints  She has recent cellulitis of the L shoulder area after applying an ice pack  She complains of intermittent R forearm swelling and R ankle swelling  She sprained her ankle 1 year ago  She feels "lumps" on both of her calves  She has stiffness and pain of both legs  She has MS and has baseline musculoskeletal pain  She walks with a cane  Doesn't wear compression  Elevates her legs when it swells  She has done therapy for the R ankle    Plans to see orthopedics soon for L shoulder issues  The following portions of the patient's history were reviewed and updated as appropriate: allergies, current medications, past family history, past medical history, past social history, past surgical history and problem list     Review of Systems   Constitutional: Positive for fatigue  HENT: Positive for congestion and tinnitus  Eyes: Positive for visual disturbance  Respiratory: Positive for apnea, cough, chest tightness and shortness of breath  Cardiovascular: Positive for palpitations and leg swelling  Gastrointestinal: Positive for abdominal distention  Endocrine: Positive for cold intolerance and heat intolerance  Genitourinary: Positive for urgency  Musculoskeletal: Positive for back pain, gait problem, neck pain and neck stiffness  Leg pain  Leg cramping when walking   Skin: Positive for color change (L shoulder cellulitis) and rash  Allergic/Immunologic: Positive for immunocompromised state  Neurological: Positive for dizziness, weakness, light-headedness, numbness and headaches  Psychiatric/Behavioral: Positive for agitation, confusion and sleep disturbance  The patient is nervous/anxious  Depression         Objective:      /80 (BP Location: Right arm, Patient Position: Sitting)   Pulse 77   Resp 18   Ht 5' (1 524 m)   Wt 64 kg (141 lb)   BMI 27 54 kg/m²          Physical Exam  Vitals signs and nursing note reviewed  Constitutional:       Appearance: She is well-developed  HENT:      Head: Normocephalic and atraumatic  Eyes:      Conjunctiva/sclera: Conjunctivae normal    Neck:      Musculoskeletal: Normal range of motion and neck supple  Cardiovascular:      Rate and Rhythm: Normal rate and regular rhythm  Pulses:           Radial pulses are 2+ on the right side and 2+ on the left side  Dorsalis pedis pulses are 2+ on the right side and 2+ on the left side          Posterior tibial pulses are 2+ on the right side and 2+ on the left side  Heart sounds: Normal heart sounds  Comments: No carotid bruits  Pulmonary:      Effort: Pulmonary effort is normal       Breath sounds: Normal breath sounds  Abdominal:      General: There is no distension  Palpations: Abdomen is soft  There is no pulsatile mass  Tenderness: There is no abdominal tenderness  There is no rebound  Musculoskeletal: Normal range of motion  Right lower leg: No edema  Left lower leg: No edema  Comments: No lumps or masses were palpated in the bilateral calfs   Skin:     General: Skin is warm and dry  Comments: No evidence of varicosities, venous disease, lymphedema or other vascular findings   Neurological:      Mental Status: She is alert and oriented to person, place, and time  Psychiatric:         Behavior: Behavior normal          I have reviewed and made appropriate changes to the review of systems input by the medical assistant      Vitals:    02/26/21 1329   BP: 142/80   BP Location: Right arm   Patient Position: Sitting   Pulse: 77   Resp: 18   Weight: 64 kg (141 lb)   Height: 5' (1 524 m)       Patient Active Problem List   Diagnosis    Trigger ring finger of left hand    S/P trigger finger release    Ambulatory dysfunction    Chronic right-sided headaches    Multiple sclerosis (HCC)    Panniculitis    Mesenteric panniculitis (HCC)    Right low back pain    Skin rash    Right hemiparesis (HCC)    Other spondylosis with radiculopathy, lumbar region    COPD (chronic obstructive pulmonary disease) (HCC)    Hyperlipidemia with target LDL less than 160    Hypertension    Pneumonia due to COVID-19 virus    Hypotension       Past Surgical History:   Procedure Laterality Date    COLONOSCOPY      DILATION AND CURETTAGE OF UTERUS      OR COLONOSCOPY FLX DX W/COLLJ SPEC WHEN PFRMD N/A 1/11/2018    Procedure: COLONOSCOPY;  Surgeon: Cory Vicente MD;  Location: [facility]; Service: Gastroenterology    NV INCISE FINGER TENDON SHEATH Left 1/17/2018    Procedure: RING TRIGGER FINGER RELEASE;  Surgeon: Rhea Brown MD;  Location:  MAIN OR;  Service: Orthopedics    TONSILLECTOMY      UTERINE FIBROID SURGERY         Family History   Problem Relation Age of Onset    Stroke Mother     Diabetes Mother     Hypertension Mother     Heart disease Father     Diabetes Father     Hypertension Father     Diabetes Sister     Hypertension Brother    Cleta Camera' disease Brother     Breast cancer Maternal Aunt 76    Stomach cancer Maternal Aunt     No Known Problems Maternal Grandmother     No Known Problems Paternal Grandmother     No Known Problems Maternal Aunt     No Known Problems Maternal Aunt     No Known Problems Maternal Aunt     No Known Problems Maternal Aunt        Social History     Socioeconomic History    Marital status: /Civil Union     Spouse name: Not on file    Number of children: Not on file    Years of education: Not on file    Highest education level: Not on file   Occupational History    Not on file   Social Needs    Financial resource strain: Not on file    Food insecurity     Worry: Not on file     Inability: Not on file   [employer] needs     Medical: Not on file     Non-medical: Not on file   Tobacco Use    Smoking status: Never Smoker    Smokeless tobacco: Never Used   Substance and Sexual Activity    Alcohol use: Yes     Frequency: Monthly or less     Drinks per session: 1 or 2     Binge frequency: Never     Comment: social    Drug use: Yes     Types: Marijuana     Comment: last use 4 days ago       Sexual activity: Not on file   Lifestyle    Physical activity     Days per week: Not on file     Minutes per session: Not on file    Stress: Not on file   Relationships    Social connections     Talks on phone: Not on file     Gets together: Not on file     Attends Hindu service: Not on file     Active member of club or organization: Not on file     Attends meetings of clubs or organizations: Not on file     Relationship status: Not on file    Intimate partner violence     Fear of current or ex partner: Not on file     Emotionally abused: Not on file     Physically abused: Not on file     Forced sexual activity: Not on file   Other Topics Concern    Not on file   Social History Narrative    Not on file       Allergies   Allergen Reactions    Nuts Shortness Of Breath and Anaphylaxis    Other Anaphylaxis, Hives and Itching     [id number]: POISON IVY   walnuts & cashews  Cats    Peanut Oil      Hives anaphylaxis    Shellfish Allergy Shortness Of Breath and Swelling    Shellfish-Derived Products (Food Allergy) Shortness Of Breath and Swelling    Sumatriptan Headache and Other (See Comments)     Other reaction(s): Headache    Copaxone [Glatiramer Acetate] Hives    Cat Hair Extract     Cephalexin Rash     Pt says they took keflex again and didn't get a reaction from it  Pt states they believe it was something they ate that day instead   Glatiramer Rash     Other reaction(s): rash    Pollen Extract Sneezing    Seasonal Ic [Cholestatin]          Current Outpatient Medications:     albuterol (2 5 mg/3 mL) 0 083 % nebulizer solution, Albuterol Sulfate (2 5 MG/3ML) 0 083% Inhalation Nebulization Solution USE 1 UNIT DOSE IN NEBULIZER EVERY 6 HOURS AS NEEDED    Quantity: 1;  Refills: 5    Ju GARCIA ;  Started 12-July-2016 Active3 ML Plas Cont (125 Plas Conts), Disp: , Rfl:     albuterol (VENTOLIN HFA) 90 mcg/act inhaler, Inhale 2 puffs every 6 (six) hours as needed for wheezing, Disp: 3 Inhaler, Rfl: 0    aspirin 81 MG tablet, Aspirin 81 MG TABS Take 1 tablet daily  Refills: 0  Active, Disp: , Rfl:     atorvastatin (LIPITOR) 20 mg tablet, Take 40 mg by mouth Medrol Dose Pack scheduling ONLY  , Disp: , Rfl:     baclofen 20 mg tablet, Take 1 tablet (20 mg total) by mouth 3 (three) times a day, Disp: 270 tablet, Rfl: 2    benzonatate (TESSALON) 200 MG capsule, Take 200 mg by mouth 3 (three) times a day, Disp: , Rfl:     betamethasone, augmented, (DIPROLENE-AF) 0 05 % cream, Apply topically 2 (two) times a day To rash on left thigh until resolved, Disp: 15 g, Rfl: 0    budesonide (PULMICORT FLEXHALER) 180 MCG/ACT inhaler, Inhale 1 puff 2 (two) times a day (Patient taking differently: Inhale 1 puff 2 (two) times a day as needed  ), Disp: 3 Inhaler, Rfl: 3    butalbital-acetaminophen-caffeine (FIORICET,ESGIC) -40 mg per tablet, Take 1 tablet by mouth every 4 (four) hours as needed for headaches, Disp: 12 tablet, Rfl: 4    cholecalciferol (VITAMIN D3) 1,000 units tablet, Take 2 tablets (2,000 Units total) by mouth daily for 1 day, Disp: 2 tablet, Rfl: 0    EPINEPHrine (EPIPEN) 0 3 mg/0 3 mL SOAJ, Inject 0 3 mg into the shoulder, thigh, or buttocks, Disp: , Rfl:     ergocalciferol (VITAMIN D2) 50,000 units, TAKE ONE CAPSULE WEEKLY WITH FOOD, Disp: , Rfl: 1    famotidine (PEPCID) 20 mg tablet, Famotidine 20 MG Oral Tablet TAKE 1 TABLET DAILY PRN AS DIRECTED    Refills: 0  Active, Disp: , Rfl:     fluticasone (FLONASE) 50 mcg/act nasal spray, 2 sprays into each nostril daily, Disp: 1 Bottle, Rfl: 3    gabapentin (NEURONTIN) 300 mg capsule, Take 2 caps in am and noon and 3 caps HS, Disp: 630 capsule, Rfl: 2    levocetirizine (XYZAL) 5 MG tablet, TAKE 1 TABLET (5 MG TOTAL) BY MOUTH DAILY AS NEEDED (HIVES), Disp: 30 tablet, Rfl: 0    metFORMIN (GLUCOPHAGE-XR) 500 mg 24 hr tablet, TAKE 1 TABLET BY MOUTH DAILY WITH BREAKFAST OR DINNER, Disp: , Rfl:     sertraline (ZOLOFT) 50 mg tablet, Sertraline HCl - 50 MG Oral Tablet TAKE 3 TABLETS DAILY  Refills: 0  Active, Disp: , Rfl:     tolterodine (DETROL LA) 4 mg 24 hr capsule, Take 1 capsule by mouth daily, Disp: , Rfl:

## 2021-03-10 DIAGNOSIS — Z23 ENCOUNTER FOR IMMUNIZATION: ICD-10-CM

## 2021-03-18 ENCOUNTER — IMMUNIZATIONS (OUTPATIENT)
Dept: FAMILY MEDICINE CLINIC | Facility: HOSPITAL | Age: 61
End: 2021-03-18

## 2021-03-18 DIAGNOSIS — Z23 ENCOUNTER FOR IMMUNIZATION: Primary | ICD-10-CM

## 2021-03-18 PROCEDURE — 0001A SARS-COV-2 / COVID-19 MRNA VACCINE (PFIZER-BIONTECH) 30 MCG: CPT

## 2021-03-18 PROCEDURE — 91300 SARS-COV-2 / COVID-19 MRNA VACCINE (PFIZER-BIONTECH) 30 MCG: CPT

## 2021-04-12 ENCOUNTER — IMMUNIZATIONS (OUTPATIENT)
Dept: FAMILY MEDICINE CLINIC | Facility: HOSPITAL | Age: 61
End: 2021-04-12

## 2021-04-12 ENCOUNTER — OFFICE VISIT (OUTPATIENT)
Dept: NEUROLOGY | Facility: CLINIC | Age: 61
End: 2021-04-12
Payer: COMMERCIAL

## 2021-04-12 VITALS
DIASTOLIC BLOOD PRESSURE: 62 MMHG | SYSTOLIC BLOOD PRESSURE: 120 MMHG | HEART RATE: 62 BPM | RESPIRATION RATE: 16 BRPM | WEIGHT: 140 LBS | HEIGHT: 60 IN | BODY MASS INDEX: 27.48 KG/M2

## 2021-04-12 DIAGNOSIS — U07.1 ENCEPHALOPATHY DUE TO SEVERE ACUTE RESPIRATORY SYNDROME CORONAVIRUS 2 (SARS-COV-2): ICD-10-CM

## 2021-04-12 DIAGNOSIS — G81.91 RIGHT HEMIPARESIS (HCC): ICD-10-CM

## 2021-04-12 DIAGNOSIS — G35 MULTIPLE SCLEROSIS (HCC): Primary | ICD-10-CM

## 2021-04-12 DIAGNOSIS — R47.81 SLURRED SPEECH: ICD-10-CM

## 2021-04-12 DIAGNOSIS — M50.123 CERVICAL DISC DISORDER AT C6-C7 LEVEL WITH RADICULOPATHY: ICD-10-CM

## 2021-04-12 DIAGNOSIS — Z23 ENCOUNTER FOR IMMUNIZATION: Primary | ICD-10-CM

## 2021-04-12 DIAGNOSIS — R47.89 WORD FINDING DIFFICULTY: ICD-10-CM

## 2021-04-12 DIAGNOSIS — G93.49 ENCEPHALOPATHY DUE TO SEVERE ACUTE RESPIRATORY SYNDROME CORONAVIRUS 2 (SARS-COV-2): ICD-10-CM

## 2021-04-12 DIAGNOSIS — R26.2 AMBULATORY DYSFUNCTION: ICD-10-CM

## 2021-04-12 PROCEDURE — 1036F TOBACCO NON-USER: CPT | Performed by: PSYCHIATRY & NEUROLOGY

## 2021-04-12 PROCEDURE — 91300 SARS-COV-2 / COVID-19 MRNA VACCINE (PFIZER-BIONTECH) 30 MCG: CPT

## 2021-04-12 PROCEDURE — 3008F BODY MASS INDEX DOCD: CPT | Performed by: PSYCHIATRY & NEUROLOGY

## 2021-04-12 PROCEDURE — 99215 OFFICE O/P EST HI 40 MIN: CPT | Performed by: PSYCHIATRY & NEUROLOGY

## 2021-04-12 PROCEDURE — 0002A SARS-COV-2 / COVID-19 MRNA VACCINE (PFIZER-BIONTECH) 30 MCG: CPT

## 2021-04-12 RX ORDER — DEXTROAMPHETAMINE SACCHARATE, AMPHETAMINE ASPARTATE, DEXTROAMPHETAMINE SULFATE AND AMPHETAMINE SULFATE 1.25; 1.25; 1.25; 1.25 MG/1; MG/1; MG/1; MG/1
5 TABLET ORAL DAILY
Qty: 14 TABLET | Refills: 0 | Status: SHIPPED | OUTPATIENT
Start: 2021-04-12 | End: 2021-04-26 | Stop reason: ALTCHOICE

## 2021-04-12 RX ORDER — DEXTROAMPHETAMINE SACCHARATE, AMPHETAMINE ASPARTATE, DEXTROAMPHETAMINE SULFATE AND AMPHETAMINE SULFATE 1.25; 1.25; 1.25; 1.25 MG/1; MG/1; MG/1; MG/1
5 TABLET ORAL DAILY
Qty: 14 TABLET | Refills: 0 | Status: SHIPPED | OUTPATIENT
Start: 2021-04-12 | End: 2021-04-12 | Stop reason: SDUPTHER

## 2021-04-12 NOTE — PROGRESS NOTES
[facility] MULTIPLE SCLEROSIS CENTER  PATIENT:  Chao Nash  MRN:  [id number]  :  1960  DATE OF SERVICE:  2021    Assessment/Plan:           Problem List Items Addressed This Visit        Nervous and Auditory    Multiple sclerosis ([facility] [address] ) - Primary    Relevant Medications    amphetamine-dextroamphetamine (ADDERALL) 5 MG tablet    Right hemiparesis (HCC)    Encephalopathy due to severe acute respiratory syndrome coronavirus 2 (SARS-CoV-2)    Relevant Medications    amphetamine-dextroamphetamine (ADDERALL) 5 MG tablet       Musculoskeletal and Integument    Cervical disc disorder at C6-C7 level with radiculopathy       Other    Ambulatory dysfunction    Word finding difficulty    Relevant Orders    Ambulatory referral to Speech Therapy    Slurred speech    Relevant Orders    Ambulatory referral to Speech Therapy         Mrs Leonard Armenta has presented to [facility] multiple [address] for follow-up on multiple sclerosis and related issues  Patient described no new focal neurological deficit with progressive ambulatory dysfunction has been noted  - patient had severe headaches with confusional states while she had COVID-19 infection, with persistent word finding difficulties and slurred speech has been noted  We agreed patient has no breakthrough disease on her recent brain imaging, with no acute or chronic ischemic or hemorrhagic changes, with stable demyelination appreciated  We also discussed potential post coronavirus neuro psychological complications in the form of mood disorder and neuro cognitive dysfunction  Patient will be advised to consider cognitive therapy in addition to short trial of CNS stimulants  - patient will be advised against disease modifying therapy for MS with persistent right-sided hemiparesis and footdrop noted on exam   Patient is to follow with physical therapy    - patient does have left shoulder pain with known left-sided C6 radiculopathy due to degenerative disc disease appreciated on imaging of the cervical region back in 2017   - patient is receiving her 2nd shot of COVID-19 seen, she is to consider ibuprofen and Tylenol for prevention of developing fevers/myalgia/arthralgia in the setting of motor dysfunction due to [address]  Follow-up with [address] [place] [address] Neurology within 6 months  Subjective:    HPI    Mrs Brady is a 56 yo female who has presented to  [address] [place] [address] for follow up on MS and related issues  Patient has known relapsing remitting multiple sclerosis, neurogenic bladder, ambulatory dysfunction due to right hemiparesis, disbalance, mesenteric panniculitis, a his cervical disc disease with myelopathy, classic migraine without aura, who presented for follow up on MS and related issues  11/06/2020: COVID-19 pneumonia improved  Today's complaints are :  She fatigue, balance, neck pain neck stiffness, leg weakness, confusion, sleep disturbance  MRI brain 1/11/2021: There are multiple small scattered lesions best seen on sagittal double inversion recovery imaging, consistent with chronic demyelinating disease  On axial FLAIR imaging the disease is grossly unchanged        There is a larger lesion identified within the upper cervical cord at the level of the C2 vertebral body which is only faintly visualized on the prior examination due to differences in technique  Most recent cervical spine MRI is dated 7/7/2017  MRI C-spine 2017: Stable cord lesion within the right anterolateral aspect of the cord at the level of C2 measuring 1 7 cm in craniocaudad dimension when compared to prior examination  No cord expansion or abnormal enhancement      Stable degenerative disc disease most pronounced at C5-6  Correlate for left C6 radiculopathy      RIGHT ANKLE SWOLLEN, but improved after amlodipine dose was decreased; muscle spasm in the middle of the right calf was still noted described as a pain  ; WORD FINDING DIFFICULTIES with slurring of the speech also was noted by patient's relatives with no confusional states and difficulties recalling recent conversation  All those findings were reported after patient suffered COVID-19 infection  Today patient receives Eisenberg Peter booster, we discussed potential use of ibuprofen and Tylenol to avoid side effects such as fevers and myalgias/arthralgias  He    The following portions of the patient's history were reviewed and updated as appropriate:   She  has a past medical history of Asthma, Cardiac disease, COPD (chronic obstructive pulmonary disease) ([address] ), Depression, Hyperlipidemia, Hypertension, Influenza, Irregular heart beat, Migraine, Migraine, MS (multiple sclerosis) ([address] ), Multiple sclerosis ([address] ), Nephrolithiasis, Neurogenic bladder, and Panniculitis    She   Patient Active Problem List    Diagnosis Date Noted    Cervical disc disorder at C6-C7 level with radiculopathy 04/12/2021    Encephalopathy due to severe acute respiratory syndrome coronavirus 2 (SARS-CoV-2) 04/12/2021    Word finding difficulty 04/12/2021    Slurred speech 04/12/2021    Edema of right lower extremity 02/26/2021    Hypotension 11/09/2020    Pneumonia due to COVID-19 virus 11/06/2020    Other spondylosis with radiculopathy, lumbar region 02/20/2020    Right low back pain 02/21/2019    Skin rash 02/21/2019    Right hemiparesis ([facility] [address] ) 02/21/2019    Mesenteric panniculitis ([facility] [address] ) 03/15/2018    Ambulatory dysfunction 02/21/2018    Chronic right-sided headaches 02/21/2018    Multiple sclerosis ([facility] [address] ) 02/21/2018    Panniculitis 02/21/2018    S/P trigger finger release 02/02/2018    Trigger ring finger of left hand 01/17/2018    Hypertension 04/06/2017    COPD (chronic obstructive pulmonary disease) ([facility] [address] ) 04/11/2016    Hyperlipidemia with target LDL less than 160 07/14/2011     She  has a past surgical history that includes Dilation and curettage of uterus; Uterine fibroid surgery; pr colonoscopy flx dx w/collj spec when pfrmd (N/A, 1/11/2018); Colonoscopy; pr incise finger tendon sheath (Left, 1/17/2018); and Tonsillectomy  Her family history includes Breast cancer (age of onset: 76) in her maternal aunt; Diabetes in her father, mother, and sister; Bobbi Skates' disease in her brother; Heart disease in her father; Hypertension in her brother, father, and mother; No Known Problems in her maternal aunt, maternal aunt, maternal aunt, maternal aunt, maternal grandmother, and paternal grandmother; Stomach cancer in her maternal aunt; Stroke in her mother  She  reports that she has never smoked  She has never used smokeless tobacco  She reports current alcohol use  She reports current drug use  Drug: Marijuana  Current Outpatient Medications   Medication Sig Dispense Refill    albuterol (2 5 mg/3 mL) 0 083 % nebulizer solution Albuterol Sulfate (2 5 MG/3ML) 0 083% Inhalation Nebulization Solution  USE 1 UNIT DOSE IN NEBULIZER EVERY 6 HOURS AS NEEDED     Quantity: 1;  Refills: 5       Georgiana GARCIA ;  Started 12-July-2016  Active3 ML Plas Cont (125 Plas Conts)      albuterol (VENTOLIN HFA) 90 mcg/act inhaler Inhale 2 puffs every 6 (six) hours as needed for wheezing 3 Inhaler 0    aspirin 81 MG tablet Aspirin 81 MG TABS  Take 1 tablet daily   Refills: 0    Active      atorvastatin (LIPITOR) 20 mg tablet Take 40 mg by mouth Medrol Dose Pack scheduling ONLY        baclofen 20 mg tablet Take 1 tablet (20 mg total) by mouth 3 (three) times a day 270 tablet 2    benzonatate (TESSALON) 200 MG capsule Take 200 mg by mouth 3 (three) times a day      budesonide (PULMICORT FLEXHALER) 180 MCG/ACT inhaler Inhale 1 puff 2 (two) times a day (Patient taking differently: Inhale 1 puff 2 (two) times a day as needed  ) 3 Inhaler 3    butalbital-acetaminophen-caffeine (FIORICET,ESGIC) -40 mg per tablet Take 1 tablet by mouth every 4 (four) hours as needed for headaches 12 tablet 4    cholecalciferol (VITAMIN D3) 1,000 units tablet Take 2 tablets (2,000 Units total) by mouth daily for 1 day 2 tablet 0    EPINEPHrine (EPIPEN) 0 3 mg/0 3 mL SOAJ Inject 0 3 mg into the shoulder, thigh, or buttocks      ergocalciferol (VITAMIN D2) 50,000 units TAKE ONE CAPSULE WEEKLY WITH FOOD  1    famotidine (PEPCID) 20 mg tablet Famotidine 20 MG Oral Tablet  TAKE 1 TABLET DAILY PRN AS DIRECTED  Refills: 0    Active      fluticasone (FLONASE) 50 mcg/act nasal spray 2 sprays into each nostril daily 1 Bottle 3    gabapentin (NEURONTIN) 300 mg capsule Take 2 caps in am and noon and 3 caps  capsule 2    metFORMIN (GLUCOPHAGE-XR) 500 mg 24 hr tablet TAKE 1 TABLET BY MOUTH DAILY WITH BREAKFAST OR DINNER      sertraline (ZOLOFT) 50 mg tablet Sertraline HCl - 50 MG Oral Tablet  TAKE 3 TABLETS DAILY   Refills: 0    Active      tolterodine (DETROL LA) 4 mg 24 hr capsule Take 1 capsule by mouth daily      amphetamine-dextroamphetamine (ADDERALL) 5 MG tablet Take 1 tablet (5 mg total) by mouth dailyMax Daily Amount: 5 mg 14 tablet 0     No current facility-administered medications for this visit  Current Outpatient Medications on File Prior to Visit   Medication Sig    albuterol (2 5 mg/3 mL) 0 083 % nebulizer solution Albuterol Sulfate (2 5 MG/3ML) 0 083% Inhalation Nebulization Solution  USE 1 UNIT DOSE IN NEBULIZER EVERY 6 HOURS AS NEEDED     Quantity: 1;  Refills: 5       Nicole GARCIA ;  Started 12-July-2016  Active3 ML Plas Cont (125 Plas Conts)    albuterol (VENTOLIN HFA) 90 mcg/act inhaler Inhale 2 puffs every 6 (six) hours as needed for wheezing    aspirin 81 MG tablet Aspirin 81 MG TABS  Take 1 tablet daily   Refills: 0    Active    atorvastatin (LIPITOR) 20 mg tablet Take 40 mg by mouth Medrol Dose Pack scheduling ONLY      baclofen 20 mg tablet Take 1 tablet (20 mg total) by mouth 3 (three) times a day    benzonatate (TESSALON) 200 MG capsule Take 200 mg by mouth 3 (three) times a day    budesonide (PULMICORT FLEXHALER) 180 MCG/ACT inhaler Inhale 1 puff 2 (two) times a day (Patient taking differently: Inhale 1 puff 2 (two) times a day as needed  )    butalbital-acetaminophen-caffeine (FIORICET,ESGIC) -40 mg per tablet Take 1 tablet by mouth every 4 (four) hours as needed for headaches    cholecalciferol (VITAMIN D3) 1,000 units tablet Take 2 tablets (2,000 Units total) by mouth daily for 1 day    EPINEPHrine (EPIPEN) 0 3 mg/0 3 mL SOAJ Inject 0 3 mg into the shoulder, thigh, or buttocks    ergocalciferol (VITAMIN D2) 50,000 units TAKE ONE CAPSULE WEEKLY WITH FOOD    famotidine (PEPCID) 20 mg tablet Famotidine 20 MG Oral Tablet  TAKE 1 TABLET DAILY PRN AS DIRECTED  Refills: 0    Active    fluticasone (FLONASE) 50 mcg/act nasal spray 2 sprays into each nostril daily    gabapentin (NEURONTIN) 300 mg capsule Take 2 caps in am and noon and 3 caps HS    metFORMIN (GLUCOPHAGE-XR) 500 mg 24 hr tablet TAKE 1 TABLET BY MOUTH DAILY WITH BREAKFAST OR DINNER    sertraline (ZOLOFT) 50 mg tablet Sertraline HCl - 50 MG Oral Tablet  TAKE 3 TABLETS DAILY   Refills: 0    Active    tolterodine (DETROL LA) 4 mg 24 hr capsule Take 1 capsule by mouth daily    [DISCONTINUED] betamethasone, augmented, (DIPROLENE-AF) 0 05 % cream Apply topically 2 (two) times a day To rash on left thigh until resolved    [DISCONTINUED] levocetirizine (XYZAL) 5 MG tablet TAKE 1 TABLET (5 MG TOTAL) BY MOUTH DAILY AS NEEDED (HIVES)     No current facility-administered medications on file prior to visit  She is allergic to nuts - food allergy; other; peanut oil - food allergy; shellfish allergy - food allergy; shellfish-derived products - food allergy; sumatriptan; copaxone [glatiramer acetate]; cat hair extract; cephalexin; glatiramer; pollen extract; and seasonal ic [cholestatin]            Objective:    Blood pressure 120/62, pulse 62, resp  rate 16, height 5' (1 524 m), weight 63 5 kg (140 lb), not currently breastfeeding      Physical Exam    Neurological Exam    Gait  25 FTW 9 87 sec with cane  Neurological Exam  CARDIOVASCULAR: RRR, normal S1S2, no murmurs, no rubs  RESP: clear to auscultation bilaterally, no wheezes/rhonchi/rales  ABDOMEN: soft, non tender, non distended  SKIN: no rash or skin lesions  EXTREMITIES: no edema, pulses 2+bilaterally  PSYCH: appropriate mood and affect  NEUROLOGIC COMPREHENSIVE EXAM: Patient is oriented to person, place and time, NAD; appropriate affect  CN II, III, IV, V, VI, VII,VIII,IX,X,XI-XII intact with EOMI, PERRLA, OKN intact, VF grossly intact, fundi poorly visualized secondary to pupillary constriction; symmetric face noted  Motor: 5/5 UE/LE bilateral symmetric, RUE 4/5 shoulder abduction and finger flexion; RLU 4-/5 hip flexion and knee flexion, 2/5 dorsiflexion rigjt; Sensory: intact to light touch and pinprick bilaterally; normal vibration sensation feet bilaterally; Coordination within normal limits on FTN and ROLANDO testing; DTR: 2/4 through, no Babinski, no clonus  Tandem gait is abnormal  Romberg: negative a      ROS:  12 points of review of system was reviewed with the patient and was unremarkable with exception: see HPI  Review of Systems   Constitutional: Positive for fatigue  Negative for appetite change and fever  HENT: Negative  Negative for hearing loss, tinnitus, trouble swallowing and voice change  Eyes: Negative  Negative for photophobia and pain  Respiratory: Negative  Negative for shortness of breath  Cardiovascular: Negative  Negative for palpitations  Gastrointestinal: Positive for nausea  Negative for vomiting  Endocrine: Negative  Negative for cold intolerance  Genitourinary: Negative  Negative for dysuria, frequency and urgency  Musculoskeletal: Positive for gait problem, neck pain and neck stiffness  Negative for myalgias  Skin: Negative  Negative for rash  Allergic/Immunologic: Negative      Neurological: Positive for dizziness, speech difficulty, weakness, light-headedness, numbness (tingling feet) and headaches  Negative for tremors, seizures, syncope and facial asymmetry  Hematological: Negative  Does not bruise/bleed easily  Psychiatric/Behavioral: Positive for confusion and sleep disturbance (due to symptoms)  Negative for hallucinations

## 2021-04-21 ENCOUNTER — TELEPHONE (OUTPATIENT)
Dept: NEUROLOGY | Facility: CLINIC | Age: 61
End: 2021-04-21

## 2021-04-21 DIAGNOSIS — R47.89 WORD FINDING DIFFICULTY: Primary | ICD-10-CM

## 2021-04-21 DIAGNOSIS — R41.840 CONCENTRATION DEFICIT: ICD-10-CM

## 2021-04-22 NOTE — TELEPHONE ENCOUNTER
Adderall denied  The diagnosis of MS, encephalopathy due to severe acute respiratory syndrom are considered an experimental or investigational use for this drug or product  Experimental or investigational indications are those which further studies or clinical trials are necessary to determine the maximum tolerated dose, toxicity, safety, or efficacy as compared with the standard means of treatment  Denial scanned under media tab       Please advise

## 2021-04-26 RX ORDER — ARMODAFINIL 50 MG/1
50 TABLET ORAL DAILY
Qty: 30 TABLET | Refills: 1 | Status: SHIPPED | OUTPATIENT
Start: 2021-04-26 | End: 2022-03-17

## 2021-04-26 NOTE — PROGRESS NOTES
Daily Note     Today's date: 2019  Patient name: Cassius Nageotte  : 1960  MRN: [id number]  Referring provider: Herminia Kehr, *  Dx:   Encounter Diagnosis     ICD-10-CM    1  Muscle weakness M62 81    2  Multiple sclerosis ([address] ) G35    3  Trigger finger, left index finger M65 322    4  Trigger finger, left middle finger M65 332                   Subjective: "My MF is a little better, buy I'm leaning towards getting an injection "      Objective: See treatment diary below  Manual              stm extensor mass left              IASTM MP volar head IF/LF  5'            IASTM deep tissue massage  5' R hand only                           Trigger finger splint                    Exercise Diary             Extensor mass stretch left              dexterity             Adaptive equip             Chinese balls   10xs cw 10cs ccw           Grooved peg board             Hook fist              RFB static  50% for 5 sec hold  2x10           Beading  5 sets putting on and popping off           Marbles             theraputty              9 Hole peg re-eval             Theraputty with pegs             RFB up/down  2x10           Digi flex  20xs red                                                                                                                             Modalities                      MH left forearm                    MH left forearm and paraffin  7'  5'                                              Assessment: Tolerated treatment well  Patient would benefit from continued OT  Patient demonstrated increased muscle fatigue as the session progressed  Dexterity and intrinsic weakness continues to be an impairment to functional performance  During beading activity, patient required increased time and had difficulty with manipulation and   Plan: Continue per plan of care        Precautions: MS; COPD
Calm

## 2021-09-07 ENCOUNTER — TELEPHONE (OUTPATIENT)
Dept: NEUROLOGY | Facility: CLINIC | Age: 61
End: 2021-09-07

## 2021-09-07 ENCOUNTER — HOSPITAL ENCOUNTER (OUTPATIENT)
Dept: MRI IMAGING | Facility: HOSPITAL | Age: 61
Discharge: HOME | End: 2021-09-07
Attending: PSYCHIATRY & NEUROLOGY
Payer: COMMERCIAL

## 2021-09-07 DIAGNOSIS — G35 MULTIPLE SCLEROSIS (HCC): ICD-10-CM

## 2021-09-07 DIAGNOSIS — R47.89 WORD FINDING DIFFICULTY: ICD-10-CM

## 2021-09-07 DIAGNOSIS — R47.89 WORD FINDING DIFFICULTY: Primary | ICD-10-CM

## 2021-09-07 PROCEDURE — A9585 GADOBUTROL INJECTION: HCPCS | Performed by: PSYCHIATRY & NEUROLOGY

## 2021-09-07 PROCEDURE — 70553 MRI BRAIN STEM W/O & W/DYE: CPT

## 2021-09-07 PROCEDURE — G1004 CDSM NDSC: HCPCS

## 2021-09-07 RX ADMIN — GADOBUTROL 5 ML: 604.72 INJECTION INTRAVENOUS at 17:46

## 2021-09-07 NOTE — TELEPHONE ENCOUNTER
Patient called to report symptoms  She states last Wednesday (9/1), she felt very  confused  Her hands were shaking  Later that day, patient was shopping at a store  She dropped an item she was holding  Patient and  left store  On the way home, patient had trouble word finding  She knew what she wanted to say, but couldn't actually say it  This lasted appx one hour  Patient states a friend told her the day before this episode that patient's face looked different, as if her cheek was swollen  Patient denies there was facial droop  Patient also c/o of headaches for the past two (2) weeks  She rates the pain as a 5-6/10  Patient has been taking OTC Excedrin Migraine and Naprosen for pain relief  Patient has suffered from chronic fatigue s/p Covid infection but states it has been increasing in severity since the episode on 9/1/21  Patient did not seek any medical treatment/evaluation  Patient denies any recent illnesses  Patient was tested for Covid-19 at [facility] appx 3 weeks  She was asymptomatic at the time  Results were negative  Patient was hospitalized in 11/2020 due to Covid-19 infection  MRI - 1/2021    Patient is scheduled for f/u on 9/17/21     [phone] to leave detailed msg  Dr Parrish Seat - please advise

## 2021-09-07 NOTE — TELEPHONE ENCOUNTER
Called patient  Advised her of STAT MRI brain order  Patient verbalized understanding  Called CS w/patient on the line  Spoke w/Mulu  Per Papa Alonso, an immediate opening is available at [address] if patient can go now  Patient was agreeable to appt

## 2021-09-10 ENCOUNTER — IMMUNIZATIONS (OUTPATIENT)
Dept: FAMILY MEDICINE CLINIC | Facility: HOSPITAL | Age: 61
End: 2021-09-10

## 2021-09-10 DIAGNOSIS — Z23 ENCOUNTER FOR IMMUNIZATION: Primary | ICD-10-CM

## 2021-09-10 PROCEDURE — 91300 SARS-COV-2 / COVID-19 MRNA VACCINE (PFIZER-BIONTECH) 30 MCG: CPT

## 2021-09-10 PROCEDURE — 0001A SARS-COV-2 / COVID-19 MRNA VACCINE (PFIZER-BIONTECH) 30 MCG: CPT

## 2021-09-17 ENCOUNTER — APPOINTMENT (OUTPATIENT)
Dept: RADIOLOGY | Facility: CLINIC | Age: 61
End: 2021-09-17
Payer: COMMERCIAL

## 2021-09-17 ENCOUNTER — OFFICE VISIT (OUTPATIENT)
Dept: NEUROLOGY | Facility: CLINIC | Age: 61
End: 2021-09-17
Payer: COMMERCIAL

## 2021-09-17 VITALS
DIASTOLIC BLOOD PRESSURE: 68 MMHG | SYSTOLIC BLOOD PRESSURE: 130 MMHG | HEIGHT: 60 IN | TEMPERATURE: 96.7 F | BODY MASS INDEX: 27.01 KG/M2 | WEIGHT: 137.6 LBS

## 2021-09-17 DIAGNOSIS — M25.512 ACUTE PAIN OF LEFT SHOULDER: ICD-10-CM

## 2021-09-17 DIAGNOSIS — G93.49 ENCEPHALOPATHY DUE TO SEVERE ACUTE RESPIRATORY SYNDROME CORONAVIRUS 2 (SARS-COV-2): ICD-10-CM

## 2021-09-17 DIAGNOSIS — R47.89 WORD FINDING DIFFICULTY: ICD-10-CM

## 2021-09-17 DIAGNOSIS — U07.1 PNEUMONIA DUE TO COVID-19 VIRUS: ICD-10-CM

## 2021-09-17 DIAGNOSIS — G35 MULTIPLE SCLEROSIS (HCC): Primary | ICD-10-CM

## 2021-09-17 DIAGNOSIS — M47.26 OTHER SPONDYLOSIS WITH RADICULOPATHY, LUMBAR REGION: ICD-10-CM

## 2021-09-17 DIAGNOSIS — J12.82 PNEUMONIA DUE TO COVID-19 VIRUS: ICD-10-CM

## 2021-09-17 DIAGNOSIS — G81.91 RIGHT HEMIPARESIS (HCC): ICD-10-CM

## 2021-09-17 DIAGNOSIS — G35 MULTIPLE SCLEROSIS (HCC): ICD-10-CM

## 2021-09-17 DIAGNOSIS — U07.1 ENCEPHALOPATHY DUE TO SEVERE ACUTE RESPIRATORY SYNDROME CORONAVIRUS 2 (SARS-COV-2): ICD-10-CM

## 2021-09-17 DIAGNOSIS — R51.9 MORNING HEADACHE: ICD-10-CM

## 2021-09-17 PROCEDURE — 99214 OFFICE O/P EST MOD 30 MIN: CPT | Performed by: PSYCHIATRY & NEUROLOGY

## 2021-09-17 PROCEDURE — 73030 X-RAY EXAM OF SHOULDER: CPT

## 2021-09-17 RX ORDER — NIFEDIPINE 60 MG/1
60 TABLET, FILM COATED, EXTENDED RELEASE ORAL
COMMUNITY
End: 2022-05-05 | Stop reason: SDUPTHER

## 2021-09-17 NOTE — PROGRESS NOTES
ESOPHAGOGASTRODUODENOSCOPY (EGD) & COLONOSCOPY REPORT    Rosendo Xiong     1961 Age 61year old   PCP Thalia Rhoades DO Endoscopist Estela Goel MD     Date of procedure: 19    Procedure: EGD w/cold biopsy & Colonoscopy w/cold snare Patient ID: Jesse Hunter is a 57 y o  female  Assessment/Plan:           Problem List Items Addressed This Visit        Nervous and Auditory    Multiple sclerosis ([facility] ) - Primary    Right hemiparesis ([facility] [address] )    Other spondylosis with radiculopathy, lumbar region    Encephalopathy due to severe acute respiratory syndrome coronavirus 2 (SARS-CoV-2)       Other    Word finding difficulty          Mrs Tunde Blake has presented to [address] [place] [address] multiple [address] for follow-up on multiple sclerosis related issues  Patient believes she has no significant progression of her MS related disability since last office visit in April 2021:  - patient continue describing word-finding difficulties with chest tightness with reported by the patient with consideration underlying mood related dysfunction, where zoloft treatment was started and described as beneficial, considering patient's symptoms;  headaches and confusional brought concern for TIA , as patient was also describing right facial weakness and swelling; increased tiredness and slurred speech concluded her event we with headache in all; patient had dysphagia for the last 2 weeks as she lost sense of appetite with preserved sensation of her taste and smell;  -  MRI on 9/7/2021 was not consistent with acute MS or CVA  Patient reported word finding difficulties;  - concern for CINTHYA with morning headaches  - patient follows with Dr Ana Jessica for COPD, albuterol has been providing great benefits;   - intermittent joint pain with left shoulder osteoarthritis has been stable  - patient is not taking disease modifying therapy multiple sclerosis; we extensively discussed importance to follow CDC recommendation despite been fully vaccinated against COVID-19; patient may benefit from 2rd COVID-19 booster in the light of lifelong immunosuppression with disease modifying therapy for MS and having COPD with type 2 diabetes;     Patient is to continue follow with [address] [place] [address] Neurology within 6 months  Subjective: fatigue, lost of appetite, left shoulder pain for 1 week, weakness, headaches daily, confusion  pt needs refill on Gabapentin 300mg Tid and Pepcid    HPI    Mrs Anuja Dickey has presented to [facility] multiple [address] for follow-up on multiple sclerosis and related issues  Patient described no new focal neurological deficit with progressive ambulatory dysfunction has been noted  Patient had severe headaches with confusional states while she had COVID-19 infection, with persistent word finding difficulties and slurred speech has been noted  We agreed patient has no breakthrough disease on her recent brain imaging, with no acute or chronic ischemic or hemorrhagic changes, with stable demyelination appreciated  We also discussed potential post coronavirus neuro psychological complications in the form of mood disorder and neuro cognitive dysfunction  Patient will be advised to consider cognitive therapy in addition to short trial of CNS stimulants  Patient describes she has no appetite despite improved sense of taste  Patient will be advised against disease modifying therapy for MS with persistent right-sided hemiparesis and footdrop noted on exam   Patient is to follow with physical therapy  Patient does have left shoulder pain with known osteoarthritis in her left shoulder left-sided C6 radiculopathy due to degenerative disc disease appreciated on imaging of the cervical region back in 2017  Patient is COVID-19 vaccinated     The following portions of the patient's history were reviewed and updated as appropriate:   She  has a past medical history of Asthma, Cardiac disease, COPD (chronic obstructive pulmonary disease) ([address] ), Depression, Hyperlipidemia, Hypertension, Influenza, Irregular heart beat, Migraine, Migraine, MS (multiple sclerosis) ([address] ), Multiple sclerosis ([address] ), Nephrolithiasis, Neurogenic bladder, and Panniculitis    She   Patient Active colon were good with washing. I then carefully withdrew the instrument from the patient who tolerated the procedure well. Complications: None    EGD findings:      1. Esophagus: The squamocolumnar junction was noted at 40 cm and appeared regular.  The d Problem List    Diagnosis Date Noted    Cervical disc disorder at C6-C7 level with radiculopathy 04/12/2021    Encephalopathy due to severe acute respiratory syndrome coronavirus 2 (SARS-CoV-2) 04/12/2021    Word finding difficulty 04/12/2021    Slurred speech 04/12/2021    Edema of right lower extremity 02/26/2021    Hypotension 11/09/2020    Pneumonia due to COVID-19 virus 11/06/2020    Other spondylosis with radiculopathy, lumbar region 02/20/2020    Right low back pain 02/21/2019    Skin rash 02/21/2019    Right hemiparesis ([facility] ) 02/21/2019    [facility] 03/15/2018    Ambulatory dysfunction 02/21/2018    Chronic right-sided headaches 02/21/2018    Multiple sclerosis ([facility] [address] ) 02/21/2018    Panniculitis 02/21/2018    S/P trigger finger release 02/02/2018    Trigger ring finger of left hand 01/17/2018    Hypertension 04/06/2017    COPD (chronic obstructive pulmonary disease) ([facility] ) 04/11/2016    Hyperlipidemia with target LDL less than 160 07/14/2011     She  has a past surgical history that includes Dilation and curettage of uterus; Uterine fibroid surgery; pr colonoscopy flx dx w/collj spec when pfrmd (N/A, 1/11/2018); Colonoscopy; pr incise finger tendon sheath (Left, 1/17/2018); and Tonsillectomy  Her family history includes Breast cancer (age of onset: 76) in her maternal aunt; Diabetes in her father, mother, and sister; Yessica Sumit' disease in her brother; Heart disease in her father; Hypertension in her brother, father, and mother; No Known Problems in her maternal aunt, maternal aunt, maternal aunt, maternal aunt, maternal grandmother, and paternal grandmother; Stomach cancer in her maternal aunt; Stroke in her mother  She  reports that she has never smoked  She has never used smokeless tobacco  She reports current alcohol use  She reports current drug use  Drug: Marijuana    Current Outpatient Medications   Medication Sig Dispense Refill    albuterol (2 5 mg/3 mL) 0 083 % diverticulosis and internal hemorrhoids. Recommend:  · Await pathology. Repeat EGD in 2 years. REpeat CLN in 3 years. If new signs or symptoms develop, colonoscopy may need to be repeated sooner. · High fiber diet. · Monitor for blood in the stool. nebulizer solution Albuterol Sulfate (2 5 MG/3ML) 0 083% Inhalation Nebulization Solution  USE 1 UNIT DOSE IN NEBULIZER EVERY 6 HOURS AS NEEDED  Quantity: 1;  Refills: Cristian STOKES RADHA ;  Started 12-July-2016  Active3 ML Plas Cont (125 Plas Conts)      albuterol (VENTOLIN HFA) 90 mcg/act inhaler Inhale 2 puffs every 6 (six) hours as needed for wheezing 3 Inhaler 0    aspirin 81 MG tablet Aspirin 81 MG TABS  Take 1 tablet daily   Refills: 0    Active      atorvastatin (LIPITOR) 20 mg tablet Take 40 mg by mouth Medrol Dose Pack scheduling ONLY        baclofen 20 mg tablet Take 1 tablet (20 mg total) by mouth 3 (three) times a day 270 tablet 2    budesonide (PULMICORT FLEXHALER) 180 MCG/ACT inhaler Inhale 1 puff 2 (two) times a day (Patient taking differently: Inhale 1 puff 2 (two) times a day as needed  ) 3 Inhaler 3    butalbital-acetaminophen-caffeine (FIORICET,ESGIC) -40 mg per tablet Take 1 tablet by mouth every 4 (four) hours as needed for headaches 12 tablet 4    EPINEPHrine (EPIPEN) 0 3 mg/0 3 mL SOAJ Inject 0 3 mg into the shoulder, thigh, or buttocks      ergocalciferol (VITAMIN D2) 50,000 units TAKE ONE CAPSULE WEEKLY WITH FOOD  1    famotidine (PEPCID) 20 mg tablet Famotidine 20 MG Oral Tablet  TAKE 1 TABLET DAILY PRN AS DIRECTED     Refills: 0    Active      fluticasone (FLONASE) 50 mcg/act nasal spray 2 sprays into each nostril daily 1 Bottle 3    gabapentin (NEURONTIN) 300 mg capsule Take 2 caps in am and noon and 3 caps  capsule 2    metFORMIN (GLUCOPHAGE-XR) 500 mg 24 hr tablet TAKE 1 TABLET BY MOUTH DAILY WITH BREAKFAST OR DINNER      sertraline (ZOLOFT) 50 mg tablet Sertraline HCl - 50 MG Oral Tablet  TAKE 3 TABLETS DAILY   Refills: 0    Active      tolterodine (DETROL LA) 4 mg 24 hr capsule Take 1 capsule by mouth daily      Armodafinil 50 MG tablet Take 1 tablet (50 mg total) by mouth daily (Patient not taking: Reported on 9/17/2021) 30 tablet 1    benzonatate (TESSALON) 200 MG capsule Take 200 mg by mouth 3 (three) times a day (Patient not taking: Reported on 9/17/2021)      cholecalciferol (VITAMIN D3) 1,000 units tablet Take 2 tablets (2,000 Units total) by mouth daily for 1 day 2 tablet 0    NIFEdipine ER (ADALAT CC) 60 MG 24 hr tablet Take 60 mg by mouth       No current facility-administered medications for this visit  Current Outpatient Medications on File Prior to Visit   Medication Sig    albuterol (2 5 mg/3 mL) 0 083 % nebulizer solution Albuterol Sulfate (2 5 MG/3ML) 0 083% Inhalation Nebulization Solution  USE 1 UNIT DOSE IN NEBULIZER EVERY 6 HOURS AS NEEDED  Quantity: 1;  Refills: 5       Joy GARCIA ;  Started 12-July-2016  Active3 ML Plas Cont (125 Plas Conts)    albuterol (VENTOLIN HFA) 90 mcg/act inhaler Inhale 2 puffs every 6 (six) hours as needed for wheezing    aspirin 81 MG tablet Aspirin 81 MG TABS  Take 1 tablet daily   Refills: 0    Active    atorvastatin (LIPITOR) 20 mg tablet Take 40 mg by mouth Medrol Dose Pack scheduling ONLY      baclofen 20 mg tablet Take 1 tablet (20 mg total) by mouth 3 (three) times a day    budesonide (PULMICORT FLEXHALER) 180 MCG/ACT inhaler Inhale 1 puff 2 (two) times a day (Patient taking differently: Inhale 1 puff 2 (two) times a day as needed  )    butalbital-acetaminophen-caffeine (FIORICET,ESGIC) -40 mg per tablet Take 1 tablet by mouth every 4 (four) hours as needed for headaches    EPINEPHrine (EPIPEN) 0 3 mg/0 3 mL SOAJ Inject 0 3 mg into the shoulder, thigh, or buttocks    ergocalciferol (VITAMIN D2) 50,000 units TAKE ONE CAPSULE WEEKLY WITH FOOD    famotidine (PEPCID) 20 mg tablet Famotidine 20 MG Oral Tablet  TAKE 1 TABLET DAILY PRN AS DIRECTED     Refills: 0    Active    fluticasone (FLONASE) 50 mcg/act nasal spray 2 sprays into each nostril daily    gabapentin (NEURONTIN) 300 mg capsule Take 2 caps in am and noon and 3 caps HS    metFORMIN (GLUCOPHAGE-XR) 500 mg 24 hr tablet TAKE 1 TABLET BY MOUTH DAILY WITH BREAKFAST OR DINNER    sertraline (ZOLOFT) 50 mg tablet Sertraline HCl - 50 MG Oral Tablet  TAKE 3 TABLETS DAILY   Refills: 0    Active    tolterodine (DETROL LA) 4 mg 24 hr capsule Take 1 capsule by mouth daily    Armodafinil 50 MG tablet Take 1 tablet (50 mg total) by mouth daily (Patient not taking: Reported on 9/17/2021)    benzonatate (TESSALON) 200 MG capsule Take 200 mg by mouth 3 (three) times a day (Patient not taking: Reported on 9/17/2021)    cholecalciferol (VITAMIN D3) 1,000 units tablet Take 2 tablets (2,000 Units total) by mouth daily for 1 day    NIFEdipine ER (ADALAT CC) 60 MG 24 hr tablet Take 60 mg by mouth     No current facility-administered medications on file prior to visit  She is allergic to nuts - food allergy, other, peanut oil - food allergy, shellfish allergy - food allergy, shellfish-derived products - food allergy, sumatriptan, copaxone [glatiramer acetate], cat hair extract, cephalexin, glatiramer, pollen extract, and seasonal ic [cholestatin]            Objective:    Blood pressure 130/68, temperature (!) 96 7 °F (35 9 °C), temperature source Skin, height 5' (1 524 m), weight 62 4 kg (137 lb 9 6 oz), not currently breastfeeding  Physical Exam    Neurological Exam    CARDIOVASCULAR: RRR, normal S1S2, no murmurs, no rubs  RESP: clear to auscultation bilaterally, no wheezes/rhonchi/rales  ABDOMEN: soft, non tender, non distended  SKIN: no rash or skin lesions  EXTREMITIES: no edema, pulses 2+bilaterally  PSYCH: appropriate mood and affect  NEUROLOGIC COMPREHENSIVE EXAM: Patient is oriented to person, place and time, NAD; appropriate affect  CN II, III, IV, V, VI, VII,VIII,IX,X,XI-XII intact with EOMI, PERRLA, OKN intact, VF grossly intact, fundi poorly visualized secondary to pupillary constriction; symmetric face noted   Motor: 5/5 UE/LE bilateral symmetric, RUE 4/5 shoulder abduction and finger flexion; RLU 4-/5 hip flexion and knee flexion, 2/5 dorsiflexion rigjt; Sensory: intact to light touch and pinprick bilaterally; normal vibration sensation feet bilaterally; Coordination within normal limits on FTN and ROLANDO testing; DTR: 2/4 through, no Babinski, no clonus  Tandem gait is abnormal  Romberg: negative a  ROS:  12 points of review of system was reviewed with the patient and was unremarkable with exception: see HPI  Review of Systems   Constitutional: Positive for fatigue  Negative for appetite change (lost of appetite) and fever  HENT: Negative  Negative for hearing loss, tinnitus, trouble swallowing and voice change  Eyes: Negative  Negative for photophobia and pain  Respiratory: Negative  Negative for shortness of breath  Cardiovascular: Negative  Negative for palpitations  Gastrointestinal: Negative  Negative for nausea and vomiting  Endocrine: Negative  Negative for cold intolerance  Genitourinary: Negative  Negative for dysuria, frequency and urgency  Musculoskeletal: Negative  Negative for myalgias and neck pain  Left shoulder pain x 1 week   Skin: Negative  Negative for rash  Neurological: Positive for weakness and headaches (daily)  Negative for dizziness, tremors, seizures, syncope, facial asymmetry, speech difficulty, light-headedness and numbness  Hematological: Negative  Does not bruise/bleed easily  Psychiatric/Behavioral: Positive for confusion  Negative for hallucinations and sleep disturbance

## 2021-09-30 DIAGNOSIS — R47.89 WORD FINDING DIFFICULTY: ICD-10-CM

## 2021-09-30 DIAGNOSIS — G81.91 RIGHT HEMIPARESIS (HCC): ICD-10-CM

## 2021-09-30 DIAGNOSIS — G35 MULTIPLE SCLEROSIS (HCC): ICD-10-CM

## 2021-09-30 DIAGNOSIS — R26.2 AMBULATORY DYSFUNCTION: Primary | ICD-10-CM

## 2021-09-30 DIAGNOSIS — M25.512 ACUTE PAIN OF LEFT SHOULDER: ICD-10-CM

## 2021-09-30 DIAGNOSIS — M54.50 RIGHT LOW BACK PAIN, UNSPECIFIED CHRONICITY, UNSPECIFIED WHETHER SCIATICA PRESENT: ICD-10-CM

## 2021-10-05 RX ORDER — BACLOFEN 20 MG/1
20 TABLET ORAL 3 TIMES DAILY
Qty: 270 TABLET | Refills: 2 | Status: SHIPPED | OUTPATIENT
Start: 2021-10-05 | End: 2022-03-17 | Stop reason: SDUPTHER

## 2021-10-12 ENCOUNTER — OFFICE VISIT (OUTPATIENT)
Dept: OBGYN CLINIC | Facility: CLINIC | Age: 61
End: 2021-10-12
Payer: COMMERCIAL

## 2021-10-12 VITALS
BODY MASS INDEX: 27.13 KG/M2 | SYSTOLIC BLOOD PRESSURE: 134 MMHG | DIASTOLIC BLOOD PRESSURE: 73 MMHG | HEART RATE: 84 BPM | HEIGHT: 60 IN | WEIGHT: 138.2 LBS

## 2021-10-12 DIAGNOSIS — M75.02 ADHESIVE CAPSULITIS OF LEFT SHOULDER: Primary | ICD-10-CM

## 2021-10-12 DIAGNOSIS — R26.2 AMBULATORY DYSFUNCTION: ICD-10-CM

## 2021-10-12 DIAGNOSIS — M54.50 RIGHT LOW BACK PAIN, UNSPECIFIED CHRONICITY, UNSPECIFIED WHETHER SCIATICA PRESENT: ICD-10-CM

## 2021-10-12 PROCEDURE — 99213 OFFICE O/P EST LOW 20 MIN: CPT | Performed by: ORTHOPAEDIC SURGERY

## 2021-10-13 PROBLEM — M75.02 ADHESIVE CAPSULITIS OF LEFT SHOULDER: Status: ACTIVE | Noted: 2021-10-13

## 2021-11-02 ENCOUNTER — TELEPHONE (OUTPATIENT)
Dept: NEUROLOGY | Facility: CLINIC | Age: 61
End: 2021-11-02

## 2021-11-12 NOTE — PROGRESS NOTES
Daily Note     Today's date: 10/3/2019  Patient name: Justen Mcdonald  : 1960  MRN: [id number]  Referring provider: Timoteo Smith MD  Dx:   Encounter Diagnosis     ICD-10-CM    1  Lumbar radiculopathy M54 16    2  Right hemiparesis ([facility] [address] ) G81 91    3  Multiple sclerosis ([facility] [address] ) G35                   Subjective: Patient states, "The back's about the same," but following questioning, she states she hasn't had any back spasms since starting therapy  She also reports being fatigued following last visit  Objective: See treatment diary below      Assessment: Tolerated treatment well  Patient does exhibit right foot drag during gait, partially due to weaker R DF, but also because patient is unable to perform adequate knee flexion during swing phase of gait  Continued strengthening required  Patient would benefit from continued PT She leaves session without complaint  Plan: Continue per plan of care           Precautions: MS; COPD      Manual   9/26 10/2 10/3         STR R TSP [employer]  8' NP                                                                  Exercise Diary  9/23 9/26 10/2 10/3         Pt Edu KS             Bike NV  5 min 5 min         TUG NV  15 sec          PIERCE NV  34/56          Transverse Abdominus Engagement NV 3"x20 HEP 3"x20         Adductor set NV 3"x20 x20 3"x20         Hooklying TB hip abd NV Red x 20 ea Red x20 Red x 20 ea         Standing hip abd/ext  2 x 10ea 2x10 ea abd 2 x 10ea         Standing marching  X 20 x20          Standing heel raises  x20 x20 x20         SAQ  2x10 HEP 2 x 10 ea         R trunk stretch in left sidelying  4 x 15" NP- no pain          SHC   X 20 ea          Biodex- LOS   Static x 3 Static x 3         Biodex- weight shifting   NV equal weight 50% 1 min x 2         Mini squat    2x10         Bridge    2x10         Stairs    NV         Seated TB hamstring curl    NV         Ankle TB DF/PF    NV                          Modalities no focal deficits

## 2021-12-02 ENCOUNTER — OFFICE VISIT (OUTPATIENT)
Dept: PULMONOLOGY | Facility: CLINIC | Age: 61
End: 2021-12-02
Payer: COMMERCIAL

## 2021-12-02 ENCOUNTER — EVALUATION (OUTPATIENT)
Dept: PHYSICAL THERAPY | Facility: CLINIC | Age: 61
End: 2021-12-02
Payer: COMMERCIAL

## 2021-12-02 VITALS
DIASTOLIC BLOOD PRESSURE: 68 MMHG | OXYGEN SATURATION: 97 % | HEIGHT: 60 IN | BODY MASS INDEX: 27.72 KG/M2 | TEMPERATURE: 95.9 F | SYSTOLIC BLOOD PRESSURE: 134 MMHG | RESPIRATION RATE: 12 BRPM | HEART RATE: 84 BPM | WEIGHT: 141.2 LBS

## 2021-12-02 DIAGNOSIS — E66.3 OVERWEIGHT (BMI 25.0-29.9): ICD-10-CM

## 2021-12-02 DIAGNOSIS — G47.33 OSA (OBSTRUCTIVE SLEEP APNEA): ICD-10-CM

## 2021-12-02 DIAGNOSIS — U07.1 PNEUMONIA DUE TO COVID-19 VIRUS: ICD-10-CM

## 2021-12-02 DIAGNOSIS — J45.991 COUGH VARIANT ASTHMA: Primary | ICD-10-CM

## 2021-12-02 DIAGNOSIS — R51.9 MORNING HEADACHE: ICD-10-CM

## 2021-12-02 DIAGNOSIS — J12.82 PNEUMONIA DUE TO COVID-19 VIRUS: ICD-10-CM

## 2021-12-02 DIAGNOSIS — M75.02 ADHESIVE CAPSULITIS OF LEFT SHOULDER: Primary | ICD-10-CM

## 2021-12-02 PROCEDURE — 97140 MANUAL THERAPY 1/> REGIONS: CPT | Performed by: PHYSICAL THERAPIST

## 2021-12-02 PROCEDURE — 99204 OFFICE O/P NEW MOD 45 MIN: CPT | Performed by: INTERNAL MEDICINE

## 2021-12-02 PROCEDURE — 97161 PT EVAL LOW COMPLEX 20 MIN: CPT | Performed by: PHYSICAL THERAPIST

## 2021-12-02 PROCEDURE — 97110 THERAPEUTIC EXERCISES: CPT | Performed by: PHYSICAL THERAPIST

## 2021-12-06 ENCOUNTER — TELEPHONE (OUTPATIENT)
Dept: PULMONOLOGY | Facility: CLINIC | Age: 61
End: 2021-12-06

## 2021-12-07 ENCOUNTER — OFFICE VISIT (OUTPATIENT)
Dept: PHYSICAL THERAPY | Facility: CLINIC | Age: 61
End: 2021-12-07
Payer: COMMERCIAL

## 2021-12-07 DIAGNOSIS — M75.02 ADHESIVE CAPSULITIS OF LEFT SHOULDER: Primary | ICD-10-CM

## 2021-12-07 PROCEDURE — 97110 THERAPEUTIC EXERCISES: CPT | Performed by: PHYSICAL THERAPIST

## 2021-12-07 PROCEDURE — 97140 MANUAL THERAPY 1/> REGIONS: CPT | Performed by: PHYSICAL THERAPIST

## 2021-12-09 ENCOUNTER — OFFICE VISIT (OUTPATIENT)
Dept: PHYSICAL THERAPY | Facility: CLINIC | Age: 61
End: 2021-12-09
Payer: COMMERCIAL

## 2021-12-09 DIAGNOSIS — M75.02 ADHESIVE CAPSULITIS OF LEFT SHOULDER: Primary | ICD-10-CM

## 2021-12-09 PROCEDURE — 97140 MANUAL THERAPY 1/> REGIONS: CPT | Performed by: PHYSICAL THERAPIST

## 2021-12-09 PROCEDURE — 97110 THERAPEUTIC EXERCISES: CPT | Performed by: PHYSICAL THERAPIST

## 2021-12-10 DIAGNOSIS — G35 MULTIPLE SCLEROSIS (HCC): ICD-10-CM

## 2021-12-10 RX ORDER — GABAPENTIN 300 MG/1
CAPSULE ORAL
Qty: 630 CAPSULE | Refills: 2 | Status: SHIPPED | OUTPATIENT
Start: 2021-12-10

## 2021-12-14 ENCOUNTER — OFFICE VISIT (OUTPATIENT)
Dept: PHYSICAL THERAPY | Facility: CLINIC | Age: 61
End: 2021-12-14
Payer: COMMERCIAL

## 2021-12-14 DIAGNOSIS — M75.02 ADHESIVE CAPSULITIS OF LEFT SHOULDER: Primary | ICD-10-CM

## 2021-12-14 PROCEDURE — 97140 MANUAL THERAPY 1/> REGIONS: CPT | Performed by: PHYSICAL THERAPIST

## 2021-12-14 PROCEDURE — 97110 THERAPEUTIC EXERCISES: CPT | Performed by: PHYSICAL THERAPIST

## 2021-12-16 ENCOUNTER — TELEPHONE (OUTPATIENT)
Dept: NEUROLOGY | Facility: CLINIC | Age: 61
End: 2021-12-16

## 2021-12-16 ENCOUNTER — OFFICE VISIT (OUTPATIENT)
Dept: PHYSICAL THERAPY | Facility: CLINIC | Age: 61
End: 2021-12-16
Payer: COMMERCIAL

## 2021-12-16 DIAGNOSIS — M75.02 ADHESIVE CAPSULITIS OF LEFT SHOULDER: Primary | ICD-10-CM

## 2021-12-16 PROCEDURE — 97110 THERAPEUTIC EXERCISES: CPT | Performed by: PHYSICAL THERAPIST

## 2021-12-16 PROCEDURE — 97140 MANUAL THERAPY 1/> REGIONS: CPT | Performed by: PHYSICAL THERAPIST

## 2021-12-20 ENCOUNTER — TELEPHONE (OUTPATIENT)
Dept: NEUROLOGY | Facility: CLINIC | Age: 61
End: 2021-12-20

## 2021-12-21 ENCOUNTER — OFFICE VISIT (OUTPATIENT)
Dept: PHYSICAL THERAPY | Facility: CLINIC | Age: 61
End: 2021-12-21
Payer: COMMERCIAL

## 2021-12-21 DIAGNOSIS — M75.02 ADHESIVE CAPSULITIS OF LEFT SHOULDER: Primary | ICD-10-CM

## 2021-12-21 PROCEDURE — 97140 MANUAL THERAPY 1/> REGIONS: CPT | Performed by: PHYSICAL THERAPIST

## 2021-12-21 PROCEDURE — 97110 THERAPEUTIC EXERCISES: CPT | Performed by: PHYSICAL THERAPIST

## 2021-12-23 ENCOUNTER — OFFICE VISIT (OUTPATIENT)
Dept: PHYSICAL THERAPY | Facility: CLINIC | Age: 61
End: 2021-12-23
Payer: COMMERCIAL

## 2021-12-23 DIAGNOSIS — M75.02 ADHESIVE CAPSULITIS OF LEFT SHOULDER: Primary | ICD-10-CM

## 2021-12-23 PROCEDURE — 97140 MANUAL THERAPY 1/> REGIONS: CPT | Performed by: PHYSICAL THERAPIST

## 2021-12-23 PROCEDURE — 97110 THERAPEUTIC EXERCISES: CPT | Performed by: PHYSICAL THERAPIST

## 2021-12-27 ENCOUNTER — HOSPITAL ENCOUNTER (OUTPATIENT)
Dept: PULMONOLOGY | Facility: HOSPITAL | Age: 61
Discharge: HOME | End: 2021-12-27
Attending: INTERNAL MEDICINE
Payer: COMMERCIAL

## 2021-12-27 DIAGNOSIS — J45.991 COUGH VARIANT ASTHMA: ICD-10-CM

## 2021-12-27 PROCEDURE — 94727 GAS DIL/WSHOT DETER LNG VOL: CPT

## 2021-12-27 PROCEDURE — 94060 EVALUATION OF WHEEZING: CPT

## 2021-12-27 PROCEDURE — 94060 EVALUATION OF WHEEZING: CPT | Performed by: INTERNAL MEDICINE

## 2021-12-27 PROCEDURE — 94729 DIFFUSING CAPACITY: CPT | Performed by: INTERNAL MEDICINE

## 2021-12-27 PROCEDURE — 94727 GAS DIL/WSHOT DETER LNG VOL: CPT | Performed by: INTERNAL MEDICINE

## 2021-12-27 PROCEDURE — 94618 PULMONARY STRESS TESTING: CPT | Performed by: INTERNAL MEDICINE

## 2021-12-27 PROCEDURE — 94761 N-INVAS EAR/PLS OXIMETRY MLT: CPT

## 2021-12-27 PROCEDURE — 94729 DIFFUSING CAPACITY: CPT

## 2021-12-27 RX ORDER — ALBUTEROL SULFATE 2.5 MG/3ML
2.5 SOLUTION RESPIRATORY (INHALATION) ONCE
Status: COMPLETED | OUTPATIENT
Start: 2021-12-27 | End: 2021-12-27

## 2021-12-27 RX ADMIN — ALBUTEROL SULFATE 2.5 MG: 2.5 SOLUTION RESPIRATORY (INHALATION) at 15:18

## 2022-01-04 ENCOUNTER — OFFICE VISIT (OUTPATIENT)
Dept: PHYSICAL THERAPY | Facility: CLINIC | Age: 62
End: 2022-01-04
Payer: COMMERCIAL

## 2022-01-04 DIAGNOSIS — M75.02 ADHESIVE CAPSULITIS OF LEFT SHOULDER: Primary | ICD-10-CM

## 2022-01-04 PROCEDURE — 97140 MANUAL THERAPY 1/> REGIONS: CPT | Performed by: PHYSICAL THERAPIST

## 2022-01-04 PROCEDURE — 97110 THERAPEUTIC EXERCISES: CPT | Performed by: PHYSICAL THERAPIST

## 2022-01-04 NOTE — PROGRESS NOTES
PT Re-Evaluation     Today's date: 2022   Patient name: Lynsey Nicholson  : 1960  MRN: [id number]  Referring provider: George Izquierdo MD  Dx:   Encounter Diagnosis     ICD-10-CM    1  Adhesive capsulitis of left shoulder  M75 02        Start Time: 1102  Stop Time: 1150  Total time in clinic (min): 48 minutes    Assessment  Assessment details: Patient is a 57 y o  female who carries the diagnosis of adhesive capsulitis and has completed 8 visits to date with improved FOTO score of 50 to 58 since initial evaluation  Patient demonstrates notable subjective/objective improvement since enrolling in PT to include pain and ROM  Patient continues to exhibit lingering deficits to include pain, ROM, and functional strength2 that continues to impact tolerance/ability to perform ADLs and recreational activities  Patient will benefit from continued skilled PT intervention to address the aforementioned impairments, achieve goals, maximize function, and improve quality of life  Pt is in agreement with this plan          Impairments: abnormal or restricted ROM, activity intolerance, impaired physical strength, lacks appropriate home exercise program, pain with function, poor posture  and poor body mechanics    Symptom irritability: moderateUnderstanding of Dx/Px/POC: good   Prognosis: good    Goals  (STG) Impairment Goals 4 weeks  - Decrease pain to 0-1/10 PROGRESSING  - Improve active shoulder flexion/abduction to 130 degrees without pain/compensation PROGRESSING  - Increase shoulder strength to 3+/5 throughout PROGRESSING  - Patient will be independent with HEP MET    (LTG) Functional Goals 8 weeks  - Return to Prior Level of Function PROGRESSING  - Increase Functional Status Measure (FOTO) to: 61 PROGRESSING  - Patient will be able to reach overhead without increased pain/compensation/difficulty PROGRESSING  - Patient will be able to reach behind back without increased pain/compensation/difficulty MET  - Patient will be able to wash hair without increased pain/compensation/difficulty MET      Plan  Plan details: Will continue to treat per POC  Patient would benefit from: skilled physical therapy  Referral necessary: No  Planned modality interventions: cryotherapy  Other planned modality interventions: [facility]  Planned therapy interventions: ADL training, functional ROM exercises, home exercise program, therapeutic exercise, therapeutic activities, stretching, strengthening, self care, postural training, neuromuscular re-education, joint mobilization and manual therapy  Frequency: 2x week  Duration in weeks: 8  Plan of Care beginning date: 2021  Plan of Care expiration date: 2022  Treatment plan discussed with: patient        Subjective Evaluation    History of Present Illness  Mechanism of injury: Pt returns to PT after 1 5 week absence while being away for the holiday  Pt states her shoulder continues to improve and has more motion and less pain since enrolling in therapy  Pt still feels limited with reaching overhead and behind her back, as well as feeling weak with activities     Quality of life: good    Pain  Current pain rating: 3  At best pain ratin  At worst pain ratin  Quality: throbbing, sharp and dull ache  Relieving factors: change in position and rest  Progression: no change    Treatments  Current treatment: physical therapy  Patient Goals  Patient goals for therapy: increased strength, increased motion, decreased pain and independence with ADLs/IADLs          Objective     Postural Observations  Seated posture: fair  Standing posture: good  Correction of posture: has no consistent effect        Active Range of Motion   Left Shoulder   Flexion: 113 degrees with pain  Abduction: 135 degrees with pain  External rotation BTH: T2 with pain  Internal rotation BTB: T10 with pain    Passive Range of Motion   Left Shoulder   Flexion: 145 degrees with pain  Abduction: 170 degrees with pain  External rotation 45°: with pain  External rotation 90°: 90 degrees   Internal rotation 45°: with pain  Internal rotation 90°: 70 degrees     Additional Passive Range of Motion Details  Improved PROM with approximation of GHJ    Strength/Myotome Testing     Additional Strength Details  Deferred 2* to pain and limited AROM, able to resist sub-maximally    Diagnosis: L adhesive capsulitis   Precautions: hx of MS, hx of falls   POC expires: 1/27/22   Re-eval: 1/27/22   Manuals 12/21 12/23 1/4/22 12/14 12/16   L shoulder PROM; posterior/inf humeral glides 12 min 12 min 15 min 12 min 12 min   Deltoid unloading w/ leukotape NT KD NV NV KD                           Ther Ex 12/21 12/23 1/4/22 12/16   Pulleys 3 min ea (flex/scaption) 3 min ea (flex/scaption) 3 min ea (flex/scaption) 3 min ea (flex/scaption) 3 min ea (flex/scaption)           IR belt stretch 10x10" 10x10" 10x10" 10x10" 10x10"           Cane 3-way  10x ea 10x ea 10x ea 10x ea 10x ea   Wall wash 10x3" ea flex/scaption 10x3" flex/scaption 10x3" ea (flex/scaption) 10x3" ea 10x3" ea (flex/scaption"   SL ER  2x10 2# 2x10 2#     SL shoulder abd 2x10 2# 3x10 2# 3x10 2# 10x 1# 2x10 1#   Supine smith flex 2x10 1# 3x10 1# 10x ea 10x 1# 2x10 1#   Supine I, Y 5x10" ea 2# 5x10" ea 2# 5x10" ea 1# 5x10" ea 2# 5x10" ea 2#   Rows/pulldowns Grn/yellow 2x10 grn 2x10 Yellow 2x10 ea Yellow 2x10 Grn/yellow 2x10   IR stick pass 10x 10x 10x  10x   Standing shoulder flex/scaption 10x ea 10x ea 10x ea  10x ea   Ball roll up wall 5# 10x  5# 10x 5# 10x 5# 10x 5# 10x, 5x                                      Re-assessed L shoulder; FOTO 58     Neuro Re-Ed                                                                                                                      Ther Act                                         Modalities             HEP

## 2022-01-06 ENCOUNTER — OFFICE VISIT (OUTPATIENT)
Dept: PHYSICAL THERAPY | Facility: CLINIC | Age: 62
End: 2022-01-06
Payer: COMMERCIAL

## 2022-01-06 DIAGNOSIS — M75.02 ADHESIVE CAPSULITIS OF LEFT SHOULDER: Primary | ICD-10-CM

## 2022-01-06 PROCEDURE — 97110 THERAPEUTIC EXERCISES: CPT

## 2022-01-06 PROCEDURE — 97140 MANUAL THERAPY 1/> REGIONS: CPT

## 2022-01-06 NOTE — PROGRESS NOTES
Daily Note     Today's date: 2022  Patient name: Carmenza Schmidt  : 1960  MRN: [id number]  Referring provider: Yomaira Berrios MD  Dx:   Encounter Diagnosis     ICD-10-CM    1  Adhesive capsulitis of left shoulder  M75 02        Start Time: 457  Stop Time: 1230  Total time in clinic (min): 45 minutes    Subjective: Patient reports her shoulder is starting to do much better  Objective: See treatment diary below      Assessment: Tolerated treatment well  Patient exhibited good technique with therapeutic exercises and would benefit from continued PT  Patient did not have sharp pains with exercises, just achiness, which is an improvement since beginning of therapy  Mobility is improving overall  Added ER w/ tband w/ good tolerance  Plan: Continue per plan of care         Diagnosis: L adhesive capsulitis   Precautions: hx of MS, hx of falls   POC expires: 22   Re-eval: 22   Manuals 22 1    L shoulder PROM; posterior/inf humeral glides 12 min 12 min 15 min 8'    Deltoid unloading w/ leukotape NT KD NV NV                            Ther Ex 22     Pulleys 3 min ea (flex/scaption) 3 min ea (flex/scaption) 3 min ea (flex/scaption) 3 min ea (flex/scaption)            IR belt stretch 10x10" 10x10" 10x10" 10x10"            Cane 3-way  10x ea 10x ea 10x ea 10x    Wall wash 10x3" ea flex/scaption 10x3" flex/scaption 10x3" ea (flex/scaption) 10x 3" ea (flex/scaption)    SL ER  2x10 2# 2x10 2# 2x10 2#    SL shoulder abd 2x10 2# 3x10 2# 3x10 2# 3x10 2#    Supine smith flex 2x10 1# 3x10 1# 10x ea     Supine I, Y 5x10" ea 2# 5x10" ea 2# 5x10" ea 1# 1# 10x 10" ea    Rows/pulldowns Grn/yellow 2x10 grn 2x10 Yellow 2x10 ea     IR stick pass 10x 10x 10x 10x    Standing shoulder flex/scaption 10x ea 10x ea 10x ea 10x ea    Ball roll up wall 5# 10x  5# 10x 5# 10x     Supine ER    ytb 10x                               Re-assessed L shoulder; FOTO 58     Neuro Re-Ed Ther Act                                      Modalities            HEP

## 2022-01-11 ENCOUNTER — OFFICE VISIT (OUTPATIENT)
Dept: PHYSICAL THERAPY | Facility: CLINIC | Age: 62
End: 2022-01-11
Payer: COMMERCIAL

## 2022-01-11 DIAGNOSIS — M75.02 ADHESIVE CAPSULITIS OF LEFT SHOULDER: Primary | ICD-10-CM

## 2022-01-11 PROCEDURE — 97140 MANUAL THERAPY 1/> REGIONS: CPT

## 2022-01-11 PROCEDURE — 97110 THERAPEUTIC EXERCISES: CPT

## 2022-01-11 NOTE — PROGRESS NOTES
Daily Note     Today's date: 2022  Patient name: Lionel Mauro  : 1960  MRN: [id number]  Referring provider: Umberto Swanson MD  Dx:   Encounter Diagnosis     ICD-10-CM    1  Adhesive capsulitis of left shoulder  M75 02                   Subjective: Susana reports tightness/discomfort to L UT today with insidious onset  Objective: See treatment diary below      Assessment: Added UT stretch which improved flexibility and helped to decrease discomfort in L shoulder today  Added cone reaching to top of fridge today to work on functional reaching this visit with cues for scapular positioning/posture  Patient would continue to benefit from PT in order to improve functional reaching, ROM, and overall strength  Plan: Continue with current POC to address pt deficits        Diagnosis: L adhesive capsulitis   Precautions: hx of MS, hx of falls   POC expires: 22   Re-eval: 22   Manuals 22   L shoulder PROM; posterior/inf humeral glides 12 min 12 min 15 min 8' 8'   Deltoid unloading w/ leukotape NT KD NV NV NV                           Ther Ex 22     Pulleys 3 min ea (flex/scaption) 3 min ea (flex/scaption) 3 min ea (flex/scaption) 3 min ea (flex/scaption) 3 min ea flex/scap           IR belt stretch 10x10" 10x10" 10x10" 10x10" 10x 10"           Cane 3-way  10x ea 10x ea 10x ea 10x x10 ea dir   Wall wash 10x3" ea flex/scaption 10x3" flex/scaption 10x3" ea (flex/scaption) 10x 3" ea (flex/scaption) 10x 3" ea flex/scap   SL ER  2x10 2# 2x10 2# 2x10 2# 2x10 2#    SL shoulder abd 2x10 2# 3x10 2# 3x10 2# 3x10 2# 2x10 2#   Supine smith flex 2x10 1# 3x10 1# 10x ea     Supine I, Y 5x10" ea 2# 5x10" ea 2# 5x10" ea 1# 1# 10x 10" ea 1# 10x 10" ea   Rows/pulldowns Grn/yellow 2x10 grn 2x10 Yellow 2x10 ea  Yellow rows 2x10  Pink 2x10 pulldowns   IR stick pass 10x 10x 10x 10x x10   Standing shoulder flex/scaption 10x ea 10x ea 10x ea 10x ea NP-resume NV   Ball roll up wall 5# 10x  5# 10x 5# 10x  5# 10x   Supine ER    ytb 10x ytb x20   UT stretch L      10s x10   Cone reaching      3 cones 1st shelf x1 round, then 5 rounds to top of fridge              Re-assessed L shoulder; FOTO 58     Neuro Re-Ed                                                                                                                     Ther Act                                      Modalities            HEP

## 2022-01-14 ENCOUNTER — OFFICE VISIT (OUTPATIENT)
Dept: PHYSICAL THERAPY | Facility: CLINIC | Age: 62
End: 2022-01-14
Payer: COMMERCIAL

## 2022-01-14 DIAGNOSIS — M75.02 ADHESIVE CAPSULITIS OF LEFT SHOULDER: Primary | ICD-10-CM

## 2022-01-14 PROCEDURE — 97140 MANUAL THERAPY 1/> REGIONS: CPT | Performed by: PHYSICAL THERAPIST

## 2022-01-14 PROCEDURE — 97110 THERAPEUTIC EXERCISES: CPT | Performed by: PHYSICAL THERAPIST

## 2022-01-14 NOTE — PROGRESS NOTES
Daily Note     Today's date: 2022  Patient name: Kenisha Zaman  : 1960  MRN: [id number]  Referring provider: Hal Berkowitz MD  Dx:   Encounter Diagnosis     ICD-10-CM    1  Adhesive capsulitis of left shoulder  M75 02        Start Time: 1030  Stop Time: 1120  Total time in clinic (min): 50 minutes    Subjective: Pt reports very minimal pain today, rates 1/10 currently  Pt feels her motion is progressing excellently with therapy  Objective: See treatment diary below      Assessment: Pt continues to progress excellently with manual PROM all planes and is now nearly full all directions except for IR which remains limited secondary to pain  Pt was able to transition from mat table to standing strengthening with fwd/lat raises with 1# with good tolerance but cues to avoid compensatory UT activation and shoulder hiking when she achieves * but able to correct with verbal/tactile cues  Pt is no longer dependent on leukotape for shoulder and able to complete all exercises with minimal to no pain with most pain reported during IR belt stretch and IR stick passes, however this is reportedly much less intense compared to a few weeks ago  Pt will benefit from continued skilled PT intervention to further progress functional ROM and strength to allow full return to PLOF  Plan: Continue per plan of care  Progress treatment as tolerated         Diagnosis: L adhesive capsulitis   Precautions: hx of MS, hx of falls   POC expires: 22   Re-eval: 22   Manuals 22   L shoulder PROM; posterior/inf humeral glides 10 min 12 min 15 min 8' 8'   Deltoid unloading w/ leukotape NT KD NV NV NV                           Ther Ex 22     Pulleys 3 min ea (flex/scaption) 3 min ea (flex/scaption) 3 min ea (flex/scaption) 3 min ea (flex/scaption) 3 min ea flex/scap           IR belt stretch 10x10" 10x10" 10x10" 10x10" 10x 10"           Cane 3-way  10x ea 10x ea 10x ea 10x x10 ea dir   Wall wash 10x3" ea flex/scaption 10x3" flex/scaption 10x3" ea (flex/scaption) 10x 3" ea (flex/scaption) 10x 3" ea flex/scap   SL ER 3x10 3# 2x10 2# 2x10 2# 2x10 2# 2x10 2#    SL shoulder abd  3x10 2# 3x10 2# 3x10 2# 2x10 2#   Supine smith flex  3x10 1# 10x ea     Supine I, Y 5x10" ea 2# 5x10" ea 2# 5x10" ea 1# 1# 10x 10" ea 1# 10x 10" ea   Rows/pulldowns Yellow rows 3x10  Pink 3x10 pulldowns grn 2x10 Yellow 2x10 ea  Yellow rows 2x10  Pink 2x10 pulldowns   IR stick pass 10x 10x 10x 10x x10   Standing shoulder flex/scaption 2x10 ea 1# 10x ea 10x ea 10x ea NP-resume NV   Ball roll up wall 8# 2x10 5# 10x 5# 10x  5# 10x   Supine ER    ytb 10x ytb x20   UT stretch L      10s x10   Cone reaching  3 cones 1st shelf x1 round, then 5 rounds to top of fridge    3 cones 1st shelf x1 round, then 5 rounds to top of fridge              Re-assessed L shoulder; FOTO 58     Neuro Re-Ed                                                                                                                     Ther Act                                      Modalities            HEP

## 2022-01-17 ENCOUNTER — APPOINTMENT (OUTPATIENT)
Dept: PHYSICAL THERAPY | Facility: CLINIC | Age: 62
End: 2022-01-17
Payer: COMMERCIAL

## 2022-01-18 ENCOUNTER — OFFICE VISIT (OUTPATIENT)
Dept: OBGYN CLINIC | Facility: CLINIC | Age: 62
End: 2022-01-18
Payer: COMMERCIAL

## 2022-01-18 VITALS
SYSTOLIC BLOOD PRESSURE: 125 MMHG | DIASTOLIC BLOOD PRESSURE: 78 MMHG | HEIGHT: 60 IN | HEART RATE: 68 BPM | WEIGHT: 142.4 LBS | BODY MASS INDEX: 27.96 KG/M2

## 2022-01-18 DIAGNOSIS — M75.02 ADHESIVE CAPSULITIS OF LEFT SHOULDER: Primary | ICD-10-CM

## 2022-01-18 PROCEDURE — 99213 OFFICE O/P EST LOW 20 MIN: CPT | Performed by: ORTHOPAEDIC SURGERY

## 2022-01-18 NOTE — PROGRESS NOTES
Orthopaedics Office Visit - Follow up Patient Visit    ASSESSMENT/PLAN:    Assessment:   Left shoulder adhesive capsulitis     Plan:   · Overall Susana is doing well  · Advised to continue PT and HEP, updated script was provided   · If symptoms fail to progress or improve, a intra-articular CSI may be performed  · PRN    To Do Next Visit:  PRN, possible intra-articular CSI     _____________________________________________________  CHIEF COMPLAINT:  Chief Complaint   Patient presents with    Left Shoulder - Follow-up         SUBJECTIVE:  Yuan Cheatham is a 57 y o  female who presents to the office today for a follow up regarding left shoulder adhesive capsulitis  Overall Pricilla Ling is doing well  She notes pain has improved at least 80 percent  She feels therapy has been beneficial for her  She is attending PT 2x a week and performing a HEP  She notes continued pain when reaching behind her back or when reaching across her body  She will take extra strength Tylenol at night  PAST MEDICAL HISTORY:  Past Medical History:   Diagnosis Date    Asthma     Cardiac disease     heart murmur    COPD [facility]chronic obstructive pulmonary disease) ([facility] [address] )     Depression     Hyperlipidemia     Hypertension     Influenza     December 2017    Irregular heart beat     Migraine     Migraine     MS (multiple sclerosis) (HCC)     Multiple sclerosis (HCC)     Nephrolithiasis     Neurogenic bladder     Panniculitis        PAST SURGICAL HISTORY:  Past Surgical History:   Procedure Laterality Date    COLONOSCOPY      DILATION AND CURETTAGE OF UTERUS      AR COLONOSCOPY FLX DX W/COLLJ SPEC WHEN PFRMD N/A 1/11/2018    Procedure: COLONOSCOPY;  Surgeon: Aysha Holbrook MD;  Location: [facility];   Service: Gastroenterology    AR INCISE FINGER TENDON SHEATH Left 1/17/2018    Procedure: RING TRIGGER FINGER RELEASE;  Surgeon: Alberta Lee MD;  Location: [facility] OR;  Service: [address] [place] [address] FAMILY HISTORY:  Family History   Problem Relation Age of Onset   [address] [facility] Stroke Mother     Diabetes Mother     Hypertension Mother     Emphysema Mother     Heart disease Father     Diabetes Father     Hypertension Father     Diabetes Sister     Sleep apnea Sister     Hypertension Brother    Rosalva [place]' disease Brother     Breast cancer Maternal Aunt 76    Stomach cancer Maternal Aunt     Sleep apnea Maternal Aunt     No Known Problems Maternal Grandmother     No Known Problems Paternal Grandmother     No Known Problems Maternal Aunt     No Known Problems Maternal Aunt     No Known Problems Maternal Aunt     No Known Problems Maternal Aunt        SOCIAL HISTORY:  Social History     Tobacco Use    Smoking status: Never Smoker    Smokeless tobacco: Never Used   Vaping Use    Vaping Use: Never used   Substance Use Topics    Alcohol use: Yes     Comment: social    Drug use: Yes     Types: Marijuana     Comment: last use 4 days ago  MEDICATIONS:    Current Outpatient Medications:     albuterol (2 5 mg/3 mL) 0 083 % nebulizer solution, Albuterol Sulfate (2 5 MG/3ML) 0 083% Inhalation Nebulization Solution USE 1 UNIT DOSE IN NEBULIZER EVERY 6 HOURS AS NEEDED    Quantity: 1;  Refills: 5    Seretha Simmonds M D ;  Started 12-July-2016 Active3 ML Plas Cont (125 Plas Conts), Disp: , Rfl:     albuterol (VENTOLIN HFA) 90 mcg/act inhaler, Inhale 2 puffs every 6 (six) hours as needed for wheezing, Disp: 3 Inhaler, Rfl: 0    aspirin 81 MG tablet, Aspirin 81 MG TABS Take 1 tablet daily  Refills: 0  Active, Disp: , Rfl:     atorvastatin (LIPITOR) 20 mg tablet, Take 40 mg by mouth Medrol Dose Pack scheduling ONLY  , Disp: , Rfl:     baclofen 20 mg tablet, Take 1 tablet (20 mg total) by mouth 3 (three) times a day, Disp: 270 tablet, Rfl: 2    butalbital-acetaminophen-caffeine (FIORICET,ESGIC) -40 mg per tablet, Take 1 tablet by mouth every 4 (four) hours as needed for headaches, Disp: 12 tablet, Rfl: 4    EPINEPHrine (EPIPEN) 0 3 mg/0 3 mL SOAJ, Inject 0 3 mg into the shoulder, thigh, or buttocks, Disp: , Rfl:     ergocalciferol (VITAMIN D2) 50,000 units, TAKE ONE CAPSULE WEEKLY WITH FOOD, Disp: , Rfl: 1    gabapentin (NEURONTIN) 300 mg capsule, Take 2 caps in am and noon and 3 caps HS, Disp: 630 capsule, Rfl: 2    metFORMIN (GLUCOPHAGE-XR) 500 mg 24 hr tablet, TAKE 1 TABLET BY MOUTH DAILY WITH BREAKFAST OR DINNER, Disp: , Rfl:     NIFEdipine ER (ADALAT CC) 60 MG 24 hr tablet, Take 60 mg by mouth, Disp: , Rfl:     sertraline (ZOLOFT) 50 mg tablet, Sertraline HCl - 50 MG Oral Tablet TAKE 3 TABLETS DAILY  Refills: 0  Active, Disp: , Rfl:     tolterodine (DETROL LA) 4 mg 24 hr capsule, Take 1 capsule by mouth daily, Disp: , Rfl:     Armodafinil 50 MG tablet, Take 1 tablet (50 mg total) by mouth daily (Patient not taking: Reported on 9/17/2021), Disp: 30 tablet, Rfl: 1    benzonatate (TESSALON) 200 MG capsule, Take 200 mg by mouth 3 (three) times a day (Patient not taking: Reported on 9/17/2021), Disp: , Rfl:     cholecalciferol (VITAMIN D3) 1,000 units tablet, Take 2 tablets (2,000 Units total) by mouth daily for 1 day, Disp: 2 tablet, Rfl: 0    famotidine (PEPCID) 20 mg tablet, Famotidine 20 MG Oral Tablet TAKE 1 TABLET DAILY PRN AS DIRECTED    Refills: 0  Active, Disp: , Rfl:     fluticasone (FLONASE) 50 mcg/act nasal spray, 2 sprays into each nostril daily, Disp: 1 Bottle, Rfl: 3    fluticasone-salmeterol (Advair Diskus) 250-50 mcg/dose inhaler, Inhale 1 puff 2 (two) times a day Rinse mouth after use , Disp: 60 blister, Rfl: 3    ALLERGIES:  Allergies   Allergen Reactions    Nuts - Food Allergy Shortness Of Breath and Anaphylaxis    Other Anaphylaxis, Hives and Itching     [facility] - [id number]: POISON IVY   walnuts & cashews  Cats    Peanut Oil - Food Allergy      Hives anaphylaxis    Shellfish Allergy - Food Allergy Shortness Of Breath and Swelling    Shellfish-Derived Products - Food Allergy Shortness Of Breath and Swelling    Sumatriptan Headache and Other (See Comments)     Other reaction(s): Headache    Copaxone [Glatiramer Acetate] Hives    Cat Hair Extract     Cephalexin Rash     Pt says they took keflex again and didn't get a reaction from it  Pt states they believe it was something they ate that day instead   Glatiramer Rash     Other reaction(s): rash    Pollen Extract Sneezing    Seasonal Ic [Cholestatin]        REVIEW OF SYSTEMS:  MSK: as noted in HPI  Neuro: WNLs  Pertinent items are otherwise noted in HPI  A comprehensive review of systems was otherwise negative  LABS:  HgA1c: No results found for: HGBA1C  BMP:   Lab Results   Component Value Date    CALCIUM 8 4 11/11/2020     12/21/2017    K 3 2 (L) 11/11/2020    CO2 25 11/11/2020     (H) 11/11/2020    BUN 10 11/11/2020    CREATININE 0 80 11/11/2020     CBC: No components found for: CBC    _____________________________________________________  PHYSICAL EXAMINATION:  Vital signs: /78   Pulse 68   Ht 5' (1 524 m)   Wt 64 6 kg (142 lb 6 4 oz)   BMI 27 81 kg/m²   General: No acute distress, awake and alert  Psychiatric: Mood and affect appear appropriate  HEENT: Trachea Midline, No torticollis, no apparent facial trauma  Cardiovascular: No audible murmurs; Extremities appear perfused  Pulmonary: No audible wheezing or stridor  Skin: No open lesions; see further details (if any) below    MUSCULOSKELETAL EXAMINATION:    Extremities:  Left shoulder: Forward flexion aprox  120 degrees   Non tender to palpation   Full elbow flexion and extension   Some pain with full elbow flexion  2+ radial pulse      _____________________________________________________  STUDIES REVIEWED:  I personally reviewed the images and interpretation is as follows:   No new imaging to review       PROCEDURES PERFORMED:  Procedures      Scribe Attestation    I,:  Salma Bunn am acting as a scribe while in the presence of the attending physician :       I,:  Gregg Cody MD personally performed the services described in this documentation    as scribed in my presence :

## 2022-01-20 ENCOUNTER — APPOINTMENT (OUTPATIENT)
Dept: PHYSICAL THERAPY | Facility: CLINIC | Age: 62
End: 2022-01-20
Payer: COMMERCIAL

## 2022-02-03 NOTE — PROGRESS NOTES
Vini Cowan was contacted by phone and decided to self-discharge to [facility] at this time for concerns of COVID  Will call if/when she needs additional therapy  Please refer to most recent re-evaluation for most current subjective/objective measures

## 2022-03-14 ENCOUNTER — TELEPHONE (OUTPATIENT)
Dept: NEUROLOGY | Facility: CLINIC | Age: 62
End: 2022-03-14

## 2022-03-17 ENCOUNTER — OFFICE VISIT (OUTPATIENT)
Dept: NEUROLOGY | Facility: CLINIC | Age: 62
End: 2022-03-17
Payer: COMMERCIAL

## 2022-03-17 VITALS
BODY MASS INDEX: 39.03 KG/M2 | DIASTOLIC BLOOD PRESSURE: 70 MMHG | SYSTOLIC BLOOD PRESSURE: 122 MMHG | HEART RATE: 68 BPM | WEIGHT: 198.8 LBS | HEIGHT: 60 IN | TEMPERATURE: 98.1 F

## 2022-03-17 DIAGNOSIS — M47.26 OTHER SPONDYLOSIS WITH RADICULOPATHY, LUMBAR REGION: ICD-10-CM

## 2022-03-17 DIAGNOSIS — R26.2 AMBULATORY DYSFUNCTION: ICD-10-CM

## 2022-03-17 DIAGNOSIS — R51.9 CHRONIC RIGHT-SIDED HEADACHES: ICD-10-CM

## 2022-03-17 DIAGNOSIS — G89.29 CHRONIC RIGHT-SIDED HEADACHES: ICD-10-CM

## 2022-03-17 DIAGNOSIS — R47.89 WORD FINDING DIFFICULTY: ICD-10-CM

## 2022-03-17 DIAGNOSIS — K65.4 MESENTERIC PANNICULITIS (HCC): ICD-10-CM

## 2022-03-17 DIAGNOSIS — M54.50 RIGHT LOW BACK PAIN, UNSPECIFIED CHRONICITY, UNSPECIFIED WHETHER SCIATICA PRESENT: ICD-10-CM

## 2022-03-17 DIAGNOSIS — R47.81 SLURRED SPEECH: ICD-10-CM

## 2022-03-17 DIAGNOSIS — G35 MULTIPLE SCLEROSIS (HCC): Primary | ICD-10-CM

## 2022-03-17 DIAGNOSIS — G81.91 RIGHT HEMIPARESIS (HCC): ICD-10-CM

## 2022-03-17 DIAGNOSIS — T78.40XS ALLERGY, SEQUELA: ICD-10-CM

## 2022-03-17 PROCEDURE — 99215 OFFICE O/P EST HI 40 MIN: CPT | Performed by: PSYCHIATRY & NEUROLOGY

## 2022-03-17 RX ORDER — EPINEPHRINE 0.3 MG/.3ML
0.3 INJECTION SUBCUTANEOUS AS NEEDED
Qty: 1 EACH | Refills: 1 | Status: SHIPPED | OUTPATIENT
Start: 2022-03-17

## 2022-03-17 RX ORDER — BACLOFEN 20 MG/1
20 TABLET ORAL 4 TIMES DAILY
Qty: 360 TABLET | Refills: 2 | Status: SHIPPED | OUTPATIENT
Start: 2022-03-17

## 2022-03-17 RX ORDER — BUTALBITAL, ACETAMINOPHEN AND CAFFEINE 50; 325; 40 MG/1; MG/1; MG/1
1 TABLET ORAL EVERY 4 HOURS PRN
Qty: 12 TABLET | Refills: 4 | Status: SHIPPED | OUTPATIENT
Start: 2022-03-17

## 2022-03-17 NOTE — PROGRESS NOTES
[facility] MULTIPLE SCLEROSIS CENTER  PATIENT:  Yudi Rodriguez  MRN:  [id number]  :  1960  DATE OF SERVICE:  3/17/2022    Assessment/Plan:           Problem List Items Addressed This Visit        Digestive    Mesenteric panniculitis ([facility] [address] )       Nervous and Auditory    Multiple sclerosis ([facility] [address] ) - Primary    Relevant Medications    baclofen 20 mg tablet    Other Relevant Orders    Ambulatory Referral to Physical Therapy    Right hemiparesis ([facility] [address] )    Relevant Orders    Ambulatory Referral to Physical Therapy    Other spondylosis with radiculopathy, lumbar region       Other    Ambulatory dysfunction    Relevant Orders    Ambulatory Referral to Physical Therapy    Chronic right-sided headaches    Relevant Medications    butalbital-acetaminophen-caffeine (FIORICET,ESGIC) -40 mg per tablet    baclofen 20 mg tablet    Right low back pain    Relevant Medications    baclofen 20 mg tablet    Word finding difficulty    Slurred speech      Other Visit Diagnoses     Allergy, sequela        Relevant Medications    EPINEPHrine (EPIPEN) 0 3 mg/0 3 mL SOAJ         Mrs Yojana Kaplan has presented to [facility] multiple [address] for follow-up on multiple sclerosis and related issues    - patient described no new focal neurological dysfunction with residual right-sided hemiparesis noted on today's exam   Patient believes her right lower extremity is getting stiffer and much more difficult to ambulate, patient is to proceed with physical therapy;  - patient also noticed having contracture in her right hand, patient is taking baclofen 20 mg a day, may consider increasing dose to 4 times a daily, patient may use glove or splint overnight;  - patient has significant improvement in her left shoulder function with known history of left shoulder adhesive capsulitis, patient is very happy with orthopedic team as range of motion in the left arm has improved significantly; trigger fingers in her left hand has been corrected with injections, patient can use left hand when she ambulating with a cane  - bladder dysfunction has been progressed, patient has nocturia, patient is to consider following with urology team  - patient is looking for new primary care physician has her current PCP has been moving out   - right ankle pain and swelling still concerning today, imaging suggest patient likely had suffered cellulitis; patient had no signs of DVT on her lower extremity ultrasound  - patient is not on disease modifying regimen due to immuno senescence, MRI done in September 2021 was stable  Patient is to follow with [facility] within 6 months  Subjective:  fatigue, dizziness, tremors right leg, weakness all over body, light headedness, numbness right side of body, headaches and right foot swelling, 25ftw 6 78 seconds with cane       HPI  Mrs Sheldon Maradiaga has presented to [facility] multiple [address] for follow-up on multiple sclerosis related issues  Patient believes she has no significant progression of her MS related disability since last office visit in September 2021  Patient has significant improvement in her left shoulder function as she had adhesive capsulitis, patient has done physical therapy and was working with orthopedic team as she is very happy with her outcomes  Patient has nearly complete improvement in range of motion on left upper extremity  Patient also described having swelling in the right ankle some areas of her foot became black pre get question for the bruising and skin demarcation  Patient was evaluated vascular surgeon with no concern at that time  Patient has intermittent lightheadedness  Patient had scheduled sleep study on March 23rd, patient is to follow with Dr Santosh Lee for the formal consult as she missed [place]      Patient previously was describing word-finding difficulties with chest tightness with reported by the patient with consideration underlying mood related dysfunction, where zoloft treatment was started and described as beneficial, considering patient's symptoms;  headaches and confusional brought concern for TIA , as patient was also describing right facial weakness and swelling; increased tiredness and slurred speech concluded her event we with headache in all; patient had dysphagia for the last 2 weeks as she lost sense of appetite with preserved sensation of her taste and smell;  -  MRI on 9/7/2021 was not consistent with acute MS or CVA  Patient reported word finding difficulties;  - concern for CINTHYA with morning headaches  - patient follows with Dr Mony Manzo for COPD, albuterol has been providing great benefits;   - intermittent joint pain with left shoulder osteoarthritis has been stable  - patient is not taking disease modifying therapy multiple sclerosis; we extensively discussed importance to follow CDC recommendation despite been fully vaccinated against COVID-19; patient may benefit from 2rd COVID-19 booster in the light of lifelong immunosuppression with disease modifying therapy for MS and having COPD with type 2 diabetes;     MRI lumbar spine: Degenerative changes as described above have only slightly progressed from the prior exam  These are most severe at L3-L4 where there is spinal canal narrowing  The following portions of the patient's history were reviewed and updated as appropriate:   She  has a past medical history of Asthma, Cardiac disease, COPD (chronic obstructive pulmonary disease) ([address] ), Depression, Hyperlipidemia, Hypertension, Influenza, Irregular heart beat, Migraine, Migraine, MS (multiple sclerosis) ([address] ), Multiple sclerosis ([address] ), Nephrolithiasis, Neurogenic bladder, and Panniculitis    She   Patient Active Problem List    Diagnosis Date Noted    Adhesive capsulitis of left shoulder 10/13/2021    Cervical disc disorder at C6-C7 level with radiculopathy 04/12/2021    Encephalopathy due to severe acute respiratory syndrome coronavirus 2 (SARS-CoV-2) 04/12/2021    Word finding difficulty 04/12/2021    Slurred speech 04/12/2021    Edema of right lower extremity 02/26/2021    Hypotension 11/09/2020    Pneumonia due to COVID-19 virus 11/06/2020    Other spondylosis with radiculopathy, lumbar region 02/20/2020    Right low back pain 02/21/2019    Skin rash 02/21/2019    Right hemiparesis ([facility] ) 02/21/2019    Mesenteric panniculitis ([facility] ) 03/15/2018    Ambulatory dysfunction 02/21/2018    Chronic right-sided headaches 02/21/2018    Multiple sclerosis ([facility] ) 02/21/2018    Panniculitis 02/21/2018    S/P trigger finger release 02/02/2018    Trigger ring finger of left hand 01/17/2018    Hypertension 04/06/2017    COPD (chronic obstructive pulmonary disease) ([facility] ) 04/11/2016    Hyperlipidemia with target LDL less than 160 07/14/2011     She  has a past surgical history that includes Dilation and curettage of uterus; Uterine fibroid surgery; pr colonoscopy flx dx w/collj spec when pfrmd (N/A, 1/11/2018); Colonoscopy; pr incise finger tendon sheath (Left, 1/17/2018); and Tonsillectomy  Her family history includes Breast cancer (age of onset: 76) in her maternal aunt; Diabetes in her father, mother, and sister; Emphysema in her mother; Gladys Fly' disease in her brother; Heart disease in her father; Hypertension in her brother, father, and mother; No Known Problems in her maternal aunt, maternal aunt, maternal aunt, maternal aunt, maternal grandmother, and paternal grandmother; Sleep apnea in her maternal aunt and sister; Stomach cancer in her maternal aunt; Stroke in her mother  She  reports that she has never smoked  She has never used smokeless tobacco  She reports current alcohol use  She reports current drug use  Drug: Marijuana    Current Outpatient Medications   Medication Sig Dispense Refill    albuterol (2 5 mg/3 mL) 0 083 % nebulizer solution Albuterol Sulfate (2 5 MG/3ML) 0 083% Inhalation Nebulization Solution  USE 1 UNIT DOSE IN NEBULIZER EVERY 6 HOURS AS NEEDED  Quantity: 1;  Refills: 5       Alisa Kylie GARCIA ;  Started 12-July-2016  Active3 ML Plas Cont (125 Plas Conts)      albuterol (VENTOLIN HFA) 90 mcg/act inhaler Inhale 2 puffs every 6 (six) hours as needed for wheezing 3 Inhaler 0    aspirin 81 MG tablet Aspirin 81 MG TABS  Take 1 tablet daily   Refills: 0    Active      atorvastatin (LIPITOR) 20 mg tablet Take 40 mg by mouth Medrol Dose Pack scheduling ONLY        baclofen 20 mg tablet Take 1 tablet (20 mg total) by mouth 4 (four) times a day 360 tablet 2    butalbital-acetaminophen-caffeine (FIORICET,ESGIC) -40 mg per tablet Take 1 tablet by mouth every 4 (four) hours as needed for headaches 12 tablet 4    EPINEPHrine (EPIPEN) 0 3 mg/0 3 mL SOAJ Inject 0 3 mL (0 3 mg total) into a muscle if needed for anaphylaxis 1 each 1    ergocalciferol (VITAMIN D2) 50,000 units TAKE ONE CAPSULE WEEKLY WITH FOOD  1    gabapentin (NEURONTIN) 300 mg capsule Take 2 caps in am and noon and 3 caps  capsule 2    metFORMIN (GLUCOPHAGE-XR) 500 mg 24 hr tablet TAKE 1 TABLET BY MOUTH DAILY WITH BREAKFAST OR DINNER      NIFEdipine ER (ADALAT CC) 60 MG 24 hr tablet Take 60 mg by mouth      sertraline (ZOLOFT) 50 mg tablet Takes 1 tablet in am and hs       tolterodine (DETROL LA) 4 mg 24 hr capsule Take 1 capsule by mouth daily      benzonatate (TESSALON) 200 MG capsule Take 200 mg by mouth 3 (three) times a day (Patient not taking: Reported on 9/17/2021)      cholecalciferol (VITAMIN D3) 1,000 units tablet Take 2 tablets (2,000 Units total) by mouth daily for 1 day 2 tablet 0    famotidine (PEPCID) 20 mg tablet Famotidine 20 MG Oral Tablet  TAKE 1 TABLET DAILY PRN AS DIRECTED     Refills: 0    Active      fluticasone (FLONASE) 50 mcg/act nasal spray 2 sprays into each nostril daily 1 Bottle 3    fluticasone-salmeterol (Advair Diskus) 250-50 mcg/dose inhaler Inhale 1 puff 2 (two) times a day Rinse mouth after use  60 blister 3     No current facility-administered medications for this visit  Current Outpatient Medications on File Prior to Visit   Medication Sig    albuterol (2 5 mg/3 mL) 0 083 % nebulizer solution Albuterol Sulfate (2 5 MG/3ML) 0 083% Inhalation Nebulization Solution  USE 1 UNIT DOSE IN NEBULIZER EVERY 6 HOURS AS NEEDED     Quantity: 1;  Refills: 5       Danny STOKES D ;  Started 12-July-2016  Active3 ML Plas Cont (125 Plas Conts)    albuterol (VENTOLIN HFA) 90 mcg/act inhaler Inhale 2 puffs every 6 (six) hours as needed for wheezing    aspirin 81 MG tablet Aspirin 81 MG TABS  Take 1 tablet daily   Refills: 0    Active    atorvastatin (LIPITOR) 20 mg tablet Take 40 mg by mouth Medrol Dose Pack scheduling ONLY      ergocalciferol (VITAMIN D2) 50,000 units TAKE ONE CAPSULE WEEKLY WITH FOOD    gabapentin (NEURONTIN) 300 mg capsule Take 2 caps in am and noon and 3 caps HS    metFORMIN (GLUCOPHAGE-XR) 500 mg 24 hr tablet TAKE 1 TABLET BY MOUTH DAILY WITH BREAKFAST OR DINNER    NIFEdipine ER (ADALAT CC) 60 MG 24 hr tablet Take 60 mg by mouth    sertraline (ZOLOFT) 50 mg tablet Takes 1 tablet in am and hs     tolterodine (DETROL LA) 4 mg 24 hr capsule Take 1 capsule by mouth daily    [DISCONTINUED] baclofen 20 mg tablet Take 1 tablet (20 mg total) by mouth 3 (three) times a day    [DISCONTINUED] butalbital-acetaminophen-caffeine (FIORICET,ESGIC) -40 mg per tablet Take 1 tablet by mouth every 4 (four) hours as needed for headaches    [DISCONTINUED] EPINEPHrine (EPIPEN) 0 3 mg/0 3 mL SOAJ Inject 0 3 mg into the shoulder, thigh, or buttocks    benzonatate (TESSALON) 200 MG capsule Take 200 mg by mouth 3 (three) times a day (Patient not taking: Reported on 9/17/2021)    cholecalciferol (VITAMIN D3) 1,000 units tablet Take 2 tablets (2,000 Units total) by mouth daily for 1 day    famotidine (PEPCID) 20 mg tablet Famotidine 20 MG Oral Tablet  TAKE 1 TABLET DAILY PRN AS DIRECTED  Refills: 0    Active    fluticasone (FLONASE) 50 mcg/act nasal spray 2 sprays into each nostril daily    fluticasone-salmeterol (Advair Diskus) 250-50 mcg/dose inhaler Inhale 1 puff 2 (two) times a day Rinse mouth after use   [DISCONTINUED] Armodafinil 50 MG tablet Take 1 tablet (50 mg total) by mouth daily (Patient not taking: Reported on 9/17/2021)     No current facility-administered medications on file prior to visit  She is allergic to nuts - food allergy, other, peanut oil - food allergy, shellfish allergy - food allergy, shellfish-derived products - food allergy, sumatriptan, copaxone [glatiramer acetate], cat hair extract, cephalexin, glatiramer, pollen extract, and seasonal ic [cholestatin]            Objective:    Blood pressure 122/70, pulse 68, temperature 98 1 °F (36 7 °C), temperature source Skin, height 5' (1 524 m), weight 90 2 kg (198 lb 12 8 oz), not currently breastfeeding  Physical Exam    Neurological Exam    Gait  25 foot walk 6 78 seconds with cane    CARDIOVASCULAR: RRR, normal S1S2, no murmurs, no rubs  RESP: clear to auscultation bilaterally, no wheezes/rhonchi/rales  ABDOMEN: soft, non tender, non distended  SKIN: no rash or skin lesions  EXTREMITIES: no edema, pulses 2+bilaterally  PSYCH: appropriate mood and affect  NEUROLOGIC COMPREHENSIVE EXAM: Patient is oriented to person, place and time, NAD; appropriate affect  CN II, III, IV, V, VI, VII,VIII,IX,X,XI-XII intact with EOMI, PERRLA, OKN intact, VF grossly intact, fundi poorly visualized secondary to pupillary constriction; symmetric face noted  Motor: 5/5 UE/LE bilateral symmetric, RUE 4/5 shoulder abduction and finger flexion; RLU 4-/5 hip flexion and knee flexion, 2/5 dorsiflexion rigjt; Sensory: intact to light touch and pinprick bilaterally; normal vibration sensation feet bilaterally; Coordination within normal limits on FTN and ROLANDO testing; DTR: 2/4 through, no Babinski, no clonus  Tandem gait is abnormal  Romberg: negative    ROS:  12 points of review of system was reviewed with the patient and was unremarkable with exception: see HPI  Review of Systems   Constitutional: Positive for fatigue  Negative for appetite change and fever  HENT: Negative  Negative for hearing loss, tinnitus, trouble swallowing and voice change  Eyes: Negative  Negative for photophobia and pain  Respiratory: Negative  Negative for shortness of breath  Cardiovascular: Negative  Negative for palpitations  Gastrointestinal: Negative  Negative for nausea and vomiting  Endocrine: Negative  Negative for cold intolerance  Genitourinary: Negative  Negative for dysuria, frequency and urgency  Musculoskeletal: Negative  Negative for myalgias and neck pain  Skin: Negative  Negative for rash  Neurological: Positive for dizziness, tremors (right leg), weakness (all over body), light-headedness, numbness (right side of body) and headaches  Negative for seizures, syncope, facial asymmetry and speech difficulty  Right foot swelling   Hematological: Negative  Does not bruise/bleed easily  Psychiatric/Behavioral: Negative  Negative for confusion, hallucinations and sleep disturbance

## 2022-03-23 ENCOUNTER — HOSPITAL ENCOUNTER (OUTPATIENT)
Dept: SLEEP CENTER | Facility: CLINIC | Age: 62
Discharge: HOME | End: 2022-03-23
Payer: COMMERCIAL

## 2022-03-23 DIAGNOSIS — G47.33 OSA (OBSTRUCTIVE SLEEP APNEA): ICD-10-CM

## 2022-03-23 PROCEDURE — 95810 POLYSOM 6/> YRS 4/> PARAM: CPT | Performed by: INTERNAL MEDICINE

## 2022-03-23 PROCEDURE — 95810 POLYSOM 6/> YRS 4/> PARAM: CPT

## 2022-03-23 NOTE — PROGRESS NOTES
Sleep Study Documentation    Pre-Sleep Study       Sleep testing procedure explained to patient:YES    Patient napped prior to study:YES- less than 30 minutes  Napped after 2PM: yes    Caffeine:Dayshift worker after 12PM   Caffeine use:NO    Alcohol:Dayshift workers after 5PM: Alcohol use:NO    Typical day for patient:YES       Study Documentation    Sleep Study Indications: Snoring, witnessed apneas and unrefreshing sleep    Sleep Study: Diagnostic   Snore:Severe  Supplemental O2: no    O2 flow rate (L/min) range   O2 flow rate (L/min) final   Minimum SaO2:  80%  Baseline SaO2:   92%    EKG abnormalities: no     EEG abnormalities: no    Sleep Study Recorded < 2 hours: N/A    Sleep Study Recorded > 2 hours but incomplete study: N/A    Sleep Study Recorded 6 hours but no sleep obtained: NO    Patient classification: disabled       Post-Sleep Study    Medication used at bedtime or during sleep study:NO    Patient reports time it took to fall asleep:20 to 30 minutes    Patient reports waking up during study:3 or more times  Patient reports returning to sleep in 10 to 30 minutes  Patient reports sleeping 4 to 6 hours without dreaming  Patient reports sleep during study:typical    Patient rated sleepiness: Very sleepy or tired    PAP treatment:no

## 2022-03-28 ENCOUNTER — TELEPHONE (OUTPATIENT)
Dept: SLEEP CENTER | Facility: CLINIC | Age: 62
End: 2022-03-28

## 2022-03-28 NOTE — TELEPHONE ENCOUNTER
Left voice message for patient  Advised sleep study resulted  Per study order, patient to follow up with Dr Maggie Srinivasan to review results  She has appointment scheduled 3/30/22 at 10:40am   Advised to call Dr Abundio Lopez office with any questions  Phone number provided

## 2022-03-30 ENCOUNTER — OFFICE VISIT (OUTPATIENT)
Dept: PULMONOLOGY | Facility: CLINIC | Age: 62
End: 2022-03-30
Payer: COMMERCIAL

## 2022-03-30 VITALS
HEART RATE: 78 BPM | DIASTOLIC BLOOD PRESSURE: 68 MMHG | OXYGEN SATURATION: 96 % | WEIGHT: 140 LBS | BODY MASS INDEX: 27.48 KG/M2 | SYSTOLIC BLOOD PRESSURE: 118 MMHG | TEMPERATURE: 98 F | HEIGHT: 60 IN

## 2022-03-30 DIAGNOSIS — G47.33 OSA (OBSTRUCTIVE SLEEP APNEA): ICD-10-CM

## 2022-03-30 DIAGNOSIS — J45.991 COUGH VARIANT ASTHMA: Primary | ICD-10-CM

## 2022-03-30 DIAGNOSIS — E66.3 OVERWEIGHT (BMI 25.0-29.9): ICD-10-CM

## 2022-03-30 PROCEDURE — 99214 OFFICE O/P EST MOD 30 MIN: CPT | Performed by: INTERNAL MEDICINE

## 2022-03-30 NOTE — PROGRESS NOTES
Assessment/Plan:   Diagnoses and all orders for this visit:    Cough variant asthma    CINTHYA (obstructive sleep apnea)  -     CPAP Study; Future    Overweight (BMI 25 0-29  9)        Chronic cough likely secondary to cough variant asthma, respiratory allergy panel and hypersensitivity pneumonitis panel have been normal except for very mild cat dander and dust mite  Dust mite precautions discussed with the patient  PFTs normal spirometry normal lung volumes and mildly reduced DLCO at 70%, 6 minute walk test patient could walk a total distance of 320 m in 6 minutes without any evidence of exercise desaturation  Discussed with the patient to continue with Wixela 250/51 puff twice daily  Rinse mouth after use   Continue with albuterol MDI 2 puffs 4 times daily as needed only  She also has a nebulizer machine which she states currently since the [employer] has not been needing it she states  Her sleep study results were discussed in detail with severe obstructive sleep apnea with an AHI of 55 4   Etiology pathogenesis of obstructive sleep apnea was discussed   Treatment options for severe obstructive sleep apnea discussed she would like to go in for the PAP titration study  Testing procedure was discussed   After the CPAP titration study will order the PAP machine and follow-up in 3 months from then   Also discussed the need for compliance with the PAP machine understands and verbalizes   Discussed regarding consequences of untreated sleep apnea understands and verbalizes   Recommend weight loss   Follow-up in 3 months or p r n  earlier as needed  Return in about 3 months (around 6/30/2022)  All questions are answered to the patient's satisfaction and understanding  She verbalizes understanding  She is encouraged to call with any further questions or concerns  Portions of the record may have been created with voice recognition software    Occasional wrong word or "sound a like" substitutions may have occurred due to the inherent limitations of voice recognition software  Read the chart carefully and recognize, using context, where substitutions have occurred  Electronically Signed by Darryle Breaker, MD    ______________________________________________________________________    Chief Complaint: No chief complaint on file  Patient ID: Zhou Chavez is a 60 y o  y o  female has a past medical history of Asthma, Cardiac disease, COPD (chronic obstructive pulmonary disease) ([facility] [address] ), Depression, Hyperlipidemia, Hypertension, Influenza, Irregular heart beat, Migraine, Migraine, MS (multiple sclerosis) ([facility] [address] ), Multiple sclerosis ([facility] [address] ), Nephrolithiasis, Neurogenic bladder, and Panniculitis  3/30/2022  Patient presents today for follow-up visit  Catina Guadarrama is a very pleasant 58-year-old lady who has never smoked with history of multiple sclerosis following up with Neurology, who has history of likely cough variant asthma has been on bronchodilators has been on Pulmicort and albuterol as needed which she states has been working well, but recently she has had increased use of the rescue inhaler as well as the waking up in the middle of the night with asthma symptoms requiring the rescue inhaler she states  She had COVID-19 infection in November/December of 2020 was admitted in the hospital for about 4 days did not have the need to go home on home oxygen  Since then she states the symptoms of shortness of breath and wheezing has slightly gotten worse   she has been referred for evaluation for obstructive sleep apnea with a neurologist for early morning headaches      Review of Systems   Constitutional: Positive for fatigue  HENT: Negative  Eyes: Negative  Respiratory: Positive for cough and shortness of breath  She states the cough and shortness of breath have improved since her last visit  Cardiovascular: Negative  Gastrointestinal: Negative  Endocrine: Negative  Genitourinary: Negative  Musculoskeletal: Negative  Allergic/Immunologic: Negative  Neurological: Negative  Hematological: Negative  Psychiatric/Behavioral: Positive for sleep disturbance  Smoking history: She reports that she has never smoked  She has never used smokeless tobacco     The following portions of the patient's history were reviewed and updated as appropriate: allergies, current medications, past family history, past medical history, past social history, past surgical history and problem list     Immunization History   Administered Date(s) Administered    COVID-19 PFIZER VACCINE 0 3 ML IM 03/18/2021, 04/12/2021, 09/10/2021     Current Outpatient Medications   Medication Sig Dispense Refill    albuterol (2 5 mg/3 mL) 0 083 % nebulizer solution Albuterol Sulfate (2 5 MG/3ML) 0 083% Inhalation Nebulization Solution  USE 1 UNIT DOSE IN NEBULIZER EVERY 6 HOURS AS NEEDED  Quantity: 1;  Refills: 5       Lenon Settles M D ;  Started 12-July-2016  Active3 ML Plas Cont (125 Plas Conts)      albuterol (VENTOLIN HFA) 90 mcg/act inhaler Inhale 2 puffs every 6 (six) hours as needed for wheezing 3 Inhaler 0    aspirin 81 MG tablet Aspirin 81 MG TABS  Take 1 tablet daily   Refills: 0    Active      atorvastatin (LIPITOR) 20 mg tablet Take 40 mg by mouth Medrol Dose Pack scheduling ONLY        baclofen 20 mg tablet Take 1 tablet (20 mg total) by mouth 4 (four) times a day 360 tablet 2    butalbital-acetaminophen-caffeine (FIORICET,ESGIC) -40 mg per tablet Take 1 tablet by mouth every 4 (four) hours as needed for headaches 12 tablet 4    EPINEPHrine (EPIPEN) 0 3 mg/0 3 mL SOAJ Inject 0 3 mL (0 3 mg total) into a muscle if needed for anaphylaxis 1 each 1    ergocalciferol (VITAMIN D2) 50,000 units TAKE ONE CAPSULE WEEKLY WITH FOOD  1    fluticasone-salmeterol (Advair Diskus) 250-50 mcg/dose inhaler Inhale 1 puff 2 (two) times a day Rinse mouth after use   60 blister 3    gabapentin (NEURONTIN) 300 mg capsule Take 2 caps in am and noon and 3 caps  capsule 2    metFORMIN (GLUCOPHAGE-XR) 500 mg 24 hr tablet TAKE 1 TABLET BY MOUTH DAILY WITH BREAKFAST OR DINNER      NIFEdipine ER (ADALAT CC) 60 MG 24 hr tablet Take 60 mg by mouth      sertraline (ZOLOFT) 50 mg tablet Takes 1 tablet in am and hs       tolterodine (DETROL LA) 4 mg 24 hr capsule Take 1 capsule by mouth daily      benzonatate (TESSALON) 200 MG capsule Take 200 mg by mouth 3 (three) times a day (Patient not taking: Reported on 9/17/2021)      cholecalciferol (VITAMIN D3) 1,000 units tablet Take 2 tablets (2,000 Units total) by mouth daily for 1 day 2 tablet 0    famotidine (PEPCID) 20 mg tablet Famotidine 20 MG Oral Tablet  TAKE 1 TABLET DAILY PRN AS DIRECTED  Refills: 0    Active      fluticasone (FLONASE) 50 mcg/act nasal spray 2 sprays into each nostril daily 1 Bottle 3     No current facility-administered medications for this visit  Allergies: Nuts - food allergy, Other, Peanut oil - food allergy, Shellfish allergy - food allergy, Shellfish-derived products - food allergy, Sumatriptan, Copaxone [glatiramer acetate], Cat hair extract, Cephalexin, Glatiramer, Pollen extract, and Seasonal ic [cholestatin]    Objective:  Vitals:    03/30/22 1030   BP: 118/68   Pulse: 78   Temp: 98 °F (36 7 °C)   SpO2: 96%   Weight: 63 5 kg (140 lb)   Height: 5' (1 524 m)   Oxygen Therapy  SpO2: 96 %    Wt Readings from Last 3 Encounters:   03/30/22 63 5 kg (140 lb)   03/17/22 90 2 kg (198 lb 12 8 oz)   01/18/22 64 6 kg (142 lb 6 4 oz)     Body mass index is 27 34 kg/m²  Physical Exam  Vitals and nursing note reviewed  Constitutional:       Appearance: She is well-developed  HENT:      Head: Normocephalic and atraumatic  Mouth/Throat:      Comments: Crowded oropharyngeal airway  Eyes:      Conjunctiva/sclera: Conjunctivae normal       Pupils: Pupils are equal, round, and reactive to light  Neck:      Thyroid: No thyromegaly        Vascular: No JVD    Cardiovascular:      Rate and Rhythm: Normal rate and regular rhythm  Heart sounds: Normal heart sounds  No murmur heard  No friction rub  No gallop  Pulmonary:      Effort: Pulmonary effort is normal  No respiratory distress  Breath sounds: Normal breath sounds  No wheezing or rales  Chest:      Chest wall: No tenderness  Abdominal:      General: Bowel sounds are normal       Palpations: Abdomen is soft  Musculoskeletal:         General: No tenderness or deformity  Normal range of motion  Cervical back: Normal range of motion and neck supple  Lymphadenopathy:      Cervical: No cervical adenopathy  Skin:     General: Skin is warm and dry  Neurological:      Mental Status: She is alert and oriented to person, place, and time  Diagnostics:  I have personally reviewed pertinent reports

## 2022-05-05 ENCOUNTER — OFFICE VISIT (OUTPATIENT)
Dept: INTERNAL MEDICINE CLINIC | Facility: CLINIC | Age: 62
End: 2022-05-05
Payer: COMMERCIAL

## 2022-05-05 VITALS
SYSTOLIC BLOOD PRESSURE: 140 MMHG | TEMPERATURE: 98.6 F | WEIGHT: 143 LBS | HEIGHT: 61 IN | BODY MASS INDEX: 27 KG/M2 | RESPIRATION RATE: 18 BRPM | OXYGEN SATURATION: 96 % | DIASTOLIC BLOOD PRESSURE: 70 MMHG | HEART RATE: 97 BPM

## 2022-05-05 DIAGNOSIS — I82.4Z1 DVT, LOWER EXTREMITY, DISTAL, ACUTE, RIGHT (HCC): ICD-10-CM

## 2022-05-05 DIAGNOSIS — N32.81 OVERACTIVE BLADDER: ICD-10-CM

## 2022-05-05 DIAGNOSIS — J42 CHRONIC BRONCHITIS, UNSPECIFIED CHRONIC BRONCHITIS TYPE (HCC): ICD-10-CM

## 2022-05-05 DIAGNOSIS — I10 PRIMARY HYPERTENSION: ICD-10-CM

## 2022-05-05 DIAGNOSIS — Z12.31 ENCOUNTER FOR SCREENING MAMMOGRAM FOR BREAST CANCER: Primary | ICD-10-CM

## 2022-05-05 DIAGNOSIS — G35 MULTIPLE SCLEROSIS (HCC): ICD-10-CM

## 2022-05-05 DIAGNOSIS — F32.A DEPRESSION, UNSPECIFIED DEPRESSION TYPE: ICD-10-CM

## 2022-05-05 DIAGNOSIS — E78.5 HYPERLIPIDEMIA, UNSPECIFIED HYPERLIPIDEMIA TYPE: ICD-10-CM

## 2022-05-05 PROCEDURE — 3008F BODY MASS INDEX DOCD: CPT

## 2022-05-05 PROCEDURE — 1036F TOBACCO NON-USER: CPT

## 2022-05-05 PROCEDURE — 99205 OFFICE O/P NEW HI 60 MIN: CPT

## 2022-05-05 PROCEDURE — 3725F SCREEN DEPRESSION PERFORMED: CPT

## 2022-05-05 RX ORDER — NIFEDIPINE 60 MG/1
60 TABLET, FILM COATED, EXTENDED RELEASE ORAL DAILY
Qty: 90 TABLET | Refills: 1 | Status: SHIPPED | OUTPATIENT
Start: 2022-05-05

## 2022-05-05 RX ORDER — COVID-19 MOLECULAR TEST ASSAY
KIT MISCELLANEOUS
COMMUNITY
Start: 2022-02-09 | End: 2022-05-05 | Stop reason: ALTCHOICE

## 2022-05-05 RX ORDER — AZITHROMYCIN 500 MG/1
500 TABLET, FILM COATED ORAL DAILY
COMMUNITY
Start: 2022-04-21 | End: 2022-05-05 | Stop reason: ALTCHOICE

## 2022-05-05 RX ORDER — FLUTICASONE PROPIONATE AND SALMETEROL 250; 50 UG/1; UG/1
POWDER RESPIRATORY (INHALATION)
COMMUNITY
Start: 2022-03-31

## 2022-05-05 RX ORDER — ATORVASTATIN CALCIUM 40 MG/1
40 TABLET, FILM COATED ORAL
Qty: 90 TABLET | Refills: 1 | Status: SHIPPED | OUTPATIENT
Start: 2022-05-05

## 2022-05-05 RX ORDER — MIRABEGRON 25 MG/1
25 TABLET, FILM COATED, EXTENDED RELEASE ORAL DAILY
Qty: 30 TABLET | Refills: 0 | Status: SHIPPED | OUTPATIENT
Start: 2022-05-05

## 2022-05-05 NOTE — PROGRESS NOTES
INTERNAL MEDICINE INITIAL OFFICE VISIT  [facility]    NAME: Elvi Barrios  AGE: 60 y o  SEX: female  : 1960     DATE: 2022     Assessment and Plan:     Problem List Items Addressed This Visit        Respiratory    COPD (chronic obstructive pulmonary disease) ([facility] [address] )     Symptoms are stable  Continue current medications         Relevant Medications    Sherrine Palma 250-50 MCG/ACT inhaler       Cardiovascular and Mediastinum    Hypertension     Blood pressure 140/70  Continue current medication         Relevant Medications    NIFEdipine ER (ADALAT CC) 60 MG 24 hr tablet    DVT, lower extremity, distal, acute, right (HCC)       Nervous and Auditory    Multiple sclerosis (HCC)     No acute exacerbation  She is managed by Neurology  Genitourinary    Overactive bladder     She has been on Detrol LA however states it is no longer working for reports urinary incontinence  Requesting other medication  Will try Myrbetriq 25 mg daily  Patient will follow up with me in 4 weeks  Relevant Medications    Mirabegron ER (Myrbetriq) 25 MG TB24       Other    Depression     Patient expresses feeling depressed  She denies suicidal ideation  She has had a number of difficulty for consensus over the past few years  She was diagnosed with MS, cared for at the daughter-in-law who eventually passed from a brain tumor, cared for 2 young granddaughters during [facility], and is now caring for her significant other who has metastatic prostate cancer  She is currently taking Zoloft 50 mg   I did offer her referral to behavioral therapist which she has declined at this time  I also recommended that she look into bereavement groups  I also emphasized the importance of self-care             Other Visit Diagnoses     Encounter for screening mammogram for breast cancer    -  Primary    Hyperlipidemia, unspecified hyperlipidemia type        Relevant Medications atorvastatin (LIPITOR) 40 mg tablet          No follow-ups on file  Chief Complaint:     Chief Complaint   Patient presents with   [facility] Patient Visit      History of Present Illness:     Jim Brito is a pleasant 79-year-old female who presents to the office today to establish care  She has a past medical history that is significant for COPD, CINTHYA, DVT, hypertension, migraines, MS, OAB, and DM II  Past surgical history includes tonsillectomy, D&C, and trigger finger release of left ring finger  Past psychiatric history includes history of depression  She denies alcohol, tobacco, or drug use  She lives with her significant other feels safe in her home  She tries to follow a healthy diet  She uses a cane to ambulate  She is supposed to be doing physical therapy however states that she is finding difficulty in finding the time to do this secondary to being a caregiver to her  who has metastatic prostate cancer  Her previous PCP was in the [place] however will be moving to [place]  She states all of her specialists were in this area and today she is looking to establish a PCP now in the area  She reports no new complaints or concerns  Today we had a lengthy conversation regarding her depression and recent life circumstances  She has been the primary caregiver to a daughter-in-law who passed away from a brain tumor, cared for her 2 young granddaughters, and is now caring for her significant other who has metastatic prostate cancer  I stressed to her the importance of self-care  I did offer her referrals to therapist and   She is declining at this time however is willing to revisit if she feels that she needs them      The following portions of the patient's history were reviewed and updated as appropriate: allergies, current medications, past family history, past medical history, past social history, past surgical history and problem list      Review of Systems:     Review of Systems Constitutional: Negative  HENT: Negative  Respiratory: Negative  Cardiovascular: Negative  Gastrointestinal: Negative  Genitourinary: Positive for frequency  Urinary incontinence   Musculoskeletal: Positive for back pain and gait problem  Skin: Negative  Neurological: Positive for weakness (Right drop foot)  Psychiatric/Behavioral: Positive for sleep disturbance  Negative for suicidal ideas  Depression        Past Medical History:     Past Medical History:   Diagnosis Date    Asthma     Cardiac disease     heart murmur    [facility]     Depression     Hyperlipidemia     Hypertension     Influenza     December 2017    Irregular heart beat     Migraine     Migraine     MS [facility]multiple sclerosis) ([facility])     Multiple sclerosis ([facility] [address] )     Nephrolithiasis     Neurogenic bladder     Panniculitis         Past Surgical History:     Past Surgical History:   Procedure Laterality Date    COLONOSCOPY      DILATION AND CURETTAGE OF UTERUS      NH COLONOSCOPY FLX DX W/COLLJ SPEC WHEN PFRMD N/A 1/11/2018    Procedure: COLONOSCOPY;  Surgeon: Alice Mantilla MD;  Location: [facility]; Service: Gastroenterology    NH INCISE FINGER TENDON SHEATH Left 1/17/2018    Procedure: RING TRIGGER FINGER RELEASE;  Surgeon: Jomar Love MD;  Location[facility] OR;  Service: Orthopedics    TONSILLECTOMY      UTERINE FIBROID SURGERY          Social History:     Social History     Socioeconomic History    Marital status: /Civil Union     Spouse name: None    Number of children: None    Years of education: None    Highest education level: None   Occupational History    None   Tobacco Use    Smoking status: Never Smoker    Smokeless tobacco: Never Used   Vaping Use    Vaping Use: Never used   Substance and Sexual Activity    Alcohol use: Yes     Comment: social    Drug use: Yes     Types: Marijuana     Comment: last use 4 days ago   Sexual activity: None   Other Topics Concern    None   Social History Narrative    None     Social Determinants of Health     Financial Resource Strain: Not on file   Food Insecurity: Not on file   Transportation Needs: Not on file   Physical Activity: Not on file   Stress: Not on file   Social Connections: Not on file   Intimate Partner Violence: Not on file   Housing Stability: Not on file         Family History:     Family History   Problem Relation Age of Onset    Stroke Mother     Diabetes Mother     Hypertension Mother     Emphysema Mother     Heart disease Father     Diabetes Father     Hypertension Father     Diabetes Sister     Sleep apnea Sister     Hypertension Brother    Yuliya Russ' disease Brother     Breast cancer Maternal Aunt 76    Stomach cancer Maternal Aunt     Sleep apnea Maternal Aunt     No Known Problems Maternal Grandmother     No Known Problems Paternal Grandmother     No Known Problems Maternal Aunt     No Known Problems Maternal Aunt     No Known Problems Maternal Aunt     No Known Problems Maternal Aunt         Current Medications:     Current Outpatient Medications:     albuterol (2 5 mg/3 mL) 0 083 % nebulizer solution, Albuterol Sulfate (2 5 MG/3ML) 0 083% Inhalation Nebulization Solution USE 1 UNIT DOSE IN NEBULIZER EVERY 6 HOURS AS NEEDED    Quantity: 1;  Refills: 5    Jacqueline GARCIA ;  Started 12-July-2016 Active3 ML Plas Cont (125 Plas Conts), Disp: , Rfl:     albuterol (VENTOLIN HFA) 90 mcg/act inhaler, Inhale 2 puffs every 6 (six) hours as needed for wheezing, Disp: 3 Inhaler, Rfl: 0    aspirin 81 MG tablet, Aspirin 81 MG TABS Take 1 tablet daily  Refills: 0  Active, Disp: , Rfl:     atorvastatin (LIPITOR) 20 mg tablet, Take 40 mg by mouth Medrol Dose Pack scheduling ONLY  , Disp: , Rfl:     baclofen 20 mg tablet, Take 1 tablet (20 mg total) by mouth 4 (four) times a day, Disp: 360 tablet, Rfl: 2    butalbital-acetaminophen-caffeine (FIORICET,ESGIC) -40 mg per tablet, Take 1 tablet by mouth every 4 (four) hours as needed for headaches, Disp: 12 tablet, Rfl: 4    EPINEPHrine (EPIPEN) 0 3 mg/0 3 mL SOAJ, Inject 0 3 mL (0 3 mg total) into a muscle if needed for anaphylaxis, Disp: 1 each, Rfl: 1    ergocalciferol (VITAMIN D2) 50,000 units, TAKE ONE CAPSULE WEEKLY WITH FOOD, Disp: , Rfl: 1    gabapentin (NEURONTIN) 300 mg capsule, Take 2 caps in am and noon and 3 caps HS, Disp: 630 capsule, Rfl: 2    metFORMIN (GLUCOPHAGE-XR) 500 mg 24 hr tablet, TAKE 1 TABLET BY MOUTH DAILY WITH BREAKFAST OR DINNER, Disp: , Rfl:     NIFEdipine ER (ADALAT CC) 60 MG 24 hr tablet, Take 60 mg by mouth, Disp: , Rfl:     sertraline (ZOLOFT) 50 mg tablet, Takes 1 tablet in am and hs , Disp: , Rfl:     tolterodine (DETROL LA) 4 mg 24 hr capsule, Take 1 capsule by mouth daily, Disp: , Rfl:     Wixela Inhub 250-50 MCG/ACT inhaler, INHALE 1 PUFF 2 TIMES A DAY RINSE MOUTH AFTER USE , Disp: , Rfl:     azithromycin (ZITHROMAX) 500 MG tablet, Take 500 mg by mouth daily (Patient not taking: Reported on 5/5/2022 ), Disp: , Rfl:     benzonatate (TESSALON) 200 MG capsule, Take 200 mg by mouth 3 (three) times a day (Patient not taking: Reported on 9/17/2021), Disp: , Rfl:     cholecalciferol (VITAMIN D3) 1,000 units tablet, Take 2 tablets (2,000 Units total) by mouth daily for 1 day, Disp: 2 tablet, Rfl: 0    famotidine (PEPCID) 20 mg tablet, Famotidine 20 MG Oral Tablet TAKE 1 TABLET DAILY PRN AS DIRECTED  Refills: 0  Active, Disp: , Rfl:     fluticasone (FLONASE) 50 mcg/act nasal spray, 2 sprays into each nostril daily, Disp: 1 Bottle, Rfl: 3    fluticasone-salmeterol (Advair Diskus) 250-50 mcg/dose inhaler, Inhale 1 puff 2 (two) times a day Rinse mouth after use , Disp: 60 blister, Rfl: 3    ID Now COVID-19 KIT, TEST AS DIRECTED TODAY (Patient not taking: Reported on 5/5/2022), Disp: , Rfl:      Allergies:      Allergies   Allergen Reactions    Nuts - Food Allergy Shortness Of Breath and Anaphylaxis    Other Anaphylaxis, Hives and Itching     [facility] [id number]: POISON IVY   walnuts & cashews  Cats    Peanut Oil - Food Allergy      Hives anaphylaxis    Shellfish Allergy - Food Allergy Shortness Of Breath and Swelling    Shellfish-Derived Products - Food Allergy Shortness Of Breath and Swelling    Sumatriptan Headache and Other (See Comments)     Other reaction(s): Headache    Copaxone [Glatiramer Acetate] Hives    Cat Hair Extract     Cephalexin Rash     Pt says they took keflex again and didn't get a reaction from it  Pt states they believe it was something they ate that day instead   Glatiramer Rash     Other reaction(s): rash    Pollen Extract Sneezing    Seasonal Ic [Cholestatin]         Physical Exam:     /70 (BP Location: Left arm, Patient Position: Sitting, Cuff Size: Standard)   Pulse 97   Temp 98 6 °F (37 °C) (Tympanic)   Resp 18   Ht 5' 1" (1 549 m)   Wt 64 9 kg (143 lb)   SpO2 96%   BMI 27 02 kg/m²     Physical Exam  Vitals and nursing note reviewed  Constitutional:       General: She is not in acute distress  Appearance: She is well-developed  HENT:      Head: Normocephalic and atraumatic  Right Ear: Tympanic membrane normal       Left Ear: Tympanic membrane normal       Nose: Nose normal  No congestion or rhinorrhea  Mouth/Throat:      Mouth: Mucous membranes are moist       Pharynx: Oropharynx is clear  Eyes:      Conjunctiva/sclera: Conjunctivae normal       Pupils: Pupils are equal, round, and reactive to light  Neck:      Thyroid: No thyromegaly  Cardiovascular:      Rate and Rhythm: Normal rate and regular rhythm  Pulses: Normal pulses  Heart sounds: Murmur heard  Pulmonary:      Effort: Pulmonary effort is normal  No respiratory distress  Breath sounds: Normal breath sounds  Abdominal:      General: Bowel sounds are normal       Palpations: Abdomen is soft  Tenderness: There is no abdominal tenderness  Musculoskeletal:         General: Normal range of motion  Cervical back: Normal range of motion and neck supple  Lymphadenopathy:      Cervical: No cervical adenopathy  Skin:     General: Skin is warm and dry  Capillary Refill: Capillary refill takes less than 2 seconds  Neurological:      General: No focal deficit present  Mental Status: She is alert and oriented to person, place, and time  Motor: Weakness (Right lower extremity) present  Gait: Gait abnormal    Psychiatric:         Mood and Affect: Mood normal          Behavior: Behavior normal          Thought Content: Thought content normal          Judgment: Judgment normal            Data:     Laboratory Results: I have personally reviewed the pertinent laboratory results/reports   Radiology/Other Diagnostic Testing Results: I have personally reviewed pertinent reports  I spent 40 minutes with this patient      ASHLEIGH Machado  [facility] [address] [place] [address]

## 2022-05-05 NOTE — PATIENT INSTRUCTIONS
Compression stocking and elevate  Edema   AMBULATORY CARE:   Edema  is swelling throughout your body  Edema is usually a sign that you are retaining fluid  The swelling may be caused by heart failure or kidney, thyroid, or liver disease  It may also be caused by medicines such as antidepressants, blood pressure medicines, or hormones  Sudden swelling around the lips or face may be a sign of a severe allergic reaction  Swelling of an arm or leg may be caused by blockage of your veins  Other signs and symptoms that may occur with edema:   · Discomfort or tenderness in the swollen areas    · Tight and shiny skin over the swollen areas    · Weight gain    Seek care immediately if:   · You have shortness of breath at rest, especially when you lie down  · You cough up pink, foamy sputum  · You have chest pain  · Your heartbeat is fast or not even  Call your doctor if:   · The swollen area feels cold and is pale or blue in color  · The swollen area feels warm, painful, and is red in color  · You have increased swelling or swelling in other parts of your body  · You have questions or concerns about your condition or care  Treatment  depends on the cause  You may be given medicine called diuretics to help get rid of extra body fluid  Manage edema:   · Elevate  your arms or legs as directed  Raise them above the level of your heart as often as you can  This will help decrease swelling and pain  Prop them on pillows or blankets to keep them elevated comfortably  · Wear pressure stockings as directed  The stockings are tight and put pressure on your legs  This helps to keep fluid from collecting in your legs or ankles  · Limit your salt intake  Salt causes your body to hold water  Ask about any other changes to your diet  · Stay active  Do not stand or sit for long periods of time  Ask your healthcare provider about the best exercise plan for you           · Keep your skin moist  using lotion, cream, or ointment  Ask your healthcare provider what to use and how often to use it  Follow up with your doctor as directed:  Write down your questions so you remember to ask them during your visits  © Copyright [employer] 2022 Information is for End User's use only and may not be sold, redistributed or otherwise used for commercial purposes  All illustrations and images included in CareNotes® are the copyrighted property of A D A M , Inc  or Albino Galvez   The above information is an  only  It is not intended as medical advice for individual conditions or treatments  Talk to your doctor, nurse or pharmacist before following any medical regimen to see if it is safe and effective for you  Mirabegron (By mouth)   Mirabegron ([id number])  Treats bladder problems, including neurogenic detrusor overactivity and symptoms of an overactive bladder  Brand Name(s): Myrbetriq   There may be other brand names for this medicine  When This Medicine Should Not Be Used: This medicine is not right for everyone  Do not use it if you had an allergic reaction to mirabegron  How to Use This Medicine:   Liquid, Long Acting Tablet  · Take your medicine as directed  Your dose may need to be changed several times to find what works best for you  This medicine usually works within 8 weeks after you start taking it  · Swallow the extended-release tablet whole  Do not crush, break, or chew it  Adults may take this medicine with or without food  Children should take it with food  · Oral liquid:   ? It is best to take this medicine within 1 hour with food  ? Shake the bottle well just before taking each dose  ? Measure the oral liquid medicine with a marked measuring spoon, oral syringe, or medicine cup  · Read and follow the patient instructions that come with this medicine  Talk to your doctor or pharmacist if you have any questions  · Missed dose:  If you miss a dose and it is less than 12 hours from your regular schedule, take the dose as soon as you can  If you miss a dose and it is more than 12 hours, skip the missed dose and go back to your regular dosing schedule  Do not double doses  · Store the medicine in a closed container at room temperature, away from heat, moisture, and direct light  · Throw away any unused oral liquid 28 days after first opening the bottle  Drugs and Foods to Avoid:   Ask your doctor or pharmacist before using any other medicine, including over-the-counter medicines, vitamins, and herbal products  · Some medicines can affect how mirabegron works  Tell your doctor if you are using desipramine, digoxin, flecainide, metoprolol, propafenone, thioridazine, or a blood thinner (including warfarin)  Warnings While Using This Medicine:   · Tell your doctor if you are pregnant or breastfeeding, or if you have kidney disease, liver disease, high blood pressure, or bladder outlet obstruction (trouble urinating or a weak urine stream)  · This medicine may cause the following problems:   ? High blood pressure  ? Urinary retention (trouble passing urine or not fully emptying the bladder) when used together with another medicine  ? Angioedema (severe swelling)  · Your doctor will do lab tests at regular visits to check on the effects of this medicine  Keep all appointments  · Keep all medicine out of the reach of children  Never share your medicine with anyone    Possible Side Effects While Using This Medicine:   Call your doctor right away if you notice any of these side effects:  · Allergic reaction: Itching or hives, swelling in your face or hands, swelling or tingling in your mouth or throat, chest tightness, trouble breathing  · Bloody or cloudy urine, lower back or side pain, difficult or painful urination  · Blurred vision, headache, fast, pounding, or uneven heartbeat, lightheadedness, dizziness  If you notice these less serious side effects, talk with your doctor:   · Constipation  · Dry mouth, sore throat  If you notice other side effects that you think are caused by this medicine, tell your doctor  Call your doctor for medical advice about side effects  You may report side effects to FDA at 3-688-FDA-6369    © Copyright [employer] 2022 Information is for End User's use only and may not be sold, redistributed or otherwise used for commercial purposes  The above information is an  only  It is not intended as medical advice for individual conditions or treatments  Talk to your doctor, nurse or pharmacist before following any medical regimen to see if it is safe and effective for you

## 2022-05-06 PROBLEM — N32.81 OVERACTIVE BLADDER: Status: ACTIVE | Noted: 2022-05-06

## 2022-05-06 PROBLEM — F32.A DEPRESSION: Status: ACTIVE | Noted: 2022-05-06

## 2022-05-06 NOTE — ASSESSMENT & PLAN NOTE
She has been on Detrol LA however states it is no longer working for reports urinary incontinence  Requesting other medication  Will try Myrbetriq 25 mg daily  Patient will follow up with me in 4 weeks

## 2022-05-06 NOTE — ASSESSMENT & PLAN NOTE
Patient expresses feeling depressed  She denies suicidal ideation  She has had a number of difficulty for consensus over the past few years  She was diagnosed with MS, cared for at the daughter-in-law who eventually passed from a brain tumor, cared for 2 young granddaughters during [place], and is now caring for her significant other who has metastatic prostate cancer  She is currently taking Zoloft 50 mg   I did offer her referral to behavioral therapist which she has declined at this time  I also recommended that she look into bereavement groups  I also emphasized the importance of self-care

## 2022-05-11 NOTE — PROGRESS NOTES
Daily Note     Today's date: 10/17/2019  Patient name: Sol Zabala  : 1960  MRN: [id number]  Referring provider: Rusty Abbott MD  Dx:   Encounter Diagnosis     ICD-10-CM    1  Lumbar radiculopathy M54 16    2  Right hemiparesis ([facility] [address] ) G81 91    3  Multiple sclerosis ([facility] [address] ) G35                   Subjective: Patient states she is noticing improvements with her walking  "My sister said my walking looked a lot better  She says, "Whatever you're doing, keep doing it!""     Objective: See treatment diary below      Assessment: Tolerated treatment well  She exhibits improved gait pattern; more smooth, improved speed, and improved knee flexion during swing phase of gait  She will continue to benefit from skilled PT  Patient discusses transition to one time per week next week  Plan: Continue per plan of care           Precautions: MS; COPD      Manual   9/26 10/2 10/3 10/9 10/10 10/16 10/17     STR R TSP [employer]  8' NP  B 10'  R 8'                                                              Exercise Diary  9/23 9/26 10/2 10/3 10/9 10/10 10/16 10/17     Pt Edu KS      KS       Bike NV  5 min 5 min 3 min 5 min 5 min 5 min     TUG NV  15 sec          PIERCE NV  34/56          Transverse Abdominus Engagement NV 3"x20 HEP 3"x20   3"x20 3"x20     Adductor set NV 3"x20 x20 3"x20 3"x20  3"x20 3"x20     Hooklying TB hip abd NV Red x 20 ea Red x20 Red x 20 ea         Standing hip abd/ext  2 x 10ea 2x10 ea abd 2 x 10ea 2x10 ea yellow 2 x 10 ea 2x10 ea Red 2 x 10 ea     Standing marching  X 20 x20          Standing heel raises  x20 x20 x20 x20 x20 x20 x20     SAQ  2x10 HEP 2 x 10 ea LAQ 2 x 10 ea SAQ & LAQ 2x10ea SAQ&LAQ 2 x 10ea SAQ 1# LAQ 0# x 30ea     R trunk stretch in left sidelying  4 x 15" NP- no pain          SHC   X 20 ea          Biodex- LOS   Static x 3 Static x 3 Lv 12 x 3 Lv 12 x 5 Lv 12 x 5 Lv 11 x 5     Biodex- weight shifting   NV equal weight 50% 1 min x 2 equal weight 50% 1 5 min x 2        Mini Pt cleared for discharge. Discharge instructions gone over with the pt. Discharge instructions also gone over with the pt's son, Antoine over the phone. Instructed Antoine to  the prescription from the pt's pharmacy. Both verbalized understanding. All questions answered at this time. PIV removed.    squat    2x10  2x10  2x10     Bridge    2x10   2x10 2x10     Stairs    NV NV        Seated TB hamstring curl    NV Red 2 x 10ea yellow 2 x10  Yellow 2 x 10 ea     Ankle TB DF/PF    NV NV PF yellow & 3 way [id number]  PF yellow & 3 way [id number]     Tandem Stance on foam       2 x30"ea      SLB on foam       2x30"ea      Seated lumbar flexion w PB     x5 ea C,L,R            Modalities

## 2022-05-23 DIAGNOSIS — J45.909 UNCOMPLICATED ASTHMA, UNSPECIFIED ASTHMA SEVERITY, UNSPECIFIED WHETHER PERSISTENT: ICD-10-CM

## 2022-05-23 RX ORDER — ALBUTEROL SULFATE 90 UG/1
2 AEROSOL, METERED RESPIRATORY (INHALATION) EVERY 6 HOURS PRN
Qty: 8 G | Refills: 1 | Status: SHIPPED | OUTPATIENT
Start: 2022-05-23 | End: 2022-07-13

## 2022-06-03 ENCOUNTER — HOSPITAL ENCOUNTER (OUTPATIENT)
Dept: MAMMOGRAPHY | Facility: CLINIC | Age: 62
Discharge: HOME | End: 2022-06-03
Payer: COMMERCIAL

## 2022-06-03 VITALS — WEIGHT: 140 LBS | HEIGHT: 60 IN | BODY MASS INDEX: 27.48 KG/M2

## 2022-06-03 DIAGNOSIS — Z12.31 SCREENING MAMMOGRAM FOR HIGH-RISK PATIENT: ICD-10-CM

## 2022-06-03 PROCEDURE — 77067 SCR MAMMO BI INCL CAD: CPT

## 2022-06-03 PROCEDURE — 77063 BREAST TOMOSYNTHESIS BI: CPT

## 2022-06-07 ENCOUNTER — EVALUATION (OUTPATIENT)
Dept: PHYSICAL THERAPY | Facility: CLINIC | Age: 62
End: 2022-06-07
Payer: COMMERCIAL

## 2022-06-07 ENCOUNTER — TELEPHONE (OUTPATIENT)
Dept: PULMONOLOGY | Facility: CLINIC | Age: 62
End: 2022-06-07

## 2022-06-07 DIAGNOSIS — M62.81 MUSCLE WEAKNESS (GENERALIZED): ICD-10-CM

## 2022-06-07 DIAGNOSIS — R26.2 AMBULATORY DYSFUNCTION: ICD-10-CM

## 2022-06-07 DIAGNOSIS — G81.91 RIGHT HEMIPARESIS (HCC): ICD-10-CM

## 2022-06-07 DIAGNOSIS — G35 MULTIPLE SCLEROSIS (HCC): Primary | ICD-10-CM

## 2022-06-07 PROCEDURE — 97162 PT EVAL MOD COMPLEX 30 MIN: CPT | Performed by: PHYSICAL THERAPIST

## 2022-06-07 PROCEDURE — 97530 THERAPEUTIC ACTIVITIES: CPT | Performed by: PHYSICAL THERAPIST

## 2022-06-07 NOTE — PROGRESS NOTES
PT Evaluation     Today's date: 2022  Patient name: Elvi Barrios  : 1960  MRN: [id number]  Referring provider: Xavier Christensen, *  Dx:   Encounter Diagnosis     ICD-10-CM    1  Multiple sclerosis ([facility] [address] )  [address] Ambulatory Referral to Physical Therapy   2  Right hemiparesis ([facility] [address] )  G81 91 Ambulatory Referral to Physical Therapy   3  Ambulatory dysfunction  R26 2 Ambulatory Referral to Physical Therapy   4  Muscle weakness (generalized)  M62 81        Start Time: 1630  Stop Time: 1720  Total time in clinic (min): 50 minutes    Assessment  Assessment details: Patient is a 60 y o  female who presents to physical therapy with c/o right sided weakness and difficulty with balance/gait secondary to hx of Multiple Sclerosis  Patient presents to evaluation with pain, decreased range of motion, decreased strength, and decreased tolerance to activity  No treatment was administered today as today emphasized baseline functional testing to include TUG, 5xSTS, and Oakes Balance test  Upon functional testing, pt found to be categorized in medium fall risk category per Oakes score of 33/56  I discussed risks, benefits, and alternatives to treatment, and answered all patient questions to patient satisfaction  Patient presents with baseline FOTO score of 55 indicating limited tolerance/ability to complete ADLs  Patient is an appropriate candidate for skilled PT and would benefit from skilled PT services to address the aforementioned impairments, achieve goals, maximize function, and improve quality of life  Pt is in agreement with this plan      Patient Education: activity modifications as needed, pacing of activities, importance of HEP compliance, PT prognosis/POC    Impairments: abnormal gait, abnormal or restricted ROM, activity intolerance, impaired balance, impaired physical strength, lacks appropriate home exercise program and safety issue    Goals  ST weeks  Pt will demonstrate good understanding and compliance with HEP  Pt will ambulate with least restrictive AD with adequate step-through gait mechanics including proper toe clearance during mid-swing phase of gait to reduce fall risk  Pt will demonstrate negative Elys test bilaterally  Pt will demonstrate improved 90/90 hamstring flexibility test to (25*) bilaterally    LT weeks  Pt will improve FOTO score to > or = to 59 to indicate improved functional abilities   Pt will increase score on PIERCE balance test to 42/56 to indicate improved balance and allow for increased safety with ADLs, community ambulation, and reduced fall risk   Pt will increase bilateral hip/knee strength to 4+/5 to allow for improved ability to squat, negotiate stairs, and for community ambulation   Pt will improve TUG score to 9" with improved quality of gait and toe clearance to indicate improved safety with ambulation  Pt will improve 5xSTS to 10" to indicate improve strength and safety with functional transfers          Plan  Patient would benefit from: skilled physical therapy and PT eval  Planned modality interventions: cryotherapy and thermotherapy: hydrocollator packs  Planned therapy interventions: manual therapy, neuromuscular re-education, ADL training, activity modification, balance, body mechanics training, flexibility, functional ROM exercises, gait training, graded activity, graded exercise, home exercise program, strengthening, stretching, therapeutic activities, therapeutic exercise, self care and patient education  Frequency: 2x week  Duration in weeks: 8  Plan of Care beginning date: 2022  Plan of Care expiration date: 2022  Treatment plan discussed with: patient        Subjective Evaluation    History of Present Illness  Mechanism of injury: Pt reports to PT with hx of MS and feels that had recent exacerbation a few months ago where she is noticing more weakness, decreased balance, and decreased quality of gait and feels this is all progressively worsening   Pt current uses a SPC and does report frequent falls (2x/wk) and finds that she is catching her toe often on the floor  Pt notes her issues are more right sided  Pt was diagnosed with MS about 12 years ago and had course of PT for this in the past which felt like it helped       Aggravating factors: activity    Easing Factors:  Rest     PLOF:  Hx of MS for past 12 years    PMH: MS, COPD, HTN (controlled), Diabetic, fall risk, sleep apnea   Pain  No pain reported  Current pain ratin  At best pain ratin  At worst pain ratin    Hand dominance: right    Treatments  Previous treatment: physical therapy  Patient Goals  Patient goals for therapy: improved balance, increased motion, increased strength, independence with ADLs/IADLs and return to sport/leisure activities          Objective     Neurological Testing     Additional Neurological Details  Light touch intact bilaterally but diminished throughout arm/leg throughout right UE/LE    Strength/Myotome Testing     Left Shoulder     Planes of Motion   Flexion: 5   Abduction: 5     Right Shoulder     Planes of Motion   Flexion: 4   Abduction: 4     Left Elbow   Flexion: 5  Extension: 5    Right Elbow   Flexion: 4+  Extension: 4+    Left Wrist/Hand   Wrist extension: 5  Wrist flexion: 5    Right Wrist/Hand   Wrist extension: 4+  Wrist flexion: 4+    Left Hip   Planes of Motion   Flexion: 5  Abduction: 5    Right Hip   Planes of Motion   Flexion: 4-  Abduction: 4+    Left Knee   Flexion: 5  Extension: 5    Right Knee   Flexion: 4  Extension: 4    Left Ankle/Foot   Dorsiflexion: 5  Plantar flexion: 5    Right Ankle/Foot   Dorsiflexion: 3+  Plantar flexion: 4    Functional Assessment        Comments  Pt ambulates with SPC with poor toe clearance on right during mid-swing phase of gait with slight steppage/circumduction of RLE    5xSTS: 14 1" without UE assist and fair eccentric control     TU 4" performed with SPC in right hand    Oakes Balance Test: 33/56  Neuro Exam: Sensation   Light touch LE: right impaired             Diagnosis: MS, right sided weakness, difficulty with balance/gait    Precautions:  MS, COPD, HTN (controlled), Diabetic, fall risk, sleep apnea    POC Expires: 8/2/22   Re-evaluation Date: 7/5/22   FOTO Scores/Date: Goal - 59; 6/7/22 - 55   Visit Count 1/10       Manuals 6/7                                               Ther Ex 6/7       Calf stretch on right NV       Tibialis anterior strengthening w/ band NV                                                               Neuro Re-Ed 6/7       Tandem walk NV       Alt toe taps NV       Standing NBOS EO/EC NV       Functional cone reach outside COG NV       Dynamic gait NV                                                                              Ther Act 6/7                                                         Administered functional testing TUG 11 4"; 5xSTS 14 1";  Oakes 33/56           Modalities

## 2022-06-07 NOTE — TELEPHONE ENCOUNTER
Michelle, from [place], [place] re: the sleep study with procedure code 71404 was approved for 1 unit from 6/7/22-12/31/22   [facility] # is [id number]    Shawn Burdick assuming this is for you??

## 2022-06-09 ENCOUNTER — OFFICE VISIT (OUTPATIENT)
Dept: PHYSICAL THERAPY | Facility: CLINIC | Age: 62
End: 2022-06-09
Payer: COMMERCIAL

## 2022-06-09 DIAGNOSIS — G35 MULTIPLE SCLEROSIS (HCC): Primary | ICD-10-CM

## 2022-06-09 DIAGNOSIS — R26.2 AMBULATORY DYSFUNCTION: ICD-10-CM

## 2022-06-09 DIAGNOSIS — M62.81 MUSCLE WEAKNESS (GENERALIZED): ICD-10-CM

## 2022-06-09 DIAGNOSIS — G81.91 RIGHT HEMIPARESIS (HCC): ICD-10-CM

## 2022-06-09 PROCEDURE — 97112 NEUROMUSCULAR REEDUCATION: CPT

## 2022-06-09 PROCEDURE — 97530 THERAPEUTIC ACTIVITIES: CPT

## 2022-06-13 ENCOUNTER — TELEPHONE (OUTPATIENT)
Dept: NEUROLOGY | Facility: CLINIC | Age: 62
End: 2022-06-13

## 2022-06-13 DIAGNOSIS — G47.33 OSA (OBSTRUCTIVE SLEEP APNEA): Primary | ICD-10-CM

## 2022-06-13 NOTE — TELEPHONE ENCOUNTER
I put in an order for the Auto-CPAP  We can try that and if not effective would then need to go to the sleep lab  Please let her know that there is still a delay in obtaining the machines

## 2022-06-13 NOTE — TELEPHONE ENCOUNTER
Patient calling saying she was told by Dr Fina Mccormack that she did not need this second sleep study that is scheduled on 8/31  She is asking if Dr Fina Mccormack can order the machine without her going to get this second test done  I did not want her to cancel the appt until she had the ok from Dr Fina Mccormack  Please advise

## 2022-06-14 ENCOUNTER — OFFICE VISIT (OUTPATIENT)
Dept: PHYSICAL THERAPY | Facility: CLINIC | Age: 62
End: 2022-06-14
Payer: COMMERCIAL

## 2022-06-14 DIAGNOSIS — G81.91 RIGHT HEMIPARESIS (HCC): ICD-10-CM

## 2022-06-14 DIAGNOSIS — G35 MULTIPLE SCLEROSIS (HCC): Primary | ICD-10-CM

## 2022-06-14 DIAGNOSIS — R26.2 AMBULATORY DYSFUNCTION: ICD-10-CM

## 2022-06-14 DIAGNOSIS — M62.81 MUSCLE WEAKNESS (GENERALIZED): ICD-10-CM

## 2022-06-14 PROCEDURE — 97112 NEUROMUSCULAR REEDUCATION: CPT

## 2022-06-14 PROCEDURE — 97110 THERAPEUTIC EXERCISES: CPT

## 2022-06-14 NOTE — PROGRESS NOTES
Daily Note     Today's date: 2022  Patient name: Sol Zabala  : 1960  MRN: [id number]  Referring provider: Leonel Willams, *  Dx:   Encounter Diagnosis     ICD-10-CM    1  Multiple sclerosis ([facility] [address] )  G35    2  Ambulatory dysfunction  R26 2    3  Muscle weakness (generalized)  M62 81    4  Right hemiparesis ([facility] [address] )  G81 91        Start Time: 1502  Stop Time: 1550  Total time in clinic (min): 48 minutes    Subjective: patient reports having a fall yesterday  Reports while walking to   Denies any injury      Objective: See treatment diary below      Assessment:  Patient was able to complete therapy without incidents  Required min assist to finish active dorsi flexion against resistance  Cueing through out on lifting of R hip with movement not to drag foot  Required CG to CGA with min UE for safety with static and dynamic balance  Required rest periods between sets to address fatigue      Plan: Continue per plan of care  Progress treatment as tolerated         Diagnosis: MS, right sided weakness, difficulty with balance/gait    Precautions:  MS, COPD, HTN (controlled), Diabetic, fall risk, sleep apnea    POC Expires: 22   Re-evaluation Date: 22   FOTO Scores/Date: Goal - 59; 22 - 54   Visit Count 1/10 2/10 3/10     Manuals                                              Ther Ex      Calf stretch on right NV 10"x10 30"x4     Tibialis anterior strengthening w/ band NV Red TB 3"2x10 RTB x20 min therapist assist     Seated TR   3" 2x10 3"x x20     seated march  2x10 2x10 ea     Bike    L1 x 6 mins                                     Neuro Re-Ed      Tandem walk NV x3 laps with 1 UE x4 laps w/CGA     Alt toe taps NV 4" 2x10 4"2x10     Standing NBOS EO/EC NV 15"x3 each-CS to CG      Functional cone reach outside COG NV  To low mat x 3 (6 cones) NBOS     Dynamic gait NV       Side steps  x3 laps at // bars x3 laps dowel rods x 3 laps Ther Act 6/7           Pt edu                                              Administered functional testing TUG 11 4"; 5xSTS 14 1";  Oakes 33/56           Modalities

## 2022-06-15 DIAGNOSIS — R73.03 PREDIABETES: Primary | ICD-10-CM

## 2022-06-15 NOTE — TELEPHONE ENCOUNTER
Medication Refill Request     Name Metformin Dose/Frequency  500 mg 24 TAKE 1 TABLET BY MOUTH DAILY WITH BREAKFAST OR DINNER    Quantity  80  Verified pharmacy   [x]  Verified ordering Provider   [x]  Verified enough for 3 days  [x]

## 2022-06-16 ENCOUNTER — OFFICE VISIT (OUTPATIENT)
Dept: PHYSICAL THERAPY | Facility: CLINIC | Age: 62
End: 2022-06-16
Payer: COMMERCIAL

## 2022-06-16 DIAGNOSIS — G35 MULTIPLE SCLEROSIS (HCC): Primary | ICD-10-CM

## 2022-06-16 DIAGNOSIS — R26.2 AMBULATORY DYSFUNCTION: ICD-10-CM

## 2022-06-16 DIAGNOSIS — M62.81 MUSCLE WEAKNESS (GENERALIZED): ICD-10-CM

## 2022-06-16 PROCEDURE — 97112 NEUROMUSCULAR REEDUCATION: CPT

## 2022-06-16 PROCEDURE — 97110 THERAPEUTIC EXERCISES: CPT

## 2022-06-16 PROCEDURE — 97530 THERAPEUTIC ACTIVITIES: CPT

## 2022-06-16 RX ORDER — METFORMIN HYDROCHLORIDE 500 MG/1
500 TABLET, EXTENDED RELEASE ORAL
Qty: 90 TABLET | Refills: 1 | Status: SHIPPED | OUTPATIENT
Start: 2022-06-16

## 2022-06-16 NOTE — TELEPHONE ENCOUNTER
S/w pt, cancelled cpap study for now and has agreed for us to order the cpap unit  Placed order in [facility] for [address] [facility] pt units are taking long   Also advised once she receives unit, call us and make a 3 mth fup for compliance

## 2022-06-16 NOTE — PROGRESS NOTES
Daily Note     Today's date: 2022  Patient name: Margot Carmichael  : 1960  MRN: [id number]  Referring provider: Cate Landon, *  Dx:   Encounter Diagnosis     ICD-10-CM    1  Multiple sclerosis ([address] )  G35    2  Ambulatory dysfunction  R26 2    3  Muscle weakness (generalized)  M62 81        Start Time: 1415          Subjective: pt reports no new complaints upon arrival        Objective: See treatment diary below      Assessment: Tolerated treatment well  Patient would benefit from continued PT  Slight LOB w/ tandem walking, required CGA  Pt was challenged w/ increased step height w/ toe taps  Became tired towards the end  Cueing to increased knee flexion with side stepping over sticks  Plan: Continue per plan of care        Diagnosis: MS, right sided weakness, difficulty with balance/gait    Precautions:  MS, COPD, HTN (controlled), Diabetic, fall risk, sleep apnea    POC Expires: 22   Re-evaluation Date: 22   FOTO Scores/Date: Goal - 61; 22 - 54   Visit Count 10 2/10 3/10 4/10    Manuals                                             Ther Ex      Calf stretch on right NV 10"x10 30"x4 30"x4    Tibialis anterior strengthening w/ band NV Red TB 3"2x10 RTB x20 min therapist assist Red TB 3"2x10    Seated TR   3" 2x10 3"x x20 3" 20x    seated march  2x10 2x10 ea Standing 2x10 ea    Bike    L1 x 6 mins L1 x 6 mins                                    Neuro Re-Ed      Tandem walk NV x3 laps with 1 UE x4 laps w/CGA x4 laps w/CGA    Alt toe taps NV 4" 2x10 4"2x10 6" 2x10    Standing NBOS EO/EC NV 15"x3 each-CS to CG  airex EO 15" 3x; no foam EC 10" 4x    Functional cone reach outside COG NV  To low mat x 3 (6 cones) NBOS To low mat x 3 (6 cones) NBOS    Dynamic gait NV       Side steps  x3 laps at // bars x3 laps dowel rods x 3 laps x3 laps dowel rods                                                                   Ther Act            Pt edu Step ups fwd    L 4" R 2" 10x ea                                  Administered functional testing TUG 11 4"; 5xSTS 14 1";  Oakes 33/56           Modalities

## 2022-06-21 ENCOUNTER — OFFICE VISIT (OUTPATIENT)
Dept: PHYSICAL THERAPY | Facility: CLINIC | Age: 62
End: 2022-06-21
Payer: COMMERCIAL

## 2022-06-21 DIAGNOSIS — G35 MULTIPLE SCLEROSIS (HCC): Primary | ICD-10-CM

## 2022-06-21 DIAGNOSIS — R26.2 AMBULATORY DYSFUNCTION: ICD-10-CM

## 2022-06-21 DIAGNOSIS — M62.81 MUSCLE WEAKNESS (GENERALIZED): ICD-10-CM

## 2022-06-21 PROCEDURE — 97110 THERAPEUTIC EXERCISES: CPT

## 2022-06-21 PROCEDURE — 97112 NEUROMUSCULAR REEDUCATION: CPT

## 2022-06-21 NOTE — PROGRESS NOTES
Daily Note     Today's date: 2022  Patient name: Bryan Odell  : 1960  MRN: [id number]  Referring provider: Rianna Torres, *  Dx:   Encounter Diagnosis     ICD-10-CM    1  Multiple sclerosis ([address] )  G35    2  Muscle weakness (generalized)  M62 81    3  Ambulatory dysfunction  R26 2        Start Time: 1147  Stop Time: 1229  Total time in clinic (min): 42 minutes    Subjective: patient reports no new incidents or complaints  Denies any falls  Reports being more aware of heel strike with ambulation    Objective: See treatment diary below      Assessment:  Patient demonstrates improved active dorsi and hip flexion when compared to previous therapy session  Was able to transition to standing toe raises with cueing on compensatory body leaning to promote motion  Utilized 6" hurdles to emphasize use of motions  Required CG to CGA throughout along with frequent rest periods due to fatigue    Plan: Continue per plan of care  Progress treatment as tolerated         Diagnosis: MS, right sided weakness, difficulty with balance/gait    Precautions:  MS, COPD, HTN (controlled), Diabetic, fall risk, sleep apnea    POC Expires: 22   Re-evaluation Date: 22   FOTO Scores/Date: Goal - 61; 22 - 54   Visit Count 1/10 2/10 3/10 4/10 5/10   Manuals                                            Ther Ex    Calf stretch on right NV 10"x10 30"x4 30"x4 Gastroc/soleus 30'x3 ea   Tibialis anterior strengthening w/ band NV Red TB 3"2x10 RTB x20 min therapist assist Red TB 3"2x10 Active x10, RTB x 10 w/therapist assist into end ROM   Seated TR   3" 2x10 3"x x20 3" 20x x30 Standing   seated march  2x10 2x10 ea Standing 2x10 ea standing 5" hold 2x10 alt   Bike    L1 x 6 mins L1 x 6 mins L2 x 6 mins                                    Neuro Re-Ed    Tandem walk NV x3 laps with 1 UE x4 laps w/CGA x4 laps w/CGA x3 laps w/CG   Alt toe taps NV 4" 2x10 4"2x10 6" 2x10 6"2x10   Standing NBOS EO/EC NV 15"x3 each-CS to CG  airex EO 15" 3x; no foam EC 10" 4x    Functional cone reach outside COG NV  To low mat x 3 (6 cones) NBOS To low mat x 3 (6 cones) NBOS    Dynamic gait NV       Side steps  x3 laps at // bars x3 laps dowel rods x 3 laps x3 laps dowel rods    Quirino Walking     Fwd/Lat x 3 laps ea w/CG                                                          Ther Act 6/7           Pt edu        Step ups fwd    L 4" R 2" 10x ea                                  Administered functional testing TUG 11 4"; 5xSTS 14 1";  Oakes 33/56           Modalities

## 2022-06-22 ENCOUNTER — CONSULT (OUTPATIENT)
Dept: NEUROLOGY | Facility: CLINIC | Age: 62
End: 2022-06-22
Payer: COMMERCIAL

## 2022-06-22 VITALS
SYSTOLIC BLOOD PRESSURE: 124 MMHG | WEIGHT: 145.7 LBS | BODY MASS INDEX: 28.61 KG/M2 | HEIGHT: 60 IN | TEMPERATURE: 96.6 F | DIASTOLIC BLOOD PRESSURE: 64 MMHG | HEART RATE: 76 BPM

## 2022-06-22 DIAGNOSIS — M70.62 TROCHANTERIC BURSITIS, LEFT HIP: ICD-10-CM

## 2022-06-22 DIAGNOSIS — R26.1 SPASTIC GAIT: ICD-10-CM

## 2022-06-22 DIAGNOSIS — R26.2 AMBULATORY DYSFUNCTION: ICD-10-CM

## 2022-06-22 DIAGNOSIS — G35 MULTIPLE SCLEROSIS (HCC): Primary | ICD-10-CM

## 2022-06-22 PROCEDURE — 3008F BODY MASS INDEX DOCD: CPT | Performed by: STUDENT IN AN ORGANIZED HEALTH CARE EDUCATION/TRAINING PROGRAM

## 2022-06-22 PROCEDURE — 99203 OFFICE O/P NEW LOW 30 MIN: CPT | Performed by: STUDENT IN AN ORGANIZED HEALTH CARE EDUCATION/TRAINING PROGRAM

## 2022-06-22 PROCEDURE — 1036F TOBACCO NON-USER: CPT | Performed by: STUDENT IN AN ORGANIZED HEALTH CARE EDUCATION/TRAINING PROGRAM

## 2022-06-22 NOTE — PROGRESS NOTES
Physical Medicine & Rehabilitation New patient Evaluation  Raymond Range 60 y o  female      HPI/SUBJECTIVE: Raymond Range 60 y o  female right handed, with  has a past medical history of Asthma, Cardiac disease, COPD (chronic obstructive pulmonary disease) ([address] ), Depression, Hyperlipidemia, Hypertension, Influenza, Irregular heart beat, Migraine, Migraine, MS (multiple sclerosis) ([facility] ), Multiple sclerosis ([facility] ), Nephrolithiasis, Neurogenic bladder, and Panniculitis  Old records were reviewed personally  Ms Luc Neff has a history of MS and right sided weakness  She also had a long history of left sided adhesive capsulitis which is now improved along with swelling that she noticed in her legs  She is currently taking Baclofen 20mg TID  MRI September 7 2021 of brain with and outout contrast without new pathology noted  Was doing physical therapy for the left shoulder, much better, now for the back and for walking  She feels like her right leg was heavy and her friends stated she was swinging her leg  She was losing her balance at times  She is here for assistance in managing her abnormal gait and ambulatory dysfunction  ASSESSMENT/PLAN:     Diagnoses and all orders for this visit:    Multiple sclerosis (HCC)  -     Diclofenac Sodium (VOLTAREN) 1 %; Apply 2 g topically 4 (four) times a day as needed (left hip pain)    Ambulatory dysfunction    Spastic gait  -     Diclofenac Sodium (VOLTAREN) 1 %; Apply 2 g topically 4 (four) times a day as needed (left hip pain)    Trochanteric bursitis, left hip  -     Diclofenac Sodium (VOLTAREN) 1 %; Apply 2 g topically 4 (four) times a day as needed (left hip pain)        Cane evaluated and appropriate height and condition- normal wear, cap in good condition  Approximately 20-25degrees of elbow flexion sitting around greater trochanter  We did discuss an AFO for the right leg despite good strength/ROM as she is at risk for falls   We trialed contralateral cane use, but would need some time to practice this in therapy which she will continue  At this time she is requesting to put off a brace to the fall as shedoes not want to wear with shorts  She is developing left hip pain likely due to trochanteric bursisitis or inflammatory due to the way her hips move while clearing the right leg during walking  I ordered Diclofenac gel for the left hip, however ultimately gait correction will be the only longer term solution  I also asked her to present some requests to her therapiest, not only trialing the cane in the contralateral hand, but also using ace or Coban to create an AFO-like set up to see how she does  Review of Systems   Constitutional: Negative  Negative for appetite change and fever  HENT: Negative  Negative for hearing loss, tinnitus, trouble swallowing and voice change  Eyes: Negative  Negative for photophobia and pain  Respiratory: Negative  Negative for shortness of breath  Cardiovascular: Negative  Negative for palpitations  Gastrointestinal: Negative  Negative for nausea and vomiting  Endocrine: Negative  Negative for cold intolerance  Genitourinary: Negative  Negative for dysuria, frequency and urgency  Musculoskeletal: Positive for back pain and gait problem  Negative for myalgias and neck pain  Skin: Negative  Negative for rash  Neurological: Positive for dizziness, weakness, light-headedness and headaches  Negative for tremors, seizures, syncope, facial asymmetry, speech difficulty and numbness  Hematological: Negative  Does not bruise/bleed easily  Psychiatric/Behavioral: Positive for confusion and sleep disturbance  Negative for hallucinations  All other systems reviewed and are negative      ROS personally reviewed and updated    OBJECTIVE:   /64 (BP Location: Left arm, Patient Position: Sitting)   Pulse 76   Temp (!) 96 6 °F (35 9 °C) (Temporal)   Ht 5' (1 524 m)   Wt 66 1 kg (145 lb 11 2 oz)   BMI 28 46 kg/m² Physical Exam  Constitutional:       General: She is not in acute distress  HENT:      Head: Normocephalic and atraumatic  Right Ear: External ear normal       Left Ear: External ear normal       Nose: Nose normal  No rhinorrhea  Mouth/Throat:      Mouth: Mucous membranes are moist       Pharynx: Oropharynx is clear  Eyes:      General: No scleral icterus  Right eye: No discharge  Left eye: No discharge  Cardiovascular:      Rate and Rhythm: Normal rate  Pulses: Normal pulses  Pulmonary:      Effort: Pulmonary effort is normal  No respiratory distress  Breath sounds: No wheezing  Abdominal:      General: There is no distension  Palpations: Abdomen is soft  Musculoskeletal:      Cervical back: Normal range of motion and neck supple  Right lower leg: No edema  Left lower leg: No edema  Skin:     General: Skin is warm and dry  Neurological:      Mental Status: She is alert and oriented to person, place, and time  Sensory: No sensory deficit  Coordination: Coordination normal       Gait: Gait abnormal       Comments: 4/5 weakness in the right leg more distally in ankle dorsiflexion and plantar flexion, but good range of motion  This ROM is somehow lost during gait analysis with consistent right toe drag and near loss of balance  She is hip hiking on the left to clear with mild circumduction on the right  She is using the cane in the ipsilateral side to her deficit     Psychiatric:         Mood and Affect: Mood normal          Behavior: Behavior normal        Imaging: I personally reviewed pertinent imaging    Labs: Personally reviewed  Lab Results   Component Value Date    WBC 6 01 11/10/2020    HGB 13 5 11/10/2020    HCT 42 1 11/10/2020    MCV 88 11/10/2020     11/10/2020     Lab Results   Component Value Date    CALCIUM 8 4 11/11/2020     12/21/2017    K 3 2 (L) 11/11/2020    CO2 25 11/11/2020     (H) 11/11/2020    BUN 10 11/11/2020    CREATININE 0 80 11/11/2020         The following portions of the patient's history were reviewed and updated as appropriate: allergies, current medications, past family history, past medical history, past social history, past surgical history and problem list     Past Medical History:   Diagnosis Date    Asthma     Cardiac disease     heart murmur    [facility]) [facility]     Depression     Hyperlipidemia     Hypertension     Influenza     December 2017    Irregular heart beat     Migraine     Migraine     MS (multiple sclerosis) ([facility] )     Multiple sclerosis ([address] )     Nephrolithiasis     Neurogenic bladder     Panniculitis        Patient Active Problem List    Diagnosis Date Noted    Depression 05/06/2022    Overactive bladder 05/06/2022    DVT, lower extremity, distal, acute, right ([facility] ) 05/05/2022    CINTHYA (obstructive sleep apnea)     Adhesive capsulitis of left shoulder 10/13/2021    Cervical disc disorder at C6-C7 level with radiculopathy 04/12/2021    Encephalopathy due to severe acute respiratory syndrome coronavirus 2 (SARS-CoV-2) 04/12/2021    Word finding difficulty 04/12/2021    Slurred speech 04/12/2021    Edema of right lower extremity 02/26/2021    Hypotension 11/09/2020    Pneumonia due to COVID-19 virus 11/06/2020    Other spondylosis with radiculopathy, lumbar region 02/20/2020    Right low back pain 02/21/2019    Skin rash 02/21/2019    Right hemiparesis ([facility] [facility] ) 02/21/2019    Mesenteric panniculitis ([address] ) 03/15/2018    Ambulatory dysfunction 02/21/2018    Chronic right-sided headaches 02/21/2018    Multiple sclerosis ([address] ) 02/21/2018    Panniculitis 02/21/2018    S/P trigger finger release 02/02/2018    Trigger ring finger of left hand 01/17/2018    Hypertension 04/06/2017    COPD (chronic obstructive pulmonary disease) ([facility] [facility] ) 04/11/2016    Hyperlipidemia with target LDL less than 160 07/14/2011       Past Surgical History:   Procedure Laterality Date    COLONOSCOPY      DILATION AND CURETTAGE OF UTERUS      OR COLONOSCOPY FLX DX W/COLLJ SPEC WHEN PFRMD N/A 1/11/2018    Procedure: COLONOSCOPY;  Surgeon: Nils Lay MD;  Location: [facility]; Service: Gastroenterology    OR INCISE FINGER TENDON SHEATH Left 1/17/2018    Procedure: RING TRIGGER FINGER RELEASE;  Surgeon: Joaquin Bell MD;  Location:  MAIN OR;  Service: Orthopedics    TONSILLECTOMY      UTERINE FIBROID SURGERY         Family History   Problem Relation Age of Onset    Stroke Mother     Diabetes Mother     Hypertension Mother     Emphysema Mother     Heart disease Father     Diabetes Father     Hypertension Father     Diabetes Sister     Sleep apnea Sister     No Known Problems Maternal Grandmother     No Known Problems Paternal Grandmother     Hypertension Brother    Bessy Quivers' disease Brother     Breast cancer Maternal Aunt 76    Stomach cancer Maternal Aunt     Sleep apnea Maternal Aunt     No Known Problems Maternal Aunt     No Known Problems Maternal Aunt     No Known Problems Maternal Aunt     No Known Problems Maternal Aunt        Social History     Allergies   Allergen Reactions    Nuts - Food Allergy Shortness Of Breath and Anaphylaxis    Other Anaphylaxis, Hives and Itching     [facility] - [id number]: POISON IVY   walnuts & cashews  Cats    Peanut Oil - Food Allergy      Hives anaphylaxis    Shellfish Allergy - Food Allergy Shortness Of Breath and Swelling    Shellfish-Derived Products - Food Allergy Shortness Of Breath and Swelling    Sumatriptan Headache and Other (See Comments)     Other reaction(s): Headache    Copaxone [Glatiramer Acetate] Hives    Cat Hair Extract     Cephalexin Rash     Pt says they took keflex again and didn't get a reaction from it  Pt states they believe it was something they ate that day instead       Glatiramer Rash     Other reaction(s): rash    Pollen Extract Sneezing    Seasonal Ic [Cholestatin]          Current Outpatient Medications:     albuterol (2 5 mg/3 mL) 0 083 % nebulizer solution, Albuterol Sulfate (2 5 MG/3ML) 0 083% Inhalation Nebulization Solution USE 1 UNIT DOSE IN NEBULIZER EVERY 6 HOURS AS NEEDED    Quantity: 1;  Refills: 5    Seldenanamaria Burns M D ;  Started 12-July-2016 Active3 ML Plas Cont (125 Plas Conts), Disp: , Rfl:     albuterol (Ventolin HFA) 90 mcg/act inhaler, Inhale 2 puffs every 6 (six) hours as needed for wheezing, Disp: 8 g, Rfl: 1    aspirin 81 MG tablet, Aspirin 81 MG TABS Take 1 tablet daily  Refills: 0  Active, Disp: , Rfl:     atorvastatin (LIPITOR) 40 mg tablet, Take 1 tablet (40 mg total) by mouth once at bedtime, Disp: 90 tablet, Rfl: 1    baclofen 20 mg tablet, Take 1 tablet (20 mg total) by mouth 4 (four) times a day, Disp: 360 tablet, Rfl: 2    butalbital-acetaminophen-caffeine (FIORICET,ESGIC) -40 mg per tablet, Take 1 tablet by mouth every 4 (four) hours as needed for headaches, Disp: 12 tablet, Rfl: 4    Diclofenac Sodium (VOLTAREN) 1 %, Apply 2 g topically 4 (four) times a day as needed (left hip pain), Disp: 150 g, Rfl: 1    EPINEPHrine (EPIPEN) 0 3 mg/0 3 mL SOAJ, Inject 0 3 mL (0 3 mg total) into a muscle if needed for anaphylaxis, Disp: 1 each, Rfl: 1    ergocalciferol (VITAMIN D2) 50,000 units, TAKE ONE CAPSULE WEEKLY WITH FOOD, Disp: , Rfl: 1    gabapentin (NEURONTIN) 300 mg capsule, Take 2 caps in am and noon and 3 caps HS, Disp: 630 capsule, Rfl: 2    metFORMIN (GLUCOPHAGE-XR) 500 mg 24 hr tablet, Take 1 tablet (500 mg total) by mouth daily with breakfast, Disp: 90 tablet, Rfl: 1    NIFEdipine ER (ADALAT CC) 60 MG 24 hr tablet, Take 1 tablet (60 mg total) by mouth daily, Disp: 90 tablet, Rfl: 1    sertraline (ZOLOFT) 50 mg tablet, Takes 1 tablet in am and hs , Disp: , Rfl:     Wixela Inhub 250-50 MCG/ACT inhaler, INHALE 1 PUFF 2 TIMES A DAY RINSE MOUTH AFTER USE , Disp: , Rfl:     cholecalciferol (VITAMIN D3) 1,000 units tablet, Take 2 tablets (2,000 Units total) by mouth daily for 1 day (Patient not taking: Reported on 6/22/2022), Disp: 2 tablet, Rfl: 0    fluticasone (FLONASE) 50 mcg/act nasal spray, 2 sprays into each nostril daily (Patient not taking: Reported on 6/22/2022), Disp: 1 Bottle, Rfl: 3    Mirabegron ER (Myrbetriq) 25 MG TB24, Take 25 mg by mouth in the morning (Patient not taking: Reported on 6/22/2022), Disp: 30 tablet, Rfl: 0    Brittney Robertson, DO  Physical Medicine and [place] [id number]

## 2022-06-22 NOTE — PATIENT INSTRUCTIONS
You were seen today for abnormal gait or walking which is related to the foot drag on the right side  Despite having good strength and range of motion, you are dragging the front of the right foot on the ground  We talked about using the cane in the left hand in therapy to trial  In addition, you would be a candidate for an AFO or ankle-foot orthosis (brace) but decided to hold off for now  A walker is also an option, but deferred at this time  You have been developing left hip pain likely due to trochanteric bursisitis or inflammatory due to the way your hips move while clearing the right leg during walking  Call or schedule an appointment in the fall if considering a brace and reassess walking and pain  Call sooner if needed      For your Therapist: Trial using the SPC in the contralateral hand during ambulation and trial an AFO and if not available use ace wrap or Coban to mimic an AFO for the right foot

## 2022-06-23 ENCOUNTER — OFFICE VISIT (OUTPATIENT)
Dept: PHYSICAL THERAPY | Facility: CLINIC | Age: 62
End: 2022-06-23
Payer: COMMERCIAL

## 2022-06-23 DIAGNOSIS — G35 MULTIPLE SCLEROSIS (HCC): ICD-10-CM

## 2022-06-23 DIAGNOSIS — R26.2 AMBULATORY DYSFUNCTION: ICD-10-CM

## 2022-06-23 DIAGNOSIS — M62.81 MUSCLE WEAKNESS (GENERALIZED): Primary | ICD-10-CM

## 2022-06-23 PROCEDURE — 97110 THERAPEUTIC EXERCISES: CPT

## 2022-06-23 PROCEDURE — 97530 THERAPEUTIC ACTIVITIES: CPT

## 2022-06-23 NOTE — PROGRESS NOTES
Daily Note     Today's date: 2022  Patient name: Katie Hunter  : 1960  MRN: [id number]  Referring provider: Robby Guillen, *  Dx:   Encounter Diagnosis     ICD-10-CM    1  Muscle weakness (generalized)  M62 81    2  Multiple sclerosis ([facility] )  G35    3  Ambulatory dysfunction  R26 2        Start Time: 1146  Stop Time: 6009  Total time in clinic (min): 38 minutes    Subjective: patient reports seeing Dr   Denies any new incidents or complaints  Denies any falls  Reports Dr Rachid Shannon like trial of cane on L hand and use of AFO      Objective: See treatment diary below      Assessment:  Trialed AFO using ACE to mimic along with SPC in L UE  Cane required adjusting along with corrective cues to address mechanics  Patient demonstrated improved toe clearance on R with AFO when compared to not utilizing it  Education on use of AFO at home  FOTO performed scoring 54  Plan: Continue per plan of care  Progress treatment as tolerated         Diagnosis: MS, right sided weakness, difficulty with balance/gait    Precautions:  MS, COPD, HTN (controlled), Diabetic, fall risk, sleep apnea    POC Expires: 22   Re-evaluation Date: 22   FOTO Scores/Date: Goal - 59; 22 - 55, 22-54   Visit Count 6/10 2/10 310 410 5/10   Manuals                                            Ther Ex    Calf stretch on right  10"x10 30"x4 30"x4 Gastroc/soleus 30'x3 ea   Tibialis anterior strengthening w/ band  Red TB 3"2x10 RTB x20 min therapist assist Red TB 3"2x10 Active x10, RTB x 10 w/therapist assist into end ROM   Seated TR   3" 2x10 3"x x20 3" 20x x30 Standing   seated march  2x10 2x10 ea Standing 2x10 ea standing 5" hold 2x10 alt   Bike  L2 x 6 mins  L1 x 6 mins L1 x 6 mins L2 x 6 mins    FOTO Administered performed                               Neuro Re-Ed    Tandem walk  x3 laps with 1 UE x4 laps w/CGA x4 laps w/CGA x3 laps w/CG   Alt toe taps  4" 2x10 4"2x10 6" 2x10 6"2x10   Standing NBOS EO/EC  15"x3 each-CS to CG  airex EO 15" 3x; no foam EC 10" 4x    Functional cone reach outside COG   To low mat x 3 (6 cones) NBOS To low mat x 3 (6 cones) NBOS    Dynamic gait        Side steps  x3 laps at // bars x3 laps dowel rods x 3 laps x3 laps dowel rods    Quirino Walking     Fwd/Lat x 3 laps ea w/CG                                                         Ther Act 6/23           Pt edu Performed and helped patient with home AFO       Step ups fwd    L 4" R 2" 10x ea    Gait SPC on L w/ACE wrap AFO x 4 laps, without AFO x 2                             Administered functional testing            Modalities

## 2022-06-28 ENCOUNTER — OFFICE VISIT (OUTPATIENT)
Dept: PHYSICAL THERAPY | Facility: CLINIC | Age: 62
End: 2022-06-28
Payer: COMMERCIAL

## 2022-06-28 DIAGNOSIS — R26.2 AMBULATORY DYSFUNCTION: ICD-10-CM

## 2022-06-28 DIAGNOSIS — M62.81 MUSCLE WEAKNESS (GENERALIZED): Primary | ICD-10-CM

## 2022-06-28 DIAGNOSIS — G81.91 RIGHT HEMIPARESIS (HCC): ICD-10-CM

## 2022-06-28 DIAGNOSIS — G35 MULTIPLE SCLEROSIS (HCC): ICD-10-CM

## 2022-06-28 PROCEDURE — 97530 THERAPEUTIC ACTIVITIES: CPT | Performed by: PHYSICAL THERAPIST

## 2022-06-28 PROCEDURE — 97110 THERAPEUTIC EXERCISES: CPT | Performed by: PHYSICAL THERAPIST

## 2022-06-28 PROCEDURE — 97112 NEUROMUSCULAR REEDUCATION: CPT | Performed by: PHYSICAL THERAPIST

## 2022-06-28 NOTE — PROGRESS NOTES
Daily Note     Today's date: 2022  Patient name: Davey Vela  : 1960  MRN: [id number]  Referring provider: Omar Potts, *  Dx:   Encounter Diagnosis     ICD-10-CM    1  Muscle weakness (generalized)  M62 81    2  Multiple sclerosis ([facility] [address] )  G35    3  Ambulatory dysfunction  R26 2    4  Right hemiparesis ([facility] [address] )  G81 91        Start Time: 3579  Stop Time: 1230  Total time in clinic (min): 45 minutes    Subjective: Pt reports ordering an AFO from [employer] and brings it in today with requests to help show her how to put it in        Objective: See treatment diary below       Assessment: Pt brings in new AFO with instruction on how to don and properly position foot in adequate amount of DF to help with toe clearance during ambulation and was able to instruct how to properly don independent after a few attempts and verbal cues  Pt continues to progress well with static/dynamic balance activities and able to complete with improving levels of stability but continue to utilize CGA for safety throughout  Pt with improving tolerance with alternating mini marches with isometric holds with hip flexion to 90* with minimal reliance on UE assist on plinth table  Pt also with improved reaching outside COG and improved weight shifting observed compared to previous visits  Pt encouraged to utilize her AFO during community ambulation to improve safety and decrease fall risk  Will continue to progress next visit  Plan: Continue per plan of care  Progress treatment as tolerated         Diagnosis: MS, right sided weakness, difficulty with balance/gait    Precautions:  MS, COPD, HTN (controlled), Diabetic, fall risk, sleep apnea    POC Expires: 22   Re-evaluation Date: 22   FOTO Scores/Date: Goal - 59; 22 - 55, 22-54   Visit Count 6/10 7/10 3/10 4/10 5/10   Manuals                                            Ther Ex    Calf stretch on right  4x30" 30"x4 30"x4 Gastroc/soleus 30'x3 ea   Tibialis anterior strengthening w/ band  Red TB 3"2x10 RTB x20 min therapist assist Red TB 3"2x10 Active x10, RTB x 10 w/therapist assist into end ROM   Seated TR   x30 standing 3"x x20 3" 20x x30 Standing   seated march  standing 5" hold 2x10 alt 2x10 ea Standing 2x10 ea standing 5" hold 2x10 alt   Bike  L2 x 6 mins L2 x 6 min L1 x 6 mins L1 x 6 mins L2 x 6 mins    FOTO Administered performed                               Neuro Re-Ed 6/23 6/28 6/14 6/21   Tandem walk  x4 laps with 1 UE x4 laps w/CGA x4 laps w/CGA x3 laps w/CG   Alt toe taps   4"2x10 6" 2x10 6"2x10   Standing NBOS EO/EC  airex EO 15" 3x; no foam EC 10" 4x  airex EO 15" 3x; no foam EC 10" 4x    Functional cone reach outside COG  To low mat x 3 (6 cones) NBOS To low mat x 3 (6 cones) NBOS To low mat x 3 (6 cones) NBOS    Dynamic gait        Side steps  x3 laps dowel rods x3 laps dowel rods x 3 laps x3 laps dowel rods    Quirino Walking     Fwd/Lat x 3 laps ea w/CG                                                         Ther Act 6/23 6/28         Pt edu Performed and helped patient with home AFO Educated on use and set up of AFO that she purchased       Step ups fwd    L 4" R 2" 10x ea    Gait SPC on L w/ACE wrap AFO x 4 laps, without AFO x 2 Amb around clinic with AFO donned                             Administered functional testing            Modalities

## 2022-06-30 ENCOUNTER — OFFICE VISIT (OUTPATIENT)
Dept: PHYSICAL THERAPY | Facility: CLINIC | Age: 62
End: 2022-06-30
Payer: COMMERCIAL

## 2022-06-30 DIAGNOSIS — G35 MULTIPLE SCLEROSIS (HCC): ICD-10-CM

## 2022-06-30 DIAGNOSIS — R26.2 AMBULATORY DYSFUNCTION: ICD-10-CM

## 2022-06-30 DIAGNOSIS — M62.81 MUSCLE WEAKNESS (GENERALIZED): Primary | ICD-10-CM

## 2022-06-30 PROCEDURE — 97140 MANUAL THERAPY 1/> REGIONS: CPT

## 2022-06-30 PROCEDURE — 97110 THERAPEUTIC EXERCISES: CPT

## 2022-06-30 PROCEDURE — 97112 NEUROMUSCULAR REEDUCATION: CPT

## 2022-06-30 NOTE — PROGRESS NOTES
Daily Note     Today's date: 2022  Patient name: Leandro Gnozalez  : 1960  MRN: [id number]  Referring provider: Denzel Lewis, *  Dx:   Encounter Diagnosis     ICD-10-CM    1  Muscle weakness (generalized)  M62 81    2  Multiple sclerosis ([address] )  G35    3  Ambulatory dysfunction  R26 2        Start Time: 1146  Stop Time: 1229  Total time in clinic (min): 43 minutes    Subjective: patient reports wearing of AFO brace and feeling more comfortable with it  States less difficulty going up stairs with wearing  Objective: See treatment diary below      Assessment: patient was able to complete therapy without incident  Required CG to CGA with balance and stability due to safety  Introduce lateral stepping up and over 6" plyo box  Was able to perform NBOS w/ EC on foam surface  Performed manual interventions to improve available DF      Plan: Continue per plan of care  Progress treatment as tolerated         Diagnosis: MS, right sided weakness, difficulty with balance/gait    Precautions:  MS, COPD, HTN (controlled), Diabetic, fall risk, sleep apnea    POC Expires: 22   Re-evaluation Date: 22   FOTO Scores/Date: Goal - 59; 22 - 55, 22-54   Visit Count 6/10 7/10 8/10 4/10 5/10   Manuals    R Ankle PROM   ROBSON w/focus on DF                                     Ther Ex    Calf stretch on right  4x30" 30"x4 30"x4 Gastroc/soleus 30'x3 ea   Tibialis anterior strengthening w/ band  Red TB 3"2x10 RTB 2x10 Red TB 3"2x10 Active x10, RTB x 10 w/therapist assist into end ROM   Seated TR   x30 standing x30 Toe/Heel Raise 3" 20x x30 Standing   seated march  standing 5" hold 2x10 alt Standing 5" hold 2x10 alt  Standing 2x10 ea standing 5" hold 2x10 alt   Bike  L2 x 6 mins L2 x 6 min L1 x 6 mins L1 x 6 mins L2 x 6 mins    FOTO Administered performed                               Neuro Re-Ed    Tandem walk  x4 laps with 1 UE X 4 laps w/CGA x4 laps w/CGA x3 laps w/CG   Alt toe taps    6" 2x10 6"2x10   Standing NBOS EO/EC  airex EO 15" 3x; no foam EC 10" 4x On foam EO 20"x3/ EC 10"x4 airex EO 15" 3x; no foam EC 10" 4x    Functional cone reach outside COG  To low mat x 3 (6 cones) NBOS  To low mat x 3 (6 cones) NBOS    Dynamic gait        Side steps  x3 laps dowel rods Over 6" plyo x 5  x3 laps dowel rods    Quirino Walking     Fwd/Lat x 3 laps ea w/CG                                                        Ther Act 6/23 6/28        Pt edu Performed and helped patient with home AFO Educated on use and set up of AFO that she purchased       Step ups fwd    L 4" R 2" 10x ea    Gait SPC on L w/ACE wrap AFO x 4 laps, without AFO x 2 Amb around clinic with AFO donned                            Administered functional testing           Modalities

## 2022-07-01 LAB
DME PARACHUTE DELIVERY DATE EXPECTED: NORMAL
DME PARACHUTE DELIVERY DATE REQUESTED: NORMAL
DME PARACHUTE ITEM DESCRIPTION: NORMAL
DME PARACHUTE ORDER STATUS: NORMAL
DME PARACHUTE SUPPLIER NAME: NORMAL
DME PARACHUTE SUPPLIER PHONE: NORMAL

## 2022-07-05 ENCOUNTER — OFFICE VISIT (OUTPATIENT)
Dept: PHYSICAL THERAPY | Facility: CLINIC | Age: 62
End: 2022-07-05
Payer: COMMERCIAL

## 2022-07-05 DIAGNOSIS — M62.81 MUSCLE WEAKNESS (GENERALIZED): Primary | ICD-10-CM

## 2022-07-05 DIAGNOSIS — G81.91 RIGHT HEMIPARESIS (HCC): ICD-10-CM

## 2022-07-05 DIAGNOSIS — R26.2 AMBULATORY DYSFUNCTION: ICD-10-CM

## 2022-07-05 DIAGNOSIS — G35 MULTIPLE SCLEROSIS (HCC): ICD-10-CM

## 2022-07-05 PROCEDURE — 97530 THERAPEUTIC ACTIVITIES: CPT | Performed by: PHYSICAL THERAPIST

## 2022-07-05 NOTE — PROGRESS NOTES
PT Re-Evaluation     Today's date: 2022  Patient name: Carmenza Schmidt  : 1960  MRN: [id number]  Referring provider: Mihaela Marie, *  Dx:   Encounter Diagnosis     ICD-10-CM    1  Muscle weakness (generalized)  M62 81    2  Multiple sclerosis ([facility] [address] )  G35    3  Ambulatory dysfunction  R26 2    4  Right hemiparesis ([facility] [address] )  G81 91        Start Time: 1145  Stop Time: 1220  Total time in clinic (min): 35 minutes    Assessment  Assessment details: Patient is a 60 y o  female c/o right sided weakness and difficulty with balance/gait secondary to hx of Multiple Sclerosis and has completed 9 visits to date with improved FOTO score of 55 to 60 since last re-assessment  Patient demonstrates notable subjective/objective improvement since enrolling in PT to include improved quality of gait, functional strength, and balance as evidenced by increase in Oakes balance test from 33 to 46 since initial evaluation  Patient continues to exhibit lingering deficits to include decreased quality of gait and walking tolerance, closed chain strength, and balance that continues to impact tolerance/ability to perform ADLs and recreational activities  Patient will benefit from continued skilled PT intervention to address the aforementioned impairments, achieve goals, maximize function, improve safety/reduce fall risk, and improve quality of life  Pt is in agreement with this plan            Impairments: abnormal gait, abnormal or restricted ROM, activity intolerance, impaired balance, impaired physical strength, lacks appropriate home exercise program and safety issue    Goals  ST weeks  Pt will demonstrate good understanding and compliance with HEP MET  Pt will ambulate with least restrictive AD with adequate step-through gait mechanics including proper toe clearance during mid-swing phase of gait to reduce fall risk ROGRESSING  Pt will demonstrate negative Elys test bilaterally NT  Pt will demonstrate improved 90/90 hamstring flexibility test to (25*) bilaterally NT    LT weeks  Pt will improve FOTO score to > or = to 62 to indicate improved functional abilities UPDATED  Pt will increase score on PIERCE balance test to 48/56 to indicate improved balance and allow for increased safety with ADLs, community ambulation, and reduced fall risk UPDATED  Pt will increase bilateral hip/knee strength to 4+/5 to allow for improved ability to squat, negotiate stairs, and for community ambulation PROGRESSING  Pt will improve TUG score to 9" with improved quality of gait and toe clearance to indicate improved safety with ambulation PROGRESSING  Pt will improve 5xSTS to 10" to indicate improve strength and safety with functional transfers [facility] details: Cont to tx per POC  Patient would benefit from: skilled physical therapy  Planned modality interventions: cryotherapy and thermotherapy: hydrocollator packs  Planned therapy interventions: manual therapy, neuromuscular re-education, ADL training, activity modification, balance, body mechanics training, flexibility, functional ROM exercises, gait training, graded activity, graded exercise, home exercise program, strengthening, stretching, therapeutic activities, therapeutic exercise, self care and patient education  Frequency: 2x week  Duration in weeks: 8  Plan of Care beginning date: 2022  Plan of Care expiration date: 2022  Treatment plan discussed with: patient        Subjective Evaluation    History of Present Illness  Mechanism of injury: RE-EVAL (22): Pt overall feels improved walking tolerance, safety, and improved balance since enrolling in therapy  Pt had obtained a velcro sleeve AFO and was instructed on to properly don/support this to her ankle to help with functional dorsiflexion and improved quality of gait and safety and feels this has helped and is able to walk further distances without close calls for fall         IE:   Pt reports to PT with hx of MS and feels that had recent exacerbation a few months ago where she is noticing more weakness, decreased balance, and decreased quality of gait and feels this is all progressively worsening  Pt current uses a SPC and does report frequent falls (2x/wk) and finds that she is catching her toe often on the floor  Pt notes her issues are more right sided  Pt was diagnosed with MS about 12 years ago and had course of PT for this in the past which felt like it helped       Aggravating factors: activity    Easing Factors:  Rest     PLOF:  Hx of MS for past 12 years    PMH: MS, COPD, HTN (controlled), Diabetic, fall risk, sleep apnea   Pain  No pain reported  Current pain ratin  At best pain ratin  At worst pain ratin    Hand dominance: right    Treatments  Previous treatment: physical therapy  Patient Goals  Patient goals for therapy: improved balance, increased motion, increased strength, independence with ADLs/IADLs and return to sport/leisure activities          Objective     Neurological Testing     Additional Neurological Details  Light touch intact bilaterally but diminished throughout arm/leg throughout right UE/LE    Strength/Myotome Testing     Left Shoulder     Planes of Motion   Flexion: 5   Abduction: 5     Right Shoulder     Planes of Motion   Flexion: 4   Abduction: 4     Left Elbow   Flexion: 5  Extension: 5    Right Elbow   Flexion: 4+  Extension: 4+    Left Wrist/Hand   Wrist extension: 5  Wrist flexion: 5    Right Wrist/Hand   Wrist extension: 4+  Wrist flexion: 4+    Left Hip   Planes of Motion   Flexion: 5  Abduction: 5    Right Hip   Planes of Motion   Flexion: 4-  Abduction: 4+    Left Knee   Flexion: 5  Extension: 5    Right Knee   Flexion: 4  Extension: 4    Left Ankle/Foot   Dorsiflexion: 5  Plantar flexion: 5    Right Ankle/Foot   Dorsiflexion: 3+  Plantar flexion: 4    Functional Assessment        Comments  Pt ambulates with SPC with poor toe clearance on right during mid-swing phase of gait with slight steppage/circumduction of RLE    5xSTS: 15" without UE assist and improved eccentric control (14 1" at IE)    TU" performed with SPC in right hand with improved toe clearance and safety during ambulation (11 4" at IE)    Joe Hillman Balance Test: 46/56 (33/56 at IE)  Neuro Exam:     Sensation   Light touch LE: right impaired                 Diagnosis: MS, right sided weakness, difficulty with balance/gait    Precautions:  MS, COPD, HTN (controlled), Diabetic, fall risk, sleep apnea    POC Expires: 22   Re-evaluation Date: 22   FOTO Scores/Date: Goal - 59; 22 - , 22-; 22 -    Visit Count 6/10 7/10 8/10 1/10 5/10   Manuals    R Ankle PROM   ROBSON w/focus on DF                                     Ther Ex    Calf stretch on right  4x30" 30"x4  Gastroc/soleus 30'x3 ea   Tibialis anterior strengthening w/ band  Red TB 3"2x10 RTB 2x10  Active x10, RTB x 10 w/therapist assist into end ROM   Seated TR   x30 standing x30 Toe/Heel Raise  x30 Standing   seated march  standing 5" hold 2x10 alt Standing 5" hold 2x10 alt   standing 5" hold 2x10 alt   Bike  L2 x 6 mins L2 x 6 min L1 x 6 mins L1 x 6 mins L2 x 6 mins    FOTO Administered performed                               Neuro Re-Ed    Tandem walk  x4 laps with 1 UE X 4 laps w/CGA  x3 laps w/CG   Alt toe taps     6"2x10   Standing NBOS EO/EC  airex EO 15" 3x; no foam EC 10" 4x On foam EO 20"x3/ EC 10"x4     Functional cone reach outside COG  To low mat x 3 (6 cones) NBOS      Dynamic gait        Side steps  x3 laps dowel rods Over 6" plyo x 5      Quirino Walking     Fwd/Lat x 3 laps ea w/CG                                                        Ther Act      Pt edu Performed and helped patient with home AFO Educated on use and set up of AFO that she purchased       Step ups fwd        Gait SPC on L w/ACE wrap AFO x 4 laps, without AFO x 2 Amb around clinic with AFO donned                            Administered functional testing     5xSTS 15", TUG 12", Oakes 46/56;  Re-assessed balance/gait and FOTO: updated pt on PT prognosis/POC and goals      Modalities

## 2022-07-07 ENCOUNTER — OFFICE VISIT (OUTPATIENT)
Dept: PHYSICAL THERAPY | Facility: CLINIC | Age: 62
End: 2022-07-07
Payer: COMMERCIAL

## 2022-07-07 DIAGNOSIS — M62.81 MUSCLE WEAKNESS (GENERALIZED): Primary | ICD-10-CM

## 2022-07-07 DIAGNOSIS — G81.91 RIGHT HEMIPARESIS (HCC): ICD-10-CM

## 2022-07-07 DIAGNOSIS — R26.2 AMBULATORY DYSFUNCTION: ICD-10-CM

## 2022-07-07 DIAGNOSIS — G35 MULTIPLE SCLEROSIS (HCC): ICD-10-CM

## 2022-07-07 PROCEDURE — 97112 NEUROMUSCULAR REEDUCATION: CPT | Performed by: PHYSICAL THERAPIST

## 2022-07-07 PROCEDURE — 97140 MANUAL THERAPY 1/> REGIONS: CPT | Performed by: PHYSICAL THERAPIST

## 2022-07-07 PROCEDURE — 97110 THERAPEUTIC EXERCISES: CPT | Performed by: PHYSICAL THERAPIST

## 2022-07-07 NOTE — PROGRESS NOTES
Daily Note     Today's date: 2022  Patient name: Cj Maciel  : 1960  MRN: [id number]  Referring provider: Trina Honeycutt, *  Dx:   Encounter Diagnosis     ICD-10-CM    1  Muscle weakness (generalized)  M62 81    2  Multiple sclerosis ([facility] [address] )  G35    3  Ambulatory dysfunction  R26 2    4  Right hemiparesis ([facility] [address] )  G81 91        Start Time: 1145  Stop Time: 1230  Total time in clinic (min): 45 minutes    Subjective: Pt reports good improvement in ability/confidence with walking with AFO and less "close calls" with catching her toes while walking or doing stairs  Pt is to be leaving for extended vacation to [place] next week  Objective: See treatment diary below      Assessment: Pt demonstrates improved quality of dorsiflexion active ROM and arc of motion during resisted DF strengthening today following manual interventions with more available range of motion, however easily fatigues with progressive loss of motion during final few reps of resisted DF against TB  Pt requires extensive cues during standing DF to avoid compensating with leaning trunk forward/extending hips out to ensure motion is achieved at ankle joint  Added sit to stands from low mat table with good tolerance but frequent cues required to avoid leaning towards left side and to remain with equal weight distribution during completion as she tends to unload right side  Added single leg leg press to further challenge right sided strength with notable fatigue but no pain  Pt continues to make steady progress with balance and strength  Plan to discharge pt to [facility] next visit as she will be away on vacation for 2 months  Plan: Continue per plan of care  Progress treatment as tolerated         Diagnosis: MS, right sided weakness, difficulty with balance/gait    Precautions:  MS, COPD, HTN (controlled), Diabetic, fall risk, sleep apnea    POC Expires: 22   Re-evaluation Date: 22   FOTO Scores/Date: Goal - 59; 22 - 55, 6/23/22-54; 7/5/22 - 60   Visit Count 6/10 7/10 8/10 1/10 2/10   Manuals 6/23 6/28 6/30 7/5 7/7   R Ankle PROM   ROBSON w/focus on DF  KD w/ focus on DF                                   Ther Ex 6/23 6/28 6/30 7/5 7/7   Calf stretch on right  4x30" 30"x4  Gastroc/soleus 30"x3 ea   Tibialis anterior strengthening w/ band  Red TB 3"2x10 RTB 2x10  RTB 2x10   Seated TR   x30 standing x30 Toe/Heel Raise  x30 Standing   seated march  standing 5" hold 2x10 alt Standing 5" hold 2x10 alt   standing 5" hold 2x10 alt   Bike  L2 x 6 mins L2 x 6 min L1 x 6 mins L1 x 6 mins L2 x 6 mins    FOTO Administered performed       Sit to stand     To low mat 2x10 10# - cues for equal weight distribution   Leg press     SL 35# 2x10           Neuro Re-Ed 6/23 6/28 6/30 7/5 7/7   Tandem walk  x4 laps with 1 UE X 4 laps w/CGA  x4 laps w/CG   Alt toe taps     8"2x10   Standing NBOS EO/EC  airex EO 15" 3x; no foam EC 10" 4x On foam EO 20"x3/ EC 10"x4     Functional cone reach outside COG  To low mat x 3 (6 cones) NBOS   To low mat + 12" x 3 (6 cones) NBOS   Dynamic gait        Side steps  x3 laps dowel rods Over 6" plyo x 5   Over 6" plyo x 5    Quirino Walking     Lateral walk                                                        Ther Act 6/23 6/28 7/5     Pt edu Performed and helped patient with home AFO Educated on use and set up of AFO that she purchased       Step ups fwd        Gait SPC on L w/ACE wrap AFO x 4 laps, without AFO x 2 Amb around clinic with AFO donned                            Administered functional testing     5xSTS 15", TUG 12", Oakes 46/56;  Re-assessed balance/gait and FOTO: updated pt on PT prognosis/POC and goals      Modalities

## 2022-07-11 ENCOUNTER — OFFICE VISIT (OUTPATIENT)
Dept: PHYSICAL THERAPY | Facility: CLINIC | Age: 62
End: 2022-07-11
Payer: COMMERCIAL

## 2022-07-11 DIAGNOSIS — R26.2 AMBULATORY DYSFUNCTION: ICD-10-CM

## 2022-07-11 DIAGNOSIS — G35 MULTIPLE SCLEROSIS (HCC): ICD-10-CM

## 2022-07-11 DIAGNOSIS — M62.81 MUSCLE WEAKNESS (GENERALIZED): Primary | ICD-10-CM

## 2022-07-11 PROCEDURE — 97110 THERAPEUTIC EXERCISES: CPT

## 2022-07-11 NOTE — PROGRESS NOTES
PT Discharge     Collection of subjective/objective data performed by Kavita Goyal PTA; Assessment, POC, and Goal attainment performed by Nanci Robles DPT     Today's date: 2022  Patient name: Harlan Morgan  : 1960  MRN: [id number]  Referring provider: Lola Casey, *  Dx:   Encounter Diagnosis     ICD-10-CM    1  Muscle weakness (generalized)  M62 81    2  Multiple sclerosis ([facility] [address] )  G35    3  Ambulatory dysfunction  R26 2        Start Time: 1501  Stop Time: 6729  Total time in clinic (min): 31 minutes    Assessment  Assessment details: Pt has been seen for 11 visits and has made excellent subjective/objective improvements since enrolling in therapy with improved FOTO score from 55 to 60 since initial evaluation  Pt exhibits much improved balance, quality of gait, and strength since enrolling in therapy and discussed discharge to [facility] as she will be away for extended vacation in [place]  Pt encouraged to continue with HEP on regular basis per tolerance and was educated on how to progress/regress exercises per symptoms/tolerance  Pt is in agreement with plan                 Goals  ST weeks  Pt will demonstrate good understanding and compliance with HEP MET  Pt will ambulate with least restrictive AD with adequate step-through gait mechanics including proper toe clearance during mid-swing phase of gait to reduce fall risk MET with AFO  Pt will demonstrate negative Elys test bilaterally NT  Pt will demonstrate improved 90/90 hamstring flexibility test to (25*) bilaterally NT    LT weeks  Pt will improve FOTO score to > or = to 62 to indicate improved functional abilities NOT MET  Pt will increase score on PIERCE balance test to 48/56 to indicate improved balance and allow for increased safety with ADLs, community ambulation, and reduced fall risk NOT MET  Pt will increase bilateral hip/knee strength to 4+/5 to allow for improved ability to squat, negotiate stairs, and for community ambulation NOT MET  Pt will improve TUG score to 9" with improved quality of gait and toe clearance to indicate improved safety with ambulation NOT MET  Pt will improve 5xSTS to 10" to indicate improve strength and safety with functional transfers NOT MET          Plan  Plan details: Discharge to [facility]  Patient would benefit from: skilled physical therapy  Planned modality interventions: cryotherapy and thermotherapy: hydrocollator packs  Planned therapy interventions: manual therapy, neuromuscular re-education, ADL training, activity modification, balance, body mechanics training, flexibility, functional ROM exercises, gait training, graded activity, graded exercise, home exercise program, strengthening, stretching, therapeutic activities, therapeutic exercise, self care and patient education  Plan of Care beginning date: 2022  Plan of Care expiration date: 2022  Treatment plan discussed with: patient        Subjective Evaluation    History of Present Illness  Mechanism of injury:   Pt overall feels improved walking tolerance, safety, and improved balance since enrolling in therapy  Pt had obtained a velcro sleeve AFO and feels this has helped and is able to walk further distances without close calls for fall  Patient does reports having increased BL LE muscular spasms stating due to MS and warm weather    Patient reports going to [place] for next 3 weeks and wishes to be DC to [facility]      Pain  No pain reported  Current pain ratin  At best pain ratin  At worst pain ratin    Hand dominance: right    Treatments  Previous treatment: physical therapy  Patient Goals  Patient goals for therapy: improved balance, increased motion, increased strength, independence with ADLs/IADLs and return to sport/leisure activities          Objective     Neurological Testing     Additional Neurological Details  Light touch intact bilaterally but diminished throughout arm/leg throughout right UE/LE    Strength/Myotome Testing     Left Shoulder     Planes of Motion   Flexion: 5   Abduction: 5     Right Shoulder     Planes of Motion   Flexion: 4   Abduction: 4     Left Elbow   Flexion: 5  Extension: 5    Right Elbow   Flexion: 4+  Extension: 4+    Left Wrist/Hand   Wrist extension: 5  Wrist flexion: 5    Right Wrist/Hand   Wrist extension: 4+  Wrist flexion: 4+    Left Hip   Planes of Motion   Flexion: 5  Abduction: 5    Right Hip   Planes of Motion   Flexion: 4-  Abduction: 4+    Left Knee   Flexion: 5  Extension: 5    Right Knee   Flexion: 4  Extension: 4    Left Ankle/Foot   Dorsiflexion: 5  Plantar flexion: 5    Right Ankle/Foot   Dorsiflexion: 3+  Plantar flexion: 4    Functional Assessment        Comments  Pt ambulates with SPC with poor toe clearance on right during mid-swing phase of gait with slight steppage/circumduction of RLE that is improved with AFO donned    5xSTS: 15" without UE assist and improved eccentric control (14 1" at IE)    TU" performed with SPC in right hand with improved toe clearance and safety during ambulation (11 4" at IE)    Oakes Balance Test: 46/56 (33/56 at IE)    FOTO 60  Neuro Exam:     Sensation   Light touch LE: right impaired                 Diagnosis: MS, right sided weakness, difficulty with balance/gait    Precautions:  MS, COPD, HTN (controlled), Diabetic, fall risk, sleep apnea    POC Expires: 22   Re-evaluation Date: 22   FOTO Scores/Date: Goal - 59; 22 - 55, 22-54; 22 - 60   Visit Count 3/10 7/10 8/10 1/10 2/10   Manuals    R Ankle PROM ROBSON w/focus on DF  ROBSON w/focus on DF  KD w/ focus on DF                                   Ther Ex    Calf stretch on right  4x30" 30"x4  Gastroc/soleus 30"x3 ea   Tibialis anterior strengthening w/ band  Red TB 3"2x10 RTB 2x10  RTB 2x10   Seated TR   x30 standing x30 Toe/Heel Raise  x30 Standing   seated march  standing 5" hold 2x10 alt Standing 5" hold 2x10 alt   standing 5" hold 2x10 alt   Bike  L1 x 6 mins L2 x 6 min L1 x 6 mins L1 x 6 mins L2 x 6 mins    FOTO Administered performed subjective objective measures collected for DC       Sit to stand     To low mat 2x10 10# - cues for equal weight distribution   Leg press     SL 35# 2x10   HEP  Updated and educated on       Neuro Re-Ed  6/28 6/30 7/5 7/7   Tandem walk  x4 laps with 1 UE X 4 laps w/CGA  x4 laps w/CG   Alt toe taps     8"2x10   Standing NBOS EO/EC  airex EO 15" 3x; no foam EC 10" 4x On foam EO 20"x3/ EC 10"x4     Functional cone reach outside COG  To low mat x 3 (6 cones) NBOS   To low mat + 12" x 3 (6 cones) NBOS   Dynamic gait        Side steps  x3 laps dowel rods Over 6" plyo x 5   Over 6" plyo x 5    Quirino Walking     Lateral walk                                                        Ther Act  6/28 7/5     Pt edu  Educated on use and set up of AFO that she purchased       Step ups fwd        Gait  Amb around clinic with AFO donned                           Administered functional testing     5xSTS 15", TUG 12", Oakes 46/56;  Re-assessed balance/gait and FOTO: updated pt on PT prognosis/POC and goals      Modalities

## 2022-07-12 ENCOUNTER — APPOINTMENT (OUTPATIENT)
Dept: PHYSICAL THERAPY | Facility: CLINIC | Age: 62
End: 2022-07-12
Payer: COMMERCIAL

## 2022-07-13 DIAGNOSIS — J45.909 UNCOMPLICATED ASTHMA, UNSPECIFIED ASTHMA SEVERITY, UNSPECIFIED WHETHER PERSISTENT: ICD-10-CM

## 2022-07-13 RX ORDER — ALBUTEROL SULFATE 90 UG/1
AEROSOL, METERED RESPIRATORY (INHALATION)
Qty: 8.5 G | Refills: 3 | Status: SHIPPED | OUTPATIENT
Start: 2022-07-13

## 2022-07-14 ENCOUNTER — APPOINTMENT (OUTPATIENT)
Dept: PHYSICAL THERAPY | Facility: CLINIC | Age: 62
End: 2022-07-14
Payer: COMMERCIAL

## 2022-09-13 ENCOUNTER — TELEPHONE (OUTPATIENT)
Dept: NEUROLOGY | Facility: CLINIC | Age: 62
End: 2022-09-13

## 2022-09-13 NOTE — TELEPHONE ENCOUNTER
Left message on  to offer virtual visit appt 09/16/2022 at 1030am  Provider no longer goes to [facility]

## 2022-09-15 ENCOUNTER — TELEPHONE (OUTPATIENT)
Dept: NEUROLOGY | Facility: CLINIC | Age: 62
End: 2022-09-15

## 2022-09-15 NOTE — TELEPHONE ENCOUNTER
Note  Left message on  to offer virtual visit appt 09/16/2022 at 1030am  Provider no longer goes to [facility] location

## 2022-09-16 ENCOUNTER — TELEPHONE (OUTPATIENT)
Dept: NEUROLOGY | Facility: CLINIC | Age: 62
End: 2022-09-16

## 2022-09-16 DIAGNOSIS — S99.912S INJURY OF LEFT ANKLE, SEQUELA: ICD-10-CM

## 2022-09-16 DIAGNOSIS — R26.2 AMBULATORY DYSFUNCTION: Primary | ICD-10-CM

## 2022-09-16 NOTE — TELEPHONE ENCOUNTER
Patient had a 10:30am appt w/ Dr Vidya Lama today (09/16/22)  Patient was supposed to be virtual, but patient accidentally came into the [place] office  Patient was checked in and informed she would be receiving a phone call from Dr Judi Thomson MA to go over medication and to begin the virtual appt  Patient came back to the office and asked to reschedule her appt to in person  Patient has been scheduled to see Dr Vidya Lama at the [facility] office on 09/27/22 at 9:30am  Patient accepted the appt time and was given an appt card w/ the appt information and office address  Patient wanted to speak to Dr Vidya Lama about her ankle/foot  Patient has been having issues w/ walking and has been falling  Patient would like to have an X-Ray done and would like to know if Dr Vidya Lama would put in an order for an X-Ray  Patient will also be reaching out to her PCP Krista Albarado to see if she would be okay with putting in an X-Ray order       Patient was informed that a message would be sent to Dr Vidya Lama about the X-Ray order

## 2022-09-19 ENCOUNTER — APPOINTMENT (OUTPATIENT)
Dept: RADIOLOGY | Facility: CLINIC | Age: 62
End: 2022-09-19
Payer: COMMERCIAL

## 2022-09-19 DIAGNOSIS — R26.2 AMBULATORY DYSFUNCTION: ICD-10-CM

## 2022-09-19 DIAGNOSIS — S99.912S INJURY OF LEFT ANKLE, SEQUELA: ICD-10-CM

## 2022-09-19 PROCEDURE — 73610 X-RAY EXAM OF ANKLE: CPT

## 2022-09-19 NOTE — TELEPHONE ENCOUNTER
Spoke w/patient  She was unable to get in touch w/her PCP regarding x-ray order  Advised patient Dr Sonia Land will place an order  Patient states it is her left ankle that she is having issues with  Patient was appreciative of Dr Heather Allen efforts  Dr Sonia Land - please place x-ray order for left ankle  Thank you!

## 2022-09-19 NOTE — TELEPHONE ENCOUNTER
Jazmine Gracia MD  You 2 days ago         Yes, I will be helping with ankle x-ray - please guide left vs right ankle or foot    Message text

## 2022-09-21 ENCOUNTER — TELEPHONE (OUTPATIENT)
Dept: NEUROLOGY | Facility: CLINIC | Age: 62
End: 2022-09-21

## 2022-09-21 LAB

## 2022-09-27 ENCOUNTER — OFFICE VISIT (OUTPATIENT)
Dept: NEUROLOGY | Facility: CLINIC | Age: 62
End: 2022-09-27
Payer: COMMERCIAL

## 2022-09-27 ENCOUNTER — TELEPHONE (OUTPATIENT)
Dept: NEUROLOGY | Facility: CLINIC | Age: 62
End: 2022-09-27

## 2022-09-27 VITALS
SYSTOLIC BLOOD PRESSURE: 123 MMHG | BODY MASS INDEX: 28.66 KG/M2 | TEMPERATURE: 96.1 F | DIASTOLIC BLOOD PRESSURE: 65 MMHG | HEIGHT: 60 IN | HEART RATE: 73 BPM | WEIGHT: 146 LBS

## 2022-09-27 DIAGNOSIS — G81.91 RIGHT HEMIPARESIS (HCC): Primary | ICD-10-CM

## 2022-09-27 DIAGNOSIS — S82.65XA CLOSED NONDISPLACED FRACTURE OF LATERAL MALLEOLUS OF LEFT FIBULA, INITIAL ENCOUNTER: ICD-10-CM

## 2022-09-27 DIAGNOSIS — R26.1 SPASTIC GAIT: ICD-10-CM

## 2022-09-27 DIAGNOSIS — S96.912A SPRAIN AND STRAIN OF LEFT ANKLE: ICD-10-CM

## 2022-09-27 DIAGNOSIS — R51.9 CHRONIC RIGHT-SIDED HEADACHES: ICD-10-CM

## 2022-09-27 DIAGNOSIS — R25.2 SPASMS OF THE HANDS OR FEET: ICD-10-CM

## 2022-09-27 DIAGNOSIS — N31.9 NEUROGENIC BLADDER: ICD-10-CM

## 2022-09-27 DIAGNOSIS — G89.29 CHRONIC RIGHT-SIDED HEADACHES: ICD-10-CM

## 2022-09-27 DIAGNOSIS — G35 MULTIPLE SCLEROSIS (HCC): ICD-10-CM

## 2022-09-27 DIAGNOSIS — R47.89 WORD FINDING DIFFICULTY: ICD-10-CM

## 2022-09-27 DIAGNOSIS — S93.402A SPRAIN AND STRAIN OF LEFT ANKLE: ICD-10-CM

## 2022-09-27 DIAGNOSIS — R51.9 MORNING HEADACHE: ICD-10-CM

## 2022-09-27 PROCEDURE — 99215 OFFICE O/P EST HI 40 MIN: CPT | Performed by: PSYCHIATRY & NEUROLOGY

## 2022-09-27 RX ORDER — BUTALBITAL, ACETAMINOPHEN AND CAFFEINE 50; 325; 40 MG/1; MG/1; MG/1
1 TABLET ORAL EVERY 4 HOURS PRN
Qty: 12 TABLET | Refills: 4 | Status: SHIPPED | OUTPATIENT
Start: 2022-09-27

## 2022-09-27 RX ORDER — OXYBUTYNIN CHLORIDE 10 MG/1
10 TABLET, EXTENDED RELEASE ORAL
Qty: 90 TABLET | Refills: 1 | Status: SHIPPED | OUTPATIENT
Start: 2022-09-27

## 2022-09-27 RX ORDER — KETOROLAC TROMETHAMINE 10 MG/1
10 TABLET, FILM COATED ORAL EVERY 6 HOURS PRN
Qty: 10 TABLET | Refills: 3 | Status: SHIPPED | OUTPATIENT
Start: 2022-09-27

## 2022-09-27 NOTE — PROGRESS NOTES
Patient ID: Karma Rosales is a 60 y o  female  Assessment/Plan:    No problem-specific Assessment & Plan notes found for this encounter  {Assess/PlanSmartLinks:62233}       Subjective:    HPI    {[facility] HPI texts:92216}    {Common ambulatory SmartLinks:79368}         Objective:    Blood pressure 123/65, pulse 73, temperature (!) 96 1 °F (35 6 °C), temperature source Skin, height 5' (1 524 m), weight 66 2 kg (146 lb), not currently breastfeeding  Physical Exam    Neurological Exam    Gait    25 foot walk 11 62 seconds with cane  ROS:    Review of Systems   Constitutional: Positive for fatigue  Negative for appetite change and fever  HENT: Negative  Negative for hearing loss, tinnitus, trouble swallowing and voice change  Eyes: Negative  Negative for photophobia and pain  Respiratory: Negative  Negative for shortness of breath  Cardiovascular: Negative  Negative for palpitations  Gastrointestinal: Negative  Negative for nausea and vomiting  Endocrine: Negative  Negative for cold intolerance  Genitourinary: Negative  Negative for dysuria, frequency and urgency  Musculoskeletal: Negative  Negative for myalgias and neck pain  Skin: Negative  Negative for rash  Neurological: Positive for dizziness, speech difficulty, weakness, light-headedness and headaches (daily rates pain 5 today)  Negative for tremors, seizures, syncope, facial asymmetry and numbness  Patient states she has tingling in the right side of the body,falls and losing balance daily since 1month and half   Hematological: Negative  Does not bruise/bleed easily  Psychiatric/Behavioral: Negative  Negative for confusion, hallucinations and sleep disturbance

## 2022-09-27 NOTE — PROGRESS NOTES
Patient ID: Vaishali Rosales is a 60 y o  female  Assessment/Plan:           Problem List Items Addressed This Visit        Nervous and Auditory    Multiple sclerosis (HCC)    Relevant Medications    ketorolac (TORADOL) 10 mg tablet    oxybutynin (DITROPAN-XL) 10 MG 24 hr tablet    Other Relevant Orders    Ambulatory Referral to Physiatry    Ambulatory Referral to Urology    Right hemiparesis [facility]) - Primary    Relevant Orders    Ambulatory Referral to Physiatry       Other    Chronic right-sided headaches    Relevant Medications    ketorolac (TORADOL) 10 mg tablet    butalbital-acetaminophen-caffeine (FIORICET,ESGIC) -40 mg per tablet    Word finding difficulty      Other Visit Diagnoses     Closed nondisplaced fracture of lateral malleolus of left fibula, initial encounter        Relevant Orders    Ambulatory Referral to Orthopedic Surgery    Ambulatory Referral to Physiatry    Sprain and strain of left ankle        Relevant Orders    Ambulatory Referral to Orthopedic Surgery    Ambulatory Referral to Physiatry    Spastic gait        Relevant Orders    Ambulatory Referral to Physiatry    Spasms of the hands or feet        Relevant Orders    Ambulatory Referral to Physiatry    Neurogenic bladder        Relevant Medications    oxybutynin (DITROPAN-XL) 10 MG 24 hr tablet    Other Relevant Orders    Ambulatory Referral to Urology    Morning headache        Relevant Medications    ketorolac (TORADOL) 10 mg tablet            Mrs Bharti Pierce has presented to [facility] multiple [address] for follow-up on multiple sclerosis related issues  Since last office visit, patient continue describing having headaches which worse at night due to uncontrolled obstructive sleep apnea  Patient was offered Fioricet and ketorolac to be use intermittently for short paragraphs time in addition to Aleve    Patient is to return to pulmonology for CPAP machine adjustment;   Patient is not on disease modifying regimen due to immuno senescence and chronic inflammatory process with mesenteric panniculitis, patient requires to follow with Gastroenterology  Patient requested starting back on oxybutynin due to worsening neurogenic bladder, patient was offered referral to urology considering symptoms has been gradually progressing  Patient brought concern for worsening balance as she may be following to right-sided while staying still and talking to her grandchild- physiatry team and orthopedic team evaluation will be highly advised, patient has right ankle sprain with faint ossification inferior to the lateral malleolus could represent old avulsion fracture, which was noted on recent x-ray  Patient is to follow with Physiatry team for face-to-face evaluation for her motorized scooter- patient cancelled her appointment on 9/29/2022 due to feeling better  Patient is to follow with [facility] within 6 months  Subjective: Positive for dizziness, speech difficulty, weakness, light-headedness and headaches (daily rates pain 5 today)  Negative for tremors, seizures, syncope, facial asymmetry and numbness  HPI  Mrs Kate Hopkins has presented to [facility] multiple [address] for follow-up on multiple sclerosis and related issues  Patient continue describing ambulatory dysfunction as patient ambulates with a cane  Patient stated she has been falling from standing position from related to her right sites with such event took place in July of 2022  Patient did not fainted with no jerking movement  Patient has bladder hesitancy in frequency as she had failed in receiving myrbetriq    for her bladder dysfunction, patient did not establish her care with Urology team yet    Patient has been using chair with more frequently as she has difficulties going up and downstairs with right and left ankle sprain, patient has right ankle brace  - patient also noticed having contracture in her right hand, patient is taking baclofen 20 mg a day, may consider increasing dose to 4 times a daily, patient may use glove or splint overnight;  - patient has significant improvement in her left shoulder function with known history of left shoulder adhesive capsulitis, patient is very happy with orthopedic team as range of motion in the left arm has improved significantly; trigger fingers in her left hand has been corrected with injections, patient can use left hand when she ambulating with a cane  - bladder dysfunction has been progressed, patient has nocturia, patient is to consider following with urology team  - patient is looking for new primary care physician has her current PCP has been moving out   - right ankle pain and swelling still concerning today, imaging suggest patient likely had suffered cellulitis; patient had no signs of DVT on her lower extremity ultrasound  - patient is not on disease modifying regimen due to immuno senescence, MRI done in September 2021 was stable  The following portions of the patient's history were reviewed and updated as appropriate: She  has a past medical history of Asthma, Cardiac disease, COPD (chronic obstructive pulmonary disease) ([facility] [address] ), Depression, Hyperlipidemia, Hypertension, Influenza, Irregular heart beat, Migraine, Migraine, MS (multiple sclerosis) ([facility] [address] ), Multiple sclerosis ([facility] [address] ), Nephrolithiasis, Neurogenic bladder, and Panniculitis    She   Patient Active Problem List    Diagnosis Date Noted    Depression 05/06/2022    Overactive bladder 05/06/2022    DVT, lower extremity, distal, acute, right ([facility] [address] ) 05/05/2022    CINTHYA (obstructive sleep apnea)     Adhesive capsulitis of left shoulder 10/13/2021    Cervical disc disorder at C6-C7 level with radiculopathy 04/12/2021    Encephalopathy due to severe acute respiratory syndrome coronavirus 2 (SARS-CoV-2) 04/12/2021    Word finding difficulty 04/12/2021    Slurred speech 04/12/2021    Edema of right lower extremity 02/26/2021    Hypotension 11/09/2020    Pneumonia due to COVID-19 virus 11/06/2020    Other spondylosis with radiculopathy, lumbar region 02/20/2020    Right low back pain 02/21/2019    Skin rash 02/21/2019    Right hemiparesis ([facility] ) 02/21/2019    Mesenteric panniculitis ([facility] ) 03/15/2018    Ambulatory dysfunction 02/21/2018    Chronic right-sided headaches 02/21/2018    Multiple sclerosis ([facility] ) 02/21/2018    Panniculitis 02/21/2018    S/P trigger finger release 02/02/2018    Trigger ring finger of left hand 01/17/2018    Hypertension 04/06/2017    COPD (chronic obstructive pulmonary disease) ([facility] ) 04/11/2016    Hyperlipidemia with target LDL less than 160 07/14/2011     She  has a past surgical history that includes Dilation and curettage of uterus; Uterine fibroid surgery; pr colonoscopy flx dx w/collj spec when pfrmd (N/A, 1/11/2018); Colonoscopy; pr incise finger tendon sheath (Left, 1/17/2018); and Tonsillectomy  Her family history includes Breast cancer (age of onset: 76) in her maternal aunt; Diabetes in her father, mother, and sister; Emphysema in her mother; Ivory Villeda' disease in her brother; Heart disease in her father; Hypertension in her brother, father, and mother; No Known Problems in her maternal aunt, maternal aunt, maternal aunt, maternal aunt, maternal grandmother, and paternal grandmother; Sleep apnea in her maternal aunt and sister; Stomach cancer in her maternal aunt; Stroke in her mother  She  reports that she has never smoked  She has never used smokeless tobacco  She reports current alcohol use  She reports current drug use  Drug: Marijuana  Current Outpatient Medications   Medication Sig Dispense Refill    albuterol (2 5 mg/3 mL) 0 083 % nebulizer solution Albuterol Sulfate (2 5 MG/3ML) 0 083% Inhalation Nebulization Solution  USE 1 UNIT DOSE IN NEBULIZER EVERY 6 HOURS AS NEEDED     Quantity: 1;  Refills: 5       Melony GARCIA ;  Started 12-July-2016  Active3 ML Plas Cont (125 Plas Conts)      albuterol (PROVENTIL HFA,VENTOLIN HFA) 90 mcg/act inhaler INHALE 2 PUFFS EVERY 6 HOURS AS NEEDED FOR WHEEZING 8 5 g 3    aspirin 81 MG tablet Aspirin 81 MG TABS  Take 1 tablet daily   Refills: 0    Active      atorvastatin (LIPITOR) 40 mg tablet Take 1 tablet (40 mg total) by mouth once at bedtime 90 tablet 1    baclofen 20 mg tablet Take 1 tablet (20 mg total) by mouth 4 (four) times a day 360 tablet 2    butalbital-acetaminophen-caffeine (FIORICET,ESGIC) -40 mg per tablet Take 1 tablet by mouth every 4 (four) hours as needed for headaches 12 tablet 4    Diclofenac Sodium (VOLTAREN) 1 % Apply 2 g topically 4 (four) times a day as needed (left hip pain) 150 g 1    EPINEPHrine (EPIPEN) 0 3 mg/0 3 mL SOAJ Inject 0 3 mL (0 3 mg total) into a muscle if needed for anaphylaxis 1 each 1    ergocalciferol (VITAMIN D2) 50,000 units TAKE ONE CAPSULE WEEKLY WITH FOOD  1    fluticasone (FLONASE) 50 mcg/act nasal spray 2 sprays into each nostril daily 1 Bottle 3    gabapentin (NEURONTIN) 300 mg capsule Take 2 caps in am and noon and 3 caps  capsule 2    ketorolac (TORADOL) 10 mg tablet Take 1 tablet (10 mg total) by mouth every 6 (six) hours as needed for moderate pain 10 tablet 3    metFORMIN (GLUCOPHAGE-XR) 500 mg 24 hr tablet Take 1 tablet (500 mg total) by mouth daily with breakfast 90 tablet 1    NIFEdipine ER (ADALAT CC) 60 MG 24 hr tablet Take 1 tablet (60 mg total) by mouth daily 90 tablet 1    oxybutynin (DITROPAN-XL) 10 MG 24 hr tablet Take 1 tablet (10 mg total) by mouth daily at bedtime 90 tablet 1    sertraline (ZOLOFT) 50 mg tablet Takes 1 tablet in am and hs       Wixela Inhub 250-50 MCG/ACT inhaler INHALE 1 PUFF 2 TIMES A DAY RINSE MOUTH AFTER USE        cholecalciferol (VITAMIN D3) 1,000 units tablet Take 2 tablets (2,000 Units total) by mouth daily for 1 day (Patient not taking: Reported on 6/22/2022) 2 tablet 0    Mirabegron ER (Myrbetriq) 25 MG TB24 Take 25 mg by mouth in the morning (Patient not taking: No sig reported) 30 tablet 0    Paxlovid, 300/100, tablet therapy pack USE AS DIRECTED TWICE A DAY FOR 5 DAYS (Patient not taking: Reported on 9/27/2022)       No current facility-administered medications for this visit  Current Outpatient Medications on File Prior to Visit   Medication Sig    albuterol (2 5 mg/3 mL) 0 083 % nebulizer solution Albuterol Sulfate (2 5 MG/3ML) 0 083% Inhalation Nebulization Solution  USE 1 UNIT DOSE IN NEBULIZER EVERY 6 HOURS AS NEEDED  Quantity: 1;  Refills: 5       Marinus Nurse M D ;  Started 12-July-2016  Active3 ML Plas Cont (125 Plas Conts)    albuterol (PROVENTIL HFA,VENTOLIN HFA) 90 mcg/act inhaler INHALE 2 PUFFS EVERY 6 HOURS AS NEEDED FOR WHEEZING    aspirin 81 MG tablet Aspirin 81 MG TABS  Take 1 tablet daily   Refills: 0    Active    atorvastatin (LIPITOR) 40 mg tablet Take 1 tablet (40 mg total) by mouth once at bedtime    baclofen 20 mg tablet Take 1 tablet (20 mg total) by mouth 4 (four) times a day    Diclofenac Sodium (VOLTAREN) 1 % Apply 2 g topically 4 (four) times a day as needed (left hip pain)    EPINEPHrine (EPIPEN) 0 3 mg/0 3 mL SOAJ Inject 0 3 mL (0 3 mg total) into a muscle if needed for anaphylaxis    ergocalciferol (VITAMIN D2) 50,000 units TAKE ONE CAPSULE WEEKLY WITH FOOD    fluticasone (FLONASE) 50 mcg/act nasal spray 2 sprays into each nostril daily    gabapentin (NEURONTIN) 300 mg capsule Take 2 caps in am and noon and 3 caps HS    metFORMIN (GLUCOPHAGE-XR) 500 mg 24 hr tablet Take 1 tablet (500 mg total) by mouth daily with breakfast    NIFEdipine ER (ADALAT CC) 60 MG 24 hr tablet Take 1 tablet (60 mg total) by mouth daily    sertraline (ZOLOFT) 50 mg tablet Takes 1 tablet in am and hs     Wixela Inhub 250-50 MCG/ACT inhaler INHALE 1 PUFF 2 TIMES A DAY RINSE MOUTH AFTER USE      [DISCONTINUED] butalbital-acetaminophen-caffeine (FIORICET,ESGIC) -40 mg per tablet Take 1 tablet by mouth every 4 (four) hours as needed for headaches    cholecalciferol (VITAMIN D3) 1,000 units tablet Take 2 tablets (2,000 Units total) by mouth daily for 1 day (Patient not taking: Reported on 6/22/2022)    Mirabegron ER (Myrbetriq) 25 MG TB24 Take 25 mg by mouth in the morning (Patient not taking: No sig reported)    Paxlovid, 300/100, tablet therapy pack USE AS DIRECTED TWICE A DAY FOR 5 DAYS (Patient not taking: Reported on 9/27/2022)     No current facility-administered medications on file prior to visit  She is allergic to nuts - food allergy, other, peanut oil - food allergy, shellfish allergy - food allergy, shellfish-derived products - food allergy, sumatriptan, copaxone [glatiramer acetate], cat hair extract, cephalexin, glatiramer, pollen extract, and seasonal ic [cholestatin]            Objective:    Blood pressure 123/65, pulse 73, temperature (!) 96 1 °F (35 6 °C), temperature source Skin, height 5' (1 524 m), weight 66 2 kg (146 lb), not currently breastfeeding  Physical Exam    Neurological Exam  CARDIOVASCULAR: RRR, normal S1S2, no murmurs, no rubs  RESP: clear to auscultation bilaterally, no wheezes/rhonchi/rales  ABDOMEN: soft, non tender, non distended  SKIN: no rash or skin lesions  EXTREMITIES: no edema, pulses 2+bilaterally  PSYCH: appropriate mood and affect  NEUROLOGIC COMPREHENSIVE EXAM: Patient is oriented to person, place and time, NAD; appropriate affect  CN II, III, IV, V, VI, VII,VIII,IX,X,XI-XII intact with EOMI, PERRLA, OKN intact, VF grossly intact, fundi poorly visualized secondary to pupillary constriction; symmetric face noted  Motor: 5/5 UE/LE bilateral symmetric, RUE 4/5 shoulder abduction and finger flexion; RLU 4-/5 hip flexion and knee flexion, 2/5 dorsiflexion rigjt; Sensory: intact to light touch and pinprick bilaterally; normal vibration sensation feet bilaterally; Coordination within normal limits on FTN and ROLANDO testing; DTR: 2/4 through, no Babinski, no clonus   Tandem gait is abnormal  Romberg: negative a    ROS:    Review of Systems  Constitutional: Positive for fatigue  Negative for appetite change and fever  HENT: Negative  Negative for hearing loss, tinnitus, trouble swallowing and voice change  Eyes: Negative  Negative for photophobia and pain  Respiratory: Negative  Negative for shortness of breath  Cardiovascular: Negative  Negative for palpitations  Gastrointestinal: Negative  Negative for nausea and vomiting  Endocrine: Negative  Negative for cold intolerance  Genitourinary: Negative  Negative for dysuria, frequency and urgency  Musculoskeletal: Negative  Negative for myalgias and neck pain  Skin: Negative  Negative for rash  Neurological: Positive for dizziness, speech difficulty, weakness, light-headedness and headaches (daily rates pain 5 today)  Negative for tremors, seizures, syncope, facial asymmetry and numbness  Patient states she has tingling in the right side of the body,falls and losing balance daily since 1month and half   Hematological: Negative  Does not bruise/bleed easily  Psychiatric/Behavioral: Negative    Negative for confusion, hallucinations and sleep disturbance

## 2022-09-27 NOTE — TELEPHONE ENCOUNTER
Patient came in the office this morning with [employer] forms to be completed  Writer will reach out to clinical director about these forms to ensure they can be completed by our office and will reach back out to the patient  MSW called her to make her aware  She did not answer the phone  MSW left her a detailed VM informing her that writer will f/u with her on Monday upon return to the office

## 2022-09-28 ENCOUNTER — TELEPHONE (OUTPATIENT)
Dept: NEUROLOGY | Facility: CLINIC | Age: 62
End: 2022-09-28

## 2022-09-30 ENCOUNTER — OFFICE VISIT (OUTPATIENT)
Dept: INTERNAL MEDICINE CLINIC | Facility: CLINIC | Age: 62
End: 2022-09-30
Payer: COMMERCIAL

## 2022-09-30 ENCOUNTER — NURSE TRIAGE (OUTPATIENT)
Dept: OTHER | Facility: OTHER | Age: 62
End: 2022-09-30

## 2022-09-30 ENCOUNTER — TELEPHONE (OUTPATIENT)
Dept: INTERNAL MEDICINE CLINIC | Facility: CLINIC | Age: 62
End: 2022-09-30

## 2022-09-30 ENCOUNTER — HOSPITAL ENCOUNTER (OUTPATIENT)
Dept: CT IMAGING | Facility: HOSPITAL | Age: 62
End: 2022-09-30
Attending: INTERNAL MEDICINE
Payer: COMMERCIAL

## 2022-09-30 ENCOUNTER — HOSPITAL ENCOUNTER (OUTPATIENT)
Dept: RADIOLOGY | Facility: HOSPITAL | Age: 62
End: 2022-09-30
Payer: COMMERCIAL

## 2022-09-30 ENCOUNTER — TELEPHONE (OUTPATIENT)
Dept: OBGYN CLINIC | Facility: HOSPITAL | Age: 62
End: 2022-09-30

## 2022-09-30 VITALS
DIASTOLIC BLOOD PRESSURE: 86 MMHG | BODY MASS INDEX: 27.88 KG/M2 | RESPIRATION RATE: 18 BRPM | HEART RATE: 76 BPM | HEIGHT: 60 IN | WEIGHT: 142 LBS | SYSTOLIC BLOOD PRESSURE: 140 MMHG

## 2022-09-30 DIAGNOSIS — M25.511 ACUTE PAIN OF RIGHT SHOULDER: Primary | ICD-10-CM

## 2022-09-30 DIAGNOSIS — R06.89 DECREASED BREATH SOUNDS AT RIGHT LUNG BASE: ICD-10-CM

## 2022-09-30 DIAGNOSIS — M25.511 ACUTE PAIN OF RIGHT SHOULDER: ICD-10-CM

## 2022-09-30 DIAGNOSIS — Z78.0 MENOPAUSE: ICD-10-CM

## 2022-09-30 DIAGNOSIS — M79.671 RIGHT FOOT PAIN: ICD-10-CM

## 2022-09-30 DIAGNOSIS — G35 MULTIPLE SCLEROSIS (HCC): ICD-10-CM

## 2022-09-30 DIAGNOSIS — M25.551 RIGHT HIP PAIN: ICD-10-CM

## 2022-09-30 DIAGNOSIS — R29.6 FREQUENT FALLS: ICD-10-CM

## 2022-09-30 DIAGNOSIS — M54.12 CERVICAL RADICULITIS: ICD-10-CM

## 2022-09-30 DIAGNOSIS — S92.354A CLOSED NONDISPLACED FRACTURE OF FIFTH METATARSAL BONE OF RIGHT FOOT, INITIAL ENCOUNTER: ICD-10-CM

## 2022-09-30 PROCEDURE — 73630 X-RAY EXAM OF FOOT: CPT

## 2022-09-30 PROCEDURE — G1004 CDSM NDSC: HCPCS

## 2022-09-30 PROCEDURE — 73030 X-RAY EXAM OF SHOULDER: CPT

## 2022-09-30 PROCEDURE — 71046 X-RAY EXAM CHEST 2 VIEWS: CPT

## 2022-09-30 PROCEDURE — 72125 CT NECK SPINE W/O DYE: CPT

## 2022-09-30 PROCEDURE — 73502 X-RAY EXAM HIP UNI 2-3 VIEWS: CPT

## 2022-09-30 PROCEDURE — 99214 OFFICE O/P EST MOD 30 MIN: CPT | Performed by: INTERNAL MEDICINE

## 2022-09-30 NOTE — PROGRESS NOTES
Assessment/Plan:    Diagnoses and all orders for this visit:    Acute pain of right shoulder  -     XR shoulder 2+ vw right; Future    Frequent falls  -     CT spine cervical wo contrast; Future    Multiple sclerosis (HCC)    Right hip pain  -     XR hip/pelv 2-3 vws right if performed; Future    Right foot pain  -     XR foot 3+ vw right; Future    Decreased breath sounds at right lung base  -     XR chest pa & lateral; Future    Cervical radiculitis  -     CT spine cervical wo contrast; Future    Menopause  -     DXA bone density spine hip and pelvis; Future            Patient Instructions   Patient advised that with falls with multiple injuries such as this, emergent visit to emergency department is appropriate  Since she is in the office now I will expedite workup with cervical spine CT and x-rays of affective areas  If no evidence for fracture, patient has a surgical boot that I advised her to wear as needed  Will follow radiology results accordingly  Subjective:      Patient ID: Yudi Rodriguez is a 60 y o  female    Patient presents acutely for evaluation of right shoulder pain    Patient has history of MS and frequent falls  She was on a step ladder yesterday evening and lost her balance and fell to the right landing on her right shoulder and hip  She also thinks she caught her foot in the step ladder because she has pain on the lateral aspect of right foot  She has also nursing left ankle injury and working with Sports Medicine for same  Last night she had increasing pain in the right lateral hip but also in the groin making it difficult to stand from a seated position on the commode  She has marked limited range of motion in the right upper extremity secondary to pain at the shoulder tingling down the arm and pain radiating along the clavicle and into the right lateral neck  She is unclear of head strike  She has no bumps or bruises on the head    She is unclear if she lost consciousness for a brief time because she cannot clearly recall how she fell  She has chronic daily headaches related to sleep apnea but no new headache syndrome, confusion or visual disturbance  Current Outpatient Medications:     albuterol (2 5 mg/3 mL) 0 083 % nebulizer solution, Albuterol Sulfate (2 5 MG/3ML) 0 083% Inhalation Nebulization Solution USE 1 UNIT DOSE IN NEBULIZER EVERY 6 HOURS AS NEEDED    Quantity: 1;  Refills: 5    Rafael GARCIA ;  Started 12-July-2016 Active3 ML Plas Cont (125 Plas Conts), Disp: , Rfl:     albuterol (PROVENTIL HFA,VENTOLIN HFA) 90 mcg/act inhaler, INHALE 2 PUFFS EVERY 6 HOURS AS NEEDED FOR WHEEZING, Disp: 8 5 g, Rfl: 3    aspirin 81 MG tablet, Aspirin 81 MG TABS Take 1 tablet daily  Refills: 0  Active, Disp: , Rfl:     atorvastatin (LIPITOR) 40 mg tablet, Take 1 tablet (40 mg total) by mouth once at bedtime, Disp: 90 tablet, Rfl: 1    baclofen 20 mg tablet, Take 1 tablet (20 mg total) by mouth 4 (four) times a day, Disp: 360 tablet, Rfl: 2    butalbital-acetaminophen-caffeine (FIORICET,ESGIC) -40 mg per tablet, Take 1 tablet by mouth every 4 (four) hours as needed for headaches, Disp: 12 tablet, Rfl: 4    Diclofenac Sodium (VOLTAREN) 1 %, Apply 2 g topically 4 (four) times a day as needed (left hip pain), Disp: 150 g, Rfl: 1    ergocalciferol (VITAMIN D2) 50,000 units, TAKE ONE CAPSULE WEEKLY WITH FOOD, Disp: , Rfl: 1    fluticasone (FLONASE) 50 mcg/act nasal spray, 2 sprays into each nostril daily, Disp: 1 Bottle, Rfl: 3    gabapentin (NEURONTIN) 300 mg capsule, Take 2 caps in am and noon and 3 caps HS, Disp: 630 capsule, Rfl: 2    ketorolac (TORADOL) 10 mg tablet, Take 1 tablet (10 mg total) by mouth every 6 (six) hours as needed for moderate pain, Disp: 10 tablet, Rfl: 3    metFORMIN (GLUCOPHAGE-XR) 500 mg 24 hr tablet, Take 1 tablet (500 mg total) by mouth daily with breakfast, Disp: 90 tablet, Rfl: 1    NIFEdipine ER (ADALAT CC) 60 MG 24 hr tablet, Take 1 tablet (60 mg total) by mouth daily, Disp: 90 tablet, Rfl: 1    oxybutynin (DITROPAN-XL) 10 MG 24 hr tablet, Take 1 tablet (10 mg total) by mouth daily at bedtime, Disp: 90 tablet, Rfl: 1    sertraline (ZOLOFT) 50 mg tablet, Takes 1 tablet in am and hs , Disp: , Rfl:     Wixela Inhub 250-50 MCG/ACT inhaler, INHALE 1 PUFF 2 TIMES A DAY RINSE MOUTH AFTER USE , Disp: , Rfl:     cholecalciferol (VITAMIN D3) 1,000 units tablet, Take 2 tablets (2,000 Units total) by mouth daily for 1 day (Patient not taking: Reported on 9/30/2022), Disp: 2 tablet, Rfl: 0    EPINEPHrine (EPIPEN) 0 3 mg/0 3 mL SOAJ, Inject 0 3 mL (0 3 mg total) into a muscle if needed for anaphylaxis, Disp: 1 each, Rfl: 1    No results found for this or any previous visit (from the past 1008 hour(s))  The following portions of the patient's history were reviewed and updated as appropriate: allergies, current medications, past family history, past medical history, past social history, past surgical history and problem list      Review of Systems   Constitutional: Negative for appetite change, chills, diaphoresis, fatigue, fever and unexpected weight change  HENT: Negative for congestion, hearing loss and rhinorrhea  Eyes: Negative for visual disturbance  Respiratory: Negative for cough, chest tightness, shortness of breath and wheezing  Cardiovascular: Negative for chest pain, palpitations and leg swelling  Gastrointestinal: Negative for abdominal pain and blood in stool  Endocrine: Negative for cold intolerance, heat intolerance, polydipsia and polyuria  Genitourinary: Negative for difficulty urinating, dysuria, frequency and urgency  Musculoskeletal: Positive for arthralgias and gait problem  Negative for myalgias  Skin: Negative for rash  Neurological: Negative for dizziness, weakness, light-headedness and headaches  Hematological: Does not bruise/bleed easily     Psychiatric/Behavioral: Negative for dysphoric mood and sleep disturbance  Objective:      Vitals:    09/30/22 0951   BP: 140/86   Pulse: 76   Resp: 18          Physical Exam  Constitutional:       Appearance: She is well-developed  HENT:      Head: Normocephalic and atraumatic  Nose: Nose normal    Eyes:      General: No scleral icterus  Conjunctiva/sclera: Conjunctivae normal       Pupils: Pupils are equal, round, and reactive to light  Neck:      Thyroid: No thyromegaly  Vascular: No JVD  Trachea: No tracheal deviation  Cardiovascular:      Rate and Rhythm: Normal rate and regular rhythm  Heart sounds: No murmur heard  No friction rub  No gallop  Pulmonary:      Effort: Pulmonary effort is normal  No respiratory distress  Breath sounds: No wheezing or rales  Comments: Diminished breath sounds right base  Musculoskeletal:         General: No deformity  Cervical back: Normal range of motion and neck supple  Comments: Right shoulder with soft tissue swelling and exquisite tenderness to palpation at the [facility] joint, over the humeral head and posterior shoulder  Range of motion is limited by pain  Clavicle is tender to palpate in the mediolateral aspect  Neck range of motion is limited by pain and not active tested due to risk of fracture    Tender to palpate right greater trochanter    Tender to palpate 4th and 5th distal metatarsals and phalanges   Lymphadenopathy:      Cervical: No cervical adenopathy  Skin:     General: Skin is warm and dry  Coloration: Skin is not pale  Findings: No erythema or rash  Neurological:      Mental Status: She is alert and oriented to person, place, and time  Cranial Nerves: No cranial nerve deficit  Psychiatric:         Behavior: Behavior normal          Thought Content:  Thought content normal          Judgment: Judgment normal

## 2022-09-30 NOTE — TELEPHONE ENCOUNTER
Reason for Disposition   Patient wants to be seen   SEVERE pain (e g , excruciating)    Answer Assessment - Initial Assessment Questions  1  MECHANISM: "How did the injury happen?" (e g , twisting injury, direct blow)       Pt was standing on a step stool when trying to reach for something and lost balance  States she fell on her entire right side- shoulder and leg- foot    2  ONSET: "When did the injury happen?" (Minutes or hours ago)       Yesterday at 5 pm    3  LOCATION: "Where is the injury located?"       Right shoulder and right leg-foot    4  APPEARANCE of INJURY: "What does the injury look like?"  (e g , deformity of leg)      Visible injury to right foot- red and swollen    5  SEVERITY: "Can you put weight on that leg?" "Can you walk?"       Yes but is painful    6  SIZE: For cuts, bruises, or swelling, ask: "How large is it?" (e g , inches or centimeters)       Na    7  PAIN: "Is there pain?" If Yes, ask: "How bad is the pain?"  "What does it keep you from doing?" (e g , Scale 1-10; or mild, moderate, severe)    -  NONE: (0): no pain    -  MILD (1-3): doesn't interfere with normal activities     -  MODERATE (4-7): interferes with normal activities (e g , work or school) or awakens from sleep, limping     -  SEVERE (8-10): excruciating pain, unable to do any normal activities, unable to walk      Shoulder pain 9/10      Leg & foot 10/10    8  TETANUS: For any breaks in the skin, ask: "When was the last tetanus booster?"      N/a    9  OTHER SYMPTOMS: "Do you have any other symptoms?"       No other symptoms  Pt just wants to be seen today by her PCP      Protocols used: LEG INJURY-ADULT-OH, SHOULDER INJURY-ADULT-OH

## 2022-09-30 NOTE — TELEPHONE ENCOUNTER
She should start with PT  She can discuss w/ Dr Cleopatra Olivas as well  Will need MRI if not improving w/ PT

## 2022-09-30 NOTE — TELEPHONE ENCOUNTER
----- Message from Rosalie Gallagher MD sent at 9/30/2022  2:38 PM EDT -----  Notify-she has a 5th metatarsal fracture which is the long bone of the foot before the pinky toe  She should wear the surgical boot as discussed  She needs to see Podiatry  X-rays of the hip, chest and shoulder show no fracture    I put in referral for both Podiatry and physical therapy

## 2022-09-30 NOTE — TELEPHONE ENCOUNTER
Regarding: FALL PAIN SWOLLEN FOOT   ----- Message from Jose Alvarez sent at 9/30/2022  7:56 AM EDT -----  Patient has a fall, she wants to see if she can schedule an appointment   Pain in her neck, shoulder blade, groin area, right leg  Swollen foot

## 2022-09-30 NOTE — PATIENT INSTRUCTIONS
Patient advised that with falls with multiple injuries such as this, emergent visit to emergency department is appropriate  Since she is in the office now I will expedite workup with cervical spine CT and x-rays of affective areas  If no evidence for fracture, patient has a surgical boot that I advised her to wear as needed  Will follow radiology results accordingly

## 2022-10-03 ENCOUNTER — TELEPHONE (OUTPATIENT)
Dept: INTERNAL MEDICINE CLINIC | Facility: CLINIC | Age: 62
End: 2022-10-03

## 2022-10-03 ENCOUNTER — TELEPHONE (OUTPATIENT)
Dept: UROLOGY | Facility: MEDICAL CENTER | Age: 62
End: 2022-10-03

## 2022-10-03 ENCOUNTER — TELEPHONE (OUTPATIENT)
Dept: NEUROLOGY | Facility: CLINIC | Age: 62
End: 2022-10-03

## 2022-10-03 DIAGNOSIS — R51.9 CHRONIC RIGHT-SIDED HEADACHES: Primary | ICD-10-CM

## 2022-10-03 DIAGNOSIS — G89.29 CHRONIC RIGHT-SIDED HEADACHES: Primary | ICD-10-CM

## 2022-10-03 RX ORDER — DIVALPROEX SODIUM 250 MG/1
TABLET, EXTENDED RELEASE ORAL
Qty: 8 TABLET | Refills: 1 | Status: SHIPPED | OUTPATIENT
Start: 2022-10-03

## 2022-10-03 RX ORDER — DEXAMETHASONE 2 MG/1
2 TABLET ORAL DAILY PRN
Qty: 5 TABLET | Refills: 2 | Status: SHIPPED | OUTPATIENT
Start: 2022-10-03

## 2022-10-03 NOTE — TELEPHONE ENCOUNTER
She saw Dr Mellisa Wren last wk  She had a form with her the day of the appt and it was given to Stefania Lynch    Is the form complete?

## 2022-10-03 NOTE — TELEPHONE ENCOUNTER
Message for Dr Luis F Ashton an appt last wk  X-ray findings are in chart     Pt has no broken bones  Rt arm pain at the  shoulder   Injury with foot; no fracture  Has throbbing w/head; rt side of skull at the base  She might have hit her head on the rail- she doesn't remember     Can't do PT right away with head throbbing     She has an appt with Dr Foster Hood- next wk

## 2022-10-03 NOTE — TELEPHONE ENCOUNTER
Base of head on right side  Patient denies bump  Patient denies area being painful to the touch  Patient reports constant throbbing  Rates pain as an 8/0-10 at it's worst   Patient denies any visual changes  Patient now unsure if she hit or head or if she had LOC  Patient did not seek medical attention at time of accident on Thursday  Head pain started yesterday  Patient did see PCP on Friday who sent patient for x-rays  She states she was not having head pain at this time, so she did not report it to PCP  Patient has been taking OTC Tylenol for pain d/t fx in right foot  Fioricet - 1 tab qid  Toradol 10mg - 1 tab q6hrs (pt taking BID - alternating w/Tylenol)     [phone] to leave detailed msg  Dr Irlanda Soler - please advise

## 2022-10-03 NOTE — TELEPHONE ENCOUNTER
pt left vm stating that she fell about a week agoand hit her shoulder and all of this should be in her reocrds  now, the back of her head is throbbing nonstop and her should still hurts even though she did not break any bones  also feels like a pressure on the bone going towards the neck  asking if she could see dr cervantes or if she suggested that she see someone closer to her in [address] [place] [address]   asking for a call as soon as we can    [phone]

## 2022-10-03 NOTE — TELEPHONE ENCOUNTER
New Patient Info   Appt 12/13/2022 with Sheree Moe     What is the reason for the patients appointment? She was recommend by her Neuro to see a Urologist again  Patient has MS      Just has urinary urgency and frequent urination at night  Do we accept the pt's insurance or is the patient Self-Pay?  Yes [employer], Medicare 2nd    Has the pt had any previous urologist? Yes Phyllis Vargas in [facility] 5/31/2018 - would be considered a NP

## 2022-10-03 NOTE — TELEPHONE ENCOUNTER
She did fracture her 5th metatarsal   She is to be wearing surgical boot as per my msg this week  Dr Crockett Apt can address shoulder in addition to other injuries  She can f/u here to address headache

## 2022-10-03 NOTE — TELEPHONE ENCOUNTER
Patient states gave papers to person who took her vitals before she seen the doctor  Wants a call back if paper is not located

## 2022-10-03 NOTE — TELEPHONE ENCOUNTER
Spoke w/patient  Advised her of note below  Again reminded patient to go to ER if head pain persists tonight  Patient verbalized understanding

## 2022-10-04 ENCOUNTER — TELEPHONE (OUTPATIENT)
Dept: INTERNAL MEDICINE CLINIC | Facility: CLINIC | Age: 62
End: 2022-10-04

## 2022-10-04 NOTE — TELEPHONE ENCOUNTER
MSW spoke with  Jennifer May about the [employer] forms  Gianfranco Pittman reviewed the forms and directed MSW to provide them to Dr Sydnie Forde) to be completed  MSW spoke with Timoteo Benítez about the forms  Meek Ag - once completed please advise SW Team or reach out to the patient to provide follow up  Thanks!

## 2022-10-07 ENCOUNTER — TELEPHONE (OUTPATIENT)
Dept: PULMONOLOGY | Facility: CLINIC | Age: 62
End: 2022-10-07

## 2022-10-07 NOTE — TELEPHONE ENCOUNTER
Pt presented in person to drop off guardian insurance paperwork regarding attending physician's progress report - left in dr Adelina Jones folder on her desk    Dr Princess Dean made aware paperwork is there for review

## 2022-10-11 NOTE — TELEPHONE ENCOUNTER
Dr Sangita Lundberg  Please enter referral to Dr Kapil Wong  Once referral is entered, reach out to patient and advise that she is being referred to Dr Kapil Alfonso for disability

## 2022-10-12 ENCOUNTER — TELEPHONE (OUTPATIENT)
Dept: NEUROLOGY | Facility: CLINIC | Age: 62
End: 2022-10-12

## 2022-10-12 ENCOUNTER — OFFICE VISIT (OUTPATIENT)
Dept: OBGYN CLINIC | Facility: CLINIC | Age: 62
End: 2022-10-12
Payer: COMMERCIAL

## 2022-10-12 VITALS
SYSTOLIC BLOOD PRESSURE: 124 MMHG | HEIGHT: 60 IN | BODY MASS INDEX: 27.88 KG/M2 | WEIGHT: 142 LBS | HEART RATE: 83 BPM | DIASTOLIC BLOOD PRESSURE: 72 MMHG

## 2022-10-12 DIAGNOSIS — S93.422A SPRAIN OF DELTOID LIGAMENT OF LEFT ANKLE, INITIAL ENCOUNTER: ICD-10-CM

## 2022-10-12 DIAGNOSIS — S82.65XA CLOSED NONDISPLACED FRACTURE OF LATERAL MALLEOLUS OF LEFT FIBULA, INITIAL ENCOUNTER: ICD-10-CM

## 2022-10-12 DIAGNOSIS — S92.354A CLOSED NONDISPLACED FRACTURE OF FIFTH METATARSAL BONE OF RIGHT FOOT, INITIAL ENCOUNTER: Primary | ICD-10-CM

## 2022-10-12 DIAGNOSIS — S96.912A SPRAIN AND STRAIN OF LEFT ANKLE: ICD-10-CM

## 2022-10-12 DIAGNOSIS — M19.011 ARTHRITIS OF RIGHT ACROMIOCLAVICULAR JOINT: ICD-10-CM

## 2022-10-12 DIAGNOSIS — S93.402A SPRAIN AND STRAIN OF LEFT ANKLE: ICD-10-CM

## 2022-10-12 PROCEDURE — 99213 OFFICE O/P EST LOW 20 MIN: CPT | Performed by: FAMILY MEDICINE

## 2022-10-12 NOTE — TELEPHONE ENCOUNTER
Thank you Sha Poon  Faxed to Dr Ekaterina Lazcano referral, last office note, face sheet to [phone]

## 2022-10-12 NOTE — TELEPHONE ENCOUNTER
Patty Cast, RNRegistered NurseSigned  12:07 PM                     Pt left VM re: a disability "form for guardian" given to Dr Marily Holly to be filled out  She would like a return call please            Hi, this is Ingrid lopez  Wrong date of birth, [id number]  I left the form with Dr Carito Chandler, past case  She gave it to the  at the [facility], and I cannot find the social number that she gave me a card to call  But she also said she called me once though the form was taking care of  Anyway, if you can give me that number or if you can let me know with my form was filled out  It was disability form for guardian  Thank you  And its Jennifer Lack were one in [place] date of birth, [birth year]  Thank you

## 2022-10-12 NOTE — TELEPHONE ENCOUNTER
Pt left VM re: a disability "form for guardian" given to Dr Rika Noble to be filled out  She would like a return call please  Hi, this is Ingrid lopez  Wrong date of birth, [phone]  I left the form with Dr Bryan Mansfield, past case  She gave it to the  at the [facility], and I cannot find the social number that she gave me a card to call  But she also said she called me once though the form was taking care of  Anyway, if you can give me that number or if you can let me know with my form was filled out  It was disability form for guardian  Thank you  And its Miguel Hickman were one in [place] date of birth, [birth year]  Thank you

## 2022-10-12 NOTE — PROGRESS NOTES
Assessment/Plan:  Assessment/Plan   Diagnoses and all orders for this visit:    Closed nondisplaced fracture of fifth metatarsal bone of right foot, initial encounter    Sprain of deltoid ligament of left ankle, initial encounter    Arthritis of right acromioclavicular joint  -     Diclofenac Sodium (VOLTAREN) 1 %; Apply 2 g topically 4 (four) times a day      67-year-old female with right foot pain and swelling from fall injury 09/27/2022  Discussed with patient physical exam, imaging studies, impression and plan  X-rays right foot noted for mild displaced fracture distal aspect of 5th metatarsal   Physical exam right foot noted for ecchymosis at the bases of the 2nd through 4th toes  She has tenderness of the foot 2nd through 5th metatarsals particularly distal aspects  She has weakness of the foot and ankle  There is positive metatarsal/forefoot squeeze  She has normal sensation and dorsalis pedis pulse  Left ankle noted for posterior medial soft tissue swelling  Clinical impression is that she sustained fracture to the right 5th metatarsal and contusion to the other areas of the foot  She may have sustained sprain to the left ankle  I discussed regimen of continuing with postop shoe and use of topical diclofenac  She was advised that fracture can take 4-6 weeks to heal   Recommend she continue with postop shoe for another 3 weeks  She will be away in [place] until December  She will follow up as needed  Subjective:   Patient ID: John Santiago is a 60 y o  female  Chief Complaint   Patient presents with   • Right Foot - Pain, Swelling   • Left Ankle - Pain, Swelling       67-year-old female presents evaluation of right foot pain and swelling following injury on 09/27/2022  She was standing on a stepstool fell to her right    Pain described as sudden in onset, generalized to the foot but worse at the forefoot and lateral aspect, worse with bearing weight and ambulating, associated with swelling, and improved resting  She also injured her right hip, right shoulder, and neck  She saw primary care physician and was sent for imaging studies which were noted for fracture of the right foot 5th metatarsal   She was recommended on stabilization and she obtained a postop shoe  She also reports having suffered injury to her left ankle 2 months ago  She has been having pain and swelling at the medial aspect of the left ankle  Ankle Pain  This is a new problem  The current episode started 1 to 4 weeks ago  The problem occurs daily  The problem has been unchanged  Associated symptoms include arthralgias, joint swelling and weakness  Pertinent negatives include no numbness  The symptoms are aggravated by standing, walking and twisting  She has tried rest for the symptoms  The treatment provided mild relief  Review of Systems   Musculoskeletal: Positive for arthralgias and joint swelling  Neurological: Positive for weakness  Negative for numbness  Objective:  Vitals:    10/12/22 0829   BP: 124/72   Pulse: 83   Weight: 64 4 kg (142 lb)   Height: 5' (1 524 m)     Right Ankle Exam     Muscle Strength   Dorsiflexion:  4+/5  Plantar flexion:  4+/5          Observations   Left Ankle/Foot   Negative for deformity  Right Ankle/Foot   Negative for deformity  Tenderness   Left Ankle/Foot   Tenderness in the deltoid ligament  Right Ankle/Foot   Tenderness in the metatarsal head  No tenderness in the Achilles insertion, anterior ankle, anterior talofibular ligament, fifth metatarsal base, calcaneofibular ligament, deltoid ligament, dorsum foot, lateral malleolus, medial malleolus, peroneal tendon, posterior tibial tendon, posterior talofibular ligament, proximal Achilles and talar dome       Active Range of Motion     Right Ankle/Foot   Dorsiflexion (kf): 5 degrees   Plantar flexion: WFL  Inversion: WFL  Eversion: WFL    Strength/Myotome Testing     Right Ankle/Foot   Dorsiflexion: 4+  Plantar flexion: 4+  Inversion: 4+  Eversion: 4+    Tests     Right Ankle/Foot   Positive for metatarsal squeeze  Physical Exam  Vitals and nursing note reviewed  Constitutional:       General: She is not in acute distress  Appearance: She is well-developed  She is not ill-appearing or diaphoretic  HENT:      Head: Normocephalic  Right Ear: External ear normal       Left Ear: External ear normal    Eyes:      Conjunctiva/sclera: Conjunctivae normal    Neck:      Trachea: No tracheal deviation  Cardiovascular:      Rate and Rhythm: Normal rate  Pulmonary:      Effort: Pulmonary effort is normal  No respiratory distress  Abdominal:      General: There is no distension  Musculoskeletal:         General: Swelling, tenderness and signs of injury present  No deformity  Right ankle: No lateral malleolus, medial malleolus, ATF ligament, CF ligament, posterior TF ligament or base of 5th metatarsal tenderness  Right foot: No deformity  Left foot: No deformity  Skin:     General: Skin is warm and dry  Coloration: Skin is not jaundiced or pale  Neurological:      Mental Status: She is alert and oriented to person, place, and time  Psychiatric:         Mood and Affect: Mood normal          Behavior: Behavior normal          Thought Content: Thought content normal          Judgment: Judgment normal          I have personally reviewed pertinent films in PACS and my interpretation is Nondisplaced fracture distal aspect 5th metatarsal of right foot  No acute osseous abnormality of left ankle

## 2022-10-13 ENCOUNTER — OFFICE VISIT (OUTPATIENT)
Dept: INTERNAL MEDICINE CLINIC | Facility: CLINIC | Age: 62
End: 2022-10-13
Payer: COMMERCIAL

## 2022-10-13 VITALS
TEMPERATURE: 98 F | HEIGHT: 60 IN | HEART RATE: 80 BPM | SYSTOLIC BLOOD PRESSURE: 134 MMHG | BODY MASS INDEX: 28.07 KG/M2 | WEIGHT: 143 LBS | RESPIRATION RATE: 20 BRPM | DIASTOLIC BLOOD PRESSURE: 76 MMHG | OXYGEN SATURATION: 96 %

## 2022-10-13 DIAGNOSIS — E55.9 VITAMIN D DEFICIENCY: ICD-10-CM

## 2022-10-13 DIAGNOSIS — Z13.6 ENCOUNTER FOR LIPID SCREENING FOR CARDIOVASCULAR DISEASE: ICD-10-CM

## 2022-10-13 DIAGNOSIS — R26.2 AMBULATORY DYSFUNCTION: ICD-10-CM

## 2022-10-13 DIAGNOSIS — Z13.1 SCREENING FOR DIABETES MELLITUS: ICD-10-CM

## 2022-10-13 DIAGNOSIS — G35 MULTIPLE SCLEROSIS (HCC): ICD-10-CM

## 2022-10-13 DIAGNOSIS — R47.81 SLURRED SPEECH: Primary | ICD-10-CM

## 2022-10-13 DIAGNOSIS — Z23 ENCOUNTER FOR IMMUNIZATION: ICD-10-CM

## 2022-10-13 DIAGNOSIS — Z91.81 HISTORY OF RECENT FALL: ICD-10-CM

## 2022-10-13 DIAGNOSIS — Z13.29 SCREENING FOR THYROID DISORDER: ICD-10-CM

## 2022-10-13 DIAGNOSIS — E78.5 HYPERLIPIDEMIA WITH TARGET LDL LESS THAN 160: ICD-10-CM

## 2022-10-13 DIAGNOSIS — I10 PRIMARY HYPERTENSION: ICD-10-CM

## 2022-10-13 DIAGNOSIS — Z13.220 ENCOUNTER FOR LIPID SCREENING FOR CARDIOVASCULAR DISEASE: ICD-10-CM

## 2022-10-13 PROCEDURE — 90682 RIV4 VACC RECOMBINANT DNA IM: CPT

## 2022-10-13 PROCEDURE — 90471 IMMUNIZATION ADMIN: CPT

## 2022-10-13 PROCEDURE — 99214 OFFICE O/P EST MOD 30 MIN: CPT

## 2022-10-13 NOTE — PROGRESS NOTES
[facility]    NAME: Jesus Middleton  AGE: 60 y o  SEX: female  : 1960     DATE: 10/13/2022     Assessment and Plan:     Problem List Items Addressed This Visit        Cardiovascular and Mediastinum    Hypertension     Blood pressure is stable continue current medication            Nervous and Auditory    Multiple sclerosis ([address] )     Followed by Neurology, disease progression  Other    Ambulatory dysfunction     Patient has had increased falls  She states that she is now using her cane or the assistance of others to ambulate at a fear of falling         Hyperlipidemia with target LDL less than 160    Relevant Orders    CBC and differential    Comprehensive metabolic panel    Slurred speech - Primary     Her speech is noted to be thick  She reports this is worsened over the last few weeks  She had a fall recently will get CT of the head  Relevant Orders    CT head wo contrast (Completed)      Other Visit Diagnoses     History of recent fall        Relevant Orders    CT head wo contrast (Completed)    Vitamin D deficiency        Relevant Orders    Vitamin D 25 hydroxy    Encounter for lipid screening for cardiovascular disease        Relevant Orders    Lipid panel    Screening for diabetes mellitus        Relevant Orders    Hemoglobin A1C    Screening for thyroid disorder        Relevant Orders    TSH, 3rd generation with Free T4 reflex    Encounter for immunization        Relevant Orders    influenza vaccine, quadrivalent, recombinant, PF, 0 5 mL, for patients 18 yr+ (FLUBLOK) (Completed)              No follow-ups on file  Chief Complaint:     Chief Complaint   Patient presents with   • Follow-up     Papers, falling on and off        History of Present Illness:     Eli Thompson presents for follow-up appointment  She has a history of multiple sclerosis, right hemiparesis a, COPD and DVT  She was recently seen in the office after a fall  She sustained a 5th metatarsal fracture of the right foot  She reports that she has had increasing falls  She states that she will just lose her balance  She also reports increase “thickness” of her speech  She endorses increasing fatigue as well  Review of Systems:     Review of Systems   Constitutional: Negative  HENT: Negative  Respiratory: Negative  Cardiovascular: Negative  Gastrointestinal: Negative  Genitourinary:        Incontinence   Musculoskeletal: Positive for arthralgias and gait problem  Cast shoe right foot  Skin: Negative  Neurological: Positive for weakness and numbness  Unbalanced; falls   Psychiatric/Behavioral: The patient is nervous/anxious (about falling)           Depressed        Problem List:     Patient Active Problem List   Diagnosis   • Trigger ring finger of left hand   • S/P trigger finger release   • Ambulatory dysfunction   • Chronic right-sided headaches   • Multiple sclerosis (HCC)   • Panniculitis   • Mesenteric panniculitis ([facility])   • Right low back pain   • Skin rash   • Right hemiparesis (HCC)   • Other spondylosis with radiculopathy, lumbar region   • COPD (chronic obstructive pulmonary disease) ([facility])   • Hyperlipidemia with target LDL less than 160   • Hypertension   • Pneumonia due to COVID-19 virus   • Hypotension   • Edema of right lower extremity   • Cervical disc disorder at C6-C7 level with radiculopathy   • Encephalopathy due to severe acute respiratory syndrome coronavirus 2 (SARS-CoV-2)   • Word finding difficulty   • Slurred speech   • Adhesive capsulitis of left shoulder   • CINTHYA (obstructive sleep apnea)   • DVT, lower extremity, distal, acute, right ([facility])   • Depression   • Overactive bladder        Objective:     /76 (BP Location: Left arm, Patient Position: Sitting, Cuff Size: Large)   Pulse 80   Temp 98 °F (36 7 °C) (Tympanic)   Resp 20   Ht 5' (1 524 m)   Wt 64 9 kg (143 lb)   SpO2 96%   BMI 27 93 kg/m² Physical Exam  Constitutional:       General: She is not in acute distress  Appearance: Normal appearance  HENT:      Head: Normocephalic and atraumatic  Right Ear: Tympanic membrane and external ear normal       Left Ear: Tympanic membrane and external ear normal       Nose: Nose normal       Mouth/Throat:      Mouth: Mucous membranes are moist       Pharynx: Oropharynx is clear  Eyes:      Conjunctiva/sclera: Conjunctivae normal    Neck:      Vascular: No carotid bruit  Cardiovascular:      Rate and Rhythm: Normal rate and regular rhythm  Pulses: Normal pulses  Heart sounds: Normal heart sounds  No murmur heard  Pulmonary:      Effort: Pulmonary effort is normal  No respiratory distress  Breath sounds: Normal breath sounds  No wheezing  Abdominal:      General: Bowel sounds are normal  There is no distension  Palpations: Abdomen is soft  Tenderness: There is no abdominal tenderness  Musculoskeletal:         General: Normal range of motion  Cervical back: Normal range of motion and neck supple  Lymphadenopathy:      Cervical: No cervical adenopathy  Skin:     General: Skin is warm and dry  Capillary Refill: Capillary refill takes less than 2 seconds  Neurological:      General: No focal deficit present  Mental Status: She is alert and oriented to person, place, and time  Cranial Nerves: Cranial nerve deficit present  Motor: Weakness (Right-sided) and atrophy present  Gait: Gait abnormal    Psychiatric:         Mood and Affect: Mood normal          Behavior: Behavior normal          Thought Content: Thought content normal          Judgment: Judgment normal          I spent 40 minutes with this patient      [address] [place] [address]

## 2022-10-14 ENCOUNTER — HOSPITAL ENCOUNTER (OUTPATIENT)
Dept: CT IMAGING | Facility: CLINIC | Age: 62
Discharge: HOME | End: 2022-10-14
Payer: COMMERCIAL

## 2022-10-14 DIAGNOSIS — R47.81 SLURRED SPEECH: ICD-10-CM

## 2022-10-14 DIAGNOSIS — Z91.81 HISTORY OF RECENT FALL: ICD-10-CM

## 2022-10-14 PROCEDURE — G1004 CDSM NDSC: HCPCS

## 2022-10-14 PROCEDURE — 70450 CT HEAD/BRAIN W/O DYE: CPT

## 2022-10-17 ENCOUNTER — TELEPHONE (OUTPATIENT)
Dept: INTERNAL MEDICINE CLINIC | Facility: CLINIC | Age: 62
End: 2022-10-17

## 2022-10-17 NOTE — ASSESSMENT & PLAN NOTE
Patient has had increased falls    She states that she is now using her cane or the assistance of others to ambulate at a fear of falling

## 2022-10-17 NOTE — ASSESSMENT & PLAN NOTE
Her speech is noted to be thick  She reports this is worsened over the last few weeks  She had a fall recently will get CT of the head

## 2022-10-17 NOTE — TELEPHONE ENCOUNTER
Pt is picking up the [employer] form  Jennifer completed form I faxed the form to [address] with office notes 10/13/2022 lissette, 9/30/2022 Alfonso, and 9/27/2022 Neurology     Pt will   N/c

## 2022-10-18 ENCOUNTER — TELEPHONE (OUTPATIENT)
Dept: INTERNAL MEDICINE CLINIC | Facility: CLINIC | Age: 62
End: 2022-10-18

## 2022-10-18 LAB
25(OH)D3 SERPL-MCNC: 110 NG/ML (ref 30–100)
ALBUMIN SERPL-MCNC: 4.1 G/DL (ref 3.6–5.1)
ALBUMIN/GLOB SERPL: 1.8 (CALC) (ref 1–2.5)
ALP SERPL-CCNC: 135 U/L (ref 37–153)
ALT SERPL-CCNC: 18 U/L (ref 6–29)
AST SERPL-CCNC: 19 U/L (ref 10–35)
BASOPHILS # BLD AUTO: 40 CELLS/UL (ref 0–200)
BASOPHILS NFR BLD AUTO: 0.7 %
BILIRUB SERPL-MCNC: 0.5 MG/DL (ref 0.2–1.2)
BUN SERPL-MCNC: 10 MG/DL (ref 7–25)
BUN/CREAT SERPL: ABNORMAL (CALC) (ref 6–22)
CALCIUM SERPL-MCNC: 9.1 MG/DL (ref 8.6–10.4)
CHLORIDE SERPL-SCNC: 109 MMOL/L (ref 98–110)
CHOLEST SERPL-MCNC: 204 MG/DL
CHOLEST/HDLC SERPL: 4.4 (CALC)
CO2 SERPL-SCNC: 26 MMOL/L (ref 20–32)
CREAT SERPL-MCNC: 0.7 MG/DL (ref 0.5–1.05)
EOSINOPHIL # BLD AUTO: 285 CELLS/UL (ref 15–500)
EOSINOPHIL NFR BLD AUTO: 5 %
ERYTHROCYTE [DISTWIDTH] IN BLOOD BY AUTOMATED COUNT: 12.7 % (ref 11–15)
GFR/BSA.PRED SERPLBLD CYS-BASED-ARV: 97 ML/MIN/1.73M2
GLOBULIN SER CALC-MCNC: 2.3 G/DL (CALC) (ref 1.9–3.7)
GLUCOSE SERPL-MCNC: 125 MG/DL (ref 65–99)
HBA1C MFR BLD: 6 % OF TOTAL HGB
HCT VFR BLD AUTO: 41.5 % (ref 35–45)
HDLC SERPL-MCNC: 46 MG/DL
HGB BLD-MCNC: 13.6 G/DL (ref 11.7–15.5)
LDLC SERPL CALC-MCNC: 131 MG/DL (CALC)
LYMPHOCYTES # BLD AUTO: 2451 CELLS/UL (ref 850–3900)
LYMPHOCYTES NFR BLD AUTO: 43 %
MCH RBC QN AUTO: 28.5 PG (ref 27–33)
MCHC RBC AUTO-ENTMCNC: 32.8 G/DL (ref 32–36)
MCV RBC AUTO: 87 FL (ref 80–100)
MONOCYTES # BLD AUTO: 513 CELLS/UL (ref 200–950)
MONOCYTES NFR BLD AUTO: 9 %
NEUTROPHILS # BLD AUTO: 2411 CELLS/UL (ref 1500–7800)
NEUTROPHILS NFR BLD AUTO: 42.3 %
NONHDLC SERPL-MCNC: 158 MG/DL (CALC)
PLATELET # BLD AUTO: 287 THOUSAND/UL (ref 140–400)
PMV BLD REES-ECKER: 10.9 FL (ref 7.5–12.5)
POTASSIUM SERPL-SCNC: 3.5 MMOL/L (ref 3.5–5.3)
PROT SERPL-MCNC: 6.4 G/DL (ref 6.1–8.1)
RBC # BLD AUTO: 4.77 MILLION/UL (ref 3.8–5.1)
SODIUM SERPL-SCNC: 143 MMOL/L (ref 135–146)
TRIGL SERPL-MCNC: 152 MG/DL
TSH SERPL-ACNC: 2.81 MIU/L (ref 0.4–4.5)
WBC # BLD AUTO: 5.7 THOUSAND/UL (ref 3.8–10.8)

## 2022-10-18 NOTE — TELEPHONE ENCOUNTER
----- Message from [address] sent at 10/18/2022  8:48 AM EDT -----  Please call the patient regarding her abnormal result  Her cholesterol and LDL are a little elevated  Her triglycerides are also mildly elevated  She wants to watch the saturated fats and carbohydrates in diet  Use healthy fats such as olive oil and avocados    Increase fiber

## 2022-10-19 NOTE — TELEPHONE ENCOUNTER
Pt  Called back and was notified and is wondering about her ct scan and her vitamin d is she to continue taking 50,000 units

## 2022-10-19 NOTE — TELEPHONE ENCOUNTER
No concerns on her CT scan  Her vitamin D level is high   She should stop vitamin D for 2 weeks then she can just take 2000 units of vitamin D3 daily

## 2022-10-21 NOTE — TELEPHONE ENCOUNTER
Pt called re: she just missed a call from our office but no message left    Please advise: [phone]

## 2022-12-10 DIAGNOSIS — I10 PRIMARY HYPERTENSION: ICD-10-CM

## 2022-12-10 DIAGNOSIS — R73.03 PREDIABETES: ICD-10-CM

## 2022-12-10 DIAGNOSIS — E78.5 HYPERLIPIDEMIA, UNSPECIFIED HYPERLIPIDEMIA TYPE: ICD-10-CM

## 2022-12-11 RX ORDER — METFORMIN HYDROCHLORIDE 500 MG/1
500 TABLET, EXTENDED RELEASE ORAL
Qty: 90 TABLET | Refills: 0 | Status: SHIPPED | OUTPATIENT
Start: 2022-12-11

## 2022-12-11 RX ORDER — ATORVASTATIN CALCIUM 40 MG/1
40 TABLET, FILM COATED ORAL
Qty: 90 TABLET | Refills: 0 | Status: SHIPPED | OUTPATIENT
Start: 2022-12-11

## 2022-12-11 RX ORDER — NIFEDIPINE 60 MG/1
60 TABLET, FILM COATED, EXTENDED RELEASE ORAL DAILY
Qty: 90 TABLET | Refills: 0 | Status: SHIPPED | OUTPATIENT
Start: 2022-12-11

## 2022-12-12 ENCOUNTER — OFFICE VISIT (OUTPATIENT)
Dept: PULMONOLOGY | Facility: CLINIC | Age: 62
End: 2022-12-12

## 2022-12-12 VITALS
HEIGHT: 60 IN | DIASTOLIC BLOOD PRESSURE: 80 MMHG | SYSTOLIC BLOOD PRESSURE: 124 MMHG | OXYGEN SATURATION: 94 % | TEMPERATURE: 98 F | WEIGHT: 145 LBS | HEART RATE: 72 BPM | BODY MASS INDEX: 28.47 KG/M2

## 2022-12-12 DIAGNOSIS — E66.3 OVERWEIGHT (BMI 25.0-29.9): ICD-10-CM

## 2022-12-12 DIAGNOSIS — J45.991 COUGH VARIANT ASTHMA: Primary | ICD-10-CM

## 2022-12-12 DIAGNOSIS — G47.33 OSA (OBSTRUCTIVE SLEEP APNEA): ICD-10-CM

## 2022-12-12 RX ORDER — FLUTICASONE PROPIONATE AND SALMETEROL 250; 50 UG/1; UG/1
1 POWDER RESPIRATORY (INHALATION) 2 TIMES DAILY
Qty: 180 BLISTER | Refills: 1 | Status: SHIPPED | OUTPATIENT
Start: 2022-12-12 | End: 2022-12-12 | Stop reason: SDUPTHER

## 2022-12-12 RX ORDER — FLUTICASONE PROPIONATE AND SALMETEROL 250; 50 UG/1; UG/1
1 POWDER RESPIRATORY (INHALATION) 2 TIMES DAILY
Qty: 180 BLISTER | Refills: 1 | Status: SHIPPED | OUTPATIENT
Start: 2022-12-12 | End: 2023-03-12

## 2022-12-12 NOTE — PROGRESS NOTES
Assessment/Plan:   Diagnoses and all orders for this visit:    Cough variant asthma  -     Discontinue: Karime  250-50 MCG/ACT inhaler; Inhale 1 puff 2 (two) times a day Rinse mouth after use  -     Discontinue: Karime  250-50 MCG/ACT inhaler; Inhale 1 puff 2 (two) times a day Rinse mouth after use  -     Karime  250-50 MCG/ACT inhaler; Inhale 1 puff 2 (two) times a day Rinse mouth after use  CINTHYA (obstructive sleep apnea)    Overweight (BMI 25 0-29  9)        Chronic cough likely secondary to cough variant asthma respiratory allergy panel and hypersensitivity pneumonitis panel have all been normal except for very mild cat dander and dust mite  Dust mite precautions discussed with the patient  PFTs normal spirometry normal lung volume and mildly reduced DLCO at 70%, 6-minute walk test total distance walked was 320 m in 6 minutes without any evidence of exercise desaturation  Discussed with the patient to continue with Wixela 250/51 puff twice daily  Rinse mouth after use  Continue with albuterol MDI 2 puffs 4 times daily as needed only  She also has a nebulizer machine which she states currently not having the need to use it since she has been started on the [address]  study recently diagnostic sleep study showing severe obstructive sleep apnea with an AHI of 55 4  Currently on auto CPAP,  CPAP download compliance reviewed for the past 70 days with 89% compliance, acceptable residual AHI of 2 9  Discussed with the patient to continue with current settings  Cleaning of the supplies and change of CPAP supplies discussed  Also discussed with the patient to wear a chinstrap with the nasal cradle or the nasal/nasal pillows complaining of abdominal distention and bloating likely swallowing air  Vaccinations up-to-date  Follow-up in 6 months or as needed earlier as needed  Return in about 6 months (around 6/12/2023)  All questions are answered to the patient's satisfaction and understanding    She verbalizes understanding  She is encouraged to call with any further questions or concerns  Portions of the record may have been created with voice recognition software  Occasional wrong word or "sound a like" substitutions may have occurred due to the inherent limitations of voice recognition software  Read the chart carefully and recognize, using context, where substitutions have occurred  Electronically Signed by Asif Meneses MD    ______________________________________________________________________    Chief Complaint:   Chief Complaint   Patient presents with   • Follow-up   • Sleep Apnea       Patient ID: Alonzo Jean is a 60 y o  y o  female has a past medical history of Asthma, Cardiac disease, COPD (chronic obstructive pulmonary disease) ([facility] ), Depression, Hyperlipidemia, Hypertension, Influenza, Irregular heart beat, Migraine, Migraine, MS (multiple sclerosis) ([facility] ), Multiple sclerosis ([facility] ), Nephrolithiasis, Neurogenic bladder, Panniculitis, and Sleep apnea, obstructive  12/12/2022  Patient presents today for follow-up visit  Gertrude Mcdonald is a very pleasant 79-year-old lady who has never smoked with history of multiple sclerosis following up with Neurology, who has history of likely cough variant asthma has been on bronchodilators has been on Pulmicort and albuterol as needed which she states has been working well, but recently she has had increased use of the rescue inhaler as well as the waking up in the middle of the night with asthma symptoms requiring the rescue inhaler she states  She had COVID-19 infection in November/December of 2020 was admitted in the hospital for about 4 days did not have the need to go home on home oxygen      Since her last visit she states the symptoms of shortness of breath and wheezing have gotten better   she had a diagnostic sleep study done with severe obstructive sleep apnea with an AHI of 55 and was placed on a CPAP  She states her early morning headaches are significantly better she is trying to use the CPAP every night most of the nights, she did go for a vacation for a week at which time she did not carry but otherwise she states she has been using it  Review of Systems   Constitutional: Positive for fatigue  HENT: Negative  Eyes: Negative  Respiratory: Positive for cough and shortness of breath  She states the cough and shortness of breath have improved since her last visit  Cardiovascular: Negative  Gastrointestinal: Negative  Endocrine: Negative  Genitourinary: Negative  Musculoskeletal: Negative  Allergic/Immunologic: Negative  Neurological: Negative  Hematological: Negative  Psychiatric/Behavioral: Positive for sleep disturbance  Smoking history: She reports that she has never smoked  She has never used smokeless tobacco     The following portions of the patient's history were reviewed and updated as appropriate: allergies, current medications, past family history, past medical history, past social history, past surgical history and problem list     Immunization History   Administered Date(s) Administered   • COVID-19 PFIZER VACCINE 0 3 ML IM 03/18/2021, 04/12/2021, 09/10/2021   • INFLUENZA 01/12/2022   • Influenza, recombinant, quadrivalent,injectable, preservative free 10/13/2022   • Influenza, seasonal, injectable 10/15/2012   • Pneumococcal Polysaccharide PPV23 10/22/2012     Current Outpatient Medications   Medication Sig Dispense Refill   • albuterol (2 5 mg/3 mL) 0 083 % nebulizer solution Albuterol Sulfate (2 5 MG/3ML) 0 083% Inhalation Nebulization Solution  USE 1 UNIT DOSE IN NEBULIZER EVERY 6 HOURS AS NEEDED     Quantity: 1;  Refills: 5       Marinus Nurse M D ;  Started 12-July-2016  Active3 ML Plas Cont (125 Plas Conts)     • albuterol (PROVENTIL HFA,VENTOLIN HFA) 90 mcg/act inhaler INHALE 2 PUFFS EVERY 6 HOURS AS NEEDED FOR WHEEZING 8 5 g 3   • aspirin 81 MG tablet Aspirin 81 MG TABS  Take 1 tablet daily   Refills: 0    Active     • atorvastatin (LIPITOR) 40 mg tablet Take 1 tablet (40 mg total) by mouth once at bedtime 90 tablet 0   • baclofen 20 mg tablet Take 1 tablet (20 mg total) by mouth 4 (four) times a day 360 tablet 2   • butalbital-acetaminophen-caffeine (FIORICET,ESGIC) -40 mg per tablet Take 1 tablet by mouth every 4 (four) hours as needed for headaches 12 tablet 4   • dexamethasone (DECADRON) 2 mg tablet Take 1 tablet (2 mg total) by mouth daily as needed (headache) 5 tablet 2   • Diclofenac Sodium (VOLTAREN) 1 % Apply 2 g topically 4 (four) times a day as needed (left hip pain) 150 g 1   • Diclofenac Sodium (VOLTAREN) 1 % Apply 2 g topically 4 (four) times a day 150 g 1   • divalproex sodium (Depakote ER) 250 mg 24 hr tablet Take 2 tabs for 3 days, then 1 tab for 2 days 8 tablet 1   • EPINEPHrine (EPIPEN) 0 3 mg/0 3 mL SOAJ Inject 0 3 mL (0 3 mg total) into a muscle if needed for anaphylaxis 1 each 1   • ergocalciferol (VITAMIN D2) 50,000 units TAKE ONE CAPSULE WEEKLY WITH FOOD  1   • fluticasone (FLONASE) 50 mcg/act nasal spray 2 sprays into each nostril daily 1 Bottle 3   • gabapentin (NEURONTIN) 300 mg capsule Take 2 caps in am and noon and 3 caps  capsule 2   • ketorolac (TORADOL) 10 mg tablet Take 1 tablet (10 mg total) by mouth every 6 (six) hours as needed for moderate pain 10 tablet 3   • metFORMIN (GLUCOPHAGE-XR) 500 mg 24 hr tablet Take 1 tablet (500 mg total) by mouth daily with breakfast 90 tablet 0   • NIFEdipine ER (ADALAT CC) 60 MG 24 hr tablet Take 1 tablet (60 mg total) by mouth daily 90 tablet 0   • oxybutynin (DITROPAN-XL) 10 MG 24 hr tablet Take 1 tablet (10 mg total) by mouth daily at bedtime 90 tablet 1   • sertraline (ZOLOFT) 50 mg tablet Takes 1 tablet in am and hs      • Wixela Inhub 250-50 MCG/ACT inhaler Inhale 1 puff 2 (two) times a day Rinse mouth after use   180 blister 1   • cholecalciferol (VITAMIN D3) 1,000 units tablet Take 2 tablets (2,000 Units total) by mouth daily for 1 day (Patient not taking: Reported on 9/30/2022) 2 tablet 0     No current facility-administered medications for this visit  Allergies: Nuts - food allergy, Other, Peanut oil - food allergy, Shellfish allergy - food allergy, Shellfish-derived products - food allergy, Sumatriptan, Copaxone [glatiramer acetate], Cat hair extract, Cephalexin, Glatiramer, Pollen extract, and Seasonal ic [cholestatin]    Objective:  Vitals:    12/12/22 0838   BP: 124/80   Pulse: 72   Temp: 98 °F (36 7 °C)   SpO2: 94%   Weight: 65 8 kg (145 lb)   Height: 5' (1 524 m)   Oxygen Therapy  SpO2: 94 %    Wt Readings from Last 3 Encounters:   12/12/22 65 8 kg (145 lb)   10/13/22 64 9 kg (143 lb)   10/12/22 64 4 kg (142 lb)     Body mass index is 28 32 kg/m²  Physical Exam  Vitals and nursing note reviewed  Constitutional:       Appearance: She is well-developed  HENT:      Head: Normocephalic and atraumatic  Mouth/Throat:      Comments: Crowded oropharyngeal airway  Eyes:      Conjunctiva/sclera: Conjunctivae normal       Pupils: Pupils are equal, round, and reactive to light  Neck:      Thyroid: No thyromegaly  Vascular: No JVD  Cardiovascular:      Rate and Rhythm: Normal rate and regular rhythm  Heart sounds: Normal heart sounds  No murmur heard  No friction rub  No gallop  Pulmonary:      Effort: Pulmonary effort is normal  No respiratory distress  Breath sounds: Normal breath sounds  No wheezing or rales  Chest:      Chest wall: No tenderness  Abdominal:      General: Bowel sounds are normal       Palpations: Abdomen is soft  Musculoskeletal:         General: No tenderness or deformity  Normal range of motion  Cervical back: Normal range of motion and neck supple  Lymphadenopathy:      Cervical: No cervical adenopathy  Skin:     General: Skin is warm and dry  Neurological:      Mental Status: She is alert and oriented to person, place, and time  Diagnostics:  I have personally reviewed pertinent reports      ESS: Total score: 12

## 2022-12-13 ENCOUNTER — OFFICE VISIT (OUTPATIENT)
Dept: UROLOGY | Facility: CLINIC | Age: 62
End: 2022-12-13

## 2022-12-13 VITALS
BODY MASS INDEX: 28.47 KG/M2 | WEIGHT: 145 LBS | HEART RATE: 83 BPM | SYSTOLIC BLOOD PRESSURE: 140 MMHG | DIASTOLIC BLOOD PRESSURE: 82 MMHG | HEIGHT: 60 IN | OXYGEN SATURATION: 98 %

## 2022-12-13 DIAGNOSIS — R10.9 FLANK PAIN: ICD-10-CM

## 2022-12-13 DIAGNOSIS — N32.81 OAB (OVERACTIVE BLADDER): Primary | ICD-10-CM

## 2022-12-13 DIAGNOSIS — G35 MULTIPLE SCLEROSIS (HCC): ICD-10-CM

## 2022-12-13 DIAGNOSIS — N31.9 NEUROGENIC BLADDER: ICD-10-CM

## 2022-12-13 LAB — POST-VOID RESIDUAL VOLUME, ML POC: 35 ML

## 2022-12-13 NOTE — PROGRESS NOTES
12/13/2022      Chief Complaint   Patient presents with   • Urinary Frequency     Assessment and Plan    1  OAB/NGB  - Currently managed on Oxybutynin 10 mg daily with ongoing symptoms    - Previously failed Detrol LA  - Will trial Myrbetriq  - If fails this would recommend repeat work-up with cystoscopy and UDS and consider of bladder botox vs PTNS    2  Hx of nephrolithiasis   - CT A/P from 2019 - Unremarkable  No hydronephrosis  - C/o intermittent mild right lower quadrant pain and right lower back pain x months  - Obtain updated imaging to r/o obstructing stone  - F/u in 6-8 weeks for symptom reassessment  History of Present Illness  Leandro Fallon is a 60 y o  female here for new patient evaluation of lower urinary tract symptoms  She previously saw our practice in 2018  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  She had been managed on Detrol 4 mg daily  Recently this has not been working  She is currently managed on oxybutynin 10 mg daily some benefit  She does have ongoing bothersome urinary urgency, frequency, nocturia as well as occasional incontinence  Nuys any dysuria or hematuria  She reports seeing a urologist approximately 10 years ago and had work-up including what sounds like cystoscopy as well as urodynamic testing at that time  Denies any prior  surgical manipulation  No family history of  malignancy  No smoking history  She also does have prior history of kidney stones with spontaneous expulsion  She does endorse some intermittent mild right lower abdominal and lower back pain  Unable to provide UA  Bladder scan-35 mL     Review of Systems   Constitutional: Negative for chills and fever  Respiratory: Negative for shortness of breath  Cardiovascular: Negative for chest pain  Gastrointestinal: Positive for abdominal pain and constipation  Negative for nausea and vomiting     Genitourinary: Positive for flank pain, frequency and urgency  Negative for difficulty urinating, dysuria, hematuria and pelvic pain  Musculoskeletal: Positive for back pain  Neurological: Negative for dizziness  Past Medical History  Past Medical History:   Diagnosis Date   • Asthma    • Cardiac disease     heart murmur   • COPD (chronic obstructive pulmonary disease) ([facility] [address] )    • Depression    • Hyperlipidemia    • Hypertension    • Influenza     December 2017   • Irregular heart beat    • Migraine    • Migraine    • MS (multiple sclerosis) (HCC)    • Multiple sclerosis (HCC)    • Nephrolithiasis    • Neurogenic bladder    • Panniculitis    • Sleep apnea, obstructive        Past Social History  Past Surgical History:   Procedure Laterality Date   • COLONOSCOPY     • DILATION AND CURETTAGE OF UTERUS     • DC COLONOSCOPY FLX DX W/COLLJ SPEC WHEN PFRMD N/A 1/11/2018    Procedure: COLONOSCOPY;  Surgeon: Pérez Ball MD;  Location: [facility];   Service: Gastroenterology   • DC TENDON SHEATH INCISION Left 1/17/2018    Procedure: RING TRIGGER FINGER RELEASE;  Surgeon: Karolina Quiros MD;  Location:  MAIN OR;  Service: Orthopedics   • TONSILLECTOMY     • UTERINE FIBROID SURGERY       Social History     Tobacco Use   Smoking Status Never   Smokeless Tobacco Never       Past Family History  Family History   Problem Relation Age of Onset   • Stroke Mother    • Diabetes Mother    • Hypertension Mother    • Emphysema Mother    • Heart disease Father    • Diabetes Father    • Hypertension Father    • Diabetes Sister    • Sleep apnea Sister    • No Known Problems Maternal Grandmother    • No Known Problems Paternal Grandmother    • Hypertension Brother    • Graves' disease Brother    • Breast cancer Maternal Aunt 68   • Stomach cancer Maternal Aunt    • Sleep apnea Maternal Aunt    • No Known Problems Maternal Aunt    • No Known Problems Maternal Aunt    • No Known Problems Maternal Aunt    • No Known Problems Maternal Aunt        Past Social history  Social History     Socioeconomic History   • Marital status: /Civil Union     Spouse name: Not on file   • Number of children: Not on file   • Years of education: Not on file   • Highest education level: Not on file   Occupational History   • Not on file   Tobacco Use   • Smoking status: Never   • Smokeless tobacco: Never   Vaping Use   • Vaping Use: Never used   Substance and Sexual Activity   • Alcohol use: Yes     Comment: social   • Drug use: Yes     Types: Marijuana     Comment: last use 4 days ago  • Sexual activity: Not on file   Other Topics Concern   • Not on file   Social History Narrative   • Not on file     Social Determinants of Health     Financial Resource Strain: Not on file   Food Insecurity: Not on file   Transportation Needs: Not on file   Physical Activity: Not on file   Stress: Not on file   Social Connections: Not on file   Intimate Partner Violence: Not on file   Housing Stability: Not on file       Current Medications  Current Outpatient Medications   Medication Sig Dispense Refill   • albuterol (2 5 mg/3 mL) 0 083 % nebulizer solution Albuterol Sulfate (2 5 MG/3ML) 0 083% Inhalation Nebulization Solution  USE 1 UNIT DOSE IN NEBULIZER EVERY 6 HOURS AS NEEDED     Quantity: 1;  Refills: 5       Jerry Mitts M D ;  Started 12-July-2016  Active3 ML Plas Cont (125 Plas Conts)     • albuterol (PROVENTIL HFA,VENTOLIN HFA) 90 mcg/act inhaler INHALE 2 PUFFS EVERY 6 HOURS AS NEEDED FOR WHEEZING 8 5 g 3   • aspirin 81 MG tablet Aspirin 81 MG TABS  Take 1 tablet daily   Refills: 0    Active     • atorvastatin (LIPITOR) 40 mg tablet Take 1 tablet (40 mg total) by mouth once at bedtime 90 tablet 0   • baclofen 20 mg tablet Take 1 tablet (20 mg total) by mouth 4 (four) times a day 360 tablet 2   • butalbital-acetaminophen-caffeine (FIORICET,ESGIC) -40 mg per tablet Take 1 tablet by mouth every 4 (four) hours as needed for headaches 12 tablet 4   • dexamethasone (DECADRON) 2 mg tablet Take 1 tablet (2 mg total) by mouth daily as needed (headache) 5 tablet 2   • Diclofenac Sodium (VOLTAREN) 1 % Apply 2 g topically 4 (four) times a day as needed (left hip pain) 150 g 1   • Diclofenac Sodium (VOLTAREN) 1 % Apply 2 g topically 4 (four) times a day 150 g 1   • divalproex sodium (Depakote ER) 250 mg 24 hr tablet Take 2 tabs for 3 days, then 1 tab for 2 days 8 tablet 1   • EPINEPHrine (EPIPEN) 0 3 mg/0 3 mL SOAJ Inject 0 3 mL (0 3 mg total) into a muscle if needed for anaphylaxis 1 each 1   • ergocalciferol (VITAMIN D2) 50,000 units TAKE ONE CAPSULE WEEKLY WITH FOOD  1   • fluticasone (FLONASE) 50 mcg/act nasal spray 2 sprays into each nostril daily 1 Bottle 3   • gabapentin (NEURONTIN) 300 mg capsule Take 2 caps in am and noon and 3 caps  capsule 2   • ketorolac (TORADOL) 10 mg tablet Take 1 tablet (10 mg total) by mouth every 6 (six) hours as needed for moderate pain 10 tablet 3   • metFORMIN (GLUCOPHAGE-XR) 500 mg 24 hr tablet Take 1 tablet (500 mg total) by mouth daily with breakfast 90 tablet 0   • NIFEdipine ER (ADALAT CC) 60 MG 24 hr tablet Take 1 tablet (60 mg total) by mouth daily 90 tablet 0   • oxybutynin (DITROPAN-XL) 10 MG 24 hr tablet Take 1 tablet (10 mg total) by mouth daily at bedtime 90 tablet 1   • sertraline (ZOLOFT) 50 mg tablet Takes 1 tablet in am and hs      • Wixela Inhub 250-50 MCG/ACT inhaler Inhale 1 puff 2 (two) times a day Rinse mouth after use  180 blister 1   • cholecalciferol (VITAMIN D3) 1,000 units tablet Take 2 tablets (2,000 Units total) by mouth daily for 1 day (Patient not taking: Reported on 9/30/2022) 2 tablet 0     No current facility-administered medications for this visit         Allergies  Allergies   Allergen Reactions   • Nuts - Food Allergy Shortness Of Breath and Anaphylaxis   • Other Anaphylaxis, Hives and Itching     [id number]: POISON IVY   walnuts & cashews  Cats   • Peanut Oil - Food Allergy      Hives anaphylaxis   • Shellfish Allergy - Food Allergy Shortness Of Breath and Swelling   • Shellfish-Derived Products - Food Allergy Shortness Of Breath and Swelling   • Sumatriptan Headache and Other (See Comments)     Other reaction(s): Headache   • Copaxone [Glatiramer Acetate] Hives   • Cat Hair Extract    • Cephalexin Rash     Pt says they took keflex again and didn't get a reaction from it  Pt states they believe it was something they ate that day instead  • Glatiramer Rash     Other reaction(s): rash   • Pollen Extract Sneezing   • Seasonal Ic [Cholestatin]          The following portions of the patient's history were reviewed and updated as appropriate: allergies, current medications, past medical history, past social history, past surgical history and problem list       Vitals  Vitals:    12/13/22 0936   BP: 140/82   Pulse: 83   SpO2: 98%   Weight: 65 8 kg (145 lb)   Height: 5' (1 524 m)           Physical Exam  Physical Exam  Constitutional:       Appearance: Normal appearance  HENT:      Head: Normocephalic and atraumatic  Right Ear: External ear normal       Left Ear: External ear normal       Nose: Nose normal    Eyes:      General: No scleral icterus  Conjunctiva/sclera: Conjunctivae normal    Cardiovascular:      Pulses: Normal pulses  Pulmonary:      Effort: Pulmonary effort is normal    Abdominal:      General: Abdomen is flat  Palpations: Abdomen is soft  Tenderness: There is no abdominal tenderness  There is no right CVA tenderness, left CVA tenderness, guarding or rebound  Musculoskeletal:         General: Normal range of motion  Cervical back: Normal range of motion  Skin:     General: Skin is warm and dry  Neurological:      General: No focal deficit present  Mental Status: She is alert and oriented to person, place, and time  Psychiatric:         Mood and Affect: Mood normal          Behavior: Behavior normal          Thought Content:  Thought content normal  Judgment: Judgment normal            Results  Recent Results (from the past 1 hour(s))   POCT Measure PVR    Collection Time: 12/13/22  9:44 AM   Result Value Ref Range    POST-VOID RESIDUAL VOLUME, ML POC 35 mL   ]  No results found for: PSA  Lab Results   Component Value Date    CALCIUM 9 1 10/17/2022     12/21/2017    K 3 5 10/17/2022    CO2 26 10/17/2022     10/17/2022    BUN 10 10/17/2022    CREATININE 0 70 10/17/2022     Lab Results   Component Value Date    WBC 5 7 10/17/2022    HGB 13 6 10/17/2022    HCT 41 5 10/17/2022    MCV 87 0 10/17/2022     10/17/2022           Orders  Orders Placed This Encounter   Procedures   • POCT Measure PVR   • POCT urine dip       Josey Gerber

## 2022-12-19 ENCOUNTER — HOSPITAL ENCOUNTER (OUTPATIENT)
Dept: BONE DENSITY | Facility: CLINIC | Age: 62
Discharge: HOME | End: 2022-12-19

## 2022-12-19 DIAGNOSIS — Z78.0 MENOPAUSE: ICD-10-CM

## 2022-12-22 ENCOUNTER — TELEPHONE (OUTPATIENT)
Dept: NEUROLOGY | Facility: CLINIC | Age: 62
End: 2022-12-22

## 2022-12-22 ENCOUNTER — OFFICE VISIT (OUTPATIENT)
Dept: INTERNAL MEDICINE CLINIC | Facility: CLINIC | Age: 62
End: 2022-12-22

## 2022-12-22 VITALS
OXYGEN SATURATION: 94 % | HEART RATE: 89 BPM | SYSTOLIC BLOOD PRESSURE: 124 MMHG | HEIGHT: 60 IN | DIASTOLIC BLOOD PRESSURE: 76 MMHG | BODY MASS INDEX: 28.07 KG/M2 | WEIGHT: 143 LBS

## 2022-12-22 DIAGNOSIS — M25.562 LEFT KNEE PAIN, UNSPECIFIED CHRONICITY: ICD-10-CM

## 2022-12-22 DIAGNOSIS — I10 PRIMARY HYPERTENSION: Primary | ICD-10-CM

## 2022-12-22 DIAGNOSIS — E55.9 VITAMIN D DEFICIENCY: ICD-10-CM

## 2022-12-22 DIAGNOSIS — R73.03 PREDIABETES: ICD-10-CM

## 2022-12-22 DIAGNOSIS — G81.91 RIGHT HEMIPARESIS (HCC): ICD-10-CM

## 2022-12-22 DIAGNOSIS — G35 MULTIPLE SCLEROSIS (HCC): ICD-10-CM

## 2022-12-22 NOTE — ASSESSMENT & PLAN NOTE
She feels her symptoms are a little worse today  She had a COVID-vaccine yesterday  Believe most likely related to her vaccination yesterday discussed that if symptoms worsen or do not improve over the next 24 hours she should be evaluated in the emergency department

## 2022-12-22 NOTE — ASSESSMENT & PLAN NOTE
Left lateral knee pain which she states started about a year ago however it has become worse over the last couple months  She states that she has been using over-the-counter topical pain medication  States at times the area will get warm and swollen  Will get x-ray  MRI to rule out meniscus tear  She has a history of falls and unsteady gait  Counseled on using ice and elevating  Will refer also to orthopedics for evaluation

## 2022-12-22 NOTE — PATIENT INSTRUCTIONS
Eucerin or Aveeno "eczema relief"  Vitamin D3 2000 units daily  Vitamin E oil    Connor hair and skin vitamins    Labs end of January

## 2022-12-22 NOTE — ASSESSMENT & PLAN NOTE
Follows with neurology  She reports that she is having some more difficulty ambulating and dropping items  Also having difficulty moving her right arm and opening her hand  She has follow-up with neurology next week

## 2022-12-22 NOTE — PROGRESS NOTES
[facility]    NAME: Tony Rob  AGE: 60 y o  SEX: female  : 1960     DATE: 2022     Assessment and Plan:     Problem List Items Addressed This Visit        Cardiovascular and Mediastinum    Hypertension - Primary     Blood pressure is stable continue current medication         Relevant Orders    CBC and differential    Comprehensive metabolic panel       Nervous and Auditory    Multiple sclerosis ([facility] [address] )     Follows with neurology  She reports that she is having some more difficulty ambulating and dropping items  Also having difficulty moving her right arm and opening her hand  She has follow-up with neurology next week  Right hemiparesis ([facility] [address] )     She feels her symptoms are a little worse today  She had a COVID-vaccine yesterday  Believe most likely related to her vaccination yesterday discussed that if symptoms worsen or do not improve over the next 24 hours she should be evaluated in the emergency department  Other    Left knee pain     Left lateral knee pain which she states started about a year ago however it has become worse over the last couple months  She states that she has been using over-the-counter topical pain medication  States at times the area will get warm and swollen  Will get x-ray  MRI to rule out meniscus tear  She has a history of falls and unsteady gait  Counseled on using ice and elevating  Will refer also to orthopedics for evaluation  Relevant Orders    MRI knee left  wo contrast    XR knee 3 vw left non injury    Ambulatory Referral to Orthopedic Surgery    Prediabetes     Last hemoglobin A1c was 6 0  States that she has decreased her carbohydrate intake  We will repeat labs prior to her next visit           Relevant Orders    CBC and differential    Comprehensive metabolic panel    Hemoglobin A1C   Other Visit Diagnoses     Vitamin D deficiency        Relevant Orders    Vitamin D 25 hydroxy              Return in about 3 months (around 3/22/2023) for Recheck  Chief Complaint:     Chief Complaint   Patient presents with   • Follow-up        History of Present Illness:     Altagracia Quinones presents to the office today for follow-up visit  She reports that she has increased weakness on her right side today  She also has pain in her bilateral lower extremities  She states she got a COVID-vaccine yesterday  Believe symptoms are most likely related to vaccine  Discussed with her that if symptoms worsen she should be evaluated in the emergency department  Encouraged her to increase her fluid intake  She also reports left lateral knee pain  She states has been going on for about a year but getting worse recently  She states at times the outer knee gets warm and swollen  She does have a history of falls and unsteady gait with right-sided weakness  Uses a cane  She has been using over-the-counter topical pain medication for it  Review of Systems:     Review of Systems   Constitutional: Negative  HENT: Negative  Cardiovascular: Negative  Gastrointestinal: Negative  Musculoskeletal: Positive for arthralgias (left knee), gait problem and myalgias  Skin:        Dry skin   Neurological: Positive for weakness and headaches  Psychiatric/Behavioral: Negative           Problem List:     Patient Active Problem List   Diagnosis   • Trigger ring finger of left hand   • S/P trigger finger release   • Ambulatory dysfunction   • Chronic right-sided headaches   • Multiple sclerosis (HCC)   • Panniculitis   • Mesenteric panniculitis (HCC)   • Right low back pain   • Skin rash   • Right hemiparesis (HCC)   • Other spondylosis with radiculopathy, lumbar region   • COPD (chronic obstructive pulmonary disease) (HCC)   • Hyperlipidemia with target LDL less than 160   • Hypertension   • Pneumonia due to COVID-19 virus   • Hypotension   • Edema of right lower extremity   • Cervical disc disorder at C6-C7 level with radiculopathy   • Encephalopathy due to severe acute respiratory syndrome coronavirus 2 (SARS-CoV-2)   • Word finding difficulty   • Slurred speech   • Adhesive capsulitis of left shoulder   • CINTHYA (obstructive sleep apnea)   • DVT, lower extremity, distal, acute, right (HCC)   • Depression   • Overactive bladder   • Left knee pain   • Prediabetes        Objective:     /76 (BP Location: Left arm, Patient Position: Sitting, Cuff Size: Standard)   Pulse 89   Ht 5' (1 524 m)   Wt 64 9 kg (143 lb)   SpO2 94%   BMI 27 93 kg/m²     Physical Exam  Constitutional:       Appearance: Normal appearance  HENT:      Head: Normocephalic and atraumatic  Right Ear: Tympanic membrane normal       Left Ear: Tympanic membrane normal       Nose: Nose normal       Mouth/Throat:      Mouth: Mucous membranes are moist       Pharynx: Oropharynx is clear  Eyes:      Conjunctiva/sclera: Conjunctivae normal       Pupils: Pupils are equal, round, and reactive to light  Cardiovascular:      Rate and Rhythm: Normal rate and regular rhythm  Pulses: Normal pulses  Heart sounds: Normal heart sounds  No murmur heard  Pulmonary:      Effort: Pulmonary effort is normal  No respiratory distress  Breath sounds: Normal breath sounds  Abdominal:      General: There is no distension  Palpations: Abdomen is soft  Musculoskeletal:         General: Tenderness (lateral left knee) present  No swelling or deformity  Right lower leg: No edema  Left lower leg: No edema  Skin:     General: Skin is warm and dry  Capillary Refill: Capillary refill takes less than 2 seconds  Neurological:      General: No focal deficit present  Mental Status: She is alert and oriented to person, place, and time  Motor: Weakness (Right sided- chronic) present        Coordination: Coordination abnormal       Gait: Gait abnormal    Psychiatric:         Mood and Affect: Mood normal  Behavior: Behavior normal          Thought Content: Thought content normal          Judgment: Judgment normal          I spent 25 minutes with this patient      [address] [place] [address]  [facility]

## 2022-12-22 NOTE — ASSESSMENT & PLAN NOTE
Last hemoglobin A1c was 6 0  States that she has decreased her carbohydrate intake  We will repeat labs prior to her next visit

## 2022-12-29 ENCOUNTER — OFFICE VISIT (OUTPATIENT)
Dept: NEUROLOGY | Facility: CLINIC | Age: 62
End: 2022-12-29

## 2022-12-29 VITALS
HEART RATE: 82 BPM | WEIGHT: 144.4 LBS | OXYGEN SATURATION: 95 % | SYSTOLIC BLOOD PRESSURE: 130 MMHG | BODY MASS INDEX: 28.2 KG/M2 | RESPIRATION RATE: 16 BRPM | TEMPERATURE: 97.9 F | DIASTOLIC BLOOD PRESSURE: 60 MMHG

## 2022-12-29 DIAGNOSIS — R25.2 SPASMS OF THE HANDS OR FEET: ICD-10-CM

## 2022-12-29 DIAGNOSIS — R26.89 IMBALANCE: ICD-10-CM

## 2022-12-29 DIAGNOSIS — G81.91 RIGHT HEMIPARESIS (HCC): ICD-10-CM

## 2022-12-29 DIAGNOSIS — G35 MULTIPLE SCLEROSIS (HCC): Primary | ICD-10-CM

## 2022-12-29 NOTE — PATIENT INSTRUCTIONS
Referral to occupational therapy for right hand spasm  Referral to physical therapy for balance and gait training  Schedule with PM&R  Complete updated MRIs (Brain and Cervical spine); labs beforehand  Follow up in 4 months

## 2022-12-29 NOTE — PROGRESS NOTES
Patient ID: Adelina García is a 60 y o  female  Assessment/Plan:    Multiple sclerosis ([facility] )  Adelina García is a 60year old female known to the practice for multiple sclerosis and related concerns  She is not currently maintained on any disease modifying therapy due to her age and immunosenescence, also in the setting of chronic inflammation related to hx of mesenteric panniculitis  She states overall she is having more difficulty with right upper extremity spasticity/spasms, and falls related to imbalance, and lower extremity weakness, worse in the last 1 week after receiving her covid-19 booster vaccine  She also describes new numbness and tingling in the right lower extremity transiently, with reported new sensory deficits in the right lower extremity on exam today  Motor testing appears stable compared to prior exams  We discussed the etiology for her recent acute worsening of symptoms s/p covid-19 booster could be related to possible multiple sclerosis relapse triggered by her vaccine vs pseudo relapse related to possible viral illness (she reported low grade fever and URI symptoms s/p vaccine)  Will hold on treating with IV steroids considering the possibility of current infection  Instead will proceed with updated MRI Brain and C-Spine to evaluate for acute demyelination  I re-iterated the importance of following up with physical therapy, occupational therapy and physiatry for her right hand spasms, balance and gait training  I provided her new referrals and she is agreeable to scheduling  She will follow up in 4 months, sooner if needed        Plan:  Referral to occupational therapy for right hand spasm  Referral to physical therapy for balance and gait training  Schedule follow up with PM&R; past due for follow up requested 9/2022  Complete updated MRIs (Brain and Cervical spine); labs beforehand  Follow up in 4 months       Diagnoses and all orders for this visit:    Multiple sclerosis ([facility] )  - Ambulatory Referral to Physiatry  -     MRI brain with and without contrast; Future  -     MRI cervical spine with and without contrast; Future  -     Ambulatory referral to Occupational Therapy; Future  -     Ambulatory referral to Physical Therapy; Future  -     BUN; Future  -     Creatinine, serum; Future    Right hemiparesis ([facility] )  -     Ambulatory Referral to Physiatry  -     Ambulatory referral to Occupational Therapy; Future  -     Ambulatory referral to Physical Therapy; Future    Spasms of the hands or feet  -     Ambulatory Referral to Physiatry  -     Ambulatory referral to Occupational Therapy; Future    Imbalance  -     Ambulatory referral to Physical Therapy; Future         I have spent a total of 45 minutes in face to face time and chart review with this patient today  Subjective:    DAWIT Lisandra Betts is a 60year old female known to the practice for multiple sclerosis and related concerns  She was last seen by Dr Alroy Hamman 9/27/22  Since that time she has followed up with sleep medicine and has obtained and started treatment with CPAP  Since doing so, she is sleeping more soundly and is having less headaches  She is not currently maintained on any disease modifying therapy due to her age and immunosenescence, also in the setting of chronic inflammation related to hx of mesenteric panniculitis  She states overall she is having more difficulty with right upper extremity spasticity, and falls related to imbalance, and lower extremity weakness, worse in the last 1 week after receiving her covid-19 booster vaccine  Since then she has been running low grade fevers  3 and has had cold like symptoms including sneezing, coughing, chills, and congestion  She explains she has been dropping dishes and other items more frequently  She continues to use a cane for ambulation  She also uses a motorized scooter and has needed to use this more often at home recently   She suffered a fall in 10/2022 that resulted in a metatarsal fracture, follows with ortho for this  She describes new numbness/tingling in the right lower extremity intermittently lasting 20 minutes at a time  It occurs mostly when she is sitting and is relieved with position change and stretching to some degree but is not completed alleviated  She continues to use baclofen 20 mg QID and Gabapentin 600 mg bid and 900 mg qhs  Since her headaches have improved, she is using Fioricet no more than 1-2 times per month and otherwise is only using Tylenol  She has Decadron, Ketorolac, and Depakote as needed for headaches on hand but has not needed to use them recently  She has followed up with Urology for her urinary incontinence however has been unable to make appointments for physical therapy or physiatry due to her  currently undergoing prostate cancer treatment  Review of most recent imaging:    [facility] 10/14/22- no acute intracranial abnormality  MRI Brain 9/2021- Stable white matter lesions  No evidence of active demyelination  MRI T-Spine 10/17/2017- no cord lesions  MRI C-spine 7/7/2017: Stable cord lesion within the right anterolateral aspect of the cord at the level of C2 measuring 1 7 cm in craniocaudad dimension when compared to prior examination  No cord expansion or abnormal enhancement  Stable degenerative disc disease most pronounced at C5-6  Correlate for left C6 radiculopathy    The following portions of the patient's history were reviewed and updated as appropriate: allergies, current medications, past family history, past medical history, past social history and past surgical history  Objective:    Blood pressure 130/60, pulse 82, temperature 97 9 °F (36 6 °C), temperature source Temporal, resp  rate 16, weight 65 5 kg (144 lb 6 4 oz), SpO2 95 %, not currently breastfeeding  Neurological Exam     On neurological examination patient is alert, awake, oriented and in no distress   Speech is fluent without dysarthria or aphasia  Cranial nerves 2-12 were symmetrically intact bilaterally  Motor testing reveals 5/5 strength of the bilateral upper and lower extremities, with the exception of RUE 4/5 triceps/biceps, and RLE 4-/5 hip flexion and knee extension, 2/5 RLE dorsiflexion  There was no pronator drift  No fasciculations present  No abnormal involuntary movements  Finger- to-nose reveals no tremor or ataxia and intact proprioceptive function, no dysmetria was noted  Rapid alternating movement normal  Sensation was intact to vibration, light touch, and temperature in bilateral upper and lower extremities, with the exception of diminished sensation to vibration, light touch and temperature in the RUE/RLE compared to the LUE/LLE  Deep tendon reflexes were 2+ and symmetric in the bilateral upper and lower extremities  She is able to rise with assistance from a seated position  Casual gait is hemiparetic, she does not bend at the knee on the right and there is diminished dorsiflexion with ambulation in the RLE  She has a RLE AFO however she is not wearing this today  Romberg is negative  Tandem gait is deferred 2/2 safety  ROS:    Review of Systems   Constitutional: Negative  Negative for appetite change and fever  HENT: Negative  Negative for hearing loss, tinnitus, trouble swallowing and voice change  Eyes: Negative  Negative for photophobia, pain and visual disturbance  Respiratory: Negative  Negative for shortness of breath  Cardiovascular: Negative  Negative for palpitations  Gastrointestinal: Negative  Negative for nausea and vomiting  Endocrine: Negative  Negative for cold intolerance  Genitourinary: Negative  Negative for dysuria, frequency and urgency  Musculoskeletal: Positive for gait problem  Negative for myalgias and neck pain  Skin: Negative  Negative for rash  Allergic/Immunologic: Negative  Neurological: Positive for dizziness, speech difficulty, weakness and light-headedness  Negative for tremors, seizures, syncope, facial asymmetry, numbness and headaches  Hematological: Negative  Does not bruise/bleed easily  Psychiatric/Behavioral: Negative  Negative for confusion, hallucinations and sleep disturbance  Reviewed ROS as entered by medical assistant

## 2022-12-30 NOTE — ASSESSMENT & PLAN NOTE
Adelina García is a 60year old female known to the practice for multiple sclerosis and related concerns  She is not currently maintained on any disease modifying therapy due to her age and immunosenescence, also in the setting of chronic inflammation related to hx of mesenteric panniculitis  She states overall she is having more difficulty with right upper extremity spasticity/spasms, and falls related to imbalance, and lower extremity weakness, worse in the last 1 week after receiving her covid-19 booster vaccine  She also describes new numbness and tingling in the right lower extremity transiently, with reported new sensory deficits in the right lower extremity on exam today  Motor testing appears stable compared to prior exams  We discussed the etiology for her recent acute worsening of symptoms s/p covid-19 booster could be related to possible multiple sclerosis relapse triggered by her vaccine vs pseudo relapse related to possible viral illness (she reported low grade fever and URI symptoms s/p vaccine)  Will hold on treating with IV steroids considering the possibility of current infection  Instead will proceed with updated MRI Brain and C-Spine to evaluate for acute demyelination  I re-iterated the importance of following up with physical therapy, occupational therapy and physiatry for her right hand spasms, balance and gait training  I provided her new referrals and she is agreeable to scheduling  She will follow up in 4 months, sooner if needed        Plan:  Referral to occupational therapy for right hand spasm  Referral to physical therapy for balance and gait training  Schedule follow up with PM&R; past due for follow up requested 9/2022  Complete updated MRIs (Brain and Cervical spine); labs beforehand  Follow up in 4 months

## 2023-01-03 ENCOUNTER — HOSPITAL ENCOUNTER (OUTPATIENT)
Dept: RADIOLOGY | Facility: MEDICAL CENTER | Age: 63
Discharge: HOME | End: 2023-01-03

## 2023-01-03 DIAGNOSIS — R10.9 FLANK PAIN: ICD-10-CM

## 2023-01-03 DIAGNOSIS — R05.9 COUGH, UNSPECIFIED TYPE: Primary | ICD-10-CM

## 2023-01-16 ENCOUNTER — OFFICE VISIT (OUTPATIENT)
Dept: UROLOGY | Facility: CLINIC | Age: 63
End: 2023-01-16

## 2023-01-16 VITALS
WEIGHT: 144 LBS | HEIGHT: 60 IN | SYSTOLIC BLOOD PRESSURE: 120 MMHG | DIASTOLIC BLOOD PRESSURE: 72 MMHG | HEART RATE: 71 BPM | BODY MASS INDEX: 28.27 KG/M2 | OXYGEN SATURATION: 93 %

## 2023-01-16 DIAGNOSIS — N31.9 NEUROGENIC BLADDER: Primary | ICD-10-CM

## 2023-01-16 DIAGNOSIS — N32.81 OAB (OVERACTIVE BLADDER): ICD-10-CM

## 2023-01-16 LAB — POST-VOID RESIDUAL VOLUME, ML POC: 6 ML

## 2023-01-16 NOTE — PROGRESS NOTES
1/16/2023      Chief Complaint   Patient presents with   • Follow-up     Assessment and Plan    1  OAB/NGB  - Currently managed on Oxybutynin 10 mg daily with ongoing symptoms    - Previously failed Detrol LA  -Doing well on Myrbetriq 25 mg daily   -Should her symptoms worsen discussed increasing Myrbetriq to 50 mg daily and/or proceeding with repeat work-up with cystoscopy and UDS and consider of bladder botox vs PTNS     2  Hx of nephrolithiasis   - C/o intermittent mild right lower quadrant pain and right lower back pain x months   - CT renal stone study from 1/3/23 - No urinary tract calculi or hydronephrosis  No acute CT findings to account for the patient's right flank/lower abdominal pain  - Probable MSK etiology in regards to back pain and recommend f/u with PCP    -As long as she remains doing well she will follow-up in 1 year for symptom reassessment  History of Present Illness  Mirella Villeda Rikki Tinoco is a 60 y o  female here for follow up evaluation of lower urinary tract symptoms and history of kidney stones  She previously saw our practice in 2018  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  She had been managed on Detrol 4 mg daily  Recently this has not been working  She was then managed on oxybutynin 10 mg daily with only minimal benefit  At last visit she was switched to Myrbetriq 25 mg daily  She reports seeing a urologist approximately 10 years ago and had work-up including what sounds like cystoscopy as well as urodynamic testing at that time  Denies any prior  surgical manipulation  No family history of  malignancy  No smoking history  Reports good benefit with the Myrbetriq  Denies any adverse side effects  She also does have prior history of kidney stones with spontaneous expulsion  Complained of some intermittent mild right lower abdominal and back pain at last visit  CT obtained was negative for any kidney stones or obstruction    Today she states pain has resolved  Review of Systems   Constitutional: Negative for chills and fever  Respiratory: Negative for shortness of breath  Cardiovascular: Negative for chest pain  Gastrointestinal: Negative for abdominal pain  Genitourinary: Positive for frequency and urgency  Negative for difficulty urinating, dysuria, flank pain and hematuria  Neurological: Negative for dizziness  Past Medical History  Past Medical History:   Diagnosis Date   • Asthma    • Cardiac disease     heart murmur   • COPD (chronic obstructive pulmonary disease) ([facility] )    • Depression    • Diabetes mellitus ([facility] )    • Fibroid    • Hyperlipidemia    • Hypertension    • Influenza     December 2017   • Irregular heart beat    • Kidney stone    • Migraine    • Migraine    • MS (multiple sclerosis) (HCC)    • Multiple sclerosis (HCC)    • Nephrolithiasis    • Neurogenic bladder    • Panniculitis    • Sleep apnea, obstructive    • Urinary incontinence        Past Social History  Past Surgical History:   Procedure Laterality Date   • COLONOSCOPY     • DILATION AND CURETTAGE OF UTERUS     • SD COLONOSCOPY FLX DX W/COLLJ SPEC WHEN PFRMD N/A 1/11/2018    Procedure: COLONOSCOPY;  Surgeon: Mario Morrison MD;  Location: [facility];   Service: Gastroenterology   • SD TENDON SHEATH INCISION Left 1/17/2018    Procedure: RING TRIGGER FINGER RELEASE;  Surgeon: Kirsten Selby MD;  Location: [facility] OR;  Service: Orthopedics   • TONSILLECTOMY     • UTERINE FIBROID SURGERY       Social History     Tobacco Use   Smoking Status Never   Smokeless Tobacco Never       Past Family History  Family History   Problem Relation Age of Onset   • Stroke Mother    • Diabetes Mother    • Hypertension Mother    • Emphysema Mother    • Heart disease Father    • Diabetes Father    • Hypertension Father    • Diabetes Sister    • Sleep apnea Sister    • No Known Problems Maternal Grandmother    • No Known Problems Paternal Grandmother    • Hypertension Brother    • Graves' disease Brother    • Breast cancer Maternal Aunt 68   • Cancer Maternal Aunt    • Stomach cancer Maternal Aunt    • Sleep apnea Maternal Aunt    • Cancer Maternal Aunt    • No Known Problems Maternal Aunt    • No Known Problems Maternal Aunt    • No Known Problems Maternal Aunt    • No Known Problems Maternal Aunt        Past Social history  Social History     Socioeconomic History   • Marital status: /Civil Union     Spouse name: Not on file   • Number of children: Not on file   • Years of education: Not on file   • Highest education level: Not on file   Occupational History   • Not on file   Tobacco Use   • Smoking status: Never   • Smokeless tobacco: Never   Vaping Use   • Vaping Use: Never used   Substance and Sexual Activity   • Alcohol use: Not Currently     Comment: social   • Drug use: Never     Types: Marijuana     Comment: last use 4 days ago  • Sexual activity: Yes     Partners: Male     Birth control/protection: Post-menopausal   Other Topics Concern   • Not on file   Social History Narrative   • Not on file     Social Determinants of Health     Financial Resource Strain: Not on file   Food Insecurity: Not on file   Transportation Needs: Not on file   Physical Activity: Not on file   Stress: Not on file   Social Connections: Not on file   Intimate Partner Violence: Not on file   Housing Stability: Not on file       Current Medications  Current Outpatient Medications   Medication Sig Dispense Refill   • albuterol (2 5 mg/3 mL) 0 083 % nebulizer solution Albuterol Sulfate (2 5 MG/3ML) 0 083% Inhalation Nebulization Solution  USE 1 UNIT DOSE IN NEBULIZER EVERY 6 HOURS AS NEEDED     Quantity: 1;  Refills: 5       Cony GARCIA ;  Started 12-July-2016  Active3 ML Plas Cont (125 Plas Conts)     • albuterol (PROVENTIL HFA,VENTOLIN HFA) 90 mcg/act inhaler INHALE 2 PUFFS EVERY 6 HOURS AS NEEDED FOR WHEEZING 8 5 g 3   • aspirin 81 MG tablet Aspirin 81 MG TABS  Take 1 tablet daily   Refills: 0    Active     • atorvastatin (LIPITOR) 40 mg tablet Take 1 tablet (40 mg total) by mouth once at bedtime 90 tablet 0   • baclofen 20 mg tablet Take 1 tablet (20 mg total) by mouth 4 (four) times a day 360 tablet 2   • butalbital-acetaminophen-caffeine (FIORICET,ESGIC) -40 mg per tablet Take 1 tablet by mouth every 4 (four) hours as needed for headaches 12 tablet 4   • divalproex sodium (Depakote ER) 250 mg 24 hr tablet Take 2 tabs for 3 days, then 1 tab for 2 days 8 tablet 1   • EPINEPHrine (EPIPEN) 0 3 mg/0 3 mL SOAJ Inject 0 3 mL (0 3 mg total) into a muscle if needed for anaphylaxis 1 each 1   • fluticasone (FLONASE) 50 mcg/act nasal spray 2 sprays into each nostril daily 1 Bottle 3   • gabapentin (NEURONTIN) 300 mg capsule Take 2 caps in am and noon and 3 caps  capsule 2   • metFORMIN (GLUCOPHAGE-XR) 500 mg 24 hr tablet Take 1 tablet (500 mg total) by mouth daily with breakfast 90 tablet 0   • NIFEdipine ER (ADALAT CC) 60 MG 24 hr tablet Take 1 tablet (60 mg total) by mouth daily 90 tablet 0   • sertraline (ZOLOFT) 50 mg tablet Takes 1 tablet in am and hs      • Wixela Inhub 250-50 MCG/ACT inhaler Inhale 1 puff 2 (two) times a day Rinse mouth after use  180 blister 1   • dexamethasone (DECADRON) 2 mg tablet Take 1 tablet (2 mg total) by mouth daily as needed (headache) 5 tablet 2   • Diclofenac Sodium (VOLTAREN) 1 % Apply 2 g topically 4 (four) times a day as needed (left hip pain) 150 g 1   • Diclofenac Sodium (VOLTAREN) 1 % Apply 2 g topically 4 (four) times a day (Patient not taking: Reported on 12/29/2022) 150 g 1   • ketorolac (TORADOL) 10 mg tablet Take 1 tablet (10 mg total) by mouth every 6 (six) hours as needed for moderate pain 10 tablet 3   • Mirabegron ER 25 MG TB24 Take 25 mg by mouth daily 30 tablet 2     No current facility-administered medications for this visit         Allergies  Allergies   Allergen Reactions   • Nuts - Food Allergy Shortness Of Breath and Anaphylaxis   • Other Anaphylaxis, Hives and Itching     [facility] - [id number]: POISON IVY   walnuts & cashews  Cats   • Peanut Oil - Food Allergy      Hives anaphylaxis   • Shellfish Allergy - Food Allergy Shortness Of Breath and Swelling   • Shellfish-Derived Products - Food Allergy Shortness Of Breath and Swelling   • Sumatriptan Headache and Other (See Comments)     Other reaction(s): Headache   • Copaxone [Glatiramer Acetate] Hives   • Cat Hair Extract    • Cephalexin Rash     Pt says they took keflex again and didn't get a reaction from it  Pt states they believe it was something they ate that day instead  • Glatiramer Rash     Other reaction(s): rash   • Pollen Extract Sneezing   • Seasonal Ic [Cholestatin]          The following portions of the patient's history were reviewed and updated as appropriate: allergies, current medications, past medical history, past social history, past surgical history and problem list       Vitals  Vitals:    01/16/23 0912   BP: 120/72   BP Location: Left arm   Patient Position: Sitting   Cuff Size: Adult   Pulse: 71   SpO2: 93%   Weight: 65 3 kg (144 lb)   Height: 5' (1 524 m)           Physical Exam  Physical Exam  Constitutional:       Appearance: Normal appearance  HENT:      Head: Normocephalic and atraumatic  Right Ear: External ear normal       Left Ear: External ear normal       Nose: Nose normal    Eyes:      General: No scleral icterus  Conjunctiva/sclera: Conjunctivae normal    Cardiovascular:      Pulses: Normal pulses  Pulmonary:      Effort: Pulmonary effort is normal    Musculoskeletal:      Cervical back: Normal range of motion  Comments: Ambulates with cane   Neurological:      General: No focal deficit present  Mental Status: She is alert and oriented to person, place, and time  Psychiatric:         Mood and Affect: Mood normal          Behavior: Behavior normal          Thought Content:  Thought content normal          Judgment: Judgment normal            Results  Recent Results (from the past 1 hour(s))   POCT Measure PVR    Collection Time: 01/16/23  9:22 AM   Result Value Ref Range    POST-VOID RESIDUAL VOLUME, ML POC 6 mL   ]  No results found for: PSA  Lab Results   Component Value Date    CALCIUM 9 1 10/17/2022     12/21/2017    K 3 5 10/17/2022    CO2 26 10/17/2022     10/17/2022    BUN 10 10/17/2022    CREATININE 0 70 10/17/2022     Lab Results   Component Value Date    WBC 5 7 10/17/2022    HGB 13 6 10/17/2022    HCT 41 5 10/17/2022    MCV 87 0 10/17/2022     10/17/2022           Orders  Orders Placed This Encounter   Procedures   • POCT Measure PVR       Arcenio Felder

## 2023-01-24 ENCOUNTER — TELEPHONE (OUTPATIENT)
Dept: INTERNAL MEDICINE CLINIC | Facility: CLINIC | Age: 63
End: 2023-01-24

## 2023-01-24 LAB
25(OH)D3 SERPL-MCNC: 56 NG/ML (ref 30–100)
ALBUMIN SERPL-MCNC: 4.3 G/DL (ref 3.6–5.1)
ALBUMIN/GLOB SERPL: 1.7 (CALC) (ref 1–2.5)
ALP SERPL-CCNC: 123 U/L (ref 37–153)
ALT SERPL-CCNC: 25 U/L (ref 6–29)
AST SERPL-CCNC: 19 U/L (ref 10–35)
BASOPHILS # BLD AUTO: 33 CELLS/UL (ref 0–200)
BASOPHILS NFR BLD AUTO: 0.5 %
BILIRUB SERPL-MCNC: 0.5 MG/DL (ref 0.2–1.2)
BUN SERPL-MCNC: 12 MG/DL (ref 7–25)
BUN/CREAT SERPL: NORMAL (CALC) (ref 6–22)
CALCIUM SERPL-MCNC: 9.4 MG/DL (ref 8.6–10.4)
CHLORIDE SERPL-SCNC: 106 MMOL/L (ref 98–110)
CO2 SERPL-SCNC: 29 MMOL/L (ref 20–32)
CREAT SERPL-MCNC: 0.67 MG/DL (ref 0.5–1.05)
EOSINOPHIL # BLD AUTO: 119 CELLS/UL (ref 15–500)
EOSINOPHIL NFR BLD AUTO: 1.8 %
ERYTHROCYTE [DISTWIDTH] IN BLOOD BY AUTOMATED COUNT: 12.4 % (ref 11–15)
GFR/BSA.PRED SERPLBLD CYS-BASED-ARV: 98 ML/MIN/1.73M2
GLOBULIN SER CALC-MCNC: 2.5 G/DL (CALC) (ref 1.9–3.7)
GLUCOSE SERPL-MCNC: 92 MG/DL (ref 65–99)
HBA1C MFR BLD: 6.2 % OF TOTAL HGB
HCT VFR BLD AUTO: 40.6 % (ref 35–45)
HGB BLD-MCNC: 13.6 G/DL (ref 11.7–15.5)
LYMPHOCYTES # BLD AUTO: 3095 CELLS/UL (ref 850–3900)
LYMPHOCYTES NFR BLD AUTO: 46.9 %
MCH RBC QN AUTO: 28.3 PG (ref 27–33)
MCHC RBC AUTO-ENTMCNC: 33.5 G/DL (ref 32–36)
MCV RBC AUTO: 84.4 FL (ref 80–100)
MONOCYTES # BLD AUTO: 495 CELLS/UL (ref 200–950)
MONOCYTES NFR BLD AUTO: 7.5 %
NEUTROPHILS # BLD AUTO: 2858 CELLS/UL (ref 1500–7800)
NEUTROPHILS NFR BLD AUTO: 43.3 %
PLATELET # BLD AUTO: 283 THOUSAND/UL (ref 140–400)
PMV BLD REES-ECKER: 11 FL (ref 7.5–12.5)
POTASSIUM SERPL-SCNC: 3.7 MMOL/L (ref 3.5–5.3)
PROT SERPL-MCNC: 6.8 G/DL (ref 6.1–8.1)
RBC # BLD AUTO: 4.81 MILLION/UL (ref 3.8–5.1)
SODIUM SERPL-SCNC: 143 MMOL/L (ref 135–146)
WBC # BLD AUTO: 6.6 THOUSAND/UL (ref 3.8–10.8)

## 2023-01-24 NOTE — TELEPHONE ENCOUNTER
----- Message from Jeffy Morris DO sent at 1/24/2023  7:17 AM EST -----  Labs look good though A1c still in pre-diabetic range  A1c went from 6 0 to 6 2%  Continue watching carbohydrate intake  Recommend higher protein, low carb diet

## 2023-03-02 ENCOUNTER — APPOINTMENT (OUTPATIENT)
Dept: RADIOLOGY | Facility: CLINIC | Age: 63
End: 2023-03-02

## 2023-03-02 DIAGNOSIS — M25.562 LEFT KNEE PAIN, UNSPECIFIED CHRONICITY: ICD-10-CM

## 2023-03-02 DIAGNOSIS — R05.9 COUGH, UNSPECIFIED TYPE: ICD-10-CM

## 2023-03-06 ENCOUNTER — TELEPHONE (OUTPATIENT)
Dept: INTERNAL MEDICINE CLINIC | Facility: CLINIC | Age: 63
End: 2023-03-06
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
## 2023-03-06 DIAGNOSIS — N32.81 OAB (OVERACTIVE BLADDER): ICD-10-CM

## 2023-03-06 DIAGNOSIS — N31.9 NEUROGENIC BLADDER: Primary | ICD-10-CM

## 2023-03-06 DIAGNOSIS — N31.9 NEUROGENIC BLADDER: ICD-10-CM

## 2023-03-06 RX ORDER — MIRABEGRON 25 MG/1
TABLET, FILM COATED, EXTENDED RELEASE ORAL
Qty: 90 TABLET | Refills: 3 | Status: SHIPPED | OUTPATIENT
Start: 2023-03-06 | End: 2023-03-06

## 2023-03-06 RX ORDER — OXYBUTYNIN CHLORIDE 10 MG/1
10 TABLET, EXTENDED RELEASE ORAL
Qty: 90 TABLET | Refills: 3 | Status: SHIPPED | OUTPATIENT
Start: 2023-03-06

## 2023-03-06 NOTE — TELEPHONE ENCOUNTER
Patient notified         ----- Message from Melissa Myers DO sent at 3/5/2023  5:12 PM EST -----  X-ray left knee shows evidence of mild arthritis   Follow-up with orthopedics as scheduled

## 2023-03-07 ENCOUNTER — HOSPITAL ENCOUNTER (OUTPATIENT)
Dept: RADIOLOGY | Facility: HOSPITAL | Age: 63
Discharge: HOME | End: 2023-03-07

## 2023-03-07 ENCOUNTER — HOSPITAL ENCOUNTER (OUTPATIENT)
Dept: MRI IMAGING | Facility: HOSPITAL | Age: 63
Discharge: HOME | End: 2023-03-07

## 2023-03-07 DIAGNOSIS — G35 MULTIPLE SCLEROSIS (HCC): ICD-10-CM
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
## 2023-03-07 RX ADMIN — GADOBUTROL 6 ML: 604.72 INJECTION INTRAVENOUS at 13:50

## 2023-03-12 DIAGNOSIS — E78.5 HYPERLIPIDEMIA, UNSPECIFIED HYPERLIPIDEMIA TYPE: ICD-10-CM

## 2023-03-12 RX ORDER — ATORVASTATIN CALCIUM 40 MG/1
40 TABLET, FILM COATED ORAL
Qty: 90 TABLET | Refills: 0 | Status: SHIPPED | OUTPATIENT
Start: 2023-03-12

## 2023-03-13 ENCOUNTER — TELEPHONE (OUTPATIENT)
Dept: INTERNAL MEDICINE CLINIC | Facility: CLINIC | Age: 63
End: 2023-03-13

## 2023-03-13 NOTE — TELEPHONE ENCOUNTER
----- Message from Rafi Paige DO sent at 3/11/2023  8:57 AM EST -----  X-ray shows healed fracture of the pinky toe  No acute findings   There is also some degenerative arthritis of the big toe

## 2023-03-14 ENCOUNTER — TELEPHONE (OUTPATIENT)
Dept: INTERNAL MEDICINE CLINIC | Facility: CLINIC | Age: 63
End: 2023-03-14

## 2023-03-14 DIAGNOSIS — M79.89 PAIN AND SWELLING OF TOE OF RIGHT FOOT: Primary | ICD-10-CM

## 2023-03-14 DIAGNOSIS — M79.674 PAIN AND SWELLING OF TOE OF RIGHT FOOT: Primary | ICD-10-CM

## 2023-03-14 DIAGNOSIS — G89.29 CHRONIC PAIN OF LEFT KNEE: ICD-10-CM

## 2023-03-14 DIAGNOSIS — M25.562 CHRONIC PAIN OF LEFT KNEE: ICD-10-CM

## 2023-03-16 DIAGNOSIS — I10 PRIMARY HYPERTENSION: ICD-10-CM

## 2023-03-16 DIAGNOSIS — G35 MULTIPLE SCLEROSIS (HCC): ICD-10-CM

## 2023-03-16 DIAGNOSIS — M54.50 RIGHT LOW BACK PAIN, UNSPECIFIED CHRONICITY, UNSPECIFIED WHETHER SCIATICA PRESENT: ICD-10-CM

## 2023-03-16 RX ORDER — BACLOFEN 20 MG/1
TABLET ORAL
Qty: 360 TABLET | Refills: 2 | Status: SHIPPED | OUTPATIENT
Start: 2023-03-16

## 2023-03-16 RX ORDER — NIFEDIPINE 60 MG/1
TABLET, FILM COATED, EXTENDED RELEASE ORAL
Qty: 90 TABLET | Refills: 0 | Status: SHIPPED | OUTPATIENT
Start: 2023-03-16

## 2023-03-22 ENCOUNTER — OFFICE VISIT (OUTPATIENT)
Dept: OBGYN CLINIC | Facility: HOSPITAL | Age: 63
End: 2023-03-22

## 2023-03-22 VITALS
SYSTOLIC BLOOD PRESSURE: 123 MMHG | HEIGHT: 60 IN | WEIGHT: 142 LBS | BODY MASS INDEX: 27.88 KG/M2 | HEART RATE: 72 BPM | DIASTOLIC BLOOD PRESSURE: 71 MMHG

## 2023-03-22 DIAGNOSIS — M25.562 LEFT KNEE PAIN, UNSPECIFIED CHRONICITY: ICD-10-CM

## 2023-03-22 DIAGNOSIS — S83.207A POSITIVE MCMURRAY TEST OF LEFT KNEE, INITIAL ENCOUNTER: ICD-10-CM

## 2023-03-22 DIAGNOSIS — M19.071 OSTEOARTHRITIS OF FIRST METATARSOPHALANGEAL (MTP) JOINT OF RIGHT FOOT: ICD-10-CM

## 2023-03-22 DIAGNOSIS — M25.571 ARTHRALGIA OF TOE OF RIGHT FOOT: ICD-10-CM

## 2023-03-22 DIAGNOSIS — M23.92 INTERNAL DERANGEMENT OF LEFT KNEE: Primary | ICD-10-CM

## 2023-03-22 RX ORDER — BUPIVACAINE HYDROCHLORIDE 2.5 MG/ML
0.5 INJECTION, SOLUTION INFILTRATION; PERINEURAL
Status: COMPLETED | OUTPATIENT
Start: 2023-03-22 | End: 2023-03-22

## 2023-03-22 RX ORDER — BETAMETHASONE SODIUM PHOSPHATE AND BETAMETHASONE ACETATE 3; 3 MG/ML; MG/ML
3 INJECTION, SUSPENSION INTRA-ARTICULAR; INTRALESIONAL; INTRAMUSCULAR; SOFT TISSUE
Status: COMPLETED | OUTPATIENT
Start: 2023-03-22 | End: 2023-03-22

## 2023-03-22 RX ADMIN — BUPIVACAINE HYDROCHLORIDE 0.5 ML: 2.5 INJECTION, SOLUTION INFILTRATION; PERINEURAL at 14:49

## 2023-03-22 RX ADMIN — BETAMETHASONE SODIUM PHOSPHATE AND BETAMETHASONE ACETATE 3 MG: 3; 3 INJECTION, SUSPENSION INTRA-ARTICULAR; INTRALESIONAL; INTRAMUSCULAR; SOFT TISSUE at 14:49

## 2023-03-22 NOTE — PROGRESS NOTES
Assessment:  1  Internal derangement of left knee  MRI knee left  wo contrast      2  Left knee pain, unspecified chronicity  Ambulatory Referral to Orthopedic Surgery    MRI knee left  wo contrast      3  Positive Annette test of left knee, initial encounter  MRI knee left  wo contrast      4  Arthralgia of toe of right foot        5  Osteoarthritis of first metatarsophalangeal (MTP) joint of right foot            Plan:  Suspect left knee meniscus tear  The patient presents for left knee pain  Due to nature of symptoms, findings on exam and lack of diagnostic findings on x-ray we will be ordering a left knee MRI to evaluate for meniscal pathology  The patient should complete the MRI and follow up afterward  Right milton toe MTP joint arthralgia  The patient likely had bony collision aggravating 1st MTP joint  The patient was provided with right 1st MTP joint steroid injection  The patient tolerated the procedure well  To do next visit:  Return for left knee MRI review   The above stated was discussed in layman's terms and the patient expressed understanding  All questions were answered to the patient's satisfaction  Scribe Attestation    I,:  Robinson Friend am acting as a scribe while in the presence of the attending physician :       I,:  Dheeraj Alfaro MD personally performed the services described in this documentation    as scribed in my presence :             Subjective:   Frieda Barrera is a 58 y o  female who presents for initial evaluation of left knee  She is here from her PCP  She has had symptoms for about one year with no injury  Today she complains of left lateral knee pain  Kneeling aggravates while rest alleviates  She rates her pain at 3/10 and 8/10 at its worse  The knee can click, catch and feel unstable  She describes the symptom as sharp  She does use a cane for ambulation  She does use Baclofen and gabapentin for MS    She does use Voltaren gel with benefit  She denies past physical therapy, injections or surgery of this area  She also complains of right large toe pain following a fall down a few steps  Review of systems negative unless otherwise specified in HPI    Past Medical History:   Diagnosis Date   • Asthma    • Cardiac disease     heart murmur   • COPD (chronic obstructive pulmonary disease) ([facility])    • Depression    • Diabetes mellitus ([facility] [address] )    • Fibroid    • Hyperlipidemia    • Hypertension    • Influenza     December 2017   • Irregular heart beat    • Kidney stone    • Migraine    • Migraine    • MS (multiple sclerosis) ([facility])    • Multiple sclerosis (HCC)    • Nephrolithiasis    • Neurogenic bladder    • Panniculitis    • Sleep apnea, obstructive    • Urinary incontinence        Past Surgical History:   Procedure Laterality Date   • COLONOSCOPY     • DILATION AND CURETTAGE OF UTERUS     • WA COLONOSCOPY FLX DX W/COLLJ SPEC WHEN PFRMD N/A 1/11/2018    Procedure: COLONOSCOPY;  Surgeon: Dwayne Braxton MD;  Location: [facility];   Service: Gastroenterology   • WA TENDON SHEATH INCISION Left 1/17/2018    Procedure: RING TRIGGER FINGER RELEASE;  Surgeon: Harpreet Reed MD;  Location: [facility];  Service: Orthopedics   • TONSILLECTOMY     • UTERINE FIBROID SURGERY         Family History   Problem Relation Age of Onset   • Stroke Mother    • Diabetes Mother    • Hypertension Mother    • Emphysema Mother    • Heart disease Father    • Diabetes Father    • Hypertension Father    • Diabetes Sister    • Sleep apnea Sister    • No Known Problems Maternal Grandmother    • No Known Problems Paternal Grandmother    • Hypertension Brother    • Graves' disease Brother    • Breast cancer Maternal Aunt 68   • Cancer Maternal Aunt    • Stomach cancer Maternal Aunt    • Sleep apnea Maternal Aunt    • Cancer Maternal Aunt    • No Known Problems Maternal Aunt    • No Known Problems Maternal Aunt    • No Known Problems Maternal Aunt    • No Known Problems Maternal Aunt        Social History     Occupational History   • Not on file   Tobacco Use   • Smoking status: Never   • Smokeless tobacco: Never   Vaping Use   • Vaping Use: Never used   Substance and Sexual Activity   • Alcohol use: Not Currently     Comment: social   • Drug use: Never     Types: Marijuana     Comment: last use 4 days ago  • Sexual activity: Yes     Partners: Male     Birth control/protection: Post-menopausal         Current Outpatient Medications:   •  albuterol (2 5 mg/3 mL) 0 083 % nebulizer solution, Albuterol Sulfate (2 5 MG/3ML) 0 083% Inhalation Nebulization Solution USE 1 UNIT DOSE IN NEBULIZER EVERY 6 HOURS AS NEEDED    Quantity: 1;  Refills: 5    Saryyessenia STOKES D ;  Started 12-July-2016 Active3 ML Plas Cont (125 Plas Conts), Disp: , Rfl:   •  aspirin 81 MG tablet, Aspirin 81 MG TABS Take 1 tablet daily  Refills: 0  Active, Disp: , Rfl:   •  atorvastatin (LIPITOR) 40 mg tablet, TAKE 1 TABLET (40 MG TOTAL) BY MOUTH ONCE AT BEDTIME, Disp: 90 tablet, Rfl: 0  •  baclofen 20 mg tablet, TAKE 1 TABLET BY MOUTH 4 TIMES A DAY , Disp: 360 tablet, Rfl: 2  •  butalbital-acetaminophen-caffeine (FIORICET,ESGIC) -40 mg per tablet, Take 1 tablet by mouth every 4 (four) hours as needed for headaches, Disp: 12 tablet, Rfl: 4  •  Diclofenac Sodium (VOLTAREN) 1 %, Apply 2 g topically 4 (four) times a day (Patient not taking: Reported on 12/29/2022), Disp: 150 g, Rfl: 1  •  divalproex sodium (Depakote ER) 250 mg 24 hr tablet, Take 2 tabs for 3 days, then 1 tab for 2 days, Disp: 8 tablet, Rfl: 1  •  EPINEPHrine (EPIPEN) 0 3 mg/0 3 mL SOAJ, Inject 0 3 mL (0 3 mg total) into a muscle if needed for anaphylaxis, Disp: 1 each, Rfl: 1  •  fluticasone (FLONASE) 50 mcg/act nasal spray, 2 sprays into each nostril daily, Disp: 1 Bottle, Rfl: 3  •  gabapentin (NEURONTIN) 300 mg capsule, Take 2 caps in am and noon and 3 caps HS, Disp: 630 capsule, Rfl: 2  •  metFORMIN (GLUCOPHAGE-XR) 500 mg 24 hr tablet, Take 1 tablet (500 mg total) by mouth daily with breakfast, Disp: 90 tablet, Rfl: 0  •  NIFEdipine ER (ADALAT CC) 60 MG 24 hr tablet, TAKE 1 TABLET BY MOUTH EVERY DAY, Disp: 90 tablet, Rfl: 0  •  oxybutynin (DITROPAN-XL) 10 MG 24 hr tablet, Take 1 tablet (10 mg total) by mouth daily at bedtime, Disp: 90 tablet, Rfl: 3  •  sertraline (ZOLOFT) 50 mg tablet, Takes 1 tablet in am and hs , Disp: , Rfl:   •  Wixela Inhub 250-50 MCG/ACT inhaler, Inhale 1 puff 2 (two) times a day Rinse mouth after use , Disp: 180 blister, Rfl: 1    Allergies   Allergen Reactions   • Nuts - Food Allergy Shortness Of Breath and Anaphylaxis   • Other Anaphylaxis, Hives and Itching     [facility] - [id number]: POISON IVY   walnuts & cashews  Cats   • Peanut Oil - Food Allergy      Hives anaphylaxis   • Shellfish Allergy - Food Allergy Shortness Of Breath and Swelling   • Shellfish-Derived Products - Food Allergy Shortness Of Breath and Swelling   • Sumatriptan Headache and Other (See Comments)     Other reaction(s): Headache   • Copaxone [Glatiramer Acetate] Hives   • Cat Hair Extract    • Cephalexin Rash     Pt says they took keflex again and didn't get a reaction from it  Pt states they believe it was something they ate that day instead      • Glatiramer Rash     Other reaction(s): rash   • Pollen Extract Sneezing   • Seasonal Ic [Cholestatin]             Vitals:    03/22/23 1408   BP: 123/71   Pulse: 72       Objective:  Physical exam  · General: Awake, Alert, Oriented  · Eyes: Pupils equal, round and reactive to light  · Heart: regular rate and rhythm  · Lungs: No audible wheezing  · Abdomen: soft                    Ortho Exam  Right foot:  No erythema or ecchymosis  Stiff leg gait, right > left   Weakness with dorsiflexion 4/5  TTP over 1st MTP    Left knee:  TTP over medial and lateral joint line  No erythema or ecchymosis  No effusion or swelling  Normal strength  Good ROM   Positive Medial McMurrays  Positive Apleys  Calf compartments soft and supple  Sensation intact  Toes are warm sensate and mobile      Diagnostics, reviewed and taken today if performed as documented: The attending physician has personally reviewed the pertinent films in PACS and interpretation is as follows:  Left knee x-ray:  Mild arthritic changes  Procedures, if performed today:    Small joint arthrocentesis: R great MTP  Universal Protocol:  Consent: Verbal consent obtained  Consent given by: patient  Time out: Immediately prior to procedure a "time out" was called to verify the correct patient, procedure, equipment, support staff and site/side marked as required  Timeout called at: 3/22/2023 2:43 PM   Patient consent: the patient's understanding of the procedure matches consent given  Site marked: the operative site was marked  Patient identity confirmed: verbally with patient    Supporting Documentation  Indications: pain   Procedure Details  Location: great toe - R great MTP  Preparation: Patient was prepped and draped in the usual sterile fashion  Needle size: 25 G  Ultrasound guidance: no  Approach: radial  Medications administered: 0 5 mL bupivacaine 0 25 %; 3 mg betamethasone acetate-betamethasone sodium phosphate 6 (3-3) mg/mL    Patient tolerance: patient tolerated the procedure well with no immediate complications  Dressing:  Sterile dressing applied            Portions of the record may have been created with voice recognition software  Occasional wrong word or "sound a like" substitutions may have occurred due to the inherent limitations of voice recognition software  Read the chart carefully and recognize, using context, where substitutions have occurred

## 2023-04-04 DIAGNOSIS — G35 MULTIPLE SCLEROSIS (HCC): ICD-10-CM

## 2023-04-04 DIAGNOSIS — E78.5 HYPERLIPIDEMIA, UNSPECIFIED HYPERLIPIDEMIA TYPE: ICD-10-CM

## 2023-04-04 DIAGNOSIS — R73.03 PREDIABETES: ICD-10-CM

## 2023-04-04 RX ORDER — METFORMIN HYDROCHLORIDE 500 MG/1
TABLET, EXTENDED RELEASE ORAL
Qty: 90 TABLET | Refills: 0 | Status: SHIPPED | OUTPATIENT
Start: 2023-04-04

## 2023-04-04 RX ORDER — GABAPENTIN 300 MG/1
CAPSULE ORAL
Qty: 630 CAPSULE | Refills: 2 | Status: SHIPPED | OUTPATIENT
Start: 2023-04-04

## 2023-04-04 RX ORDER — ATORVASTATIN CALCIUM 40 MG/1
40 TABLET, FILM COATED ORAL
Qty: 90 TABLET | Refills: 0 | Status: SHIPPED | OUTPATIENT
Start: 2023-04-04

## 2023-04-07 ENCOUNTER — TELEPHONE (OUTPATIENT)
Age: 63
End: 2023-04-07

## 2023-04-20 NOTE — TELEPHONE ENCOUNTER
Patient left message on Nurse Navigator's voicemail stating she declined scheduling with [facility] because she wants to go to the [address] [place] [address]'s office instead  Called patient back and made her aware that we do not perform the first dose observation and she only has to be observed for the first dose, all other doses are done at her home independently  Patient verbalizes understanding  Requesting I call [facility]te[facility] and ask them to call her back because she lost their phone number  Called and left detailed message for Sheree with [facility] to contact patient to schedule her FDO  Dapsone Pregnancy And Lactation Text: This medication is Pregnancy Category C and is not considered safe during pregnancy or breast feeding.

## 2023-05-02 ENCOUNTER — TELEPHONE (OUTPATIENT)
Dept: NEUROLOGY | Facility: CLINIC | Age: 63
End: 2023-05-02

## 2023-05-02 NOTE — TELEPHONE ENCOUNTER
Reminder appt call     Patient is scheduled on 05/04/2023 @ 1230 pm with an arrival time 1215 pm in the [place] location with Dr Yaneth Martins      Left message on voicemail   (1)

## 2023-05-04 ENCOUNTER — TELEPHONE (OUTPATIENT)
Dept: NEUROLOGY | Facility: CLINIC | Age: 63
End: 2023-05-04

## 2023-05-04 ENCOUNTER — OFFICE VISIT (OUTPATIENT)
Dept: NEUROLOGY | Facility: CLINIC | Age: 63
End: 2023-05-04

## 2023-05-04 VITALS
HEART RATE: 68 BPM | WEIGHT: 144 LBS | SYSTOLIC BLOOD PRESSURE: 128 MMHG | HEIGHT: 60 IN | DIASTOLIC BLOOD PRESSURE: 70 MMHG | BODY MASS INDEX: 28.27 KG/M2 | TEMPERATURE: 97.6 F

## 2023-05-04 DIAGNOSIS — M53.86 SCIATICA OF RIGHT SIDE ASSOCIATED WITH DISORDER OF LUMBAR SPINE: ICD-10-CM

## 2023-05-04 DIAGNOSIS — R29.6 FREQUENT FALLS: ICD-10-CM

## 2023-05-04 DIAGNOSIS — M19.011 ARTHRITIS OF RIGHT ACROMIOCLAVICULAR JOINT: ICD-10-CM

## 2023-05-04 DIAGNOSIS — G35 MULTIPLE SCLEROSIS (HCC): Primary | ICD-10-CM

## 2023-05-04 DIAGNOSIS — R26.2 AMBULATORY DYSFUNCTION: ICD-10-CM

## 2023-05-04 DIAGNOSIS — M47.26 OTHER SPONDYLOSIS WITH RADICULOPATHY, LUMBAR REGION: ICD-10-CM

## 2023-05-04 DIAGNOSIS — G81.91 RIGHT HEMIPARESIS (HCC): ICD-10-CM

## 2023-05-04 DIAGNOSIS — M75.02 ADHESIVE CAPSULITIS OF LEFT SHOULDER: ICD-10-CM

## 2023-05-04 RX ORDER — METHYLPREDNISOLONE 4 MG/1
TABLET ORAL
Qty: 1 EACH | Refills: 0 | Status: SHIPPED | OUTPATIENT
Start: 2023-05-04

## 2023-05-04 NOTE — PROGRESS NOTES
Patient ID: James Patiño is a 58 y o  female  Assessment/Plan:           Problem List Items Addressed This Visit        Nervous and Auditory    Multiple sclerosis ([facility] [address] ) - Primary    Relevant Medications    sertraline (ZOLOFT) 50 mg tablet    methylPREDNISolone 4 MG tablet therapy pack    Other Relevant Orders    Motorized Scooter    Ambulatory Referral to Physical Therapy    Ambulatory Referral to Pain Management    Right hemiparesis (HCC)    Relevant Medications    methylPREDNISolone 4 MG tablet therapy pack    Other spondylosis with radiculopathy, lumbar region    Relevant Orders    Ambulatory Referral to Pain Management    Sciatica of right side associated with disorder of lumbar spine    Relevant Medications    methylPREDNISolone 4 MG tablet therapy pack    Other Relevant Orders    Ambulatory Referral to Pain Management       Musculoskeletal and Integument    Adhesive capsulitis of left shoulder       Other    Ambulatory dysfunction    Relevant Medications    methylPREDNISolone 4 MG tablet therapy pack    Other Relevant Orders    Motorized Scooter    Ambulatory Referral to Physical Therapy    Ambulatory Referral to Pain Management    Frequent falls   Other Visit Diagnoses     Arthritis of right acromioclavicular joint        Relevant Medications    Diclofenac Sodium (VOLTAREN) 1 %         Mrs Alberta Mathias has presented to [address] sclerosis center for follow-up on multiple sclerosis and related issues  Patient is not on disease modifying regimen due to immunosuppressants and related immune system changes  Patient has stable radiographic finding of her brain and cervical spine concerning imaging were completed in March 2023, no disease progression has been described  Patient does have degenerative disc disease with facet arthropathy appreciated in several levels in her cervical region    Patient had evaluation of orthopedic team with medial compartment arthritis with predominant area of grade 2 "to grade 3 articular cartilage tearing over the medial femoral condyle with mild edema seen in the lateral aspect of the knee  Vitamin D 3 1000 IU daily + Calcium citrate 1200 mg oral daily  Vitamin B Complex for 30 days only  Patient has longstanding Multiple Sclerosis with residual right sided hemiparesis which interferes with the patient mobility related activities of daily living (MRADLs) in her home, including bathing, dressing, feeding, grooming, and toileting in customary locations of the home  Patient was evaluated by Dr Ricardo Mata at [facility] since 2009, then patient had followed by Dr Flores [facility]  Dr Frank Maldonado was offering MS services till May 2017 with [employer] was initiated art that time; patient had right-sided weakness with chronic fatigue and neurologic gait dysfunction back in  2017 with progressive worsening of her dysfunction reported likely due to progression to secondary progressive MS  Patients height 5\" and weight 144 lb  Prognosis is fair      Physical examination with a focus on functional assessment:   CARDIOVASCULAR: RRR, normal S1S2, no murmurs, no rubs  RESP: clear to auscultation bilaterally, no wheezes/rhonchi/rales  ABDOMEN: soft, non tender, non distended  SKIN: no rash or skin lesions  EXTREMITIES: no edema, pulses 2+bilaterally  PSYCH: appropriate mood and affect  NEUROLOGIC COMPREHENSIVE EXAM: Patient is oriented to person, place and time, NAD; appropriate affect  CN II, III, IV, V, VI, VII,VIII,IX,X,XI-XII intact with EOMI, PERRLA, OKN intact, VF grossly intact, fundi poorly visualized secondary to pupillary constriction; symmetric face noted  Motor: 5/5 UE/LE bilateral symmetric, RUE 4/5 shoulder abduction and finger flexion; RLU 4-/5 hip flexion and knee flexion, 2/5 dorsiflexion rigjt; Sensory: intact to light touch and pinprick bilaterally; normal vibration sensation feet bilaterally;  Coordination within normal limits on FTN and ROLANDO testing; DTR: 2/4 " through, no Babinski, no clonus  Tandem gait is abnormal  Romberg: negative   The patient having difficulty performing ADLs in standing with use of the walker  Use of a cane, walker, or manual wheelchair will not meet mobility needs of this patient within the home due to right sided hemiparesis  A cane, walker or annual wc does not meet mobility needs of this patient within the home due to right arm paralysis, considering patient has left hand only to hold on the cane  The patient was referred for a PT/OT evaluation; Motorized scooter advised and evaluation will be offered at wheelchair clinic  Subjective: Face to face evaluation for mobility devices    HPI  Mrs Aaron Arias has presented to [address] [place] [address] [address] for follow-up on multiple sclerosis related issues  Today's complaints are: lower back pain x 3 weeks, dizziness, light headedness, numbness-right foot, headaches  Pt states vibration on right leg and left foot swollen and fall on 04/30/2023  Pt will need refill on Volteran and Sertraline      Since last office visit, patient continued describing balance dysfunction requiring physical therapy  Patient has been working with orthopedic team Dr Iza Parrish for trigger fingers  Patient had surgical intervention neurology  Patient continue describing recurrent falls with right knee swelling requiring arthrocentesis; patient continue having fractures in her small bones in her feet with fracture of fifth metatarsal bone reported in her right foot  Patient describes sciatica involving right lower extremity  Patient is to return to pulmonology for CPAP machine adjustment-great improvement in patient quality of sleep described, patient is happily using CPAP machine with no morning headaches reported at this point  Patient believes her impression has improved as well     Patient is not on disease modifying regimen due to immuno senescence and chronic inflammatory process with mesenteric panniculitis, patient requires to follow with Gastroenterology  Patient requested starting back on oxybutynin due to worsening neurogenic bladder, patient was offered referral to urology considering symptoms has been gradually progressing  Patient brought concern for worsening balance as she may be following to right-sided while staying still and talking to her grandchild- physiatry team and orthopedic team evaluation will be highly advised, patient has right ankle sprain with faint ossification inferior to the lateral malleolus could represent old avulsion fracture, which was noted on recent x-ray     MRI Brain was completed on March 7, 2023 with stable radiographic findings reported  Stable mild chronic demyelinating disease within the supratentorial brain parenchyma  MRI C-spine 3/7/2023: Stable demyelinating lesion within the upper cervical cord  No new demyelinating lesions identified      Cervical spondylitic degenerative change at C5-6 and C6-7 with stable canal stenosis and foraminal narrowing      Facet arthropathy at C7-T1 on the left has increased since prior examination resulting in slight worsening of mild foraminal narrowing        The following portions of the patient's history were reviewed and updated as appropriate:   She  has a past medical history of Asthma, Cardiac disease, COPD (chronic obstructive pulmonary disease) ([address] ), Depression, Diabetes mellitus ([address] ), Fibroid, Hyperlipidemia, Hypertension, Influenza, Irregular heart beat, Kidney stone, Migraine, Migraine, MS (multiple sclerosis) ([address] ), Multiple sclerosis ([address] ), Nephrolithiasis, Neurogenic bladder, Panniculitis, Sleep apnea, obstructive, and Urinary incontinence    She   Patient Active Problem List    Diagnosis Date Noted   • Frequent falls 05/04/2023   • Sciatica of right side associated with disorder of lumbar spine 05/04/2023   • Left knee pain 12/22/2022   • Prediabetes 12/22/2022   • Depression 05/06/2022   • Overactive bladder 05/06/2022   • DVT, lower extremity, distal, acute, right ([facility] [address] ) 05/05/2022   • CINTHYA (obstructive sleep apnea)    • Adhesive capsulitis of left shoulder 10/13/2021   • Cervical disc disorder at C6-C7 level with radiculopathy 04/12/2021   • Encephalopathy due to severe acute respiratory syndrome coronavirus 2 (SARS-CoV-2) 04/12/2021   • Word finding difficulty 04/12/2021   • Slurred speech 04/12/2021   • Edema of right lower extremity 02/26/2021   • Hypotension 11/09/2020   • Pneumonia due to COVID-19 virus 11/06/2020   • Other spondylosis with radiculopathy, lumbar region 02/20/2020   • Right low back pain 02/21/2019   • Skin rash 02/21/2019   • Right hemiparesis ([email or web address] ) 02/21/2019   • Mesenteric panniculitis ([facility] ) 03/15/2018   • Ambulatory dysfunction 02/21/2018   • Chronic right-sided headaches 02/21/2018   • Multiple sclerosis ([email or web address] ) 02/21/2018   • Panniculitis 02/21/2018   • S/P trigger finger release 02/02/2018   • Trigger ring finger of left hand 01/17/2018   • Hypertension 04/06/2017   • COPD (chronic obstructive pulmonary disease) ([email or web address] ) 04/11/2016   • Hyperlipidemia with target LDL less than 160 07/14/2011     She  has a past surgical history that includes Dilation and curettage of uterus; Uterine fibroid surgery; pr colonoscopy flx dx w/collj spec when pfrmd (N/A, 1/11/2018); Colonoscopy; pr tendon sheath incision (Left, 1/17/2018); and Tonsillectomy  Her family history includes Breast cancer (age of onset: 76) in her maternal aunt; Cancer in her maternal aunt and maternal aunt; Diabetes in her father, mother, and sister; Emphysema in her mother; [place] Lester' disease in her brother; Heart disease in her father; Hypertension in her brother, father, and mother; No Known Problems in her maternal aunt, maternal aunt, maternal aunt, maternal aunt, maternal grandmother, and paternal grandmother; Sleep apnea in her maternal aunt and sister; Stomach cancer in her maternal aunt; Stroke in her mother    She reports that she has never smoked  She has never used smokeless tobacco  She reports that she does not currently use alcohol  She reports that she does not use drugs  Current Outpatient Medications   Medication Sig Dispense Refill   • albuterol (2 5 mg/3 mL) 0 083 % nebulizer solution Albuterol Sulfate (2 5 MG/3ML) 0 083% Inhalation Nebulization Solution  USE 1 UNIT DOSE IN NEBULIZER EVERY 6 HOURS AS NEEDED  Quantity: 1;  Refills: 5       Karla GARCIA ;  Started 12-July-2016  Active3 ML Plas Cont (125 Plas Conts)     • aspirin 81 MG tablet Aspirin 81 MG TABS  Take 1 tablet daily   Refills: 0    Active     • atorvastatin (LIPITOR) 40 mg tablet TAKE 1 TABLET (40 MG TOTAL) BY MOUTH ONCE AT BEDTIME 90 tablet 0   • baclofen 20 mg tablet TAKE 1 TABLET BY MOUTH 4 TIMES A DAY   360 tablet 2   • butalbital-acetaminophen-caffeine (FIORICET,ESGIC) -40 mg per tablet Take 1 tablet by mouth every 4 (four) hours as needed for headaches 12 tablet 4   • Diclofenac Sodium (VOLTAREN) 1 % Apply 2 g topically 4 (four) times a day 150 g 1   • EPINEPHrine (EPIPEN) 0 3 mg/0 3 mL SOAJ Inject 0 3 mL (0 3 mg total) into a muscle if needed for anaphylaxis 1 each 1   • gabapentin (NEURONTIN) 300 mg capsule TAKE 2 CAPS IN AM AND NOON AND 3 CAPS AT BEDTIME 630 capsule 2   • metFORMIN (GLUCOPHAGE-XR) 500 mg 24 hr tablet TAKE 1 TABLET BY MOUTH EVERY DAY WITH BREAKFAST 90 tablet 0   • methylPREDNISolone 4 MG tablet therapy pack Use as directed on package 1 each 0   • NIFEdipine ER (ADALAT CC) 60 MG 24 hr tablet TAKE 1 TABLET BY MOUTH EVERY DAY 90 tablet 0   • oxybutynin (DITROPAN-XL) 10 MG 24 hr tablet Take 1 tablet (10 mg total) by mouth daily at bedtime 90 tablet 3   • sertraline (ZOLOFT) 50 mg tablet Take 1 tablet (50 mg total) by mouth daily at bedtime 90 tablet 1   • fluticasone (FLONASE) 50 mcg/act nasal spray 2 sprays into each nostril daily (Patient not taking: Reported on 5/4/2023) 1 Bottle 3   • Wixela Inhub 250-50 MCG/ACT inhaler Inhale 1 puff 2 (two) times a day Rinse mouth after use  180 blister 1     No current facility-administered medications for this visit  Current Outpatient Medications on File Prior to Visit   Medication Sig   • albuterol (2 5 mg/3 mL) 0 083 % nebulizer solution Albuterol Sulfate (2 5 MG/3ML) 0 083% Inhalation Nebulization Solution  USE 1 UNIT DOSE IN NEBULIZER EVERY 6 HOURS AS NEEDED  Quantity: 1;  Refills: 5       Carmen GARCIA ;  Started 12-July-2016  Active3 ML Plas Cont (125 Plas Conts)   • aspirin 81 MG tablet Aspirin 81 MG TABS  Take 1 tablet daily   Refills: 0    Active   • atorvastatin (LIPITOR) 40 mg tablet TAKE 1 TABLET (40 MG TOTAL) BY MOUTH ONCE AT BEDTIME   • baclofen 20 mg tablet TAKE 1 TABLET BY MOUTH 4 TIMES A DAY  • butalbital-acetaminophen-caffeine (FIORICET,ESGIC) -40 mg per tablet Take 1 tablet by mouth every 4 (four) hours as needed for headaches   • EPINEPHrine (EPIPEN) 0 3 mg/0 3 mL SOAJ Inject 0 3 mL (0 3 mg total) into a muscle if needed for anaphylaxis   • gabapentin (NEURONTIN) 300 mg capsule TAKE 2 CAPS IN AM AND NOON AND 3 CAPS AT BEDTIME   • metFORMIN (GLUCOPHAGE-XR) 500 mg 24 hr tablet TAKE 1 TABLET BY MOUTH EVERY DAY WITH BREAKFAST   • NIFEdipine ER (ADALAT CC) 60 MG 24 hr tablet TAKE 1 TABLET BY MOUTH EVERY DAY   • oxybutynin (DITROPAN-XL) 10 MG 24 hr tablet Take 1 tablet (10 mg total) by mouth daily at bedtime   • fluticasone (FLONASE) 50 mcg/act nasal spray 2 sprays into each nostril daily (Patient not taking: Reported on 5/4/2023)   • Wixela Inhub 250-50 MCG/ACT inhaler Inhale 1 puff 2 (two) times a day Rinse mouth after use  No current facility-administered medications on file prior to visit       She is allergic to nuts - food allergy, other, peanut oil - food allergy, shellfish allergy - food allergy, shellfish-derived products - food allergy, sumatriptan, copaxone [glatiramer acetate], cat hair extract, cephalexin, glatiramer, pollen extract, and seasonal ic [cholestatin]            Objective:    Blood pressure 128/70, pulse 68, temperature 97 6 °F (36 4 °C), temperature source Skin, height 5' (1 524 m), weight 65 3 kg (144 lb), not currently breastfeeding  Physical Exam    Neurological Exam      ROS:    Review of Systems     Constitutional: Negative  Negative for appetite change and fever  HENT: Negative  Negative for hearing loss, tinnitus, trouble swallowing and voice change  Eyes: Negative  Negative for photophobia, pain and visual disturbance  Respiratory: Negative  Negative for shortness of breath  Cardiovascular: Negative  Negative for palpitations  Gastrointestinal: Negative  Negative for nausea and vomiting  Endocrine: Negative  Negative for cold intolerance  Genitourinary: Negative  Negative for dysuria, frequency and urgency  Musculoskeletal: Positive for back pain (lower back painX 3 weeks)  Negative for gait problem, myalgias and neck pain  Skin: Negative  Negative for rash  Allergic/Immunologic: Negative  Neurological: Positive for dizziness, light-headedness, numbness (right foot) and headaches  Negative for tremors, seizures, syncope, facial asymmetry, speech difficulty and weakness  Patient states vibration on right leg and left foot swollen and fall 04/30/2023   Hematological: Negative  Does not bruise/bleed easily  Psychiatric/Behavioral: Negative  Negative for confusion, hallucinations and sleep disturbance

## 2023-05-04 NOTE — TELEPHONE ENCOUNTER
Please follow with the patient guiding to wheelchair clinic evaluation and motorized  scooter script is in the system

## 2023-05-04 NOTE — LETTER
May 9, 2023        Jerelene Galeazzi  [address]          Our office has been unsuccessful in trying to reach you by phone regarding:        ? Other:__Evaluation for wheelchair or other mobility device____________________          [facility] now has a neuro rehabilitation office in the [place] area  They do evaluations for mobility devices such as wheelchairs and scooters  If interested in calling to schedule, their contact information is [phone]  If you have any questions or need anything further, please contact our office at [phone]  and follow the prompts    Thank you,       Sincerely,      [facility]

## 2023-05-04 NOTE — PROGRESS NOTES
Patient ID: Keron Barton is a 58 y o  female  Assessment/Plan:    No problem-specific Assessment & Plan notes found for this encounter  {Assess/PlanSmartLinks:77274}       Subjective:    HPI    {[facility] HPI texts:10789}    {Common ambulatory SmartLinks:90691}         Objective:    Blood pressure 128/70, pulse 68, temperature 97 6 °F (36 4 °C), temperature source Skin, height 5' (1 524 m), weight 65 3 kg (144 lb), not currently breastfeeding  Physical Exam    Neurological Exam      ROS:    Review of Systems   Constitutional: Negative  Negative for appetite change and fever  HENT: Negative  Negative for hearing loss, tinnitus, trouble swallowing and voice change  Eyes: Negative  Negative for photophobia, pain and visual disturbance  Respiratory: Negative  Negative for shortness of breath  Cardiovascular: Negative  Negative for palpitations  Gastrointestinal: Negative  Negative for nausea and vomiting  Endocrine: Negative  Negative for cold intolerance  Genitourinary: Negative  Negative for dysuria, frequency and urgency  Musculoskeletal: Positive for back pain (lower back painX 3 weeks)  Negative for gait problem, myalgias and neck pain  Skin: Negative  Negative for rash  Allergic/Immunologic: Negative  Neurological: Positive for dizziness, light-headedness, numbness (right foot) and headaches  Negative for tremors, seizures, syncope, facial asymmetry, speech difficulty and weakness  Patient states vibration on right leg and left foot swollen and fall 04/30/2023   Hematological: Negative  Does not bruise/bleed easily  Psychiatric/Behavioral: Negative  Negative for confusion, hallucinations and sleep disturbance

## 2023-05-05 NOTE — TELEPHONE ENCOUNTER
BRANDI called and left message for patient  Provided contact information and requested call back  [facility]  [facility] is conducting w/c evaluations through the OT department  Waiting c/b from patient to determine where she would like to be scheduled for an evaluation  SW remains available

## 2023-05-08 NOTE — TELEPHONE ENCOUNTER
BRANDI called and left second message for patient  Provided contact information and requested call back  [place] location [facility] is conducting w/c evaluations through the OT department  Waiting c/b from patient to determine where she would like to be scheduled for an evaluation  SW remains available

## 2023-05-09 NOTE — TELEPHONE ENCOUNTER
SW called and left third message for patient   Provided contact information and sent unable to reach letter to patient this date

## 2023-05-15 NOTE — TELEPHONE ENCOUNTER
SW called patient at [phone]  Left detailed message that this writer would get in contact with [address] [place] [address] location for referral for w/c evaluation  [phone]  Spoke with Mandy Pack who will get in contact with patient to schedule w/c evaluation  Has order and everything else needs at this time  SW will follow up once w/c eval completed for next steps  SW remains available

## 2023-06-09 ENCOUNTER — EVALUATION (OUTPATIENT)
Age: 63
End: 2023-06-09
Payer: MEDICARE

## 2023-06-09 DIAGNOSIS — G35 MULTIPLE SCLEROSIS (HCC): Primary | ICD-10-CM

## 2023-06-09 DIAGNOSIS — R26.2 AMBULATORY DYSFUNCTION: Chronic | ICD-10-CM

## 2023-06-09 PROCEDURE — 97167 OT EVAL HIGH COMPLEX 60 MIN: CPT

## 2023-06-09 NOTE — PROGRESS NOTES
"OCCUPATIONAL THERAPY EVALUATION - Mobility Device Evaluation    Name: Marcelino Vargas  Date: 23  : 1960  Referring Provider: Rigoberto Lovelace, *  AUTHORIZATION:   Insurance: Payor: Joe Christian / Plan: [employer] / Product Type: Blue Fee for Service /     SUBJECTIVE:  HPI: Marcelino Vargas is a 58 y o  female referred to outpatient occupational therapy evaluation as part of the mobility device clinic for the following diagnosis  Encounter Diagnoses   Name Primary? • Multiple sclerosis ([facility] ) Yes   • Ambulatory dysfunction           Reason patient is seeking a mobility device \"to be able to move around the house without falling\"  Pt reports many falls and fractures due to falls  Pt would like to conserve energy and be able to do more around the house and outside the home  Pt relies on family members for mobility  Type of device sought by patient  power scooter   Current device (if present, how old and who ordered it?) other, SPC, transport chair    Current pressure relief method and it's efficiency Pt able to relieve pressure on own via STS and weight shifting    Where does patient live? stand-alone home   Access to home stairs to enter home; 3 steps from garage, 5 steps from front; railing on R side from garage    Access to medical appointments Caregivers drive patient    Living situation Pt resides with significant other  Resides in private 3 steps to enter  2 level home with stair lift to assist patient to second floor  Pt is currently using stair lift to access second floor living  Pt must be able to go to second floor for bathing and full bedroom use    Is patient currently receiving physical therapy? No; pt is returning to therapy on 23   Does patient have aides or caregivers?  (If so, how many hours and who pays?) No   Height: 5 feet 1 inches  Weight: 144 lbs    Based on patient history recommend device for trial: power scooter is recommended for trial  Pt has attempted to " "utilize power wheelchair, however difficulty pushing and maneuvering w/c due to weakness and fatigue in UEs and LEs  Pt is only able to ambulate short distances with SPC prior to requiring seated rest break  Visual problems Yes, secondary to MS   History of falls Yes; last fall this past week ~6 days ago  Reports ~5 falls a month    History of skin breakdown No     Problems of bowel or bladder No     Pain Yes ; Location B LEs, headaches, shoulders, R ankle/foot; how it impacts ADLs: difficulty with initiation, completion, and safety with ADLs    Use of assistive or mobility devices Yes       Patient goals: \"to be self sufficient\"     Past Medical History:   Diagnosis Date   • Asthma    • Cardiac disease     heart murmur   • COPD (chronic obstructive pulmonary disease) ([facility] )    • Depression    • Diabetes mellitus ([facility] )    • Fibroid    • Hyperlipidemia    • Hypertension    • Influenza     December 2017   • Irregular heart beat    • Kidney stone    • Migraine    • Migraine    • MS (multiple sclerosis) (HCC)    • Multiple sclerosis (HCC)    • Nephrolithiasis    • Neurogenic bladder    • Panniculitis    • Sleep apnea, obstructive    • Urinary incontinence        Current Outpatient Medications:   •  albuterol (2 5 mg/3 mL) 0 083 % nebulizer solution, Albuterol Sulfate (2 5 MG/3ML) 0 083% Inhalation Nebulization Solution USE 1 UNIT DOSE IN NEBULIZER EVERY 6 HOURS AS NEEDED    Quantity: 1;  Refills: 5    Ali Romana M D ;  Started 12-July-2016 Active3 ML Plas Cont (125 Plas Conts), Disp: , Rfl:   •  aspirin 81 MG tablet, Aspirin 81 MG TABS Take 1 tablet daily  Refills: 0  Active, Disp: , Rfl:   •  atorvastatin (LIPITOR) 40 mg tablet, TAKE 1 TABLET (40 MG TOTAL) BY MOUTH ONCE AT BEDTIME, Disp: 90 tablet, Rfl: 0  •  baclofen 20 mg tablet, TAKE 1 TABLET BY MOUTH 4 TIMES A DAY , Disp: 360 tablet, Rfl: 2  •  butalbital-acetaminophen-caffeine (FIORICET,ESGIC) -40 mg per tablet, Take 1 tablet by mouth every 4 (four) " hours as needed for headaches, Disp: 12 tablet, Rfl: 4  •  Diclofenac Sodium (VOLTAREN) 1 %, Apply 2 g topically 4 (four) times a day, Disp: 150 g, Rfl: 1  •  EPINEPHrine (EPIPEN) 0 3 mg/0 3 mL SOAJ, Inject 0 3 mL (0 3 mg total) into a muscle if needed for anaphylaxis, Disp: 1 each, Rfl: 1  •  fluticasone (FLONASE) 50 mcg/act nasal spray, 2 sprays into each nostril daily (Patient not taking: Reported on 5/4/2023), Disp: 1 Bottle, Rfl: 3  •  gabapentin (NEURONTIN) 300 mg capsule, TAKE 2 CAPS IN AM AND NOON AND 3 CAPS AT BEDTIME, Disp: 630 capsule, Rfl: 2  •  metFORMIN (GLUCOPHAGE-XR) 500 mg 24 hr tablet, TAKE 1 TABLET BY MOUTH EVERY DAY WITH BREAKFAST, Disp: 90 tablet, Rfl: 0  •  methylPREDNISolone 4 MG tablet therapy pack, Use as directed on package, Disp: 1 each, Rfl: 0  •  NIFEdipine ER (ADALAT CC) 60 MG 24 hr tablet, TAKE 1 TABLET BY MOUTH EVERY DAY, Disp: 90 tablet, Rfl: 0  •  oxybutynin (DITROPAN-XL) 10 MG 24 hr tablet, Take 1 tablet (10 mg total) by mouth daily at bedtime, Disp: 90 tablet, Rfl: 3  •  sertraline (ZOLOFT) 50 mg tablet, Take 1 tablet (50 mg total) by mouth daily at bedtime, Disp: 90 tablet, Rfl: 1  •  Wixela Inhub 250-50 MCG/ACT inhaler, Inhale 1 puff 2 (two) times a day Rinse mouth after use , Disp: 180 blister, Rfl: 1    Home Environment: House   -Home is accessible: Not presently but planning to complete modifications to make the home accessible   -Hours without assistance available: Pt is usually accompanied by significant other, however significant other has a CA diagnosis    Community: Car; SUV for 8 people    -Where device will be stored during transportation: car   -Tie downs available in vehicle: yes   -Self-: YES, only when necessary or in emergency; significant other assists with driving     Communication: [facility]     Relevant Medical History:   Diagnosis: MS    Onset: 14 years ago, 2008    Height: 5 ft 1 inch    Weight: 144lbs   History: COPD, hx of TIA    Cardio status: Intact   Respiratory Status: COPD    OBJECTIVE:  Mobility/Balance:   Sitting Balance: WFL   Standing Balance: Uses UE/device for stability  Transfers: Evsin   Ambulation: Other ambualtes with SPC and HHA of another or use of furniture/wall to stabilze self while ambulating    Fall History: 30 falls in the past 6 months; 7 near falls a week     Current Seating/Mobility:   Current device: Single point cane    Ability to complete Mobility-Related Activities of Daily Living (MRADL's) with Current Mobility Device:   Move room to room: Radha   Meal prep: Unable from standing position    Feeding: Set-up required sitting    Bathing: Set-up required sitting    Grooming: Set-up required sitting    UE dressing: Set-up required  Sitting    LE dressing: Set-up required sitting    Toileting: Radha   Bowel management: Continent   Bladder management:  Continent   Comments: pt performs majority of ADLs sitting or using SPC in standing  If standing can only tolerate short bouts  Reports having many falls if standing during all of above tasks       Current Mobility Equipment Trialed/Ruled Out:   Cane/crutches: Risk of falling or h/o falls   Walker/rollator: Risk of falling or h/o falls   Manual Wheelchair: Decreased/limitations endurance & strength   Manual Wheelchair with Power Assist: Decreased/limitations endurance & strength   Scooter: Meets needs for safe independent functional ambulation/mobility     Summary: The least costly alternative for independent functional mobility was found to be: Scooter    Functional Progressing Skills for [employer]:   Processing skills are adequate for safe mobility equipment operation: Yes    Patient is willing and motivated to use recommended mobility equipment: Yes    Pt is UNABLE to safely operate mobility equipment independently and requires dependent care equipment: No    Sensation and Skin Issues:   Sensation: Intact   Pressure Relieve Method(s): Stand up (without risk of falling) if using a supportive device     Effective pressure relief method(s) can be performed consistently throughout the day: Yes   Skin Integrity Risk: Low risk   Current skin issues: Intact      Neuro-Muscular Status: Intact    Muscle Tone--Modified Elijah Scale Right Left   Elbow flexors/extensors 0 0   Wrist flexors/extensors 0 0   Hamstrings 0 0   Gastrocnemius 0 0       Posture:   Pelvis:     -Tilt: Neutral    -Obliquity (viewed form front): WFL  -Rotation-Pelvis: WFL  -Tonal Influence: Normal  Trunk:   Anterior/posterior: WFL   L/R: WFL   Rotation-shoulders and upper trunk: Neutral   Tonal Influence: Normal  Head & Neck: Functional    Hips:   Position: Neutral   Windswept: Neutral  Foot positioning: WFL    Tonal/Movements LE: Normal  Wrist & Hand:   Handedness: Right     R Functioning: Poor dexterity  L Functioning: Poor dexterity  Grasp Strength: R: 3+/5 L:4/5    Mobility Base Recommendations and Justification: Scooter/POV       Manual Muscle Tests Right Left   Shoulder abduction 3+/5 4/5   Shoulder flexion 3+/5 4/5   Elbow flexion 4/5 4/5   Elbow extension 4/5 4/5   Wrist extension 3+/5 4/5   Wrist flexion 3+/5 4/5   Hip flexion 3/5 4/5   Knee extension 3/5 4/5   Knee flexion 3/5 4/5   Dorsiflexion 2/5 4/5       Finger Nose Test without visual occlusion: slight dysmetria     Mobility Measures 6/9/2023   Assistive device used? Cane   Walking endurance/distance ~30 feet, prior to rest break seated; required using of cane and CG-min A of therapist for safety      ASSESSMENT:  Mobility device being we are seeking is powered scooter  Use of a power scooter will allow patient to independently navigate inside and outside of the home without relying on caregivers to assist  Pt will be able to perform ADLs and IADLs from a seated position safely in the home without risk of falling   Pt is unable to solely rely on cane at this time due to impaired dynamic standing balance, frequent near falls, and frequent falls   Patient requires frequent seated rest breaks due to fatigue   At this time patient is able to self initiate pressure relief via STS and weight shifting  Requires use of AE or stable surface for balance in standing  Pt has stairs to enter home, however is considering installation of ramp in the home for easy access in and outside of the home  Pt has assistance from caregivers for community mobility and large SUV for transport of scooter  Due to patient pain, poor balance, significant fall history, assist with ADLs, assist with ambulation, UE/LE weakness, and impaired coordination pt would benefit from use of power scooter to increase independence in daily life and improve QOL  Name of equipment and modifications sought:  Power scooter, unable to trial this session as facility does not have power scooter for trial  Plan to follow up with vendor for trial of equipment  Patient understands process of obtaining this equipment and that this equipment will be expected to be in use for up to 5 years prior to receiving any new equipment  Short-term/long-term goals:  1  Patient receives mobility device within 5 months  2  Patient demonstrates modified independence with navigating around clinic in mobility device in 5 months  3  Patient reports comfortable in mobility device in 5 months  PLAN:  Wheelchair fitting and instruction in use of mobility device  Follow-up as needed when mobility device is approved and delivered, instruction as needed  Up to 5 visits as needed over the next 12 months

## 2023-06-11 DIAGNOSIS — I10 PRIMARY HYPERTENSION: ICD-10-CM

## 2023-06-11 RX ORDER — NIFEDIPINE 60 MG/1
TABLET, FILM COATED, EXTENDED RELEASE ORAL
Qty: 90 TABLET | Refills: 0 | Status: SHIPPED | OUTPATIENT
Start: 2023-06-11

## 2023-06-13 ENCOUNTER — EVALUATION (OUTPATIENT)
Dept: PHYSICAL THERAPY | Facility: CLINIC | Age: 63
End: 2023-06-13
Payer: MEDICARE

## 2023-06-13 DIAGNOSIS — R26.89 BALANCE DISORDER: ICD-10-CM

## 2023-06-13 DIAGNOSIS — M62.81 GENERALIZED MUSCLE WEAKNESS: Primary | ICD-10-CM

## 2023-06-13 DIAGNOSIS — R26.9 ABNORMAL GAIT: ICD-10-CM

## 2023-06-13 PROCEDURE — 97530 THERAPEUTIC ACTIVITIES: CPT | Performed by: PHYSICAL THERAPIST

## 2023-06-13 PROCEDURE — 97162 PT EVAL MOD COMPLEX 30 MIN: CPT | Performed by: PHYSICAL THERAPIST

## 2023-06-13 NOTE — PROGRESS NOTES
PT Evaluation     Today's date: 2023  Patient name: Gemma Lewis  : 1960  MRN: [id number]  Referring provider: Elkin Arteaga  Dx:   Encounter Diagnosis     ICD-10-CM    1  Generalized muscle weakness  M62 81       2  Balance disorder  R26 89       3  Abnormal gait  R26 9           Start Time: 1105  Stop Time: 1155  Total time in clinic (min): 50 minutes    Assessment  Assessment details: Patient is a 58 y o  female who presents to physical therapy with c/o decreased strength, stability, and difficulty with gait secondary to MS  Patient presents to evaluation with decreased balance, decreased strength, and decreased quality of gait/ambulatory status, and decreased tolerance to activity  Formal tx was held today as emphasis was on functional testing to establish baseline function with most impairments noted on Oakes Balance test where she score 14/56 and categorized in high fall risk category  I discussed risks, benefits, and alternatives to treatment, and answered all patient questions to patient satisfaction  Patient presents with baseline FOTO score of 45 indicating limited tolerance/ability to complete ADLs  Patient is an appropriate candidate for skilled PT and would benefit from skilled PT services to address the aforementioned impairments, achieve goals, maximize function, and improve quality of life  Pt is in agreement with this plan       Patient Education: activity modifications as needed, pacing of activities, importance of HEP compliance, PT prognosis/POC    Impairments: abnormal gait, activity intolerance, impaired balance, impaired physical strength, lacks appropriate home exercise program, pain with function and safety issue    Goals  ST weeks  Pt will demonstrate good understanding and compliance with HEP  Pt will ambulate with least restrictive AD with adequate step-through gait mechanics including proper toe clearance during mid-swing phase of gait to reduce fall risk  Pt "will exhibit proper squatting/lifting mechanics without reliance on external cues for correction of form     LT weeks  Pt will improve FOTO score to > or = to 51 to indicate improved functional abilities   Pt will increase score on PIERCE balance test to /56 to indicate improved balance and allow for increased safety with ADLs, community ambulation, and reduced fall risk   Pt will increase bilateral hip/knee strength to 4/5 to allow for improved ability to squat, negotiate stairs, and for community ambulation   Pt will improve 5xSTS time to 12\" with fair to good eccentric control   Pt will improve TUG time to 10\" with SPC and good safety awareness with fair to good eccentric control       Plan  Patient would benefit from: skilled physical therapy and PT eval  Planned modality interventions: cryotherapy and thermotherapy: hydrocollator packs  Planned therapy interventions: ADL training, activity modification, joint mobilization, manual therapy, neuromuscular re-education, balance, balance/weight bearing training, patient education, postural training, self care, home exercise program, graded exercise, graded activity, gait training, functional ROM exercises, flexibility, strengthening, stretching, therapeutic activities and therapeutic exercise  Frequency: 2x week  Duration in weeks: 8  Plan of Care beginning date: 2023  Plan of Care expiration date: 2023  Treatment plan discussed with: patient        Subjective Evaluation    History of Present Illness  Mechanism of injury: Pt reports to PT with c/o decreased strength, stability, and difficulty with gait secondary to Multiple Sclerosis with residual right sided hemiparesis  Pt and recent scans performed in March which revealed stable lesions without any new lesions discovered  Pt reports having regression in functional status about 6 months ago where she noticed increased falls, reports she is currently having about 1 fall a week   Pt currently ambulating " "with SPC, as well as use of AFO on right but doesn't have on today  Pt does feel her legs will feel like they want to buckle on her, R>L, and notes increased weakness  Pt admits to having some left knee pain and back pain and is currently receiving care for this with other providers  Pt is awaiting a motorized WC to help with her mobility       Aggravating factors: Walking/standing, stairs, squatting, transfers    Easing Factors:  Rest, SPC    PLOF: Hx of MS - initial dx in     PMH: Hx of MS, COPD, HTN (controlled with meds), pre-diabetic, TIA  Patient Goals  Patient goals for therapy: increased motion, improved balance, increased strength, independence with ADLs/IADLs and return to sport/leisure activities          Objective     Functional Assessment        Comments  Pt ambulates with SPC with circumduction of right leg, poor toe clearance during mid-swing phase of gait on right    LE dermatomes intact and symmetrical bilaterally but reports hypersensitivity throughout all dermatomes on right    LE AROM globally WFL bilaterally, more strenuous to complete on right except for right ankle DF to -10* - uses AFO most times but doesn't have on today    Left hip MMT: Flex 4+/5 Abd 4+/5  Right hip MMT: Flex 3/5 Abd 3/5    Left knee MMT: Flex 5/5 Ext 5/5  Right hip MMT: Flex 3+/5 Ext 3+/5    5xSTS: 18\" with bilateral UE assist and poor eccentric control     TU\" with SPC and bilateral UE assist, poor eccentric control with stand to sit    Oakes Balance test:     DTRs:  Patellar (L4): Left 0; Right 0  Achilles (S1): Left 0; Right 0    FOTO: 45                  Diagnosis: Decreased balance/stability, difficulty with gait 2* to MS   Precautions: Hx of MS, COPD, HTN (controlled with meds), pre-diabetic, TIA   POC Expires: 23   Re-evaluation Date: 7/10/23   FOTO Scores/Date: Goal - 51;  - 45   Visit Count 1/10       Manuals                                Ther Ex                                         " Neuro Re-Ed                                                               Ther Act  6/13                                                                 Administered functional tests 5xSTS, TUG, Oakes balance test           Modalities

## 2023-06-15 ENCOUNTER — OFFICE VISIT (OUTPATIENT)
Dept: PHYSICAL THERAPY | Facility: CLINIC | Age: 63
End: 2023-06-15
Payer: MEDICARE

## 2023-06-15 DIAGNOSIS — M62.81 GENERALIZED MUSCLE WEAKNESS: Primary | ICD-10-CM

## 2023-06-15 DIAGNOSIS — R26.9 ABNORMAL GAIT: ICD-10-CM

## 2023-06-15 DIAGNOSIS — R26.89 BALANCE DISORDER: ICD-10-CM

## 2023-06-15 PROCEDURE — 97112 NEUROMUSCULAR REEDUCATION: CPT | Performed by: PHYSICAL THERAPIST

## 2023-06-15 PROCEDURE — 97110 THERAPEUTIC EXERCISES: CPT | Performed by: PHYSICAL THERAPIST

## 2023-06-15 NOTE — PROGRESS NOTES
"Daily Note     Today's date: 6/15/2023  Patient name: Darryl Jack  : 1960  MRN: [id number]  Referring provider: Kina Worrell  Dx:   Encounter Diagnosis     ICD-10-CM    1  Generalized muscle weakness  M62 81       2  Balance disorder  R26 89       3  Abnormal gait  R26 9           Start Time: 930  Stop Time: 1015  Total time in clinic (min): 45 minutes    Subjective: Pt denies any increase in pain after evaluation but did feel fatigued, states she has been having increased sciatica symptoms into right leg and has had episodic symptoms with this over the years  Objective: See treatment diary below      Assessment: Pt overall demonstrates good tolerance with mat table strengthening with increased difficulty on right side when performed unloaded against gravity but added light resistance on left to challenge with cues for slow eccentric control and to avoid bending knee during SLR and SL hip abd  Pt with notable challenge with NBOS with EO/EC with CGA for safety, able to further challenge dynamic stability with cone reaching outside COG and lateral no step overs with CGA to min A  Pt was issued HEP to initiate strengthening exercises at home but instructed to not perform balance activities at this time due to safety concerns and heavy reliance on CGA/min A  Will continue to progress next visit as symptoms allow  Plan: Continue per plan of care  Progress treatment as tolerated         Diagnosis: Decreased balance/stability, difficulty with gait 2* to MS   Precautions: Hx of MS, COPD, HTN (controlled with meds), pre-diabetic, TIA   POC Expires: 23   Re-evaluation Date: 7/10/23   FOTO Scores/Date: Goal - 51;  -    Visit Count 1/10 210      Manuals 6/13 6/15                              Ther Ex 6/13 6/15      SLR  2x10 ea; 1# on left, 0# on right       SL hip abd  2x10 ea; 1# on left, 0# on right       Bridges  2x10x5\" blue TB at knees      LAQs  2x10 ea; 1# on left, 0# " "on right       DL heel raises  2x10                              HEP  Creation/instruction      Neuro Re-Ed  6/15      NBOS standing EO/EC  5x10\" ea CGA      Cone reaching   At plinth 3 cones 3x; CGA to min A      Lateral no step overs  6\" 2 laps CGA to min A      Alt to taps  6\" box 2x10                             Ther Act  6/13                                                                 Administered functional tests 5xSTS, TUG, Oakes balance test           Modalities                                            "

## 2023-06-20 ENCOUNTER — OFFICE VISIT (OUTPATIENT)
Dept: PHYSICAL THERAPY | Facility: CLINIC | Age: 63
End: 2023-06-20
Payer: MEDICARE

## 2023-06-20 DIAGNOSIS — R26.9 ABNORMAL GAIT: ICD-10-CM

## 2023-06-20 DIAGNOSIS — R26.89 BALANCE DISORDER: ICD-10-CM

## 2023-06-20 DIAGNOSIS — M62.81 GENERALIZED MUSCLE WEAKNESS: Primary | ICD-10-CM

## 2023-06-20 PROCEDURE — 97112 NEUROMUSCULAR REEDUCATION: CPT | Performed by: PHYSICAL THERAPIST

## 2023-06-20 NOTE — PROGRESS NOTES
"Daily Note     Today's date: 2023  Patient name: Shayy Dougherty  : 1960  MRN: [id number]  Referring provider: Ramandeep Guan  Dx:   Encounter Diagnosis     ICD-10-CM    1  Generalized muscle weakness  M62 81       2  Balance disorder  R26 89       3  Abnormal gait  R26 9           Start Time: 1145  Stop Time: 1230  Total time in clinic (min): 45 minutes    Subjective: Patient reports feeling sore after last treatment session with some \"swelling in her right foot,\" took tylenol and noted it subsided  Pt states she fell on Friday due to one of her knees \"giving out\" when she was walking outside, falling on ground that was half cement and half grass  She notes soreness in hip from the fall but denies any increase in pain anywhere else, reporting b/l episodes giving out  Pt states she was working on alt toe taps with a lower height stair at home over the weekend and has been more compliant with wearing the AFO at home instead of only when leaving the house  Objective: See treatment diary below      Assessment: Pt demonstrates good tolerance with mat table exercises and was able to perform SLR and SL hip abduction while maintaining control, however was moderately fatigued toward end of reps and eccentric lowering control declined  Pt continues to be challenged with NBOS with CGA, sustaining 4\" holds before needing to reach for table to prevent LOB  Cone reaching outside of COG was performed with better stability when reaching forward but shifting weight specifically onto the right leg proved more challenging  Lateral no step overs were challenging also but notably smoother when stepping to left than to the right, requiring CGA to min A throughout  Will continue to progress next visit as symptoms allow  Patient treated in part by JESÚS Linder under direct PT supervision  Plan: Continue per plan of care  Progress treatment as tolerated         Diagnosis: Decreased " "balance/stability, difficulty with gait 2* to MS   Precautions: Hx of MS, COPD, HTN (controlled with meds), pre-diabetic, TIA   POC Expires: 8/8/23   Re-evaluation Date: 7/10/23   FOTO Scores/Date: Goal - 46; 6/13 - 45   Visit Count 1/10 2/10 3/10     Manuals 6/13 6/15 6/20                             Ther Ex 6/13 6/15 6/20     SLR  2x10 ea; 1# on left, 0# on right  2x10 ea; 1# on left, 0# on right     SL hip abd  2x10 ea; 1# on left, 0# on right  2x10 ea; 1# on left, 0# on right     Bridges  2x10x5\" blue TB at knees 2x10x5\" blue TB at knees     LAQs  2x10 ea; 1# on left, 0# on right  2x10 ea; 1 # on left, 0# on right     DL heel raises  2x10 2x10                             HEP  Creation/instruction      Neuro Re-Ed  6/15 6/20     NBOS standing EO/EC  5x10\" ea CGA 5x10\" ea CGA EO only / EC NV     Cone reaching   At plinth 3 cones 3x; CGA to min A At plinth 3 cones 3x; CGA to min A     Lateral no step overs  6\" 2 laps CGA to min A 6\" 2 laps CGA to min A     Alt to taps  6\" box 2x10 6\" box 2x10                            Ther Act  6/13                                                                 Administered functional tests 5xSTS, TUG, Oakes balance test           Modalities                                              "

## 2023-06-22 ENCOUNTER — OFFICE VISIT (OUTPATIENT)
Dept: PHYSICAL THERAPY | Facility: CLINIC | Age: 63
End: 2023-06-22
Payer: MEDICARE

## 2023-06-22 DIAGNOSIS — R26.89 BALANCE DISORDER: ICD-10-CM

## 2023-06-22 DIAGNOSIS — R26.9 ABNORMAL GAIT: ICD-10-CM

## 2023-06-22 DIAGNOSIS — M62.81 GENERALIZED MUSCLE WEAKNESS: Primary | ICD-10-CM

## 2023-06-22 PROCEDURE — 97110 THERAPEUTIC EXERCISES: CPT

## 2023-06-22 PROCEDURE — 97112 NEUROMUSCULAR REEDUCATION: CPT

## 2023-06-22 NOTE — PROGRESS NOTES
"Daily Note     Today's date: 2023  Patient name: Mary Hickey  : 1960  MRN: [id number]  Referring provider: Rosemary Edwards  Dx:   Encounter Diagnosis     ICD-10-CM    1  Generalized muscle weakness  M62 81       2  Balance disorder  R26 89       3  Abnormal gait  R26 9                      Subjective: Patient states her back has been hurting since about 3 weeks ago and it is progressively worsening  Objective: See treatment diary below      Assessment: Tolerated treatment well  Patient demonstrates increased difficulty with balance FT EO this visit requiring Zamzam from PT for posterior LOB several times  Patient demonstrates both posterior and anterior LOB during cone reaching this visit (more frequently anterior) requiring min A  Patient demonstrates difficulty with R foot clearance during lateral no step overs this visit  Patient uses L LE to compensate  Patient adequately challenged with all seated and supine strengthening on plinth this visit  Declines to progress  Patient exhibited good technique with therapeutic exercises and would benefit from continued PT  Plan: Continue per plan of care  Progress as tolerated       Diagnosis: Decreased balance/stability, difficulty with gait 2* to MS   Precautions: Hx of MS, COPD, HTN (controlled with meds), pre-diabetic, TIA   POC Expires: 23   Re-evaluation Date: 7/10/23   FOTO Scores/Date: Goal - 46;  -    Visit Count 1/10 2/10 3/10 4/10    Manuals 6/13 6/15 6/20 6/22                            Ther Ex 6/13 6/15 6/20 6/22    SLR  2x10 ea; 1# on left, 0# on right  2x10 ea; 1# on left, 0# on right 2x10 ea 1# on left 0# on right    SL hip abd  2x10 ea; 1# on left, 0# on right  2x10 ea; 1# on left, 0# on right 2x10 1# on left 0# on right    Bridges  2x10x5\" blue TB at knees 2x10x5\" blue TB at knees 2x10 5s blue TB    LAQs  2x10 ea; 1# on left, 0# on right  2x10 ea; 1 # on left, 0# on right 2x10 ea 1# on L 0# on right    DL " "heel raises  2x10 2x10 2x10                             HEP  Creation/instruction      Neuro Re-Ed  6/15 6/20 6/22    NBOS standing EO/EC  5x10\" ea CGA 5x10\" ea CGA EO only / EC NV 5x10s EO only - CGA to min A for balance    Cone reaching   At plinth 3 cones 3x; CGA to min A At plinth 3 cones 3x; CGA to min A At plinth 3 cones 3x CGA to min A    Lateral no step overs  6\" 2 laps CGA to min A 6\" 2 laps CGA to min A 6\" 3 laps CGA to min A    Alt to taps  6\" box 2x10 6\" box 2x10 6\" box 2x10                           Ther Act  6/13                                                                 Administered functional tests 5xSTS, TUG, Oakes balance test           Modalities                                                "

## 2023-06-23 ENCOUNTER — APPOINTMENT (OUTPATIENT)
Age: 63
End: 2023-06-23
Payer: MEDICARE

## 2023-06-27 ENCOUNTER — OFFICE VISIT (OUTPATIENT)
Dept: PHYSICAL THERAPY | Facility: CLINIC | Age: 63
End: 2023-06-27
Payer: MEDICARE

## 2023-06-27 DIAGNOSIS — R26.89 BALANCE DISORDER: ICD-10-CM

## 2023-06-27 DIAGNOSIS — M62.81 GENERALIZED MUSCLE WEAKNESS: Primary | ICD-10-CM

## 2023-06-27 DIAGNOSIS — R26.9 ABNORMAL GAIT: ICD-10-CM

## 2023-06-27 PROCEDURE — 97110 THERAPEUTIC EXERCISES: CPT

## 2023-06-27 PROCEDURE — 97112 NEUROMUSCULAR REEDUCATION: CPT

## 2023-06-27 NOTE — PROGRESS NOTES
"Daily Note     Today's date: 2023  Patient name: Filippo Chavarria  : 1960  MRN: [id number]  Referring provider: Hair Gr  Dx:   Encounter Diagnosis     ICD-10-CM    1  Generalized muscle weakness  M62 81       2  Balance disorder  R26 89       3  Abnormal gait  R26 9           Start Time: 09  Stop Time: 1024  Total time in clinic (min): 49 minutes    Subjective: patient reports having 1 incident of having near fall but was able to catch herself  Denies any other incidents      Objective: See treatment diary below      Assessment:  Patient was able to progress in LE strengthening without issue  Was able to increase load with on L with mat program demonstrating good control  Cueing on use of iso holds at end range further challenge  Introduce lateral band walk and sit to stand with cueing on sequencing of movements demonstrating fair control with cues  CG to CGA required throughout therapy session for safety      Plan: Continue per plan of care  Progress treatment as tolerated         Diagnosis: Decreased balance/stability, difficulty with gait 2* to MS   Precautions: Hx of MS, COPD, HTN (controlled with meds), pre-diabetic, TIA   POC Expires: 23   Re-evaluation Date: 7/10/23   FOTO Scores/Date: Goal - 51;  -    Visit Count 1/10 2/10 3/10 4/10 5/10   Manuals 6/13 6/15 6/20 6/22 6/27                           Ther Ex 6/13 6/15 6/20 6/22 6/27   SLR  2x10 ea; 1# on left, 0# on right  2x10 ea; 1# on left, 0# on right 2x10 ea 1# on left 0# on right (L)2# (R)0# 2x10 ea   SL hip abd  2x10 ea; 1# on left, 0# on right  2x10 ea; 1# on left, 0# on right 2x10 1# on left 0# on right (L)2# (R)0# 2x10 ea   Bridges  2x10x5\" blue TB at knees 2x10x5\" blue TB at knees 2x10 5s blue TB BTB 5\" 2x10    LAQs  2x10 ea; 1# on left, 0# on right  2x10 ea; 1 # on left, 0# on right 2x10 ea 1# on L 0# on right (L)2# (R)0# 2x10 ea 5\" iso hold   DL heel raises  2x10 2x10 2x10  2x10    Lateral Band Walk     " "RTB x 3 laps    Sit to Stand     Low mat x 10 CGA    Leg Press     DBL 35#x10/ SL R only 15#x10 (min A on concentric)   HEP  Creation/instruction      Neuro Re-Ed  6/15 6/20 6/22    NBOS standing EO/EC  5x10\" ea CGA 5x10\" ea CGA EO only / EC NV 5x10s EO only - CGA to min A for balance    Cone reaching   At plinth 3 cones 3x; CGA to min A At plinth 3 cones 3x; CGA to min A At plinth 3 cones 3x CGA to min A NBOS 3 cones to plinth x3    Lateral no step overs  6\" 2 laps CGA to min A 6\" 2 laps CGA to min A 6\" 3 laps CGA to min A    Alt to taps  6\" box 2x10 6\" box 2x10 6\" box 2x10 6\" box 2x10 ea CGA                          Ther Act  6/13                                                                 Administered functional tests 5xSTS, TUG, Oakes balance test           Modalities                                                  "

## 2023-06-29 ENCOUNTER — OFFICE VISIT (OUTPATIENT)
Dept: PHYSICAL THERAPY | Facility: CLINIC | Age: 63
End: 2023-06-29
Payer: MEDICARE

## 2023-06-29 DIAGNOSIS — R26.89 BALANCE DISORDER: ICD-10-CM

## 2023-06-29 DIAGNOSIS — M62.81 GENERALIZED MUSCLE WEAKNESS: Primary | ICD-10-CM

## 2023-06-29 DIAGNOSIS — R26.9 ABNORMAL GAIT: ICD-10-CM

## 2023-06-29 PROCEDURE — 97110 THERAPEUTIC EXERCISES: CPT

## 2023-06-29 NOTE — PROGRESS NOTES
"Daily Note     Today's date: 2023  Patient name: Nicolasa Mcknight  : 1960  MRN: [id number]  Referring provider: Breonna Barbour  Dx:   Encounter Diagnosis     ICD-10-CM    1  Generalized muscle weakness  M62 81       2  Balance disorder  R26 89       3  Abnormal gait  R26 9           Start Time: 930  Stop Time: 1014  Total time in clinic (min): 44 minutes    Subjective: patient reports fatigue and soreness post last therapy session  Denies any falls or incidents      Objective: See treatment diary below      Assessment:  Patient demonstrates improved tolerance to strength training exercises being able to progress in reps and requiring less therapist assist   Patient was able to increase reps with mat program strengthening requiring normal rest periods due to fatigue  Small cues with lateral band walking on control of trail leg that improved post cues and with repetition  Initial cues on squat mechanics and control  Patient was able to perform independently post with CG for safety  Patient demonstrated less difficulty and improved R LE activation with SL leg press and will progress in load NV  Plan: Continue per plan of care  Progress treatment as tolerated         Diagnosis: Decreased balance/stability, difficulty with gait 2* to MS   Precautions: Hx of MS, COPD, HTN (controlled with meds), pre-diabetic, TIA   POC Expires: 23   Re-evaluation Date: 7/10/23   FOTO Scores/Date: Goal - 51;    Visit Count 6/29 2/10 3/10 4/10 5/10   Manuals 6/29 6/15 6/20 6/22 6/27                           Ther Ex 6/29 6/15 6/20 6/22 6/27   Recumbent Bike for LE endurance L1 x 6 mins       SLR (L)2# (R)0# 3x10 ea 2x10 ea; 1# on left, 0# on right  2x10 ea; 1# on left, 0# on right 2x10 ea 1# on left 0# on right (L)2# (R)0# 2x10 ea   SL hip abd (L)2# (R)0# 3x10  2x10 ea; 1# on left, 0# on right  2x10 ea; 1# on left, 0# on right 2x10 1# on left 0# on right (L)2# (R)0# 2x10 ea   Bridges BTB 5\" 2x10  " "2x10x5\" blue TB at knees 2x10x5\" blue TB at knees 2x10 5s blue TB BTB 5\" 2x10    LAQs (L)2# (R)0# 3x10 ea 3\"-5\" iso 2x10 ea; 1# on left, 0# on right  2x10 ea; 1 # on left, 0# on right 2x10 ea 1# on L 0# on right (L)2# (R)0# 2x10 ea 5\" iso hold   DL heel raises 3x10  2x10 2x10 2x10  2x10    Lateral Band Walk RTB x 3 laps    RTB x 3 laps    Sit to Stand Low mat x 15 cues and CGA    Low mat x 10 CGA    Leg Press DBL 45# 2x10/ SL 15# 2x10 AROM (increase NV)    DBL 35#x10/ SL R only 15#x10 (min A on concentric)   HEP  Creation/instruction      Neuro Re-Ed  6/15 6/20 6/22    NBOS standing EO/EC  5x10\" ea CGA 5x10\" ea CGA EO only / EC NV 5x10s EO only - CGA to min A for balance    Cone reaching   At plinth 3 cones 3x; CGA to min A At plinth 3 cones 3x; CGA to min A At plinth 3 cones 3x CGA to min A NBOS 3 cones to plinth x3    Lateral no step overs  6\" 2 laps CGA to min A 6\" 2 laps CGA to min A 6\" 3 laps CGA to min A    Alt to taps  6\" box 2x10 6\" box 2x10 6\" box 2x10 6\" box 2x10 ea CGA                          Ther Act  6/13                                                                 Administered functional tests 5xSTS, TUG, Oakes balance test           Modalities                                                    "

## 2023-07-03 DIAGNOSIS — M19.011 ARTHRITIS OF RIGHT ACROMIOCLAVICULAR JOINT: ICD-10-CM

## 2023-07-03 DIAGNOSIS — R73.03 PREDIABETES: ICD-10-CM

## 2023-07-03 RX ORDER — METFORMIN HYDROCHLORIDE 500 MG/1
500 TABLET, EXTENDED RELEASE ORAL
Qty: 90 TABLET | Refills: 0 | Status: SHIPPED | OUTPATIENT
Start: 2023-07-03

## 2023-07-05 ENCOUNTER — OFFICE VISIT (OUTPATIENT)
Dept: PHYSICAL THERAPY | Facility: CLINIC | Age: 63
End: 2023-07-05
Payer: MEDICARE

## 2023-07-05 DIAGNOSIS — R26.89 BALANCE DISORDER: ICD-10-CM

## 2023-07-05 DIAGNOSIS — M62.81 GENERALIZED MUSCLE WEAKNESS: Primary | ICD-10-CM

## 2023-07-05 DIAGNOSIS — R26.9 ABNORMAL GAIT: ICD-10-CM

## 2023-07-05 PROCEDURE — 97110 THERAPEUTIC EXERCISES: CPT

## 2023-07-05 NOTE — PROGRESS NOTES
Daily Note     Today's date: 2023  Patient name: Jody Jane  : 1960  MRN: [id number]  Referring provider: Kimberlyn Navarro  Dx:   Encounter Diagnosis     ICD-10-CM    1. Generalized muscle weakness  M62.81       2. Balance disorder  R26.89       3. Abnormal gait  R26.9                      Subjective: Patient offers no new complaints this visit. Objective: See treatment diary below      Assessment: Tolerated treatment well. Patient able to progress on bridges this visit. She demonstrates some difficulty with SL hip abduction on the R LE; requires verbal cueing to prevent compensation with fair carryover. She also reports some low back and hip pain with SL hip abduction on L this visit; requests to withhold 3rd set today. Adequate challenge with all remaining exercises this visit; fatigue noted at end of session. Patient exhibited good technique with therapeutic exercises and would benefit from continued PT. Plan: Continue per plan of care. Progress treatment as tolerated.        Diagnosis: Decreased balance/stability, difficulty with gait 2* to MS   Precautions: Hx of MS, COPD, HTN (controlled with meds), pre-diabetic, TIA   POC Expires: 23   Re-evaluation Date: 7/10/23   FOTO Scores/Date: Goal - 51;  -    Visit Count  7/10 3/10 4/10 5/10   Manuals                            Ther Ex  7   Recumbent Bike for LE endurance L1 x 6 mins L1 6 min      SLR (L)2# (R)0# 3x10 ea L 2# R 0# 3x10 2x10 ea; 1# on left, 0# on right 2x10 ea 1# on left 0# on right (L)2# (R)0# 2x10 ea   SL hip abd (L)2# (R)0# 3x10  L 2# R 0# 2x10 2x10 ea; 1# on left, 0# on right 2x10 1# on left 0# on right (L)2# (R)0# 2x10 ea   Bridges BTB 5" 2x10  BTB 5s 3x10 2x10x5" blue TB at knees 2x10 5s blue TB BTB 5" 2x10    LAQs (L)2# (R)0# 3x10 ea 3"-5" iso L 2# R 0# 3x10 5s iso (add # on R nv) 2x10 ea; 1 # on left, 0# on right 2x10 ea 1# on L 0# on right (L)2# (R)0# 2x10 ea 5" iso hold   DL heel raises 3x10  3x10 2x10 2x10  2x10    Lateral Band Walk RTB x 3 laps RTB x 3 laps   RTB x 3 laps    Sit to Stand Low mat x 15 cues and CGA Low mat x 15   Low mat x 10 CGA    Leg Press DBL 45# 2x10/ SL 15# 2x10 AROM (increase NV) DBL 45# 2x10  SL: R 15# 2x10  L 25# 2x10   DBL 35#x10/ SL R only 15#x10 (min A on concentric)   HEP        Neuro Re-Ed   6/20 6/22    NBOS standing EO/EC   5x10" ea CGA EO only / EC NV 5x10s EO only - CGA to min A for balance    Cone reaching    At plinth 3 cones 3x; CGA to min A At plinth 3 cones 3x CGA to min A NBOS 3 cones to plinth x3    Lateral no step overs   6" 2 laps CGA to min A 6" 3 laps CGA to min A    Alt to taps   6" box 2x10 6" box 2x10 6" box 2x10 ea CGA                          Ther Act  6/13                                                                 Administered functional tests 5xSTS, TUG, Oakes balance test           Modalities

## 2023-07-10 NOTE — TELEPHONE ENCOUNTER
Next OV 9/13/23 with Dr. Patsy Villalba. Dr. Klein An - Rx entered. Please review and sign if in agreement.

## 2023-07-11 ENCOUNTER — OFFICE VISIT (OUTPATIENT)
Dept: PHYSICAL THERAPY | Facility: CLINIC | Age: 63
End: 2023-07-11
Payer: COMMERCIAL

## 2023-07-11 DIAGNOSIS — R26.9 ABNORMAL GAIT: ICD-10-CM

## 2023-07-11 DIAGNOSIS — R26.89 BALANCE DISORDER: ICD-10-CM

## 2023-07-11 DIAGNOSIS — M62.81 GENERALIZED MUSCLE WEAKNESS: Primary | ICD-10-CM

## 2023-07-11 PROCEDURE — 97110 THERAPEUTIC EXERCISES: CPT

## 2023-07-11 NOTE — PROGRESS NOTES
Daily Note     Today's date: 2023  Patient name: Froilan Milligan  : 1960  MRN: [id number]  Referring provider: Ye Granado  Dx:   Encounter Diagnosis     ICD-10-CM    1. Generalized muscle weakness  M62.81       2. Balance disorder  R26.89       3. Abnormal gait  R26.9           Start Time: 0930  Stop Time: 1016  Total time in clinic (min): 46 minutes    Subjective: Pt reports her L knee has been bothering her more recently. Notes she has been told she has been having increased fluid in her L knee. Has a follow up in two weeks with MD to address these symptoms. Objective: See treatment diary below. Time on bike reduced today d/t pain/discomfort in L knee. Assessment: Tolerated treatment well. Continued with program as outlined below. Was able to increase resistance during LAQ, demonstrating slight improvements in strength. Good form with standing from seated position; limited control with eccentric lowering during STS noted. Occasional LOB during low mat reaching with cones, requiring slight CGA for stabilization. Patient would benefit from continued PT to further increase strength, improve balance, and maximize overall function. Plan: Continue per plan of care.       Diagnosis: Decreased balance/stability, difficulty with gait 2* to MS   Precautions: Hx of MS, COPD, HTN (controlled with meds), pre-diabetic, TIA   POC Expires: 23   Re-evaluation Date: 7/10/23   FOTO Scores/Date: Goal - 51;  -    Visit Count 6/29 7/10 8/10 4/10 5/10   Manuals                            Ther Ex    Recumbent Bike for LE endurance L1 x 6 mins L1 6 min L1 3 min     SLR (L)2# (R)0# 3x10 ea L 2# R 0# 3x10 L 2# R 0# 3x10 2x10 ea 1# on left 0# on right (L)2# (R)0# 2x10 ea   SL hip abd (L)2# (R)0# 3x10  L 2# R 0# 2x10 L 2# R 0# 2x10 2x10 1# on left 0# on right (L)2# (R)0# 2x10 ea   Bridges BTB 5" 2x10  BTB 5s 3x10 BTB 5s 3x10 2x10 5s blue TB BTB 5" 2x10    LAQs (L)2# (R)0# 3x10 ea 3"-5" iso L 2# R 0# 3x10 5s iso (add # on R nv) L 2# R 1#   3x10 5s iso 2x10 ea 1# on L 0# on right (L)2# (R)0# 2x10 ea 5" iso hold   DL heel raises 3x10  3x10 3x10 2x10  2x10    Lateral Band Walk RTB x 3 laps RTB x 3 laps RTB x 3 laps  RTB x 3 laps    Sit to Stand Low mat x 15 cues and CGA Low mat x 15 Low mat x 15  Low mat x 10 CGA    Leg Press DBL 45# 2x10/ SL 15# 2x10 AROM (increase NV) DBL 45# 2x10  SL: R 15# 2x10  L 25# 2x10 DBL 45# 2x10  SL: R 15# 2x10  L 25# 2x10  DBL 35#x10/ SL R only 15#x10 (min A on concentric)   HEP        Neuro Re-Ed    6/22    NBOS standing EO/EC    5x10s EO only - CGA to min A for balance    Cone reaching    At plinth 3 cones 3x CGA to CS At plinth 3 cones 3x CGA to min A NBOS 3 cones to plinth x3    Lateral no step overs    6" 3 laps CGA to min A    Alt to taps    6" box 2x10 6" box 2x10 ea CGA                         Ther Act  6/13                                                               Administered functional tests 5xSTS, TUG, Oakes balance test          Modalities

## 2023-07-13 ENCOUNTER — OFFICE VISIT (OUTPATIENT)
Dept: PHYSICAL THERAPY | Facility: CLINIC | Age: 63
End: 2023-07-13
Payer: COMMERCIAL

## 2023-07-13 DIAGNOSIS — M62.81 GENERALIZED MUSCLE WEAKNESS: Primary | ICD-10-CM

## 2023-07-13 DIAGNOSIS — R26.89 BALANCE DISORDER: ICD-10-CM

## 2023-07-13 DIAGNOSIS — R26.9 ABNORMAL GAIT: ICD-10-CM

## 2023-07-13 PROCEDURE — 97112 NEUROMUSCULAR REEDUCATION: CPT

## 2023-07-13 PROCEDURE — 97110 THERAPEUTIC EXERCISES: CPT

## 2023-07-13 NOTE — PROGRESS NOTES
PT Re-Evaluation      Collection of subjective/objective data performed by Su Diaz PTA; Assessment, POC, and Goal attainment performed by Low Marquez DPT     Today's date: 2023  Patient name: Renato Marcos  : 1960  MRN: [id number]  Referring provider: Emanuel Castro  Dx:   Encounter Diagnosis     ICD-10-CM    1. Generalized muscle weakness  M62.81       2. Balance disorder  R26.89       3. Abnormal gait  R26.9           Start Time: 930  Stop Time: 1029  Total time in clinic (min): 59 minutes    Assessment  Assessment details: Patient is a 58 y.o. female with c/o decreased strength, stability, and difficulty with gait secondary to MS and has completed 9 visits to date with improved FOTO score of 45 to 51 since initial evaluation. Patient demonstrates notable subjective/objective improvement since enrolling in PT to include improving strength, balance, quality of gait, and activity tolerance. Patient continues to exhibit lingering deficits to include decreased strength, higher level balance, and functional weakness that continues to impact tolerance/ability to perform ADLs and recreational activities. Patient will benefit from continued skilled PT intervention to address the aforementioned impairments, achieve goals, maximize function, and improve quality of life. Pt is to go to [place] for several months in August and plan to discharge at that time in early August. Pt is in agreement with this plan.          Impairments: abnormal gait, activity intolerance, impaired balance, impaired physical strength, lacks appropriate home exercise program, pain with function and safety issue    Goals  ST weeks  Pt will demonstrate good understanding and compliance with HEP ONGOING  Pt will ambulate with least restrictive AD with adequate step-through gait mechanics including proper toe clearance during mid-swing phase of gait to reduce fall risk PROGRESSING  Pt will exhibit proper squatting/lifting mechanics without reliance on external cues for correction of form PROGRESSING    LT weeks  Pt will improve FOTO score to > or = to 51 to indicate improved functional abilities   Pt will increase score on PIERCE balance test to /56 to indicate improved balance and allow for increased safety with ADLs, community ambulation, and reduced fall risk NOT TESTED  Pt will increase bilateral hip/knee strength to 4/5 to allow for improved ability to squat, negotiate stairs, and for community ambulation PROGRESSING  Pt will improve 5xSTS time to 12" with fair to good eccentric control REGRESSED WITH IMPROVED CONTROL  Pt will improve TUG time to 10" with SPC and good safety awareness with fair to good eccentric control PROGRESSING      Plan  Plan details: Cont to tx per POC  Patient would benefit from: skilled physical therapy and PT eval  Planned modality interventions: cryotherapy and thermotherapy: hydrocollator packs  Planned therapy interventions: ADL training, activity modification, joint mobilization, manual therapy, neuromuscular re-education, balance, balance/weight bearing training, patient education, postural training, self care, home exercise program, graded exercise, graded activity, gait training, functional ROM exercises, flexibility, strengthening, stretching, therapeutic activities and therapeutic exercise  Frequency: 2x week  Duration in weeks: 8  Plan of Care beginning date: 2023  Plan of Care expiration date: 2023  Treatment plan discussed with: patient        Subjective Evaluation    History of Present Illness  Mechanism of injury: Patient has attended 9 PT visits to date. Patient reports 40% overall improvement. Reports feeling stronger and more safe with getting in and out of chair. Reports decreased falls but states 1x per week and daily stumbles. Pt is planning on going to [place] for long vacation in early August and plan to continue with PT until that time.    Patient Goals  Patient goals for therapy: increased motion, improved balance, increased strength, independence with ADLs/IADLs and return to sport/leisure activities    Pain  No pain reported          Objective     Functional Assessment        Comments  Pt ambulates with SPC with circumduction of right leg, poor toe clearance during mid-swing phase of gait on right    Left hip MMT: Flex 5-/5 Abd 5/5  Right hip MMT: Flex 4-/5 Abd 4/5    Left knee MMT: Flex 5/5 Ext 5/5  Right hip MMT: Flex 4/5 Ext 4+/5    5xSTS:  22" (BL UE assist with good eccentric contro)  (18" with bilateral UE assist and poor eccentric control @ IE)    TU" w/SPC good eccentric control with sittng (14" with SPC and bilateral UE assist, poor eccentric control with stand to sit @ IE)    Oakes Balance test: not tested 2* to time constraints ( @ IE)    FOTO:51 (45 @ IE)         Diagnosis: Decreased balance/stability, difficulty with gait 2* to MS   Precautions: Hx of MS, COPD, HTN (controlled with meds), pre-diabetic, TIA   POC Expires: 23   Re-evaluation Date: 23   FOTO Scores/Date: Goal - 51;  - ;  -    Visit Count 6/29 7/10 8/10 1/10 5/10   Manuals                            Ther Ex    Recumbent Bike for LE endurance L1 x 6 mins L1 6 min L1 3 min L1 x 6 mins    SLR (L)2# (R)0# 3x10 ea L 2# R 0# 3x10 L 2# R 0# 3x10 (L)2# (R)1# 3x10 ea (L)2# (R)0# 2x10 ea   SL hip abd (L)2# (R)0# 3x10  L 2# R 0# 2x10 L 2# R 0# 2x10 (L)2# (R)1# 3x10 (L)2# (R)0# 2x10 ea   Bridges BTB 5" 2x10  BTB 5s 3x10 BTB 5s 3x10 BTB 5" 3x10 BTB 5" 2x10    LAQs (L)2# (R)0# 3x10 ea 3"-5" iso L 2# R 0# 3x10 5s iso (add # on R nv) L 2# R 1#   3x10 5s iso (L)2# (R)1# 3x10 5"iso (L)2# (R)0# 2x10 ea 5" iso hold   DL heel raises 3x10  3x10 3x10 3x10  2x10    Lateral Band Walk RTB x 3 laps RTB x 3 laps RTB x 3 laps RTB x 3 laps RTB x 3 laps    Sit to Stand Low mat x 15 cues and CGA Low mat x 15 Low mat x 15  Low mat x 10 CGA Leg Press DBL 45# 2x10/ SL 15# 2x10 AROM (increase NV) DBL 45# 2x10  SL: R 15# 2x10  L 25# 2x10 DBL 45# 2x10  SL: R 15# 2x10  L 25# 2x10  DBL 35#x10/ SL R only 15#x10 (min A on concentric)   HEP    FOTO subjective Objective Data     Neuro Re-Ed    7/13    NBOS standing EO/EC    30"x2 ea CG to CGA    Cone reaching    At plinth 3 cones 3x CGA to CS  NBOS 3 cones to plinth x3    Lateral no step overs        Alt to taps    6" box 2x10 CGA 6" box 2x10 ea CGA                        Ther Act  6/13 7/13                                                         Administered functional tests 5xSTS, TUG, Oakes balance test    5xSTS, TUG     Modalities

## 2023-07-18 ENCOUNTER — OFFICE VISIT (OUTPATIENT)
Dept: PHYSICAL THERAPY | Facility: CLINIC | Age: 63
End: 2023-07-18
Payer: COMMERCIAL

## 2023-07-18 DIAGNOSIS — R26.89 BALANCE DISORDER: ICD-10-CM

## 2023-07-18 DIAGNOSIS — R26.9 ABNORMAL GAIT: ICD-10-CM

## 2023-07-18 DIAGNOSIS — M62.81 GENERALIZED MUSCLE WEAKNESS: Primary | ICD-10-CM

## 2023-07-18 PROCEDURE — 97112 NEUROMUSCULAR REEDUCATION: CPT | Performed by: PHYSICAL THERAPIST

## 2023-07-18 PROCEDURE — 97110 THERAPEUTIC EXERCISES: CPT | Performed by: PHYSICAL THERAPIST

## 2023-07-18 NOTE — PROGRESS NOTES
Daily Note     Today's date: 2023  Patient name: Jenni Baker  : 1960  MRN: [id number]  Referring provider: Letha Mcneil  Dx:   Encounter Diagnosis     ICD-10-CM    1. Generalized muscle weakness  M62.81       2. Balance disorder  R26.89       3. Abnormal gait  R26.9           Start Time: 2505  Stop Time: 1808  Total time in clinic (min): 51 minutes    Subjective: Patient notes no change in pain or soreness from last visit and that she feels better when getting in/out of chairs. Does note she has some left lateral knee pain that feels worse when going into greater degrees of knee bending such as when kneeling the other day. Reports having a previous MRI in the past on it and never followed up, but now has an apt in about 2 weeks with  to discuss it. Objective: See treatment diary below      Assessment: Patient is able to complete mat table strengthening exercises, however remains moderately fatigued. Neuro re-education exercises continue to require CGA to prevent LOB, specifically during NBOS. VC were used during alternating toe taps when stepping down due to circumduction of right limb off of the step. Pt denies increase in pain, only noting fatigue post tx session. Patient treated in part by JESÚS Marcos under direct PT supervision. Plan: Continue per plan of care. Progress treatment as tolerated.        Diagnosis: Decreased balance/stability, difficulty with gait 2* to MS   Precautions: Hx of MS, COPD, HTN (controlled with meds), pre-diabetic, TIA   POC Expires: 23   Re-evaluation Date: 23   FOTO Scores/Date:  - ;  - ;    Visit Count 6/29 7/10 8/10 1/10 2/10   Manuals                            Ther Ex    Recumbent Bike for LE endurance L1 x 6 mins L1 6 min L1 3 min L1 x 6 mins L1 x 5 min   SLR (L)2# (R)0# 3x10 ea L 2# R 0# 3x10 L 2# R 0# 3x10 (L)2# (R)1# 3x10 ea (L) 2# (R) 1# 3x10 ea SL hip abd (L)2# (R)0# 3x10  L 2# R 0# 2x10 L 2# R 0# 2x10 (L)2# (R)1# 3x10 (L) 2# (R) 1# 3x10   Bridges BTB 5" 2x10  BTB 5s 3x10 BTB 5s 3x10 BTB 5" 3x10 BTB 5" 3x10   LAQs (L)2# (R)0# 3x10 ea 3"-5" iso L 2# R 0# 3x10 5s iso (add # on R nv) L 2# R 1#   3x10 5s iso (L)2# (R)1# 3x10 5"iso (L) 2# (R) 1# 3x10 5" iso   DL heel raises 3x10  3x10 3x10 3x10  3x10   Lateral Band Walk RTB x 3 laps RTB x 3 laps RTB x 3 laps RTB x 3 laps RTB x 3 laps   Sit to Stand Low mat x 15 cues and CGA Low mat x 15 Low mat x 15  Low mat x 15   Leg Press DBL 45# 2x10/ SL 15# 2x10 AROM (increase NV) DBL 45# 2x10  SL: R 15# 2x10  L 25# 2x10 DBL 45# 2x10  SL: R 15# 2x10  L 25# 2x10     HEP    FOTO subjective Objective Data     Neuro Re-Ed    7/13 7/18   NBOS standing EO/EC    30"x2 ea CG to CGA 30"x2 ea CG to CGA   Cone reaching    At plinth 3 cones 3x CGA to CS     Lateral no step overs        Alt to taps    6" box 2x10 CGA 6" box 2x10 CGA                        Ther Act  6/13 7/13                                                         Administered functional tests 5xSTS, TUG, Oakes balance test    5xSTS, TUG     Modalities

## 2023-07-19 ENCOUNTER — TELEPHONE (OUTPATIENT)
Age: 63
End: 2023-07-19

## 2023-07-20 ENCOUNTER — OFFICE VISIT (OUTPATIENT)
Dept: PHYSICAL THERAPY | Facility: CLINIC | Age: 63
End: 2023-07-20
Payer: COMMERCIAL

## 2023-07-20 DIAGNOSIS — R26.89 BALANCE DISORDER: ICD-10-CM

## 2023-07-20 DIAGNOSIS — M62.81 GENERALIZED MUSCLE WEAKNESS: Primary | ICD-10-CM

## 2023-07-20 DIAGNOSIS — R26.9 ABNORMAL GAIT: ICD-10-CM

## 2023-07-20 PROCEDURE — 97112 NEUROMUSCULAR REEDUCATION: CPT

## 2023-07-20 PROCEDURE — 97110 THERAPEUTIC EXERCISES: CPT

## 2023-07-20 NOTE — PROGRESS NOTES
Daily Note     Today's date: 2023  Patient name: Paco Aguilera  : 1960  MRN: [id number]  Referring provider: Daisy Vernon  Dx:   Encounter Diagnosis     ICD-10-CM    1. Generalized muscle weakness  M62.81       2. Balance disorder  R26.89       3. Abnormal gait  R26.9           Start Time: 5255  Stop Time: 1017  Total time in clinic (min): 46 minutes    Subjective: patient reports no new complaints or incidents. Denies any falls      Objective: See treatment diary below      Assessment:   Patient was able to complete therapy without incident. Small cues with SLR on performing in greater ROM. Progressed lateral band walk resistance with cues on lifting of feet. CGA with NBOS and alt toe taps for safety      Plan: Continue per plan of care. Progress treatment as tolerated.        Diagnosis: Decreased balance/stability, difficulty with gait 2* to MS   Precautions: Hx of MS, COPD, HTN (controlled with meds), pre-diabetic, TIA   POC Expires: 23   Re-evaluation Date: 23   FOTO Scores/Date:  - 46;  - 39;  -    Visit Count 3/10 7/10 8/10 1/10 2/10   Manuals                            Ther Ex    Recumbent Bike for LE endurance L1 x 5 mins L1 6 min L1 3 min L1 x 6 mins L1 x 5 min   SLR (L)2# (R)1# 3x10 ea L 2# R 0# 3x10 L 2# R 0# 3x10 (L)2# (R)1# 3x10 ea (L) 2# (R) 1# 3x10 ea   SL hip abd (L)2# (R)1# 3x10  L 2# R 0# 2x10 L 2# R 0# 2x10 (L)2# (R)1# 3x10 (L) 2# (R) 1# 3x10   Bridges BTB 5" 3x10  BTB 5s 3x10 BTB 5s 3x10 BTB 5" 3x10 BTB 5" 3x10   LAQs (L)# (R)1# 3x10  L 2# R 0# 3x10 5s iso (add # on R nv) L 2# R 1#   3x10 5s iso (L)2# (R)1# 3x10 5"iso (L) 2# (R) 1# 3x10 5" iso   DL heel raises 3x10 (heel and toe) 3x10 3x10 3x10  3x10   Lateral Band Walk GTB x 3 laps RTB x 3 laps RTB x 3 laps RTB x 3 laps RTB x 3 laps   Sit to Stand  Low mat x 15 Low mat x 15  Low mat x 15   Leg Press  DBL 45# 2x10  SL: R 15# 2x10  L 25# 2x10 DBL 45# 2x10  SL: R 15# 2x10  L 25# 2x10     HEP    FOTO subjective Objective Data     Neuro Re-Ed    7/13 7/18   NBOS standing EO/EC 30"3 EC   30"x2 ea CG to CGA 30"x2 ea CG to CGA   Cone reaching    At plinth 3 cones 3x CGA to CS     Lateral no step overs        Alt to taps 6" 2x10 CGA    6" box 2x10 CGA 6" box 2x10 CGA   Rebounder NBOS yellow physio x15                   Ther Act     7/13                                                        Administered functional tests     5xSTS, TUG     Modalities

## 2023-07-24 ENCOUNTER — OFFICE VISIT (OUTPATIENT)
Dept: PHYSICAL THERAPY | Facility: CLINIC | Age: 63
End: 2023-07-24
Payer: COMMERCIAL

## 2023-07-24 DIAGNOSIS — R26.89 BALANCE DISORDER: ICD-10-CM

## 2023-07-24 DIAGNOSIS — M62.81 GENERALIZED MUSCLE WEAKNESS: Primary | ICD-10-CM

## 2023-07-24 DIAGNOSIS — R26.9 ABNORMAL GAIT: ICD-10-CM

## 2023-07-24 PROCEDURE — 97110 THERAPEUTIC EXERCISES: CPT

## 2023-07-24 PROCEDURE — 97112 NEUROMUSCULAR REEDUCATION: CPT

## 2023-07-24 NOTE — PROGRESS NOTES
Daily Note     Today's date: 2023  Patient name: Jeffry Alfred  : 1960  MRN: [id number]  Referring provider: James Mathews  Dx:   Encounter Diagnosis     ICD-10-CM    1. Generalized muscle weakness  M62.81       2. Balance disorder  R26.89       3. Abnormal gait  R26.9                      Subjective: Patient presents to PT with SPC, AFO on R. She has 2 instances of catching her toes upon walking into session. Objective: See treatment diary below      Assessment: Cues to increase hip flexion during ambulation bilaterally. Cues and AA on R to achieve full knee ext. Decreased VMO activation. She is challenged in SLB with alt toe taps, requires Zamzam for stabilization when in SLS on L with R LE movement. She is fatigued with tapping with RLE due to decreased hip flexor strength. Patient demonstrated fatigue post treatment and would benefit from continued PT      Plan: Continue per plan of care. Diagnosis: Decreased balance/stability, difficulty with gait 2* to MS   Precautions: Hx of MS, COPD, HTN (controlled with meds), pre-diabetic, TIA   POC Expires: 23   Re-evaluation Date: 23   FOTO Scores/Date: ; ;    Visit Count 3/10 4/10  1/10 2/10   Manuals                            Ther Ex    Recumbent Bike for LE endurance L1 x 5 mins L1 x 2.5 min   L Knee p!   L1 x 6 mins L1 x 5 min   SLR (L)2# (R)1# 3x10 ea (L) 2# (R) 1# 3x10 ea  (L)2# (R)1# 3x10 ea (L) 2# (R) 1# 3x10 ea   SL hip abd (L)2# (R)1# 3x10  (L)2# (R)1# 3x10  (L)2# (R)1# 3x10 (L) 2# (R) 1# 3x10   Bridges BTB 5" 3x10  BTB 5" 3x10  BTB 5" 3x10 BTB 5" 3x10   LAQs (L)# (R)1# 3x10  (L)# (R)1# 3x10   (L)2# (R)1# 3x10 5"iso (L) 2# (R) 1# 3x10 5" iso   DL heel raises 3x10 (heel and toe) 3x10 ea  3x10  3x10   Lateral Band Walk GTB x 3 laps GTB x 5 laps at table  RTB x 3 laps RTB x 3 laps   Sit to Stand     Low mat x 15   Leg Press        HEP    FOTO subjective Objective Data     Neuro Re-Ed    7/13 7/18   NBOS standing EO/EC 30"3 EC NV  30"x2 ea CG to CGA 30"x2 ea CG to CGA   Cone reaching         Lateral no step overs        Alt to taps 6" 2x10 CGA  6" 2x10 CGA   6" box 2x10 CGA 6" box 2x10 CGA   Rebounder NBOS yellow physio x15 NV                 Ther Act    7/13                                                       Administered functional tests    5xSTS, TUG     Modalities

## 2023-07-25 ENCOUNTER — OFFICE VISIT (OUTPATIENT)
Dept: OBGYN CLINIC | Facility: HOSPITAL | Age: 63
End: 2023-07-25
Payer: COMMERCIAL

## 2023-07-25 VITALS
WEIGHT: 141.4 LBS | HEIGHT: 60 IN | SYSTOLIC BLOOD PRESSURE: 119 MMHG | HEART RATE: 68 BPM | DIASTOLIC BLOOD PRESSURE: 74 MMHG | BODY MASS INDEX: 27.76 KG/M2

## 2023-07-25 DIAGNOSIS — M25.562 LEFT KNEE PAIN, UNSPECIFIED CHRONICITY: ICD-10-CM

## 2023-07-25 DIAGNOSIS — M17.12 PRIMARY OSTEOARTHRITIS OF LEFT KNEE: Primary | ICD-10-CM

## 2023-07-25 PROCEDURE — 99213 OFFICE O/P EST LOW 20 MIN: CPT | Performed by: ORTHOPAEDIC SURGERY

## 2023-07-25 NOTE — PROGRESS NOTES
Assessment:  1. Primary osteoarthritis of left knee  Ambulatory referral to Physical Therapy      2. Left knee pain, unspecified chronicity  Ambulatory referral to Physical Therapy          Plan:  Left knee osteoarthritis. Left knee MRI reviewed with patient displaying medial femora and patellar cartilage loss. The patient should continue physical therapy. The patient can follow up as needed and is welcome to return at any point with any new or old issue. To do next visit:  Return if symptoms worsen or fail to improve. The above stated was discussed in layman's terms and the patient expressed understanding. All questions were answered to the patient's satisfaction. Scribe Attestation    I,:  Jake Nielsen am acting as a scribe while in the presence of the attending physician.:       I,:  Neel Choi MD personally performed the services described in this documentation    as scribed in my presence.:             Subjective:   Sharyle Scudder is a 58 y.o. female who presents for follow up of left knee with MRI review. She is overall better since last visit I 3/2023. Today she complains of left anterolateral knee pain. She rates her pain at 3/10 and 6/10 at its worse. She is unsure what aggravates or alleviates. She is currently in physical therapy. She does use Advil with some benefit. She does use SPC. She denies past left knee surgery. She has history of MS effecting balance.         Review of systems negative unless otherwise specified in HPI    Past Medical History:   Diagnosis Date   • Asthma    • Cardiac disease     heart murmur   • COPD (chronic obstructive pulmonary disease) (HCC)    • Depression    • Diabetes mellitus ([address])    • Fibroid    • Hyperlipidemia    • Hypertension    • Influenza     December 2017   • Irregular heart beat    • Kidney stone    • Migraine    • Migraine    • MS (multiple sclerosis) ([address])    • Multiple sclerosis (HCC)    • Nephrolithiasis    • Neurogenic bladder    • Panniculitis    • Sleep apnea, obstructive    • Urinary incontinence        Past Surgical History:   Procedure Laterality Date   • COLONOSCOPY     • DILATION AND CURETTAGE OF UTERUS     • SD COLONOSCOPY FLX DX W/COLLJ SPEC WHEN PFRMD N/A 1/11/2018    Procedure: COLONOSCOPY;  Surgeon: Cinthia Khan MD;  Location: [facility]; Service: Gastroenterology   • SD TENDON SHEATH INCISION Left 1/17/2018    Procedure: RING TRIGGER FINGER RELEASE;  Surgeon: Petrona Hutson MD;  Location:  MAIN OR;  Service: Orthopedics   • TONSILLECTOMY     • UTERINE FIBROID SURGERY         Family History   Problem Relation Age of Onset   • Stroke Mother    • Diabetes Mother    • Hypertension Mother    • Emphysema Mother    • Heart disease Father    • Diabetes Father    • Hypertension Father    • Diabetes Sister    • Sleep apnea Sister    • No Known Problems Maternal Grandmother    • No Known Problems Paternal Grandmother    • Hypertension Brother    • Graves' disease Brother    • Breast cancer Maternal Aunt 68   • Cancer Maternal Aunt    • Stomach cancer Maternal Aunt    • Sleep apnea Maternal Aunt    • Cancer Maternal Aunt    • No Known Problems Maternal Aunt    • No Known Problems Maternal Aunt    • No Known Problems Maternal Aunt    • No Known Problems Maternal Aunt        Social History     Occupational History   • Not on file   Tobacco Use   • Smoking status: Never   • Smokeless tobacco: Never   Vaping Use   • Vaping Use: Never used   Substance and Sexual Activity   • Alcohol use: Not Currently     Comment: social   • Drug use: Never     Types: Marijuana     Comment: last use 4 days ago. • Sexual activity: Yes     Partners: Male     Birth control/protection: Post-menopausal         Current Outpatient Medications:   •  albuterol (2.5 mg/3 mL) 0.083 % nebulizer solution, Albuterol Sulfate (2.5 MG/3ML) 0.083% Inhalation Nebulization Solution USE 1 UNIT DOSE IN NEBULIZER EVERY 6 HOURS AS NEEDED.   Quantity: 1; Refills: 5    Lord Brenda HUGHES;  Started 12-July-2016 Active3 ML Plas Cont (125 Plas Conts), Disp: , Rfl:   •  aspirin 81 MG tablet, Aspirin 81 MG TABS Take 1 tablet daily  Refills: 0  Active, Disp: , Rfl:   •  atorvastatin (LIPITOR) 40 mg tablet, TAKE 1 TABLET (40 MG TOTAL) BY MOUTH ONCE AT BEDTIME, Disp: 90 tablet, Rfl: 0  •  baclofen 20 mg tablet, TAKE 1 TABLET BY MOUTH 4 TIMES A DAY., Disp: 360 tablet, Rfl: 2  •  butalbital-acetaminophen-caffeine (FIORICET,ESGIC) -40 mg per tablet, Take 1 tablet by mouth every 4 (four) hours as needed for headaches, Disp: 12 tablet, Rfl: 4  •  Diclofenac Sodium (VOLTAREN) 1 %, Apply 2 g topically 4 (four) times a day, Disp: 150 g, Rfl: 1  •  EPINEPHrine (EPIPEN) 0.3 mg/0.3 mL SOAJ, Inject 0.3 mL (0.3 mg total) into a muscle if needed for anaphylaxis, Disp: 1 each, Rfl: 1  •  gabapentin (NEURONTIN) 300 mg capsule, TAKE 2 CAPS IN AM AND NOON AND 3 CAPS AT BEDTIME, Disp: 630 capsule, Rfl: 2  •  metFORMIN (GLUCOPHAGE-XR) 500 mg 24 hr tablet, Take 1 tablet (500 mg total) by mouth daily with breakfast, Disp: 90 tablet, Rfl: 0  •  NIFEdipine ER (ADALAT CC) 60 MG 24 hr tablet, TAKE 1 TABLET BY MOUTH EVERY DAY, Disp: 90 tablet, Rfl: 0  •  oxybutynin (DITROPAN-XL) 10 MG 24 hr tablet, Take 1 tablet (10 mg total) by mouth daily at bedtime, Disp: 90 tablet, Rfl: 3  •  sertraline (ZOLOFT) 50 mg tablet, Take 1 tablet (50 mg total) by mouth daily at bedtime, Disp: 90 tablet, Rfl: 1  •  fluticasone (FLONASE) 50 mcg/act nasal spray, 2 sprays into each nostril daily (Patient not taking: Reported on 5/4/2023), Disp: 1 Bottle, Rfl: 3  •  methylPREDNISolone 4 MG tablet therapy pack, Use as directed on package, Disp: 1 each, Rfl: 0  •  Wixela Inhub 250-50 MCG/ACT inhaler, Inhale 1 puff 2 (two) times a day Rinse mouth after use., Disp: 180 blister, Rfl: 1    Allergies   Allergen Reactions   • Nuts - Food Allergy Shortness Of Breath and Anaphylaxis   • Other Anaphylaxis, Hives and Itching Annotation - [id number]: POISON IVY   walnuts & cashews  Cats   • Peanut Oil - Food Allergy      Hives anaphylaxis   • Shellfish Allergy - Food Allergy Shortness Of Breath and Swelling   • Shellfish-Derived Products - Food Allergy Shortness Of Breath and Swelling   • Sumatriptan Headache and Other (See Comments)     Other reaction(s): Headache   • Copaxone [Glatiramer Acetate] Hives   • Cat Hair Extract    • Cephalexin Rash     Pt says they took keflex again and didn't get a reaction from it. Pt states they believe it was something they ate that day instead. • Glatiramer Rash     Other reaction(s): rash   • Pollen Extract Sneezing   • Seasonal Ic [Cholestatin]             Vitals:    07/25/23 0951   BP: 119/74   Pulse: 68       Objective:  Physical exam  · General: Awake, Alert, Oriented  · Eyes: Pupils equal, round and reactive to light  · Heart: regular rate and rhythm  · Lungs: No audible wheezing  · Abdomen: soft                    Ortho Exam  Left knee:  No erythema or ecchymosis  No effusion or swelling  Normal strength  Good ROM   Calf compartments soft and supple  Sensation intact  Toes are warm sensate and mobile        Diagnostics, reviewed and taken today if performed as documented: The attending physician has personally reviewed the pertinent films in PACS and interpretation is as follows:  Left knee MRI:  Medial femoral condyle and patellar cartilage loss. No meniscus or ligament tears. No bone contusion. Procedures, if performed today:    Procedures    None performed      Portions of the record may have been created with voice recognition software. Occasional wrong word or "sound a like" substitutions may have occurred due to the inherent limitations of voice recognition software. Read the chart carefully and recognize, using context, where substitutions have occurred.

## 2023-07-27 ENCOUNTER — OFFICE VISIT (OUTPATIENT)
Dept: PHYSICAL THERAPY | Facility: CLINIC | Age: 63
End: 2023-07-27
Payer: COMMERCIAL

## 2023-07-27 DIAGNOSIS — R26.9 ABNORMAL GAIT: ICD-10-CM

## 2023-07-27 DIAGNOSIS — M62.81 GENERALIZED MUSCLE WEAKNESS: Primary | ICD-10-CM

## 2023-07-27 DIAGNOSIS — R26.89 BALANCE DISORDER: ICD-10-CM

## 2023-07-27 PROCEDURE — 97110 THERAPEUTIC EXERCISES: CPT

## 2023-07-27 PROCEDURE — 97112 NEUROMUSCULAR REEDUCATION: CPT

## 2023-07-27 NOTE — PROGRESS NOTES
Daily Note     Today's date: 2023  Patient name: Jamie Cruz  : 1960  MRN: [id number]  Referring provider: Nayla Slater  Dx:   Encounter Diagnosis     ICD-10-CM    1. Generalized muscle weakness  M62.81       2. Balance disorder  R26.89       3. Abnormal gait  R26.9           Start Time: 0930  Stop Time: 1015  Total time in clinic (min): 45 minutes    Subjective: patient reports no new complaints or incidents. Denies any falls     Objective: See treatment diary below      Assessment:  Patient was able to progress in L hip proximal hip strengthening performing SLR and LAQ with 3# ankle cuff. Demonstrates improved R hip flexion and control with alt toe taps. Required CGA with taps and rebounder for safety. Plan: Continue per plan of care. Progress treatment as tolerated. Diagnosis: Decreased balance/stability, difficulty with gait 2* to MS   Precautions: Hx of MS, COPD, HTN (controlled with meds), pre-diabetic, TIA   POC Expires: 23   Re-evaluation Date: 23   FOTO Scores/Date: ; ;    Visit Count 3/10 4/10 5/10 1/10 2/10   Manuals                            Ther Ex    Recumbent Bike for LE endurance L1 x 5 mins L1 x 2.5 min   L Knee p!  L1 x 6 mins L1 x 6 mins L1 x 5 min   SLR (L)2# (R)1# 3x10 ea (L) 2# (R) 1# 3x10 ea (L)3# (R)1#  3x10 ea (L)2# (R)1# 3x10 ea (L) 2# (R) 1# 3x10 ea   SL hip abd (L)2# (R)1# 3x10  (L)2# (R)1# 3x10 (L) 3# (R)1#  3x10 ea (L)2# (R)1# 3x10 (L) 2# (R) 1# 3x10   Bridges BTB 5" 3x10  BTB 5" 3x10 BTB 5" 3x10 BTB 5" 3x10 BTB 5" 3x10   LAQs (L)# (R)1# 3x10  (L)# (R)1# 3x10  (L)3# (R)1# 3x10 ea (L)2# (R)1# 3x10 5"iso (L) 2# (R) 1# 3x10 5" iso   DL heel raises 3x10 (heel and toe) 3x10 ea  3x10 ea  Heel and toe 3x10  3x10   Lateral Band Walk GTB x 3 laps GTB x 4 laps at table GTB x 4 laps RTB x 3 laps RTB x 3 laps   Sit to Stand     Low mat x 15   Leg Press        HEP    FOTO subjective Objective Data     Neuro Re-Ed    7/13 7/18   NBOS standing EO/EC 30"3 EC NV  30"x2 ea CG to CGA 30"x2 ea CG to CGA   Cone reaching         Lateral no step overs        Alt to taps 6" 2x10 CGA   6"2x10 CGA 6" box 2x10 CGA 6" box 2x10 CGA   Rebounder NBOS yellow physio x15  NBOS yellow physion 2x10                Ther Act    7/13                                                       Administered functional tests    5xSTS, TUG     Modalities

## 2023-08-24 ENCOUNTER — TELEPHONE (OUTPATIENT)
Dept: NEUROLOGY | Facility: CLINIC | Age: 63
End: 2023-08-24

## 2023-08-24 NOTE — TELEPHONE ENCOUNTER
Patient called to reschedule appointment with Dr Flor Mccormack on 9/13/23. She wont be able to come she is in [place]. Offered appointment for 12/13/23 but she wants to see Dr Flor Mccormack sooner. She will find another neurologist who can see her right away.

## 2023-08-28 ENCOUNTER — TELEPHONE (OUTPATIENT)
Dept: NEUROLOGY | Facility: CLINIC | Age: 63
End: 2023-08-28

## 2023-08-28 NOTE — TELEPHONE ENCOUNTER
Hello,     I have called patient back to help re-schedule , patient asked to please give her the same date and time she was scheduled for as she cancelled in error. Re-scheduled back to 9/13/2023, 11 am, Cristina with .     Thank you,     Ashley Wynn

## 2023-09-04 DIAGNOSIS — E78.5 HYPERLIPIDEMIA, UNSPECIFIED HYPERLIPIDEMIA TYPE: ICD-10-CM

## 2023-09-04 RX ORDER — ATORVASTATIN CALCIUM 40 MG/1
40 TABLET, FILM COATED ORAL
Qty: 90 TABLET | Refills: 0 | Status: SHIPPED | OUTPATIENT
Start: 2023-09-04

## 2023-09-08 DIAGNOSIS — I10 PRIMARY HYPERTENSION: ICD-10-CM

## 2023-09-08 RX ORDER — NIFEDIPINE 60 MG/1
TABLET, FILM COATED, EXTENDED RELEASE ORAL
Qty: 90 TABLET | Refills: 0 | Status: SHIPPED | OUTPATIENT
Start: 2023-09-08

## 2023-09-13 ENCOUNTER — OFFICE VISIT (OUTPATIENT)
Dept: NEUROLOGY | Facility: CLINIC | Age: 63
End: 2023-09-13
Payer: COMMERCIAL

## 2023-09-13 VITALS
TEMPERATURE: 98.7 F | RESPIRATION RATE: 20 BRPM | WEIGHT: 140 LBS | HEIGHT: 60 IN | HEART RATE: 64 BPM | DIASTOLIC BLOOD PRESSURE: 78 MMHG | OXYGEN SATURATION: 97 % | SYSTOLIC BLOOD PRESSURE: 130 MMHG | BODY MASS INDEX: 27.48 KG/M2

## 2023-09-13 DIAGNOSIS — M47.26 OTHER SPONDYLOSIS WITH RADICULOPATHY, LUMBAR REGION: ICD-10-CM

## 2023-09-13 DIAGNOSIS — M53.86 SCIATICA OF RIGHT SIDE ASSOCIATED WITH DISORDER OF LUMBAR SPINE: ICD-10-CM

## 2023-09-13 DIAGNOSIS — G35 MULTIPLE SCLEROSIS (HCC): Primary | ICD-10-CM

## 2023-09-13 DIAGNOSIS — R13.12 OROPHARYNGEAL DYSPHAGIA: ICD-10-CM

## 2023-09-13 DIAGNOSIS — R22.0 SWELLING OF LEFT SIDE OF FACE: ICD-10-CM

## 2023-09-13 DIAGNOSIS — H90.3 SENSORINEURAL HEARING LOSS (SNHL) OF BOTH EARS: ICD-10-CM

## 2023-09-13 DIAGNOSIS — G81.91 RIGHT HEMIPARESIS (HCC): ICD-10-CM

## 2023-09-13 PROCEDURE — 99215 OFFICE O/P EST HI 40 MIN: CPT | Performed by: PSYCHIATRY & NEUROLOGY

## 2023-09-13 NOTE — PROGRESS NOTES
Patient ID: Katie Lester is a 58 y.o. female. Assessment/Plan:           Problem List Items Addressed This Visit        Digestive    Oropharyngeal dysphagia       Nervous and Auditory    Multiple sclerosis ([address]) - Primary    Right hemiparesis (HCC)    Other spondylosis with radiculopathy, lumbar region    Sciatica of right side associated with disorder of lumbar spine    Sensorineural hearing loss (SNHL) of both ears    Relevant Orders    Ambulatory Referral to Otolaryngology       Other    Swelling of left side of face      Mrs. Geovanny Kay has presented to [facility] multiple sclerosis center for follow-up on multiple sclerosis related issues. Patient has been off disease modifying regimen due to immune senescence with no significant disease progression described. Patient has residual right-sided hemiparesis as patient ambulates with a cane. Patient required motorized scooter. Patient main concern today was swelling in her left side of the face with no significant hearing dysfunction or sore throats. Patient was advised to follow-up with otolaryngology for comprehensive evaluation to rule out any means of neoplasm. Patient also reported having speech problem with signs of aspiration. Patient had multiple injuries to her right ankle with intermittent swelling been noted. Patient describes no recent falls as she is planning to follow-up with neuro physical therapy. Burning pain in the left thigh for 1 week as well as pain in the right upper neck and shoulder likely due to the multiple sclerosis with demyelination at C2 level noted on previous imaging. Patient has been taking total of 5 capsules of gabapentin a day. Patient was offered increasing the nightly dose of baclofen up to 2 tablets for worsening contracture in the right hand. Patient completed imaging of brain and cervical spine in March 2023 with no disease progression advised.     Patient is to continue healthy dietary approach and regular physical activity. Follow-up with [facility] neurology on annual basis. Subjective: 25ft walk in 16 seconds. She is having gait problems, cont to trip over a lot, she is going to PT it has helped her a little. She states she has been having some swelling on her left side of her face, she has some throbbing eye pain on her right eye, she is still having dizziness daily. HPI  Mrs. Bk Estes has presented to [facility] multiple sclerosis center for follow-up on multiple sclerosis and related issues. Patient is not on disease modifying regimen due to immunosuppressants and related immune system changes. Patient has stable radiographic finding of her brain and cervical spine imaging were completed in March 2023, no disease progression has been described.     Patient does have degenerative disc disease with facet arthropathy appreciated in several levels in her cervical region. Patient continues describing throbbing pain in the retro-orbital area with left facial swelling and speech dysfunction with signs of aspiration. Patient stated she feels like a pill stuck in her throat. Patient also have right ankle swelling with remote history of multiple injuries to the ligaments and fractures in her right foot bones. Patient has residual right hemiparesis due to multiple sclerosis. Patient described burning pain in the left thigh for 1 week. Right upper neck and shoulder sensory dysfunction with improvement since Neurontin was increased to 5 capsule daily. Patient has been working with podiatry team requiring injection to the right big toe. Patient had evaluation of orthopedic team with medial compartment arthritis with predominant area of grade 2 to grade 3 articular cartilage tearing over the medial femoral condyle with mild edema seen in the lateral aspect of the knee.        Patient was evaluated by Dr. Sonia Chu at [facility] since 2009, then patient had followed by Dr. Anthony To.  Dr. Ambrosio Schultz was offering MS services till May 2017 with [employer] was initiated art that time; patient had right-sided weakness with chronic fatigue and neurologic gait dysfunction back in  2017 with progressive worsening of her dysfunction reported likely due to progression to secondary progressive MS. The following portions of the patient's history were reviewed and updated as appropriate: She  has a past medical history of Asthma, Cardiac disease, COPD (chronic obstructive pulmonary disease) ([address]), Depression, Diabetes mellitus ([address]), Fibroid, Hyperlipidemia, Hypertension, Influenza, Irregular heart beat, Kidney stone, Migraine, Migraine, MS (multiple sclerosis) ([address]), Multiple sclerosis ([address]), Nephrolithiasis, Neurogenic bladder, Panniculitis, Sleep apnea, obstructive, and Urinary incontinence.   She   Patient Active Problem List    Diagnosis Date Noted   • Oropharyngeal dysphagia 09/13/2023   • Sensorineural hearing loss (SNHL) of both ears 09/13/2023   • Swelling of left side of face 09/13/2023   • Frequent falls 05/04/2023   • Sciatica of right side associated with disorder of lumbar spine 05/04/2023   • Left knee pain 12/22/2022   • Prediabetes 12/22/2022   • Depression 05/06/2022   • Overactive bladder 05/06/2022   • DVT, lower extremity, distal, acute, right ([address]) 05/05/2022   • CINTHYA (obstructive sleep apnea)    • Adhesive capsulitis of left shoulder 10/13/2021   • Cervical disc disorder at C6-C7 level with radiculopathy 04/12/2021   • Encephalopathy due to severe acute respiratory syndrome coronavirus 2 (SARS-CoV-2) 04/12/2021   • Word finding difficulty 04/12/2021   • Slurred speech 04/12/2021   • Edema of right lower extremity 02/26/2021   • Hypotension 11/09/2020   • Pneumonia due to COVID-19 virus 11/06/2020   • Other spondylosis with radiculopathy, lumbar region 02/20/2020   • Right low back pain 02/21/2019   • Skin rash 02/21/2019   • Right hemiparesis ([address]) 02/21/2019   • Mesenteric panniculitis ([address]) 03/15/2018   • Ambulatory dysfunction 02/21/2018   • Chronic right-sided headaches 02/21/2018   • Multiple sclerosis ([address]) 02/21/2018   • Panniculitis 02/21/2018   • S/P trigger finger release 02/02/2018   • Trigger ring finger of left hand 01/17/2018   • Hypertension 04/06/2017   • COPD (chronic obstructive pulmonary disease) ([address]) 04/11/2016   • Hyperlipidemia with target LDL less than 160 07/14/2011     She  has a past surgical history that includes Dilation and curettage of uterus; Uterine fibroid surgery; pr colonoscopy flx dx w/collj spec when pfrmd (N/A, 1/11/2018); Colonoscopy; pr tendon sheath incision (Left, 1/17/2018); and Tonsillectomy. Her family history includes Breast cancer (age of onset: 76) in her maternal aunt; Cancer in her maternal aunt and maternal aunt; Diabetes in her father, mother, and sister; Emphysema in her mother; Ledora Nancy' disease in her brother; Heart disease in her father; Hypertension in her brother, father, and mother; No Known Problems in her maternal aunt, maternal aunt, maternal aunt, maternal aunt, maternal grandmother, and paternal grandmother; Sleep apnea in her maternal aunt and sister; Stomach cancer in her maternal aunt; Stroke in her mother. She  reports that she has never smoked. She has never used smokeless tobacco. She reports that she does not currently use alcohol. She reports that she does not use drugs. Current Outpatient Medications   Medication Sig Dispense Refill   • albuterol (2.5 mg/3 mL) 0.083 % nebulizer solution Albuterol Sulfate (2.5 MG/3ML) 0.083% Inhalation Nebulization Solution  USE 1 UNIT DOSE IN NEBULIZER EVERY 6 HOURS AS NEEDED.    Quantity: 1;  Refills: 5       Manisha Carrion M.D.;  Started 12-July-2016  Active3 ML Plas Cont (125 Plas Conts)     • aspirin 81 MG tablet Aspirin 81 MG TABS  Take 1 tablet daily   Refills: 0    Active     • atorvastatin (LIPITOR) 40 mg tablet TAKE 1 TABLET (40 MG TOTAL) BY MOUTH ONCE AT BEDTIME 90 tablet 0   • baclofen 20 mg tablet TAKE 1 TABLET BY MOUTH 4 TIMES A DAY. 360 tablet 2   • butalbital-acetaminophen-caffeine (FIORICET,ESGIC) -40 mg per tablet Take 1 tablet by mouth every 4 (four) hours as needed for headaches 12 tablet 4   • Diclofenac Sodium (VOLTAREN) 1 % Apply 2 g topically 4 (four) times a day 150 g 1   • EPINEPHrine (EPIPEN) 0.3 mg/0.3 mL SOAJ Inject 0.3 mL (0.3 mg total) into a muscle if needed for anaphylaxis 1 each 1   • fluticasone (FLONASE) 50 mcg/act nasal spray 2 sprays into each nostril daily 1 Bottle 3   • gabapentin (NEURONTIN) 300 mg capsule TAKE 2 CAPS IN AM AND NOON AND 3 CAPS AT BEDTIME 630 capsule 2   • metFORMIN (GLUCOPHAGE-XR) 500 mg 24 hr tablet Take 1 tablet (500 mg total) by mouth daily with breakfast 90 tablet 0   • NIFEdipine ER (ADALAT CC) 60 MG 24 hr tablet TAKE 1 TABLET BY MOUTH EVERY DAY 90 tablet 0   • oxybutynin (DITROPAN-XL) 10 MG 24 hr tablet Take 1 tablet (10 mg total) by mouth daily at bedtime 90 tablet 3   • sertraline (ZOLOFT) 50 mg tablet Take 1 tablet (50 mg total) by mouth daily at bedtime 90 tablet 1   • Wixela Inhub 250-50 MCG/ACT inhaler Inhale 1 puff 2 (two) times a day Rinse mouth after use. 180 blister 1     No current facility-administered medications for this visit. Current Outpatient Medications on File Prior to Visit   Medication Sig   • albuterol (2.5 mg/3 mL) 0.083 % nebulizer solution Albuterol Sulfate (2.5 MG/3ML) 0.083% Inhalation Nebulization Solution  USE 1 UNIT DOSE IN NEBULIZER EVERY 6 HOURS AS NEEDED. Quantity: 1;  Refills: 5       Eliud Solorzano M.D.;  Started 12-July-2016  Active3 ML Plas Cont (125 Plas Conts)   • aspirin 81 MG tablet Aspirin 81 MG TABS  Take 1 tablet daily   Refills: 0    Active   • atorvastatin (LIPITOR) 40 mg tablet TAKE 1 TABLET (40 MG TOTAL) BY MOUTH ONCE AT BEDTIME   • baclofen 20 mg tablet TAKE 1 TABLET BY MOUTH 4 TIMES A DAY.    • butalbital-acetaminophen-caffeine (FIORICET,ESGIC) -40 mg per tablet Take 1 tablet by mouth every 4 (four) hours as needed for headaches   • Diclofenac Sodium (VOLTAREN) 1 % Apply 2 g topically 4 (four) times a day   • EPINEPHrine (EPIPEN) 0.3 mg/0.3 mL SOAJ Inject 0.3 mL (0.3 mg total) into a muscle if needed for anaphylaxis   • fluticasone (FLONASE) 50 mcg/act nasal spray 2 sprays into each nostril daily   • gabapentin (NEURONTIN) 300 mg capsule TAKE 2 CAPS IN AM AND NOON AND 3 CAPS AT BEDTIME   • metFORMIN (GLUCOPHAGE-XR) 500 mg 24 hr tablet Take 1 tablet (500 mg total) by mouth daily with breakfast   • NIFEdipine ER (ADALAT CC) 60 MG 24 hr tablet TAKE 1 TABLET BY MOUTH EVERY DAY   • oxybutynin (DITROPAN-XL) 10 MG 24 hr tablet Take 1 tablet (10 mg total) by mouth daily at bedtime   • sertraline (ZOLOFT) 50 mg tablet Take 1 tablet (50 mg total) by mouth daily at bedtime   • Wixela Inhub 250-50 MCG/ACT inhaler Inhale 1 puff 2 (two) times a day Rinse mouth after use. • [DISCONTINUED] methylPREDNISolone 4 MG tablet therapy pack Use as directed on package (Patient not taking: Reported on 9/13/2023)     No current facility-administered medications on file prior to visit. She is allergic to nuts - food allergy, other, peanut oil - food allergy, shellfish allergy - food allergy, shellfish-derived products - food allergy, sumatriptan, copaxone [glatiramer acetate], cat hair extract, cephalexin, glatiramer, pollen extract, and seasonal ic [cholestatin]. .         Objective:    Blood pressure 130/78, pulse 64, temperature 98.7 °F (37.1 °C), temperature source Temporal, resp. rate 20, height 5' (1.524 m), weight 63.5 kg (140 lb), SpO2 97 %, not currently breastfeeding. Physical Exam    Neurological Exam  CARDIOVASCULAR: RRR, normal S1S2, no murmurs, no rubs. RESP: clear to auscultation bilaterally, no wheezes/rhonchi/rales. ABDOMEN: soft, non tender, non distended. SKIN: no rash or skin lesions. EXTREMITIES: no edema, pulses 2+bilaterally.  PSYCH: appropriate mood and affect  NEUROLOGIC COMPREHENSIVE EXAM: Patient is oriented to person, place and time, NAD; appropriate affect. CN II, III, IV, V, VI, VII,VIII,IX,X,XI-XII intact with EOMI, PERRLA, OKN intact, VF grossly intact, fundi poorly visualized secondary to pupillary constriction; symmetric face noted. Motor: 5/5 UE/LE bilateral symmetric, RUE 4/5 shoulder abduction and finger flexion; RLU 4-/5 hip flexion and knee flexion, 2/5 dorsiflexion rigjt; Sensory: intact to light touch and pinprick bilaterally; normal vibration sensation feet bilaterally; Coordination within normal limits on FTN and ROLANDO testing; DTR: 2/4 through, no Babinski, no clonus.  Tandem gait is abnormal. Romberg: negative    ROS:    Review of Systems

## 2023-09-13 NOTE — PROGRESS NOTES
Review of Systems   Constitutional: Negative for appetite change, fatigue and fever. HENT: Positive for facial swelling (left side face - 1 week ago noticed it ). Negative for hearing loss, tinnitus, trouble swallowing and voice change. Eyes: Positive for pain (right eye - throbbing pain ). Negative for photophobia and visual disturbance. Respiratory: Negative. Negative for shortness of breath. Cardiovascular: Negative. Negative for palpitations. Gastrointestinal: Negative. Negative for nausea and vomiting. Endocrine: Negative. Negative for cold intolerance. Genitourinary: Negative. Negative for dysuria, frequency and urgency. Musculoskeletal: Positive for gait problem (T25FW 16.51 seconds, trips a lot - PT has helped her ). Negative for back pain, myalgias and neck pain. Skin: Negative. Negative for rash. Allergic/Immunologic: Negative. Neurological: Positive for dizziness, weakness (right side ), light-headedness and numbness (right side ). Negative for tremors, seizures, syncope, facial asymmetry, speech difficulty and headaches. Tingling on her left shoulder down towards her chest    Hematological: Negative. Does not bruise/bleed easily. Psychiatric/Behavioral: Negative. Negative for confusion, hallucinations and sleep disturbance. All other systems reviewed and are negative.

## 2023-09-21 ENCOUNTER — OFFICE VISIT (OUTPATIENT)
Age: 63
End: 2023-09-21
Payer: COMMERCIAL

## 2023-09-21 VITALS
TEMPERATURE: 97.5 F | OXYGEN SATURATION: 98 % | SYSTOLIC BLOOD PRESSURE: 102 MMHG | WEIGHT: 139.4 LBS | HEART RATE: 69 BPM | DIASTOLIC BLOOD PRESSURE: 60 MMHG | RESPIRATION RATE: 18 BRPM | BODY MASS INDEX: 27.22 KG/M2

## 2023-09-21 DIAGNOSIS — H69.93 ETD (EUSTACHIAN TUBE DYSFUNCTION), BILATERAL: ICD-10-CM

## 2023-09-21 DIAGNOSIS — Z11.59 NEED FOR HEPATITIS C SCREENING TEST: ICD-10-CM

## 2023-09-21 DIAGNOSIS — G47.33 OSA ON CPAP: ICD-10-CM

## 2023-09-21 DIAGNOSIS — Z23 ENCOUNTER FOR IMMUNIZATION: ICD-10-CM

## 2023-09-21 DIAGNOSIS — I82.4Z1 DVT, LOWER EXTREMITY, DISTAL, ACUTE, RIGHT (HCC): ICD-10-CM

## 2023-09-21 DIAGNOSIS — R26.2 AMBULATORY DYSFUNCTION: ICD-10-CM

## 2023-09-21 DIAGNOSIS — Z12.31 ENCOUNTER FOR SCREENING MAMMOGRAM FOR MALIGNANT NEOPLASM OF BREAST: ICD-10-CM

## 2023-09-21 DIAGNOSIS — Z00.00 ANNUAL PHYSICAL EXAM: Primary | ICD-10-CM

## 2023-09-21 DIAGNOSIS — G35 MULTIPLE SCLEROSIS (HCC): ICD-10-CM

## 2023-09-21 DIAGNOSIS — J42 CHRONIC BRONCHITIS, UNSPECIFIED CHRONIC BRONCHITIS TYPE (HCC): ICD-10-CM

## 2023-09-21 DIAGNOSIS — R73.03 PREDIABETES: ICD-10-CM

## 2023-09-21 DIAGNOSIS — I10 PRIMARY HYPERTENSION: ICD-10-CM

## 2023-09-21 PROBLEM — I95.9 HYPOTENSION: Status: RESOLVED | Noted: 2020-11-09 | Resolved: 2023-09-21

## 2023-09-21 LAB — SL AMB POCT HEMOGLOBIN AIC: 6.1 (ref ?–6.5)

## 2023-09-21 PROCEDURE — 83036 HEMOGLOBIN GLYCOSYLATED A1C: CPT | Performed by: FAMILY MEDICINE

## 2023-09-21 PROCEDURE — 99396 PREV VISIT EST AGE 40-64: CPT | Performed by: FAMILY MEDICINE

## 2023-09-21 PROCEDURE — 90471 IMMUNIZATION ADMIN: CPT | Performed by: FAMILY MEDICINE

## 2023-09-21 PROCEDURE — 90677 PCV20 VACCINE IM: CPT | Performed by: FAMILY MEDICINE

## 2023-09-21 PROCEDURE — 99214 OFFICE O/P EST MOD 30 MIN: CPT | Performed by: FAMILY MEDICINE

## 2023-09-21 RX ORDER — FLUTICASONE PROPIONATE 50 MCG
2 SPRAY, SUSPENSION (ML) NASAL DAILY
Qty: 15.8 ML | Refills: 1 | Status: SHIPPED | OUTPATIENT
Start: 2023-09-21 | End: 2023-09-27

## 2023-09-21 NOTE — PROGRESS NOTES
ADULT ANNUAL PHYSICAL  [address] [facility] CARE [place]    NAME: Katie Lester  AGE: 58 y.o. SEX: female  : 1960     DATE: 2023     Assessment and Plan:     Problem List Items Addressed This Visit        Respiratory    COPD (chronic obstructive pulmonary disease) (HCC)-stable with daily wixela use and as needed albuterol. Follows with pulmonology. Cardiovascular and Mediastinum    Hypertension-well-controlled on nifedipine 60 mg daily    DVT, lower extremity, distal, acute, right (HCC)-documented in patient's history however unclear as to when this happened. Most recent limb vascular duplex study in  was negative for bilateral DVT. patient not on anticoagulant therapy at this time. Nervous and Auditory    Multiple sclerosis (HCC)-chronic. Not on immunosuppressants or DMARDs. Has significant right-sided hemiparesis and weakness. Follows with neurology. Has associated mood disorder due to chronic illness she is on SSRI therapy noted below. Relevant Medications    sertraline (ZOLOFT) 50 mg tablet       Other    Ambulatory dysfunction-due to MS. Uses cane to assist with ambulation. Prediabetes-A1C of 6.2. Dietary changes reviewed. Relevant Orders    POCT hemoglobin A1c (Completed)   Other Visit Diagnoses     Annual physical exam    -  Primary    Relevant Orders    Comprehensive metabolic panel    Lipid Panel with Direct LDL reflex    TSH, 3rd generation with Free T4 reflex    Encounter for screening mammogram for malignant neoplasm of breast        Relevant Orders    Mammo screening bilateral w 3d & cad    ETD (Eustachian tube dysfunction), bilateral    -lonstanding issue. Uses the Flonase. Follows with ENT. CINTHYA on CPAP    -no issues with use. Follows with pulmonology.     Need for hepatitis C screening test        Relevant Orders    Hepatitis C Antibody    Encounter for immunization        Relevant Orders    Pneumococcal Conjugate Vaccine 20-valent (Pcv20) (Completed)          Immunizations and preventive care screenings were discussed with patient today. Appropriate education was printed on patient's after visit summary. Counseling:  Dental Health: discussed importance of regular tooth brushing, flossing, and dental visits. · Exercise: the importance of regular exercise/physical activity was discussed. Recommend exercise 3-5 times per week for at least 30 minutes. Return in about 6 weeks (around 2023) for Next scheduled follow up. Chief Complaint:     Chief Complaint   Patient presents with   • Follow-up     Pt is here for f/u changing provider also has some concerns she would like to discuss      History of Present Illness:     Adult Annual Physical   Patient here for a comprehensive physical exam.     Diet and Physical Activity  · Diet/Nutrition: well balanced diet. · Exercise: walking. Depression Screening  PHQ-2/9 Depression Screening    Little interest or pleasure in doing things: 0 - not at all  Feeling down, depressed, or hopeless: 0 - not at all  Trouble falling or staying asleep, or sleeping too much: 0 - not at all  Feeling tired or having little energy: 0 - not at all  Poor appetite or overeatin - not at all  Feeling bad about yourself - or that you are a failure or have let yourself or your family down: 0 - not at all  Trouble concentrating on things, such as reading the newspaper or watching television: 0 - not at all  Moving or speaking so slowly that other people could have noticed. Or the opposite - being so fidgety or restless that you have been moving around a lot more than usual: 0 - not at all  Thoughts that you would be better off dead, or of hurting yourself in some way: 0 - not at all  PHQ-9 Score: 0   PHQ-9 Interpretation: No or Minimal depression        General Health  · Sleep: gets 4-6 hours of sleep on average. · Hearing: serineural loss b/l . · Vision: wears glasses. · Dental: regular dental visits. /GYN Health  · Patient is: postmenopausal  · Last mammo-6/3/2022  · Last colo 2018     Review of Systems:     Review of Systems   Respiratory: Negative for shortness of breath. Gastrointestinal: Positive for constipation. Negative for diarrhea. Genitourinary: Positive for frequency. Negative for difficulty urinating. Musculoskeletal: Positive for gait problem. Past Medical History:     Past Medical History:   Diagnosis Date   • Asthma    • Cardiac disease     heart murmur   • COPD (chronic obstructive pulmonary disease) ([address])    • Depression    • Diabetes mellitus ([address])    • Fibroid    • Hyperlipidemia    • Hypertension    • Influenza     December 2017   • Irregular heart beat    • Kidney stone    • Migraine    • Migraine    • MS (multiple sclerosis) (HCC)    • Multiple sclerosis (HCC)    • Nephrolithiasis    • Neurogenic bladder    • Panniculitis    • Sleep apnea, obstructive    • Urinary incontinence       Past Surgical History:     Past Surgical History:   Procedure Laterality Date   • COLONOSCOPY     • DILATION AND CURETTAGE OF UTERUS     • NC COLONOSCOPY FLX DX W/COLLJ SPEC WHEN PFRMD N/A 1/11/2018    Procedure: COLONOSCOPY;  Surgeon: Raulito Ware MD;  Location: [facility];   Service: Gastroenterology   • NC TENDON SHEATH INCISION Left 1/17/2018    Procedure: RING TRIGGER FINGER RELEASE;  Surgeon: Pippa Vasquez MD;  Location[facility];  Service: Orthopedics   • TONSILLECTOMY     • UTERINE FIBROID SURGERY        Social History:     Social History     Socioeconomic History   • Marital status: /Civil Union     Spouse name: None   • Number of children: None   • Years of education: None   • Highest education level: None   Occupational History   • None   Tobacco Use   • Smoking status: Never   • Smokeless tobacco: Never   Vaping Use   • Vaping Use: Never used   Substance and Sexual Activity   • Alcohol use: Not Currently     Comment: social   • Drug use: Never     Types: Marijuana     Comment: last use 4 days ago. • Sexual activity: Yes     Partners: Male     Birth control/protection: Post-menopausal   Other Topics Concern   • None   Social History Narrative   • None     Social Determinants of Health     Financial Resource Strain: Not on file   Food Insecurity: Not on file   Transportation Needs: Not on file   Physical Activity: Not on file   Stress: Not on file   Social Connections: Not on file   Intimate Partner Violence: Not on file   Housing Stability: Not on file      Family History:     Family History   Problem Relation Age of Onset   • Stroke Mother    • Diabetes Mother    • Hypertension Mother    • Emphysema Mother    • Heart disease Father    • Diabetes Father    • Hypertension Father    • Diabetes Sister    • Sleep apnea Sister    • No Known Problems Maternal Grandmother    • No Known Problems Paternal Grandmother    • Hypertension Brother    • Graves' disease Brother    • Breast cancer Maternal Aunt 68   • Cancer Maternal Aunt    • Stomach cancer Maternal Aunt    • Sleep apnea Maternal Aunt    • Cancer Maternal Aunt    • No Known Problems Maternal Aunt    • No Known Problems Maternal Aunt    • No Known Problems Maternal Aunt    • No Known Problems Maternal Aunt       Current Medications:     Current Outpatient Medications   Medication Sig Dispense Refill   • albuterol (2.5 mg/3 mL) 0.083 % nebulizer solution Albuterol Sulfate (2.5 MG/3ML) 0.083% Inhalation Nebulization Solution  USE 1 UNIT DOSE IN NEBULIZER EVERY 6 HOURS AS NEEDED. Quantity: 1;  Refills: 5       Manisha Carrion M.D.;  Started 12-July-2016  Active3 ML Plas Cont (125 Plas Conts)     • aspirin 81 MG tablet Aspirin 81 MG TABS  Take 1 tablet daily   Refills: 0    Active     • atorvastatin (LIPITOR) 40 mg tablet TAKE 1 TABLET (40 MG TOTAL) BY MOUTH ONCE AT BEDTIME 90 tablet 0   • baclofen 20 mg tablet TAKE 1 TABLET BY MOUTH 4 TIMES A DAY.  360 tablet 2   • butalbital-acetaminophen-caffeine (FIORICET,ESGIC) -40 mg per tablet Take 1 tablet by mouth every 4 (four) hours as needed for headaches 12 tablet 4   • Diclofenac Sodium (VOLTAREN) 1 % Apply 2 g topically 4 (four) times a day 150 g 1   • EPINEPHrine (EPIPEN) 0.3 mg/0.3 mL SOAJ Inject 0.3 mL (0.3 mg total) into a muscle if needed for anaphylaxis 1 each 1   • famotidine (PEPCID) 40 MG tablet Take 1 tablet (40 mg total) by mouth daily at bedtime 30 tablet 3   • gabapentin (NEURONTIN) 300 mg capsule TAKE 2 CAPS IN AM AND NOON AND 3 CAPS AT BEDTIME 630 capsule 2   • NIFEdipine ER (ADALAT CC) 60 MG 24 hr tablet TAKE 1 TABLET BY MOUTH EVERY DAY 90 tablet 0   • omeprazole (PriLOSEC) 40 MG capsule Take 1 capsule (40 mg total) by mouth daily 30 min or more prior to eating or drinking in the morning 30 capsule 3   • oxybutynin (DITROPAN-XL) 10 MG 24 hr tablet Take 1 tablet (10 mg total) by mouth daily at bedtime 90 tablet 3   • sertraline (ZOLOFT) 50 mg tablet Take 1 tablet (50 mg total) by mouth daily at bedtime 90 tablet 1   • Wixela Inhub 250-50 MCG/ACT inhaler Inhale 1 puff 2 (two) times a day Rinse mouth after use. 180 blister 1   • fluticasone (FLONASE) 50 mcg/act nasal spray SPRAY 2 SPRAYS INTO EACH NOSTRIL EVERY DAY 48 mL 1   • metFORMIN (GLUCOPHAGE-XR) 500 mg 24 hr tablet TAKE 1 TABLET BY MOUTH EVERY DAY WITH BREAKFAST 90 tablet 0     No current facility-administered medications for this visit. Allergies:      Allergies   Allergen Reactions   • Nuts - Food Allergy Shortness Of Breath and Anaphylaxis   • Other Anaphylaxis, Hives and Itching     [employer] [id number]: POISON IVY   walnuts & cashews  Cats   • Peanut Oil - Food Allergy      Hives anaphylaxis   • Shellfish Allergy - Food Allergy Shortness Of Breath and Swelling   • Shellfish-Derived Products - Food Allergy Shortness Of Breath and Swelling   • Sumatriptan Headache and Other (See Comments)     Other reaction(s): Headache   • Copaxone [Glatiramer Acetate] Hives   • Cat Hair Extract    • Cephalexin Rash     Pt says they took keflex again and didn't get a reaction from it. Pt states they believe it was something they ate that day instead. • Glatiramer Rash     Other reaction(s): rash   • Pollen Extract Sneezing   • Seasonal Ic [Cholestatin]       Physical Exam:     /60 (BP Location: Left arm, Patient Position: Sitting, Cuff Size: Standard)   Pulse 69   Temp 97.5 °F (36.4 °C) (Temporal)   Resp 18   Wt 63.2 kg (139 lb 6.4 oz)   SpO2 98%   BMI 27.22 kg/m²     Physical Exam  HENT:      Head: Normocephalic and atraumatic. Right Ear: External ear normal.      Left Ear: External ear normal.   Eyes:      Conjunctiva/sclera: Conjunctivae normal.      Pupils: Pupils are equal, round, and reactive to light. Cardiovascular:      Rate and Rhythm: Normal rate and regular rhythm. Pulmonary:      Effort: Pulmonary effort is normal.      Breath sounds: Normal breath sounds. Musculoskeletal:      Right lower leg: No edema. Left lower leg: No edema. Skin:     Capillary Refill: Capillary refill takes less than 2 seconds. Neurological:      Mental Status: She is alert and oriented to person, place, and time. Motor: Weakness present.       Gait: Gait abnormal.   Psychiatric:         Mood and Affect: Mood normal.          Lauro Claros [facility] [address]

## 2023-09-27 DIAGNOSIS — H69.93 ETD (EUSTACHIAN TUBE DYSFUNCTION), BILATERAL: ICD-10-CM

## 2023-09-27 RX ORDER — FLUTICASONE PROPIONATE 50 MCG
SPRAY, SUSPENSION (ML) NASAL
Qty: 48 ML | Refills: 1 | Status: SHIPPED | OUTPATIENT
Start: 2023-09-27

## 2023-10-05 ENCOUNTER — TELEPHONE (OUTPATIENT)
Age: 63
End: 2023-10-05

## 2023-10-05 DIAGNOSIS — R73.03 PREDIABETES: ICD-10-CM

## 2023-10-05 RX ORDER — METFORMIN HYDROCHLORIDE 500 MG/1
500 TABLET, EXTENDED RELEASE ORAL
Qty: 90 TABLET | Refills: 0 | Status: SHIPPED | OUTPATIENT
Start: 2023-10-05

## 2023-10-06 PROBLEM — R21 SKIN RASH: Status: RESOLVED | Noted: 2019-02-21 | Resolved: 2023-10-06

## 2023-10-06 PROBLEM — M79.3 PANNICULITIS: Status: RESOLVED | Noted: 2018-02-21 | Resolved: 2023-10-06

## 2023-10-06 PROBLEM — M65.342 TRIGGER RING FINGER OF LEFT HAND: Status: RESOLVED | Noted: 2018-01-17 | Resolved: 2023-10-06

## 2023-12-04 DIAGNOSIS — E78.5 HYPERLIPIDEMIA, UNSPECIFIED HYPERLIPIDEMIA TYPE: ICD-10-CM

## 2023-12-04 RX ORDER — ATORVASTATIN CALCIUM 40 MG/1
40 TABLET, FILM COATED ORAL
Qty: 90 TABLET | Refills: 0 | Status: SHIPPED | OUTPATIENT
Start: 2023-12-04

## 2023-12-07 ENCOUNTER — OFFICE VISIT (OUTPATIENT)
Age: 63
End: 2023-12-07
Payer: MEDICARE

## 2023-12-07 ENCOUNTER — TELEPHONE (OUTPATIENT)
Age: 63
End: 2023-12-07

## 2023-12-07 ENCOUNTER — APPOINTMENT (OUTPATIENT)
Age: 63
End: 2023-12-07
Payer: COMMERCIAL

## 2023-12-07 VITALS
RESPIRATION RATE: 18 BRPM | WEIGHT: 138 LBS | HEART RATE: 72 BPM | DIASTOLIC BLOOD PRESSURE: 64 MMHG | OXYGEN SATURATION: 98 % | BODY MASS INDEX: 26.95 KG/M2 | TEMPERATURE: 97.5 F | SYSTOLIC BLOOD PRESSURE: 100 MMHG

## 2023-12-07 DIAGNOSIS — M25.512 ACUTE PAIN OF LEFT SHOULDER: ICD-10-CM

## 2023-12-07 DIAGNOSIS — I10 PRIMARY HYPERTENSION: ICD-10-CM

## 2023-12-07 DIAGNOSIS — H90.3 SENSORINEURAL HEARING LOSS (SNHL) OF BOTH EARS: ICD-10-CM

## 2023-12-07 DIAGNOSIS — R26.2 AMBULATORY DYSFUNCTION: ICD-10-CM

## 2023-12-07 DIAGNOSIS — R73.03 PREDIABETES: ICD-10-CM

## 2023-12-07 DIAGNOSIS — M25.512 ACUTE PAIN OF LEFT SHOULDER: Primary | ICD-10-CM

## 2023-12-07 PROCEDURE — 73030 X-RAY EXAM OF SHOULDER: CPT

## 2023-12-07 PROCEDURE — 99214 OFFICE O/P EST MOD 30 MIN: CPT | Performed by: FAMILY MEDICINE

## 2023-12-07 NOTE — TELEPHONE ENCOUNTER
Pt saw you today  Her A1C was done on 9/21/23  When should she check her A1C again? Printed out her other lab orders    Please let her know when and put an order in  Call her if an order is put in  She does her labs at [facility] call back to schedule her 6 mo f/u and also to recheck her BP with an RN or MA.   She has to check her 's schedule with his dr blount

## 2023-12-07 NOTE — PROGRESS NOTES
Name: Susana Brady      : 1960      MRN: [id number]  Encounter Provider: Dahlia Grimes DO  Encounter Date: 2023   Encounter department: [facility] [place]    Assessment & Plan     1. Acute pain of left shoulder-secondary to fall.  Possible bony contusion secondary to fall.  Will evaluate for severity via imaging.  Patient to continue NSAIDs heat and ice.  Discussed possible PT.  -     XR shoulder 2+ vw left; future; Expected date: 2023    2. Prediabetes-controlled with metformin 500 mg daily.  A1c consistently less than 6.3.  Most recent was 6.1.  Will continue to follow.    3. Ambulatory dysfunction-secondary to MS.  Patient using assistive device    4. Primary hypertension-well-controlled with nifedipine 60 mg daily.  Patient to continue    5. Sensorineural hearing loss (SNHL) of both ears-following with ENT.           Subjective      Fell in her back yard weeks ago. Feel on left shoulder. Very bruised at the time. Still sore. Having some trouble moving her arm across the body. Using Advil for the pain   BP has been running low. Stopped medication and BP still low.           Current Outpatient Medications on File Prior to Visit   Medication Sig    albuterol (2.5 mg/3 mL) 0.083 % nebulizer solution Albuterol Sulfate (2.5 MG/3ML) 0.083% Inhalation Nebulization Solution  USE 1 UNIT DOSE IN NEBULIZER EVERY 6 HOURS AS NEEDED.   Quantity: 1;  Refills: 5       Lisa Avila M.D.;  Started   Active3 ML Plas Cont (125 Plas Conts)    aspirin 81 MG tablet Aspirin 81 MG TABS  Take 1 tablet daily   Refills: 0    Active    atorvastatin (LIPITOR) 40 mg tablet TAKE 1 TABLET (40 MG TOTAL) BY MOUTH ONCE AT BEDTIME    butalbital-acetaminophen-caffeine (FIORICET,ESGIC) -40 mg per tablet Take 1 tablet by mouth every 4 (four) hours as needed for headaches    Diclofenac Sodium (VOLTAREN) 1 % Apply 2 g topically 4 (four) times a day    famotidine (PEPCID) 40 MG tablet  Take 1 tablet (40 mg total) by mouth daily at bedtime    fluticasone (FLONASE) 50 mcg/act nasal spray SPRAY 2 SPRAYS INTO EACH NOSTRIL EVERY DAY    gabapentin (NEURONTIN) 300 mg capsule TAKE 2 CAPS IN AM AND NOON AND 3 CAPS AT BEDTIME    metFORMIN (GLUCOPHAGE-XR) 500 mg 24 hr tablet TAKE 1 TABLET BY MOUTH EVERY DAY WITH BREAKFAST    omeprazole (PriLOSEC) 40 MG capsule Take 1 capsule (40 mg total) by mouth daily 30 min or more prior to eating or drinking in the morning    oxybutynin (DITROPAN-XL) 10 MG 24 hr tablet Take 1 tablet (10 mg total) by mouth daily at bedtime    sertraline (ZOLOFT) 50 mg tablet Take 1 tablet (50 mg total) by mouth daily at bedtime    EPINEPHrine (EPIPEN) 0.3 mg/0.3 mL SOAJ Inject 0.3 mL (0.3 mg total) into a muscle if needed for anaphylaxis       Objective     /64 (BP Location: Left arm, Patient Position: Sitting, Cuff Size: Standard)   Pulse 72   Temp 97.5 °F (36.4 °C) (Temporal)   Resp 18   Wt 62.6 kg (138 lb)   SpO2 98%   BMI 26.95 kg/m²     Physical Exam  HENT:      Head: Normocephalic.   Eyes:      Conjunctiva/sclera: Conjunctivae normal.   Cardiovascular:      Rate and Rhythm: Normal rate and regular rhythm.      Heart sounds: No murmur heard.  Pulmonary:      Effort: Pulmonary effort is normal.      Breath sounds: Normal breath sounds.   Abdominal:      General: Bowel sounds are normal.      Palpations: Abdomen is soft.   Musculoskeletal:      Left shoulder: Tenderness present. No crepitus. Decreased range of motion. Normal strength.   Neurological:      Mental Status: She is alert and oriented to person, place, and time.      Motor: Weakness present.      Gait: Gait abnormal.   Psychiatric:         Mood and Affect: Mood normal.         Behavior: Behavior normal.       Dahlia Grimes DO

## 2023-12-08 ENCOUNTER — TELEPHONE (OUTPATIENT)
Age: 63
End: 2023-12-08

## 2023-12-08 NOTE — TELEPHONE ENCOUNTER
----- Message from Girish Arvizu DO sent at 12/8/2023  1:08 PM EST -----  Xray of the shoulder was normal. I recommend physical therapy

## 2023-12-12 DIAGNOSIS — G35 MULTIPLE SCLEROSIS (HCC): ICD-10-CM

## 2023-12-12 DIAGNOSIS — M54.50 RIGHT LOW BACK PAIN, UNSPECIFIED CHRONICITY, UNSPECIFIED WHETHER SCIATICA PRESENT: ICD-10-CM

## 2023-12-12 DIAGNOSIS — I10 PRIMARY HYPERTENSION: ICD-10-CM

## 2023-12-12 DIAGNOSIS — J45.991 COUGH VARIANT ASTHMA: ICD-10-CM

## 2023-12-12 LAB
ALBUMIN SERPL-MCNC: 4.3 G/DL (ref 3.6–5.1)
ALBUMIN/GLOB SERPL: 1.7 (CALC) (ref 1–2.5)
ALP SERPL-CCNC: 115 U/L (ref 37–153)
ALT SERPL-CCNC: 23 U/L (ref 6–29)
AST SERPL-CCNC: 20 U/L (ref 10–35)
BILIRUB SERPL-MCNC: 0.5 MG/DL (ref 0.2–1.2)
BUN SERPL-MCNC: 12 MG/DL (ref 7–25)
BUN/CREAT SERPL: ABNORMAL (CALC) (ref 6–22)
CALCIUM SERPL-MCNC: 9.6 MG/DL (ref 8.6–10.4)
CHLORIDE SERPL-SCNC: 108 MMOL/L (ref 98–110)
CHOLEST SERPL-MCNC: 162 MG/DL
CHOLEST/HDLC SERPL: 3.2 (CALC)
CO2 SERPL-SCNC: 28 MMOL/L (ref 20–32)
CREAT SERPL-MCNC: 0.7 MG/DL (ref 0.5–1.05)
GFR/BSA.PRED SERPLBLD CYS-BASED-ARV: 97 ML/MIN/1.73M2
GLOBULIN SER CALC-MCNC: 2.5 G/DL (CALC) (ref 1.9–3.7)
GLUCOSE SERPL-MCNC: 105 MG/DL (ref 65–99)
HCV AB SERPL QL IA: NORMAL
HDLC SERPL-MCNC: 51 MG/DL
LDLC SERPL CALC-MCNC: 94 MG/DL (CALC)
NONHDLC SERPL-MCNC: 111 MG/DL (CALC)
POTASSIUM SERPL-SCNC: 4.3 MMOL/L (ref 3.5–5.3)
PROT SERPL-MCNC: 6.8 G/DL (ref 6.1–8.1)
SODIUM SERPL-SCNC: 144 MMOL/L (ref 135–146)
TRIGL SERPL-MCNC: 80 MG/DL
TSH SERPL-ACNC: 2.46 MIU/L (ref 0.4–4.5)

## 2023-12-12 RX ORDER — NIFEDIPINE 60 MG/1
TABLET, FILM COATED, EXTENDED RELEASE ORAL
Qty: 90 TABLET | Refills: 0 | Status: SHIPPED | OUTPATIENT
Start: 2023-12-12

## 2023-12-12 RX ORDER — BACLOFEN 20 MG/1
20 TABLET ORAL 4 TIMES DAILY
Qty: 360 TABLET | Refills: 2 | Status: SHIPPED | OUTPATIENT
Start: 2023-12-12

## 2023-12-12 RX ORDER — FLUTICASONE PROPIONATE AND SALMETEROL 250; 50 UG/1; UG/1
1 POWDER RESPIRATORY (INHALATION) 2 TIMES DAILY
Qty: 180 BLISTER | Refills: 1 | Status: SHIPPED | OUTPATIENT
Start: 2023-12-12 | End: 2024-03-11

## 2023-12-12 NOTE — TELEPHONE ENCOUNTER
----- Message from Gabriel Diaz DO sent at 12/12/2023 11:59 AM EST -----  Overall blood work looks really good. Sugars are lower.  Kidney and liver function are normal. Thyroid is normal

## 2024-01-02 DIAGNOSIS — R73.03 PREDIABETES: ICD-10-CM

## 2024-01-02 RX ORDER — METFORMIN HYDROCHLORIDE 500 MG/1
500 TABLET, EXTENDED RELEASE ORAL
Qty: 90 TABLET | Refills: 0 | Status: SHIPPED | OUTPATIENT
Start: 2024-01-02

## 2024-01-05 NOTE — TELEPHONE ENCOUNTER
Called and spoke to pt.  She advised that pain has slightly improved.  Scheduled for Monday in case it does not resolve over the weekend.    Noted and told Dr Tamy Chavarria, thank you

## 2024-01-06 DIAGNOSIS — M19.011 ARTHRITIS OF RIGHT ACROMIOCLAVICULAR JOINT: ICD-10-CM

## 2024-01-10 NOTE — TELEPHONE ENCOUNTER
Next OV 5/13/2024 with Dr. Morales.     Last script sent 7/10/2023.    Dr. MARCUS - Rx entered. Please review and sign if in agreement.

## 2024-01-18 ENCOUNTER — HOSPITAL ENCOUNTER (OUTPATIENT)
Dept: MAMMOGRAPHY | Facility: CLINIC | Age: 64
End: 2024-01-18
Payer: COMMERCIAL

## 2024-01-18 VITALS — HEIGHT: 60 IN | WEIGHT: 138 LBS | BODY MASS INDEX: 27.09 KG/M2

## 2024-01-18 DIAGNOSIS — Z12.31 ENCOUNTER FOR SCREENING MAMMOGRAM FOR MALIGNANT NEOPLASM OF BREAST: ICD-10-CM

## 2024-01-18 PROCEDURE — 77063 BREAST TOMOSYNTHESIS BI: CPT

## 2024-01-18 PROCEDURE — 77067 SCR MAMMO BI INCL CAD: CPT

## 2024-02-07 DIAGNOSIS — G35 MULTIPLE SCLEROSIS (HCC): ICD-10-CM

## 2024-02-13 NOTE — TELEPHONE ENCOUNTER
Patient Comment: [phone] My mail in pharmacy sent a fax to date no response Id prefer mail     According to phone number provided, script to be sent to [facility]. However, no e-scribing available with this phone number. Fax not found either from mail order pharmacy requesting script.    Spoke with pt and she could not locate name of mail order pharmacy. Pt says to please send to [facility] pharmacy in [place].    Gabapentin 300 mg    Next OV 5/13/24 with Dr. ANGELINE Jones script sent 4/4/2023. Quantity: 630. Refills: 2.    Dr. MARCUS - Rx entered. Please review and sign if in agreement.

## 2024-02-14 RX ORDER — GABAPENTIN 300 MG/1
CAPSULE ORAL
Qty: 630 CAPSULE | Refills: 2 | Status: SHIPPED | OUTPATIENT
Start: 2024-02-14

## 2024-02-20 NOTE — PROGRESS NOTES
Daily Note     Today's date: 2019  Patient name: Adelaide Mariee  : 1960  MRN: [id number]  Referring provider: Vj Avila, *  Dx:   Encounter Diagnosis     ICD-10-CM    1  Multiple sclerosis ([facility] [address] ) G35    2  Trigger index finger of left hand M65 322    3  Trigger middle finger of left hand M65 332    4  Muscle weakness M62 81                   Subjective:  "I think the coban on the cane is really working  My hand feels better already!"      Objective: See treatment diary below      Assessment: Tolerated treatment well  Patient would benefit from continued OT  Patient locked in session when making a full fist    Decreased hand pain since OT built up can handle  Plan: Continue per plan of care        Precautions:  Fall risk      Manual             stm extensor mass left  4           IASTM MP volar head IF/LF  4                                     Trigger finger splint  David for I               Exercise Diary  923            Extensor mass stretch left  HEP reviewed            dexterity HEP/fold paper towel into hand added pen roll  To HEP           Adaptive equip Built up handle of can to decrease             Chinese balls   5xs clockwise/counterclockwise /KENN           Grooved peg board  1 set Catherin Lies           Hook fist   10xs/L                                                                                                                                                                                                     Modalities              MH left forearm
Admission

## 2024-02-21 PROBLEM — J12.82 PNEUMONIA DUE TO COVID-19 VIRUS: Status: RESOLVED | Noted: 2020-11-06 | Resolved: 2024-02-21

## 2024-02-21 PROBLEM — U07.1 PNEUMONIA DUE TO COVID-19 VIRUS: Status: RESOLVED | Noted: 2020-11-06 | Resolved: 2024-02-21

## 2024-02-29 ENCOUNTER — TELEPHONE (OUTPATIENT)
Dept: NEUROLOGY | Facility: CLINIC | Age: 64
End: 2024-02-29

## 2024-02-29 NOTE — TELEPHONE ENCOUNTER
Patient called in for sooner appointment. Patient was schedule for 3/18/24 at 10am with Dr Morales.    Patient has been having leg spasms.

## 2024-03-01 DIAGNOSIS — N32.81 OAB (OVERACTIVE BLADDER): ICD-10-CM

## 2024-03-01 DIAGNOSIS — N31.9 NEUROGENIC BLADDER: ICD-10-CM

## 2024-03-01 RX ORDER — OXYBUTYNIN CHLORIDE 10 MG/1
10 TABLET, EXTENDED RELEASE ORAL
Qty: 90 TABLET | Refills: 1 | Status: SHIPPED | OUTPATIENT
Start: 2024-03-01

## 2024-03-02 DIAGNOSIS — E78.5 HYPERLIPIDEMIA, UNSPECIFIED HYPERLIPIDEMIA TYPE: ICD-10-CM

## 2024-03-04 RX ORDER — ATORVASTATIN CALCIUM 40 MG/1
40 TABLET, FILM COATED ORAL
Qty: 90 TABLET | Refills: 0 | Status: SHIPPED | OUTPATIENT
Start: 2024-03-04

## 2024-03-10 DIAGNOSIS — I10 PRIMARY HYPERTENSION: ICD-10-CM

## 2024-03-14 ENCOUNTER — TELEPHONE (OUTPATIENT)
Age: 64
End: 2024-03-14

## 2024-03-18 ENCOUNTER — OFFICE VISIT (OUTPATIENT)
Dept: NEUROLOGY | Facility: CLINIC | Age: 64
End: 2024-03-18
Payer: MEDICARE

## 2024-03-18 ENCOUNTER — TELEPHONE (OUTPATIENT)
Dept: NEUROLOGY | Facility: CLINIC | Age: 64
End: 2024-03-18

## 2024-03-18 VITALS
HEIGHT: 60 IN | SYSTOLIC BLOOD PRESSURE: 110 MMHG | WEIGHT: 140 LBS | OXYGEN SATURATION: 95 % | HEART RATE: 77 BPM | TEMPERATURE: 97.5 F | DIASTOLIC BLOOD PRESSURE: 58 MMHG | BODY MASS INDEX: 27.48 KG/M2

## 2024-03-18 DIAGNOSIS — M53.86 SCIATICA OF RIGHT SIDE ASSOCIATED WITH DISORDER OF LUMBAR SPINE: ICD-10-CM

## 2024-03-18 DIAGNOSIS — M19.011 ARTHRITIS OF RIGHT ACROMIOCLAVICULAR JOINT: ICD-10-CM

## 2024-03-18 DIAGNOSIS — R26.2 AMBULATORY DYSFUNCTION: ICD-10-CM

## 2024-03-18 DIAGNOSIS — M48.062 SPINAL STENOSIS OF LUMBAR REGION WITH NEUROGENIC CLAUDICATION: ICD-10-CM

## 2024-03-18 DIAGNOSIS — N32.81 OVERACTIVE BLADDER: ICD-10-CM

## 2024-03-18 DIAGNOSIS — R29.6 FREQUENT FALLS: ICD-10-CM

## 2024-03-18 DIAGNOSIS — G81.91 RIGHT HEMIPARESIS (HCC): ICD-10-CM

## 2024-03-18 DIAGNOSIS — G35 MULTIPLE SCLEROSIS (HCC): Primary | ICD-10-CM

## 2024-03-18 PROCEDURE — G2211 COMPLEX E/M VISIT ADD ON: HCPCS | Performed by: PSYCHIATRY & NEUROLOGY

## 2024-03-18 PROCEDURE — 99214 OFFICE O/P EST MOD 30 MIN: CPT | Performed by: PSYCHIATRY & NEUROLOGY

## 2024-03-18 NOTE — PROGRESS NOTES
Patient ID: Susana Brady is a 64 y.o. female.    Assessment/Plan:           Problem List Items Addressed This Visit          Nervous and Auditory    Multiple sclerosis (HCC) - Primary    Relevant Orders    Ambulatory Referral to Massage Therapist    Ambulatory referral to Spine & Pain Management    Right hemiparesis (HCC)    Relevant Orders    Ambulatory Referral to Massage Therapist    Sciatica of right side associated with disorder of lumbar spine    Relevant Orders    Ambulatory Referral to Massage Therapist    Ambulatory referral to Spine & Pain Management       Genitourinary    Overactive bladder       Care Coordination    Frequent falls       Neurology/Sleep    Ambulatory dysfunction     Other Visit Diagnoses       Spinal stenosis of lumbar region with neurogenic claudication        Relevant Orders    Ambulatory referral to Spine & Pain Management    Arthritis of right acromioclavicular joint        Relevant Medications    Diclofenac Sodium (VOLTAREN) 1 %           Mrs. Brady has presented to [facility] multiple sclerosis center for follow-up on multiple sclerosis and related questions.    Patient has residual right-sided hemiparesis due to last MS relapse many years back as she ambulates with a cane.  Patient is not on disease modifying regimen due to immuno senescence.     Patient had suffered a fall where she had injured her left shoulder again with pain described between shoulder blades on the left side and upper part of the chest; patient has establish care with orthopedic surgical team.    Patient also describes having pain in thoracolumbar area predominantly located in the right side with no radiation of the pain to the buttock or hip been reported at this time.  Patient is to consider Voltaren gel in addition increasing gabapentin 900 in the morning and 1200 mg at night and continue baclofen 80 mg a day with no spasticity appreciated.    Patient was advised to consider following with pain  management team as she has left knee pain left shoulder pain and lower back pain.  Referral was provided.    We personally reviewed patient x-ray of the hips from 2020 patient has mild osteoarthritis in her right hip.  MRI of the lumbar spine from 2020 was personally reviewed as well as we agreed patient has multilevel degenerative changes with moderate to severe foraminal stenosis and likely L4-L5 radiculopathy as result with moderate spinal canal stenosis at the same level.  Patient does not describe saddle anesthesia; patient has bladder dysfunction due to multiple sclerosis with close follow-up with urology was offered.    Patient may benefit from massage therapy- team will be reached to help with resources.    Patient brought several formal documents to be completed including placard renewal and disability form.     Patient is to follow-up with [facility] neurology within 8 months.    I have spent a total time of 30 minutes on 03/18/24 in caring for this patient including Prognosis, Counseling / Coordination of care, Documenting in the medical record, Reviewing / ordering tests, medicine, procedures  , Obtaining or reviewing history  , and Communicating with other healthcare professionals .     Subjective: getting pain on the RT side of her back, she is unsure if its her hip or her back/    HPI  Mrs. Brady has presented to [facility] multiple sclerosis center for follow-up on multiple sclerosis related issues.  Patient has been off disease modifying regimen due to immuno senescence with no significant disease progression described.      Patient has residual right-sided hemiparesis as patient ambulates with a cane.    Patient brought concern for right-sided lower back pain with no pain radiating along her spine.  Patient had previous MRI of the lumbar spine x-ray of the hips and we discussed results today.  Patient also stated she has worsening brain fog when she is stressed.  Pain in left  shoulder blades radiates to the left pinky and ring finger patient has establish care with orthopedic team.  Patient has arthrocentesis for her left knee.     Patient had multiple injuries to her right ankle with intermittent swelling been noted.  Patient describes no recent falls as she is planning to follow-up with neuro physical therapy.  Burning pain in the left thigh for 1 week as well as pain in the right upper neck and shoulder likely due to the multiple sclerosis with demyelination at C2 level noted on previous imaging.  Patient completed imaging of brain and cervical spine in March 2023 with no disease progression advised.     Patient has oxybutynin, Zoloft, gabapentin, baclofen and Fioricet with no dose adjustment required.    The following portions of the patient's history were reviewed and updated as appropriate: She  has a past medical history of Asthma, Cardiac disease, COPD (chronic obstructive pulmonary disease) ([facility]), Depression, Diabetes mellitus ([facility]), Fibroid, Hyperlipidemia, Hypertension, Influenza, Irregular heart beat, Kidney stone, Migraine, Migraine, MS (multiple sclerosis) ([facility]), Multiple sclerosis ([facility]), Nephrolithiasis, Neurogenic bladder, Panniculitis, Sleep apnea, obstructive, and Urinary incontinence.  She   Patient Active Problem List    Diagnosis Date Noted    Oropharyngeal dysphagia 09/13/2023    Sensorineural hearing loss (SNHL) of both ears 09/13/2023    Swelling of left side of face 09/13/2023    Frequent falls 05/04/2023    Sciatica of right side associated with disorder of lumbar spine 05/04/2023    Left knee pain 12/22/2022    Prediabetes 12/22/2022    Depression 05/06/2022    Overactive bladder 05/06/2022    DVT, lower extremity, distal, acute, right (HCC) 05/05/2022    CINTHYA (obstructive sleep apnea)     Adhesive capsulitis of left shoulder 10/13/2021    Cervical disc disorder at C6-C7 level with radiculopathy 04/12/2021    Encephalopathy due to severe acute respiratory  syndrome coronavirus 2 (SARS-CoV-2) 04/12/2021    Word finding difficulty 04/12/2021    Slurred speech 04/12/2021    Edema of right lower extremity 02/26/2021    Other spondylosis with radiculopathy, lumbar region 02/20/2020    Right low back pain 02/21/2019    Right hemiparesis (HCC) 02/21/2019    Mesenteric panniculitis (HCC) 03/15/2018    Ambulatory dysfunction 02/21/2018    Chronic right-sided headaches 02/21/2018    Multiple sclerosis (HCC) 02/21/2018    S/P trigger finger release 02/02/2018    Hypertension 04/06/2017    COPD (chronic obstructive pulmonary disease) (HCC) 04/11/2016    Hyperlipidemia with target LDL less than 160 07/14/2011     She  has a past surgical history that includes Dilation and curettage of uterus; Uterine fibroid surgery; pr colonoscopy flx dx w/collj spec when pfrmd (N/A, 1/11/2018); Colonoscopy; pr tendon sheath incision (Left, 1/17/2018); and Tonsillectomy.  Her family history includes Breast cancer (age of onset: 68) in her maternal aunt; Cancer in her maternal aunt and maternal aunt; Diabetes in her father, mother, and sister; Emphysema in her mother; Graves' disease in her brother; Heart disease in her father; Hypertension in her brother, father, and mother; No Known Problems in her maternal aunt, maternal aunt, maternal aunt, maternal aunt, maternal grandmother, paternal grandmother, son, and son; Sleep apnea in her maternal aunt and sister; Stomach cancer in her maternal aunt; Stroke in her mother.  She  reports that she has never smoked. She has never used smokeless tobacco. She reports that she does not currently use alcohol. She reports that she does not use drugs.  Current Outpatient Medications   Medication Sig Dispense Refill    Albuterol Sulfate (VENTOLIN HFA IN)       aspirin 81 MG tablet Aspirin 81 MG TABS  Take 1 tablet daily   Refills: 0    Active      atorvastatin (LIPITOR) 40 mg tablet TAKE 1 TABLET (40 MG TOTAL) BY MOUTH ONCE AT BEDTIME 90 tablet 0    baclofen 20  mg tablet TAKE 1 TABLET BY MOUTH FOUR TIMES A  tablet 2    butalbital-acetaminophen-caffeine (FIORICET,ESGIC) -40 mg per tablet Take 1 tablet by mouth every 4 (four) hours as needed for headaches 12 tablet 4    Diclofenac Sodium (VOLTAREN) 1 % Apply 2 g topically 4 (four) times a day 150 g 1    fluticasone (FLONASE) 50 mcg/act nasal spray SPRAY 2 SPRAYS INTO EACH NOSTRIL EVERY DAY 48 mL 1    gabapentin (NEURONTIN) 300 mg capsule TAKE 2 CAPS IN AM AND NOON AND 3 CAPS AT BEDTIME 630 capsule 2    metFORMIN (GLUCOPHAGE-XR) 500 mg 24 hr tablet TAKE 1 TABLET BY MOUTH EVERY DAY WITH BREAKFAST 90 tablet 0    NIFEdipine ER (ADALAT CC) 60 MG 24 hr tablet TAKE 1 TABLET BY MOUTH EVERY DAY 90 tablet 1    omeprazole (PriLOSEC) 40 MG capsule Take 1 capsule (40 mg total) by mouth daily 30 min or more prior to eating or drinking in the morning 30 capsule 3    oxybutynin (DITROPAN-XL) 10 MG 24 hr tablet TAKE 1 TABLET BY MOUTH DAILY AT BEDTIME 90 tablet 1    sertraline (ZOLOFT) 50 mg tablet Take 1 tablet (50 mg total) by mouth daily at bedtime 90 tablet 1    Wixela Inhub 250-50 MCG/ACT inhaler Inhale 1 puff 2 (two) times a day Rinse mouth after use. 180 blister 1    albuterol (2.5 mg/3 mL) 0.083 % nebulizer solution Albuterol Sulfate (2.5 MG/3ML) 0.083% Inhalation Nebulization Solution  USE 1 UNIT DOSE IN NEBULIZER EVERY 6 HOURS AS NEEDED.   Quantity: 1;  Refills: 5       Lisa Avila M.D.;  Started 12-July-2016  Active3 ML Plas Cont (125 Plas Conts)      EPINEPHrine (EPIPEN) 0.3 mg/0.3 mL SOAJ Inject 0.3 mL (0.3 mg total) into a muscle if needed for anaphylaxis 1 each 1    famotidine (PEPCID) 40 MG tablet Take 1 tablet (40 mg total) by mouth daily at bedtime (Patient not taking: Reported on 3/18/2024) 30 tablet 3     No current facility-administered medications for this visit.     Current Outpatient Medications on File Prior to Visit   Medication Sig    Albuterol Sulfate (VENTOLIN HFA IN)     aspirin 81 MG tablet  Aspirin 81 MG TABS  Take 1 tablet daily   Refills: 0    Active    atorvastatin (LIPITOR) 40 mg tablet TAKE 1 TABLET (40 MG TOTAL) BY MOUTH ONCE AT BEDTIME    baclofen 20 mg tablet TAKE 1 TABLET BY MOUTH FOUR TIMES A DAY    butalbital-acetaminophen-caffeine (FIORICET,ESGIC) -40 mg per tablet Take 1 tablet by mouth every 4 (four) hours as needed for headaches    fluticasone (FLONASE) 50 mcg/act nasal spray SPRAY 2 SPRAYS INTO EACH NOSTRIL EVERY DAY    gabapentin (NEURONTIN) 300 mg capsule TAKE 2 CAPS IN AM AND NOON AND 3 CAPS AT BEDTIME    metFORMIN (GLUCOPHAGE-XR) 500 mg 24 hr tablet TAKE 1 TABLET BY MOUTH EVERY DAY WITH BREAKFAST    NIFEdipine ER (ADALAT CC) 60 MG 24 hr tablet TAKE 1 TABLET BY MOUTH EVERY DAY    omeprazole (PriLOSEC) 40 MG capsule Take 1 capsule (40 mg total) by mouth daily 30 min or more prior to eating or drinking in the morning    oxybutynin (DITROPAN-XL) 10 MG 24 hr tablet TAKE 1 TABLET BY MOUTH DAILY AT BEDTIME    sertraline (ZOLOFT) 50 mg tablet Take 1 tablet (50 mg total) by mouth daily at bedtime    Wixela Inhub 250-50 MCG/ACT inhaler Inhale 1 puff 2 (two) times a day Rinse mouth after use.    [DISCONTINUED] Diclofenac Sodium (VOLTAREN) 1 % Apply 2 g topically 4 (four) times a day    albuterol (2.5 mg/3 mL) 0.083 % nebulizer solution Albuterol Sulfate (2.5 MG/3ML) 0.083% Inhalation Nebulization Solution  USE 1 UNIT DOSE IN NEBULIZER EVERY 6 HOURS AS NEEDED.   Quantity: 1;  Refills: 5       Lisa Avila M.D.;  Started 12-July-2016  Active3 ML Plas Cont (125 Plas Conts)    EPINEPHrine (EPIPEN) 0.3 mg/0.3 mL SOAJ Inject 0.3 mL (0.3 mg total) into a muscle if needed for anaphylaxis    famotidine (PEPCID) 40 MG tablet Take 1 tablet (40 mg total) by mouth daily at bedtime (Patient not taking: Reported on 3/18/2024)     No current facility-administered medications on file prior to visit.     She is allergic to nuts - food allergy, other, peanut oil - food allergy, shellfish allergy - food  allergy, shellfish-derived products - food allergy, sumatriptan, copaxone [glatiramer acetate], cat hair extract, cephalexin, glatiramer, pollen extract, and seasonal ic [cholestatin]..         Objective:    Blood pressure 110/58, pulse 77, temperature 97.5 °F (36.4 °C), temperature source Temporal, height 5' (1.524 m), weight 63.5 kg (140 lb), SpO2 95%, not currently breastfeeding.    Physical Exam    Neurological Exam    CARDIOVASCULAR: RRR, normal S1S2, no murmurs, no rubs. RESP: clear to auscultation bilaterally, no wheezes/rhonchi/rales. ABDOMEN: soft, non tender, non distended. SKIN: no rash or skin lesions. EXTREMITIES: no edema, pulses 2+bilaterally. PSYCH: appropriate mood and affect  NEUROLOGIC COMPREHENSIVE EXAM: Patient is oriented to person, place and time, NAD; appropriate affect. CN II, III, IV, V, VI, VII,VIII,IX,X,XI-XII intact with EOMI, PERRLA, OKN intact, VF grossly intact, fundi poorly visualized secondary to pupillary constriction; symmetric face noted. Motor: 5/5 UE/LE bilateral symmetric, RUE 4/5 shoulder abduction and finger flexion; RLU 4-/5 hip flexion and knee flexion, 2/5 dorsiflexion rigjt; Sensory: intact to light touch and pinprick bilaterally; normal vibration sensation feet bilaterally; Coordination within normal limits on FTN and ROLANDO testing; DTR: 2/4 through, no Babinski, no clonus. Tandem gait is abnormal. Romberg: negative  ROS:    Review of Systems   Constitutional:  Negative for appetite change, fatigue and fever.   HENT: Negative.  Negative for hearing loss, tinnitus, trouble swallowing and voice change.    Eyes: Negative.  Negative for photophobia, pain and visual disturbance.   Respiratory: Negative.  Negative for shortness of breath.    Cardiovascular: Negative.  Negative for palpitations.   Gastrointestinal: Negative.  Negative for nausea and vomiting.   Endocrine: Negative.  Negative for cold intolerance.   Genitourinary: Negative.  Negative for dysuria, frequency and  urgency.   Musculoskeletal:  Negative for back pain, gait problem (T25FW 13.30 SECONDS), myalgias, neck pain and neck stiffness.   Skin: Negative.  Negative for rash.   Allergic/Immunologic: Negative.    Neurological: Negative.  Negative for dizziness, tremors, seizures, syncope, facial asymmetry, speech difficulty, weakness, light-headedness, numbness and headaches.   Hematological: Negative.  Does not bruise/bleed easily.   Psychiatric/Behavioral: Negative.  Negative for confusion, hallucinations and sleep disturbance.

## 2024-03-18 NOTE — TELEPHONE ENCOUNTER
Please provide resources where patient can have massage therapy with [facility] referral ( provided during the office visit)

## 2024-03-20 NOTE — TELEPHONE ENCOUNTER
SW called patient at [phone].  Patient reports having neck and back pain.  Someone got her a gift certificate she used at a massage location near home.  Would like to do again.  BRANDI explained not aware of insurance covering massages however, [facility] has a health and wellness taryn which massages may fall under.  Suggested patient contact the [employer] to discuss her needs to determine if they can assist with funding at location she has went to in the past.  Patient requested information be placed on her MyChart.  Did say that patient can also call member services for her insurance company, just to double check that she does/does not have a benefit for massage therapy.  Patient expressed understanding.  Patient also mentioned doing PT again, but wants to wait until she has massage first.  SW remains available.

## 2024-03-21 NOTE — PROGRESS NOTES
Assessment:  1. Lumbar spondylolysis    2. Spinal stenosis of lumbar region with neurogenic claudication    3. Lumbar radiculopathy        Plan:  Orders Placed This Encounter   Procedures    Ambulatory referral to Physical Therapy     Standing Status:   Future     Standing Expiration Date:   3/22/2025     Referral Priority:   Routine     Referral Type:   Physical Therapy     Referral Reason:   Specialty Services Required     Requested Specialty:   Physical Therapy     Number of Visits Requested:   1     Expiration Date:   3/22/2025       No orders of the defined types were placed in this encounter.      My impressions and treatment recommendations were discussed in detail with the patient, who verbalized understanding and had no further questions.    This is a 64-year-old female with history of MS who presents her office with complaints of right greater than left low back pain with radiation down the right lower extremity.  The pain travels primarily in the lateral thigh, past the knee into the posterior lateral calf, closely resembling L5 dermatome.  She does have imaging of the lumbar spine from 2020 which at that time showed degenerative spinal stenosis and neuroforaminal narrowing most notable at the L4-5 region.  Her symptoms are highly suggestive of lumbar radiculopathy.  She is also reporting possible claudication symptoms as she notes notable weakness in her legs with ambulation and improvement with sitting or leaning forward.  However some of the symptoms may be confounded by underlying right hemiparesis due to her MS.  Nevertheless, it appears that her low back is responsible for some of her symptoms.    I did discuss treatment strategies from conservative to more invasive.  I recommend a course of aquatic therapy for her for lumbar decompression.  I did also discuss right L4 and L5 transforaminal epidurals to injection of fluoroscopic guidance to help with her symptoms.  She would like to see how she  fares with PT first.  She will return in 6 weeks or sooner if medically necessary.  If no relief at that point, we will consider ordering updated MRI of the lumbar spine.  She is in agreement with this plan.    She is also dealing with increased shoulder pain following a big fall in October.  Imaging unremarkable.  She will be reestablishing with sports medicine to assess.    [place] Prescription Drug Monitoring Program report was reviewed and was appropriate     Complete risks and benefits including bleeding, infection, tissue reaction, nerve injury and allergic reaction were discussed. The approach was demonstrated using models and literature was provided. Verbal and written consent was obtained.    Discharge instructions were provided. I personally saw and examined the patient and I agree with the above discussed plan of care.    History of Present Illness:    Susana Brady is a 64 y.o. female who presents to [facility] for initial evaluation of the above stated pain complaints. The patient has a past medical and chronic pain history as outlined in the assessment section. She was referred by Carlota Morales MD  [address] [place] [address] .    Patient is here with chief complaint of lower back pain into the right leg.  Pain is moderate over the past month.  5 out of 10.  Nearly constant.  Burning, shooting, numbness, sharp, pins-and-needles, throbbing and associated with heaviness.  She reports upper and lower extremity weakness mostly on the right side.  She has notable history of MS with flare in the past complicated by chronic right hemiparesis.  She uses a cane to ambulate and a scooter at times.    Patient reports pain across the lower back which is worse on the right.  She has radiation down the right lower extremity.  The pain travels into the lateral thigh, past the knee into the posterior lateral calf and into the dorsum of the foot.  She reports  her symptoms have been worsening and are also associated with weakness in the right leg and heaviness in the right leg. She also reports ambulating in a flexed posture.  Positive shopping cart sign.  She reports weakness in the legs with ambulation and relief with sitting.    Pain is increased with lying down, standing, sitting, walking, exercise.  Decreased with lying down.    Past medical she includes anxiety, asthma, depression, COPD, stomach ulcers, stroke, hypertension.    She reports no relief with exercise.  Moderate leaf with PT.  Moderately with heat/ice therapy.    No tobacco or marijuana use.  Not allergic to latex or contrast dye.    In the past she has used tramadol.  Currently using Voltaren gel, acetaminophen, aspirin, ibuprofen, baclofen.  In the past does use naproxen, oral steroids.  Currently using gabapentin as well      Review of Systems:    Review of Systems   Constitutional:         Weight gain   HENT:  Positive for hearing loss.    Eyes:  Positive for visual disturbance.   Respiratory:  Positive for cough, shortness of breath and wheezing.    Cardiovascular:  Positive for leg swelling.   Endocrine: Positive for polydipsia and polyuria.   Musculoskeletal:  Positive for arthralgias, back pain and myalgias.   Neurological:  Positive for dizziness, numbness and headaches.   Psychiatric/Behavioral:  Positive for decreased concentration. The patient is nervous/anxious.         Depression           Past Medical History:   Diagnosis Date    Asthma     Cardiac disease     heart murmur    COPD (chronic obstructive pulmonary disease) ([facility])     Depression     Diabetes mellitus (HCC)     Fibroid     Hyperlipidemia     Hypertension     Influenza     December 2017    Irregular heart beat     Kidney stone     Migraine     Migraine     MS (multiple sclerosis) (HCC)     Multiple sclerosis (HCC)     Nephrolithiasis     Neurogenic bladder     Panniculitis     Sleep apnea, obstructive     Urinary incontinence         Past Surgical History:   Procedure Laterality Date    COLONOSCOPY      DILATION AND CURETTAGE OF UTERUS      TX COLONOSCOPY FLX DX W/COLLJ SPEC WHEN PFRMD N/A 1/11/2018    Procedure: COLONOSCOPY;  Surgeon: Carlos Alberto Hendrix MD;  Location: [facility];  Service: Gastroenterology    TX TENDON SHEATH INCISION Left 1/17/2018    Procedure: RING TRIGGER FINGER RELEASE;  Surgeon: Nitin Ge MD;  Location:  MAIN OR;  Service: Orthopedics    TONSILLECTOMY      UTERINE FIBROID SURGERY         Family History   Problem Relation Age of Onset    Stroke Mother     Diabetes Mother     Hypertension Mother     Emphysema Mother     Heart disease Father     Diabetes Father     Hypertension Father     Diabetes Sister     Sleep apnea Sister     No Known Problems Maternal Grandmother     No Known Problems Paternal Grandmother     Hypertension Brother     Graves' disease Brother     Breast cancer Maternal Aunt 68    Cancer Maternal Aunt     Stomach cancer Maternal Aunt     Sleep apnea Maternal Aunt     Cancer Maternal Aunt     No Known Problems Maternal Aunt     No Known Problems Maternal Aunt     No Known Problems Maternal Aunt     No Known Problems Maternal Aunt     No Known Problems Son     No Known Problems Son        Social History     Occupational History    Not on file   Tobacco Use    Smoking status: Never    Smokeless tobacco: Never   Vaping Use    Vaping status: Never Used   Substance and Sexual Activity    Alcohol use: Not Currently     Comment: social    Drug use: Never     Types: Marijuana     Comment: last use 4 days ago.     Sexual activity: Yes     Partners: Male     Birth control/protection: Post-menopausal         Current Outpatient Medications:     Albuterol Sulfate (VENTOLIN HFA IN), , Disp: , Rfl:     aspirin 81 MG tablet, Aspirin 81 MG TABS Take 1 tablet daily  Refills: 0  Active, Disp: , Rfl:     atorvastatin (LIPITOR) 40 mg tablet, TAKE 1 TABLET (40 MG TOTAL) BY MOUTH ONCE AT BEDTIME, Disp: 90 tablet,  Rfl: 0    baclofen 20 mg tablet, TAKE 1 TABLET BY MOUTH FOUR TIMES A DAY, Disp: 360 tablet, Rfl: 2    butalbital-acetaminophen-caffeine (FIORICET,ESGIC) -40 mg per tablet, Take 1 tablet by mouth every 4 (four) hours as needed for headaches, Disp: 12 tablet, Rfl: 4    Diclofenac Sodium (VOLTAREN) 1 %, Apply 2 g topically 4 (four) times a day, Disp: 150 g, Rfl: 1    fluticasone (FLONASE) 50 mcg/act nasal spray, SPRAY 2 SPRAYS INTO EACH NOSTRIL EVERY DAY, Disp: 48 mL, Rfl: 1    gabapentin (NEURONTIN) 300 mg capsule, TAKE 2 CAPS IN AM AND NOON AND 3 CAPS AT BEDTIME, Disp: 630 capsule, Rfl: 2    metFORMIN (GLUCOPHAGE-XR) 500 mg 24 hr tablet, TAKE 1 TABLET BY MOUTH EVERY DAY WITH BREAKFAST, Disp: 90 tablet, Rfl: 0    NIFEdipine ER (ADALAT CC) 60 MG 24 hr tablet, TAKE 1 TABLET BY MOUTH EVERY DAY, Disp: 90 tablet, Rfl: 1    omeprazole (PriLOSEC) 40 MG capsule, Take 1 capsule (40 mg total) by mouth daily 30 min or more prior to eating or drinking in the morning, Disp: 30 capsule, Rfl: 3    oxybutynin (DITROPAN-XL) 10 MG 24 hr tablet, TAKE 1 TABLET BY MOUTH DAILY AT BEDTIME, Disp: 90 tablet, Rfl: 1    sertraline (ZOLOFT) 50 mg tablet, Take 1 tablet (50 mg total) by mouth daily at bedtime, Disp: 90 tablet, Rfl: 1    albuterol (2.5 mg/3 mL) 0.083 % nebulizer solution, Albuterol Sulfate (2.5 MG/3ML) 0.083% Inhalation Nebulization Solution USE 1 UNIT DOSE IN NEBULIZER EVERY 6 HOURS AS NEEDED.  Quantity: 1;  Refills: 5    Lisa Avila M.D.;  Started 12-July-2016 Active3 ML Plas Cont (125 Plas Conts), Disp: , Rfl:     EPINEPHrine (EPIPEN) 0.3 mg/0.3 mL SOAJ, Inject 0.3 mL (0.3 mg total) into a muscle if needed for anaphylaxis, Disp: 1 each, Rfl: 1    famotidine (PEPCID) 40 MG tablet, Take 1 tablet (40 mg total) by mouth daily at bedtime (Patient not taking: Reported on 3/18/2024), Disp: 30 tablet, Rfl: 3    Wixela Inhub 250-50 MCG/ACT inhaler, Inhale 1 puff 2 (two) times a day Rinse mouth after use., Disp: 180 blister, Rfl:  1    Allergies   Allergen Reactions    Nuts - Food Allergy Shortness Of Breath and Anaphylaxis    Other Anaphylaxis, Hives and Itching     [facility] - [id number]: POISON IVY   walnuts & cashews  Cats    Peanut Oil - Food Allergy      Hives anaphylaxis    Shellfish Allergy - Food Allergy Shortness Of Breath and Swelling    Shellfish-Derived Products - Food Allergy Shortness Of Breath and Swelling    Sumatriptan Headache and Other (See Comments)     Other reaction(s): Headache    Copaxone [Glatiramer Acetate] Hives    Cat Hair Extract     Cephalexin Rash     Pt says they took keflex again and didn't get a reaction from it. Pt states they believe it was something they ate that day instead.     Glatiramer Rash     Other reaction(s): rash    Pollen Extract Sneezing    Seasonal Ic [Cholestatin]        Physical Exam:    /84   Pulse 75   Ht 5' (1.524 m)   Wt 64 kg (141 lb)   BMI 27.54 kg/m²     Constitutional: normal, well developed, well nourished, alert, in no distress and non-toxic and no overt pain behavior.  Eyes: anicteric  HEENT: grossly intact  Neck: supple, symmetric, trachea midline and no masses   Pulmonary:even and unlabored  Cardiovascular:No edema or pitting edema present  Skin:Normal without rashes or lesions and well hydrated  Psychiatric:Mood and affect appropriate  Neurologic:Cranial Nerves II-XII grossly intact  Musculoskeletal:normal    Lumbar Spine Exam    Appearance:  Normal lordosis  Palpation/Tenderness:  no tenderness or spasm  Sensory:  no sensory deficits noted  Motor Strength:  Left hip flexion:  5/5  Left hip extension:  5/5  Right hip flexion:  4/5  Right hip extension:  5/5  Left knee flexion:  5/5  Left knee extension:  5/5  Right knee flexion:  4/5  Right knee extension:  4/5  Left foot dorsiflexion:  5/5  Left foot plantar flexion:  5/5  Right foot dorsiflexion:  4/5  Right foot plantar flexion:  4/5  Reflexes:  Left Patellar:  2+   Right Patellar:  2+   Left Achilles:  2+   Right  Achilles:  2+   Seated straight leg raise negative bilaterally    Imaging    MRI LUMBAR SPINE WITHOUT CONTRAST          3/17/20     INDICATION: G81.91: Hemiplegia, unspecified affecting right dominant side  M47.26: Other spondylosis with radiculopathy, lumbar region.   Low back pain 0LBP with numbness down right leg with numbness down right leg     COMPARISON:  12/11/2017     TECHNIQUE:  Sagittal T1, sagittal T2, sagittal inversion recovery, axial T1 and axial T2, coronal T2.    IMAGE QUALITY:  Diagnostic     FINDINGS:     VERTEBRAL BODIES:  There are 5 lumbar type vertebral bodies.  Minimal levoscoliosis mid lumbar spine.  Trace grade 1 anterolisthesis of L4 on L5.  Scattered degenerative endplate changes.  No focally suspicious marrow lesions.  No bone marrow edema or   compression abnormality.     SACRUM:  Normal signal within the sacrum. No evidence of insufficiency or stress fracture.     DISTAL CORD AND CONUS:  Normal size and signal within the distal cord and conus.  The conus medullaris terminates at the L1 level.     PARASPINAL SOFT TISSUES:  Paraspinal soft tissues are unremarkable.     LOWER THORACIC DISC SPACES:  T10-T11: There is no focal disk herniation, central canal or neural foraminal narrowing.     T11-T12: Small central disc herniation, protrusion type.  No significant central canal or neural foraminal narrowing.     T12-L1: Small central disc herniation, protrusion type.  No significant central canal or neural foraminal narrowing.     LUMBAR DISC SPACES:     L1-L2:  There is loss of disc height and signal.  Small left paracentral disc herniation, protrusion type.  Minimal central canal narrowing.  Mild left neural foraminal narrowing.  Right neural foramen patent. This level is similar to the prior study.     L2-L3:  There is a right neural foraminal disc herniation, protrusion type.  There is infolding ligamentum flavum.  Moderate right neural foraminal narrowing.  Mild central canal narrowing.   Mild left neural foraminal narrowing. This level is similar to   the prior study.     L3-L4:  There is bilateral facet hypertrophy.  There is a left neural foraminal disc herniation, protrusion type.  Mild left neural foraminal narrowing.  Minimal central canal narrowing.  Mild right neural foraminal narrowing. This level is similar to   the prior study.     L4-L5:  There is a right neural foraminal disc herniation, protrusion type.  There is bilateral facet hypertrophy.  Moderate to severe right neural foraminal narrowing.  Moderate central canal narrowing.  Mild to moderate left neural foraminal narrowing.   This level is similar to the prior study.     L5-S1:  There is a central disc herniation, protrusion type.  Mild central canal narrowing.  Minimal left greater than right neural foraminal narrowing. This level is similar to the prior study.     IMPRESSION:     Multilevel spondylotic changes are similar to the previous study.    No orders to display       Orders Placed This Encounter   Procedures    Ambulatory referral to Physical Therapy

## 2024-03-22 ENCOUNTER — CONSULT (OUTPATIENT)
Dept: PAIN MEDICINE | Facility: CLINIC | Age: 64
End: 2024-03-22
Payer: MEDICARE

## 2024-03-22 VITALS
SYSTOLIC BLOOD PRESSURE: 118 MMHG | HEIGHT: 60 IN | WEIGHT: 141 LBS | BODY MASS INDEX: 27.68 KG/M2 | DIASTOLIC BLOOD PRESSURE: 84 MMHG | HEART RATE: 75 BPM

## 2024-03-22 DIAGNOSIS — M43.06 LUMBAR SPONDYLOLYSIS: Primary | ICD-10-CM

## 2024-03-22 DIAGNOSIS — M48.062 SPINAL STENOSIS OF LUMBAR REGION WITH NEUROGENIC CLAUDICATION: ICD-10-CM

## 2024-03-22 DIAGNOSIS — M54.16 LUMBAR RADICULOPATHY: ICD-10-CM

## 2024-03-22 PROCEDURE — 99204 OFFICE O/P NEW MOD 45 MIN: CPT | Performed by: STUDENT IN AN ORGANIZED HEALTH CARE EDUCATION/TRAINING PROGRAM

## 2024-03-22 NOTE — PATIENT INSTRUCTIONS
Epidural Steroid Injection   AMBULATORY CARE:   What you need to know about an epidural steroid injection (OTILIO):  An OTILIO is a procedure to inject steroid medicine into the epidural space. The epidural space is between your spinal cord and vertebrae. Steroids reduce inflammation and fluid buildup in your spine that may be causing pain. You may be given pain medicine along with the steroids.        How to prepare for an OTILIO:  Your provider will talk to you about how to prepare for your procedure. He or she will tell you what medicines to take or not take on the day of your procedure. You may need to stop taking blood thinners or other medicines several days before your procedure. You may need to adjust any diabetes medicine you take on the day of your procedure. Steroid medicine can increase your blood sugar level. Arrange for someone to drive you home when you are discharged.  What will happen during an OTILIO:   You will be given medicine to numb the procedure area. You will be awake for the procedure, but you will not feel pain. You may also be given medicine to help you relax. Contrast liquid will be used to help your provider see the area better. Tell the provider if you have ever had an allergic reaction to contrast liquid.    Your provider may place the needle into your neck area, middle of your back, or tailbone area. He or she may inject the medicine next to the nerves that are causing your pain. He or she may instead inject the medicine into a larger area of the epidural space. This helps the medicine spread to more nerves. Your provider will use a fluoroscope to help guide the needle to the right place. A fluoroscope is a type of x-ray. After the procedure, a bandage will be placed over the injection site to prevent infection.    What will happen after an OTIILO:  You will have a bandage over the injection site to prevent infection. Your provider will tell you when you can bathe and any activity guidelines. You  will be able to go home.  Risks of an OTILIO:  You may have temporary or permanent nerve damage or paralysis. You may have bleeding or develop a serious infection, such as meningitis (swelling of the brain coverings). An abscess may also develop. An abscess is a pus-filled area under the skin. You may need surgery to fix the abscess. You may have a seizure, anxiety, or trouble sleeping. If you are a man, you may have temporary erectile dysfunction (not able to have an erection).   Call your local emergency number (911 in the [place]) if:   You have a seizure.    You have trouble moving your legs.    Seek care immediately if:   Blood soaks through your bandage.    You have a fever or chills, severe back pain, and the procedure area is sensitive to the touch.    You cannot control when you urinate or have a bowel movement.    Call your doctor if:   You have weakness or numbness in your legs.    Your wound is red, swollen, or draining pus.    You have nausea or are vomiting.    Your face or neck is red and you feel warm.    You have more pain than you had before the procedure.    You have swelling in your hands or feet.    You have questions or concerns about your condition or care.    Care for your wound as directed:  You may remove the bandage before you go to bed the day of your procedure. You may take a shower, but do not take a bath for at least 24 hours.   Self-care:   Do not drive,  use machines, or do strenuous activity for 24 hours after your procedure or as directed.     Continue other treatments  as directed. Steroid injections alone will not control your pain. The injections are meant to be used with other treatments, such as physical therapy.    Follow up with your doctor as directed:  Write down your questions so you remember to ask them during your visits.   © Copyright [employer] 2023 Information is for End User's use only and may not be sold, redistributed or otherwise used for commercial purposes.  The above  information is an  only. It is not intended as medical advice for individual conditions or treatments. Talk to your doctor, nurse or pharmacist before following any medical regimen to see if it is safe and effective for you.

## 2024-03-27 DIAGNOSIS — R73.03 PREDIABETES: ICD-10-CM

## 2024-03-27 RX ORDER — METFORMIN HYDROCHLORIDE 500 MG/1
500 TABLET, EXTENDED RELEASE ORAL
Qty: 90 TABLET | Refills: 0 | Status: SHIPPED | OUTPATIENT
Start: 2024-03-27

## 2024-03-29 NOTE — TELEPHONE ENCOUNTER
BRANDI called patient at [phone].  Patient wants to talk with one of the nurse's from insurance to check her benefits before calling the [employer] for assistance.  Has not done that yet but said she will call today.  Awaiting update.  BRANDI remains available.

## 2024-04-03 ENCOUNTER — EVALUATION (OUTPATIENT)
Dept: PHYSICAL THERAPY | Age: 64
End: 2024-04-03
Payer: COMMERCIAL

## 2024-04-03 DIAGNOSIS — M48.062 SPINAL STENOSIS OF LUMBAR REGION WITH NEUROGENIC CLAUDICATION: Primary | ICD-10-CM

## 2024-04-03 DIAGNOSIS — M54.16 LUMBAR RADICULOPATHY: ICD-10-CM

## 2024-04-03 PROCEDURE — 97162 PT EVAL MOD COMPLEX 30 MIN: CPT | Performed by: PHYSICAL THERAPIST

## 2024-04-03 PROCEDURE — 97113 AQUATIC THERAPY/EXERCISES: CPT | Performed by: PHYSICAL THERAPIST

## 2024-04-03 NOTE — PROGRESS NOTES
PT Evaluation     Today's date: 4/3/2024  Patient name: Susana Brady  : 1960  MRN: [id number]  Referring provider: Familia Patricia MD  Dx:   Encounter Diagnosis     ICD-10-CM    1. Spinal stenosis of lumbar region with neurogenic claudication  M48.062       2. Lumbar radiculopathy  M54.16           Start Time: 0415  Stop Time: 0515  Total time in clinic (min): 60 minutes    Assessment  Assessment details: Susana Brady is a 64 y.o. female who presents with pain, decreased strength, decreased ROM, ambulatory dysfunction, postural dysfunction, and balance dysfunction. Due to these impairments, Patient has difficulty performing a/iadls and recreational activities. Patient's clinical presentation is consistent with their referring diagnosis. Patient would benefit from skilled aquatic physical therapy to address their aforementioned impairments, improve their level of function and to improve their overall quality of life.  Impairments: abnormal or restricted ROM, activity intolerance, impaired physical strength, lacks appropriate home exercise program, pain with function, poor posture  and poor body mechanics  Understanding of Dx/Px/POC: good   Prognosis: good    Goals  ST-3 WEEKS  1.  Decrease pain by 3 points on VAS at its worst.  2.  Increase ROM by > 5-10 deg in all deficients planes. Lumbar spine  3.  Increase LE and core strength by 1/2 MMT grade in all deficient planes.    LT-8 WEEKS  1. Patient to be independent with a/iadls.  2. Increase functional activities for leisure and home activities to previous LOF.  3. Independent with HEP and/or fitness program.    Plan  Patient would benefit from: skilled physical therapy  Planned modality interventions: cryotherapy and thermotherapy: hydrocollator packs  Planned therapy interventions: activity modification, behavior modification, aquatic therapy, functional ROM exercises, home exercise program, joint mobilization, manual therapy, neuromuscular  re-education, patient education, postural training, therapeutic activities and therapeutic exercise  Frequency: 1-2 times per week.  Duration in weeks: 8  Plan of Care beginning date: 4/3/2024  Plan of Care expiration date: 2024  Treatment plan discussed with: patient        Subjective Evaluation    History of Present Illness  Mechanism of injury: chief complaint of lower back pain into the right leg of recent onset.  Notes pain started several weeks ago. Notes the past few days now feeling it on the left as well. Pain is moderate over the past month.   Nearly constant.  Burning, shooting, numbness, sharp, pins-and-needles, throbbing and associated with heaviness.  She reports upper and lower extremity weakness mostly on the right side.  She has notable history of MS with flare in the past complicated by chronic right hemiparesis.  She uses a cane to ambulate and a scooter at times.    Pain worse with walking or stair climbing. Prolonged positioning    Left shoulder pain due to recent fall in October.   Patient Goals  Patient goals for therapy: decreased pain, increased motion and increased strength  Patient goal: walk without pain;  Pain  Current pain ratin  At best pain rating: 3  At worst pain ratin  Location: bilateral buttock and right thigh  Quality: sharp, throbbing and pressure  Relieving factors: medications and ice  Aggravating factors: walking and stair climbing    Social Support  Stairs in house: yes   Lives with: significant other          Objective     Concurrent Complaints  Positive for night pain, disturbed sleep and bladder dysfunction (neurogenic bladder hx). Negative for saddle (S4) numbness    Neurological Testing     Sensation     Lumbar   Left   Intact: light touch  Paresthesia: light touch    Right   Intact: light touch  Paresthesia: light touch    Additional Neurological Details  Weakness right LE and numbness due in part to history of MS    Active Range of Motion     Lumbar    Flexion: 40 degrees  with pain  Extension: 5 degrees  with pain  Left lateral flexion: 5 degrees    with pain  Right lateral flexion: 10 degrees  with pain    Strength/Myotome Testing     Left Hip   Planes of Motion   Flexion: 4  Abduction: 4-  Adduction: 4-    Right Hip   Planes of Motion   Flexion: 3  Abduction: 4-  Adduction: 4-    Left Knee   Flexion: 4  Extension: 4    Right Knee   Flexion: 3+  Extension: 3+    Left Ankle/Foot   Dorsiflexion: 5    Right Ankle/Foot   Dorsiflexion: 3-    Ambulation   Weight-Bearing Status   Assistive device used: single point cane    Ambulation: Level Surfaces   Ambulation with assistive device: independent  Ambulation without assistive device: contact guard assist    Ambulation: Stairs   Ascend stairs: independent  Pattern: non-reciprocal  Railings: one rail  Descend stairs: independent  Pattern: non-reciprocal  Railings: one rail    Observational Gait   Decreased walking speed and right step length.   Right foot contact pattern: forefoot  Right arm swing: decreased      Flowsheet Rows      Flowsheet Row Most Recent Value   PT/OT G-Codes    Current Score 19   Projected Score 44                 POC expires Unit limit Auth Expiration date PT/OT + Visit Limit?   7/1/24 4 NA 75                           Visit/Unit Tracking  AUTH Status:  Date 4/3              No AUTH Used 1               Remaining                 FOTO                     Precautions: MS, HTN, DM, cardiac, neurogenic bladder      Daily Treatment Diary     Date 4/3          Patient education 10          Water Walk (FW, BW, side) x10’  2'          LE work at wall               Hip FF/ext swing  2'            Toe/heel  1'            Hip ABD/ADD  1'            March 1'            Knee flexion & extension             Squats           UE noodle x3             Push/pull              Rotate              Row forward & back              OH side bend            UE/Core (AROM, paddles, MB)              ABD/ADD               "Horizontal Pec Fly              Alt. arm swing (shld flex/ext)              Push pull              Single paddle rotation           SLS (EO, EC, ball toss)            Aqua Step (FW, lat, eccentric)            Core work:              MF press (red, blue, blk)             Kickboard press (blue, red)              Row/ext w/ tubing (red, blue, blk)           Seated on bench             ankle DF/PF              March              ABD/ADD              Knee flexion/extension            DW TX - hang in the deep water  3' 5'            DW ABD/ADD              DW Bicycle  3'            DW Scissor hip flexion/extension              DW DKTC            Stretches              HS at step              Wall stretches  20\"/4x            Calf stretch at wall              Other:            Hot Tub - 10 minutes only  NO               "

## 2024-04-04 DIAGNOSIS — G35 MULTIPLE SCLEROSIS (HCC): ICD-10-CM

## 2024-04-09 ENCOUNTER — APPOINTMENT (OUTPATIENT)
Dept: PHYSICAL THERAPY | Age: 64
End: 2024-04-09
Payer: COMMERCIAL

## 2024-04-10 ENCOUNTER — OFFICE VISIT (OUTPATIENT)
Dept: PHYSICAL THERAPY | Age: 64
End: 2024-04-10
Payer: COMMERCIAL

## 2024-04-10 DIAGNOSIS — M54.16 LUMBAR RADICULOPATHY: ICD-10-CM

## 2024-04-10 DIAGNOSIS — M48.062 SPINAL STENOSIS OF LUMBAR REGION WITH NEUROGENIC CLAUDICATION: Primary | ICD-10-CM

## 2024-04-10 PROCEDURE — 97113 AQUATIC THERAPY/EXERCISES: CPT

## 2024-04-10 PROCEDURE — 97150 GROUP THERAPEUTIC PROCEDURES: CPT

## 2024-04-10 NOTE — PROGRESS NOTES
Daily Note     Today's date: 4/10/2024  Patient name: Susana Brady  : 1960  MRN: [id number]  Referring provider: Familia Patricia MD  Dx:   Encounter Diagnosis     ICD-10-CM    1. Spinal stenosis of lumbar region with neurogenic claudication  M48.062       2. Lumbar radiculopathy  M54.16           Start Time: 1000  Stop Time: 1055  Total time in clinic (min): 55 minutes    Subjective: pt notes she was sore after initial session in the water.     Objective: See treatment diary below      Assessment: Tolerated treatment well. Today we added ex's for Ue's, posture and core and LE's. No c/o w/ new ex's. Will watch fatigue post session. Pt asking to go into hot tub after session but due to her MS I would have to check w/ PT next visit. Patient would benefit from continued PT      Plan: Continue per plan of care.      POC expires Unit limit Auth Expiration date PT/OT + Visit Limit?   24 4 NA 75                           Visit/Unit Tracking  AUTH Status:  Date 4/3              No AUTH Used 1               Remaining                 FOTO                     Precautions: MS, HTN, DM, cardiac, neurogenic bladder      Daily Treatment Diary     Date 4/3 4/10         Patient education 10 5         Water Walk (FW, BW, side) x10’  2' 5' w/ N         LE work at wall               Hip FF/ext swing  2' 2           Toe/heel  1' 1           Hip ABD/ADD  1' 1           March 1' 1           Knee flexion & extension  1           Squats  1         UE noodle x3             Push/pull   1           Rotate   1           Row forward & back   2 small N           OH side bend            UE/Core (AROM, paddles, MB)              ABD/ADD              Horizontal Pec Fly              Alt. arm swing (shld flex/ext)              Push pull              Single paddle rotation           SLS (EO, EC, ball toss)            Aqua Step (FW, lat, eccentric)            Core work:              MF press (red, blue, blk)             Kickboard press  "(blue, red)              Row/ext w/ tubing (red, blue, blk)           Seated on bench             ankle DF/PF              March 1           ABD/ADD   1           Knee flexion/extension   1         DW TX - hang in the deep water  3' 5' 10           DW ABD/ADD   1           DW Bicycle  3' 4           DW Scissor hip flexion/extension              DW DKTC   1         Stretches              HS at step   30''ea           Wall stretches  20\"/4x 20''x4           Calf stretch at wall              Other:            Hot Tub - 10 minutes only  NO no                   "

## 2024-04-11 ENCOUNTER — APPOINTMENT (OUTPATIENT)
Dept: PHYSICAL THERAPY | Age: 64
End: 2024-04-11
Payer: COMMERCIAL

## 2024-04-11 ENCOUNTER — OFFICE VISIT (OUTPATIENT)
Dept: PHYSICAL THERAPY | Age: 64
End: 2024-04-11
Payer: COMMERCIAL

## 2024-04-11 DIAGNOSIS — M48.062 SPINAL STENOSIS OF LUMBAR REGION WITH NEUROGENIC CLAUDICATION: Primary | ICD-10-CM

## 2024-04-11 DIAGNOSIS — M54.16 LUMBAR RADICULOPATHY: ICD-10-CM

## 2024-04-11 PROCEDURE — 97113 AQUATIC THERAPY/EXERCISES: CPT | Performed by: PHYSICAL THERAPIST

## 2024-04-11 NOTE — PROGRESS NOTES
Daily Note     Today's date: 2024  Patient name: Susana Brady  : 1960  MRN: [id number]  Referring provider: Familia Patricia MD  Dx:   Encounter Diagnosis     ICD-10-CM    1. Spinal stenosis of lumbar region with neurogenic claudication  M48.062       2. Lumbar radiculopathy  M54.16           Start Time: 0855  Stop Time: 0950  Total time in clinic (min): 55 minutes    Subjective: Patient notes she felt good after yesterdays therapy session. Noted some soreness in both shoulders but no increased back pain. Took a nap when she got home but no major fatigue.       Objective: See treatment diary below      Assessment: Tolerated treatment well. Patient demonstrated fatigue post treatment and would benefit from continued PT Verbal cues for exercise technique and posture. Patient would like to try JWP pos treatment. Discussed potentially adding next week for 2-3' post exercise to tolerance. Some increased back soreness initially post session.       Plan: Continue per plan of care.      POC expires Unit limit Auth Expiration date PT/OT + Visit Limit?   24 4 NA 75                           Visit/Unit Tracking  AUTH Status:  Date 4/3 4/10 4/11            No AUTH Used 1  3             Remaining                 FOTO                     Precautions: MS, HTN, DM, cardiac, neurogenic bladder      Daily Treatment Diary     Date 4/3 4/10 4/11        Patient education 10 5 5        Water Walk (FW, BW, side) x10’  2' 5' w/ N 5        LE work at wall               Hip FF/ext swing  2' 2 2          Toe/heel  1' 1 1          Hip ABD/ADD  1' 1 1          ' 1 1          Knee flexion & extension  1 1          Squats  1 1        UE noodle x3             Push/pull   1 1          Rotate   1 1          Row forward & back   2 small N 2          OH side bend            UE/Core (AROM, paddles, MB)              ABD/ADD              Horizontal Pec Fly              Alt. arm swing (shld flex/ext)              Push pull         "      Single paddle rotation           SLS (EO, EC, ball toss)            Aqua Step (FW, lat, eccentric)            Core work:              MF press (red, blue, blk)             Kickboard press (blue, red)              Row/ext w/ tubing (red, blue, blk)           Seated on bench             ankle DF/PF              March 1 1          ABD/ADD   1 1          Knee flexion/extension   1 1        DW TX - hang in the deep water  3' 5' 10 10'          DW ABD/ADD   1 1          DW Bicycle  3' 4 5          DW Scissor hip flexion/extension              DW DKTC   1 1        Stretches              HS at step   30''ea 30\"/each          Wall stretches  20\"/4x 20''x4 5x          Calf stretch at wall              Other:            Hot Tub - 10 minutes only  NO no nt                    "

## 2024-04-16 DIAGNOSIS — G35 MULTIPLE SCLEROSIS (HCC): ICD-10-CM

## 2024-04-16 RX ORDER — GABAPENTIN 300 MG/1
CAPSULE ORAL
Qty: 630 CAPSULE | Refills: 2 | Status: SHIPPED | OUTPATIENT
Start: 2024-04-16

## 2024-04-16 NOTE — TELEPHONE ENCOUNTER
Received fax from [facility] mail order pharmacy stating pt requested gabapentin Rx to be sent to them.     Script pended, please review and sign if agreeable.

## 2024-04-17 ENCOUNTER — OFFICE VISIT (OUTPATIENT)
Dept: PHYSICAL THERAPY | Age: 64
End: 2024-04-17
Payer: COMMERCIAL

## 2024-04-17 DIAGNOSIS — M48.062 SPINAL STENOSIS OF LUMBAR REGION WITH NEUROGENIC CLAUDICATION: Primary | ICD-10-CM

## 2024-04-17 DIAGNOSIS — M54.16 LUMBAR RADICULOPATHY: ICD-10-CM

## 2024-04-17 PROCEDURE — 97113 AQUATIC THERAPY/EXERCISES: CPT

## 2024-04-17 NOTE — PROGRESS NOTES
Daily Note     Today's date: 2024  Patient name: Susana Brady  : 1960  MRN: [id number]  Referring provider: Familia Patricia MD  Dx:   Encounter Diagnosis     ICD-10-CM    1. Spinal stenosis of lumbar region with neurogenic claudication  M48.062       2. Lumbar radiculopathy  M54.16           Start Time: 1400  Stop Time: 1500  Total time in clinic (min): 60 minutes    Subjective: Patient reports that she doing well with the pool exercises.       Objective: See treatment diary below      Assessment: Tolerated treatment well, no c/o pain with TE, decreased balance and core weakness. Patient required verbal cueing  throughout prescribed exercises for proper execution and dosage. Patient demonstrated fatigue post treatment and would benefit from continued PT      Plan: Continue per plan of care.      POC expires Unit limit Auth Expiration date PT/OT + Visit Limit?   24 4 NA 75                           Visit/Unit Tracking  AUTH Status:  Date 4/3 4/10 4/11 4/17           No AUTH Used 1  3 4            Remaining                 FOTO                     Precautions: MS, HTN, DM, cardiac, neurogenic bladder      Daily Treatment Diary     Date 4/3 4/10 4/11 4/17       Patient education 10 5 5        Water Walk (FW, BW, side) x10’  2' 5' w/ N 5 5'       LE work at wall               Hip FF/ext swing  2' 2 2 2         Toe/heel  1' 1 1 1         Hip ABD/ADD  1' 1 1 2         March 1' 1 1 1         Knee flexion & extension  1 1 1         Squats  1 1 1       UE noodle x3             Push/pull   1 1 1         Rotate   1 1 1         Row forward & back   2 small N 2 2         OH side bend            UE/Core (AROM, paddles, MB)              ABD/ADD              Horizontal Pec Fly              Alt. arm swing (shld flex/ext)              Push pull              Single paddle rotation           SLS (EO, EC, ball toss)            Aqua Step (FW, lat, eccentric)            Core work:              MF press (red, blue, blk)  "            Kickboard press (blue, red)              Row/ext w/ tubing (red, blue, blk)           Seated on bench             ankle DF/PF              March 1 1 1         ABD/ADD   1 1 1         Knee flexion/extension   1 1 1       DW TX - hang in the deep water  3' 5' 10 10' 10         DW ABD/ADD   1 1 1         DW Bicycle  3' 4 5 5         DW Scissor hip flexion/extension              DW DKTC   1 1 1       Stretches              HS at step   30''ea 30\"/each 2x         Wall stretches  20\"/4x 20''x4 5x 5x         Calf stretch at wall              Other:            Hot Tub - 10 minutes only  NO no nt 2'                     "

## 2024-04-19 ENCOUNTER — OFFICE VISIT (OUTPATIENT)
Dept: PHYSICAL THERAPY | Age: 64
End: 2024-04-19
Payer: COMMERCIAL

## 2024-04-19 DIAGNOSIS — M54.16 LUMBAR RADICULOPATHY: ICD-10-CM

## 2024-04-19 DIAGNOSIS — M48.062 SPINAL STENOSIS OF LUMBAR REGION WITH NEUROGENIC CLAUDICATION: Primary | ICD-10-CM

## 2024-04-19 PROCEDURE — 97113 AQUATIC THERAPY/EXERCISES: CPT

## 2024-04-19 NOTE — PROGRESS NOTES
Daily Note     Today's date: 2024  Patient name: Susana Brady  : 1960  MRN: [id number]  Referring provider: Familia Patricia MD  Dx:   Encounter Diagnosis     ICD-10-CM    1. Spinal stenosis of lumbar region with neurogenic claudication  M48.062       2. Lumbar radiculopathy  M54.16           Start Time: 0900  Stop Time: 1000  Total time in clinic (min): 60 minutes    Subjective: Reports 4/10 pain at her LB upon arrival.       Objective: See treatment diary below  Patient was seen 1:1 for 30 min.     Assessment: Some discomfort reported during FF/ext hip swings at her R LB, cues to reduced ROM with improved tolerance. Added shoulder AROM with emphasis on core engagement. Cues for carryover of program and demonstrations for proper performance of exercises. Monitoring of time to ensure proper dosage is completed. Reduced pain post session. Patient would benefit from continued PT      Plan: Continue per plan of care.      POC expires Unit limit Auth Expiration date PT/OT + Visit Limit?   24 4 NA 75                           Visit/Unit Tracking  AUTH Status:  Date 4/3 4/10 4/11 4/17 4/19          No AUTH Used 1  3 4 5           Remaining                 FOTO                     Precautions: MS, HTN, DM, cardiac, neurogenic bladder      Daily Treatment Diary     Date 4/3 4/10 4/11 4/17 4/19      Patient education 10 5 5        Water Walk (FW, BW, side) x10’  2' 5' w/ N 5 5' 10'      LE work at wall               Hip FF/ext swing  2' 2 2 2 2'        Toe/heel  1' 1 1 1 1'        Hip ABD/ADD  1' 1 1 2 2'        March 1' 1 1 1 1'        Knee flexion & extension  1 1 1 1'        Squats  1 1 1 1'      UE noodle x3             Push/pull   1 1 1 1'        Rotate   1 1 1 1'        Row forward & back   2 small N 2 2 2'        OH side bend            UE/Core (AROM, paddles, MB)      AROM        ABD/ADD      1'        Horizontal Pec Fly      1'        Alt. arm swing (shld flex/ext)      1'        Push pull          "     Single paddle rotation           SLS (EO, EC, ball toss)            Aqua Step (FW, lat, eccentric)            Core work:              MF press (red, blue, blk)             Kickboard press (blue, red)              Row/ext w/ tubing (red, blue, blk)           Seated on bench             ankle DF/PF      1' March 1 1 1 1'        ABD/ADD   1 1 1 1'        Knee flexion/extension   1 1 1 1'      DW TX - hang in the deep water  3' 5' 10 10' 10 10'        DW ABD/ADD   1 1 1 1'        DW Bicycle  3' 4 5 5 5'        DW Scissor hip flexion/extension              DW DKTC   1 1 1 1'      Stretches              HS at step   30''ea 30\"/each 2x 2x        Wall stretches  20\"/4x 20''x4 5x 5x 10\"x3        Calf stretch at wall              Other:            Hot Tub - 10 minutes only  NO no nt 2' 4'                       "

## 2024-04-22 ENCOUNTER — OFFICE VISIT (OUTPATIENT)
Dept: PHYSICAL THERAPY | Age: 64
End: 2024-04-22
Payer: COMMERCIAL

## 2024-04-22 DIAGNOSIS — M48.062 SPINAL STENOSIS OF LUMBAR REGION WITH NEUROGENIC CLAUDICATION: Primary | ICD-10-CM

## 2024-04-22 DIAGNOSIS — M54.16 LUMBAR RADICULOPATHY: ICD-10-CM

## 2024-04-22 PROCEDURE — 97113 AQUATIC THERAPY/EXERCISES: CPT

## 2024-04-22 NOTE — PROGRESS NOTES
Daily Note     Today's date: 2024  Patient name: Susana Brady  : 1960  MRN: [id number]  Referring provider: Familia Patricia MD  Dx:   Encounter Diagnosis     ICD-10-CM    1. Spinal stenosis of lumbar region with neurogenic claudication  M48.062       2. Lumbar radiculopathy  M54.16                      Subjective: Patient reports she gets sharp pinching in her LB.       Objective: See treatment diary below      Assessment: Tolerated treatment fairly well with ex program outlined below. No c/o pain with TE. Patient demonstrated fatigue post treatment and would benefit from continued PT    Patient seen 1:1 for 45 minutes and rest of time group setting.        Plan: Continue per plan of care.      POC expires Unit limit Auth Expiration date PT/OT + Visit Limit?   24 4 NA 75                           Visit/Unit Tracking  AUTH Status:  Date 4/3 4/10 4/11 4/17 4/19 4/22         No AUTH Used 1  3 4 5 6          Remaining                 FOTO                     Precautions: MS, HTN, DM, cardiac, neurogenic bladder      Daily Treatment Diary     Date 4/3 4/10 4/11 4/17 4/19 4/22     Patient education 10 5 5        Water Walk (FW, BW, side) x10’  2' 5' w/ N 5 5' 10' 10'     LE work at wall               Hip FF/ext swing  2' 2 2 2 2' 2       Toe/heel  1' 1 1 1 1' 1       Hip ABD/ADD  1' 1 1 2 2' 2       March 1' 1 1 1 1' 1       Knee flexion & extension  1 1 1 1' 1       Squats  1 1 1 1' 1     UE noodle x3             Push/pull   1 1 1 1' 1       Rotate   1 1 1 1' 1       Row forward & back   2 small N 2 2 2' 2       OH side bend            UE/Core (AROM, paddles, MB)      AROM AROM       ABD/ADD      1' 1       Horizontal Pec Fly      1' 1       Alt. arm swing (shld flex/ext)      1' 1       Push pull              Single paddle rotation           SLS (EO, EC, ball toss)            Aqua Step (FW, lat, eccentric)            Core work:              MF press (red, blue, blk)             Kickboard press (blue,  "red)              Row/ext w/ tubing (red, blue, blk)           Seated on bench             ankle DF/PF      1' 1 March   1 1 1 1' 1       ABD/ADD   1 1 1 1' 1       Knee flexion/extension   1 1 1 1' 1     DW TX - hang in the deep water  3' 5' 10 10' 10 10' 10       DW ABD/ADD   1 1 1 1' 1       DW Bicycle  3' 4 5 5 5' 5'       DW Scissor hip flexion/extension              DW DKTC   1 1 1 1' 1     Stretches              HS at step   30''ea 30\"/each 2x 2x 2x       Wall stretches  20\"/4x 20''x4 5x 5x 10\"x3 3x10\"       Calf stretch at wall              Other:            Hot Tub - 10 minutes only  NO no nt 2' 4'  4'                       "

## 2024-04-23 RX ORDER — VALACYCLOVIR HYDROCHLORIDE 1 G/1
1000 TABLET, FILM COATED ORAL DAILY
COMMUNITY
Start: 2024-03-21

## 2024-04-24 ENCOUNTER — OFFICE VISIT (OUTPATIENT)
Dept: PHYSICAL THERAPY | Age: 64
End: 2024-04-24
Payer: COMMERCIAL

## 2024-04-24 DIAGNOSIS — M48.062 SPINAL STENOSIS OF LUMBAR REGION WITH NEUROGENIC CLAUDICATION: Primary | ICD-10-CM

## 2024-04-24 DIAGNOSIS — M54.16 LUMBAR RADICULOPATHY: ICD-10-CM

## 2024-04-24 PROCEDURE — 97113 AQUATIC THERAPY/EXERCISES: CPT

## 2024-04-24 NOTE — PROGRESS NOTES
4/25/2024      Chief Complaint   Patient presents with    Follow-up     Assessment and Plan    1. OAB/NGB  - Overall well managed on Oxybutynin 10 mg daily  - Previously failed Detrol LA. Unable to afford Myrbetriq  - PVR 16 mL   -Should her symptoms worsen, consider proceeding with repeat work-up with cystoscopy and UDS and consider bladder botox vs PTNS     2. Hx of nephrolithiasis   - CT renal stone study from 1/3/23 - No urinary tract calculi or hydronephrosis.      History of Present Illness  Susana Brady is a 64 y.o. female here for follow up evaluation of above.     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 Currently managed on Oxybutynin 10 mg daily. She reports seeing a urologist approximately 10 years ago and had work-up including what sounds like cystoscopy as well as urodynamic testing at that time.  Denies any prior  surgical manipulation.     She also does have prior history of kidney stones with spontaneous expulsion.  CT last year was negative for any urinary tract calculi    Review of Systems   Constitutional:  Negative for chills and fever.   Respiratory:  Negative for shortness of breath.    Cardiovascular:  Negative for chest pain.   Gastrointestinal:  Negative for abdominal pain.   Genitourinary:  Positive for frequency and urgency. Negative for difficulty urinating, dysuria, flank pain and hematuria.   Neurological:  Negative for dizziness.       Past Medical History  Past Medical History:   Diagnosis Date    Asthma     Cardiac disease     heart murmur    COPD (chronic obstructive pulmonary disease) (HCC)     Depression     Diabetes mellitus (HCC)     Fibroid     Hyperlipidemia     Hypertension     Influenza     December 2017    Irregular heart beat     Kidney stone     Migraine     Migraine     MS (multiple sclerosis) (HCC)     Multiple sclerosis (HCC)     Nephrolithiasis     Neurogenic bladder     Panniculitis     Sleep apnea, obstructive     Urinary  incontinence     Urine frequency 03/01/2024       Past Social History  Past Surgical History:   Procedure Laterality Date    COLONOSCOPY      DILATION AND CURETTAGE OF UTERUS      DC COLONOSCOPY FLX DX W/COLLJ SPEC WHEN PFRMD N/A 1/11/2018    Procedure: COLONOSCOPY;  Surgeon: Carlos Alberto Hendrix MD;  Location: [facility];  Service: Gastroenterology    DC TENDON SHEATH INCISION Left 1/17/2018    Procedure: RING TRIGGER FINGER RELEASE;  Surgeon: Nitin Ge MD;  Location:  MAIN OR;  Service: Orthopedics    TONSILLECTOMY      UTERINE FIBROID SURGERY       Social History     Tobacco Use   Smoking Status Never   Smokeless Tobacco Never       Past Family History  Family History   Problem Relation Age of Onset    Stroke Mother     Diabetes Mother     Hypertension Mother     Emphysema Mother     Heart disease Father     Diabetes Father     Hypertension Father     Diabetes Sister     Sleep apnea Sister     No Known Problems Maternal Grandmother     No Known Problems Paternal Grandmother     Hypertension Brother     Graves' disease Brother     Breast cancer Maternal Aunt 68    Cancer Maternal Aunt     Stomach cancer Maternal Aunt     Sleep apnea Maternal Aunt     Cancer Maternal Aunt     No Known Problems Maternal Aunt     No Known Problems Maternal Aunt     No Known Problems Maternal Aunt     No Known Problems Maternal Aunt     No Known Problems Son     No Known Problems Son        Past Social history  Social History     Socioeconomic History    Marital status: /Civil Union     Spouse name: Not on file    Number of children: Not on file    Years of education: Not on file    Highest education level: Not on file   Occupational History    Not on file   Tobacco Use    Smoking status: Never    Smokeless tobacco: Never   Vaping Use    Vaping status: Never Used   Substance and Sexual Activity    Alcohol use: Not Currently     Comment: social    Drug use: Never     Types: Marijuana    Sexual activity: Not Currently      Partners: Male     Birth control/protection: Post-menopausal   Other Topics Concern    Not on file   Social History Narrative    Not on file     Social Determinants of Health     Financial Resource Strain: Not on file   Food Insecurity: Not on file   Transportation Needs: Not on file   Physical Activity: Not on file   Stress: Not on file   Social Connections: Not on file   Intimate Partner Violence: Not on file   Housing Stability: Not on file       Current Medications  Current Outpatient Medications   Medication Sig Dispense Refill    Albuterol Sulfate (VENTOLIN HFA IN)       aspirin 81 MG tablet Aspirin 81 MG TABS  Take 1 tablet daily   Refills: 0    Active      atorvastatin (LIPITOR) 40 mg tablet TAKE 1 TABLET (40 MG TOTAL) BY MOUTH ONCE AT BEDTIME 90 tablet 0    baclofen 20 mg tablet TAKE 1 TABLET BY MOUTH FOUR TIMES A  tablet 2    butalbital-acetaminophen-caffeine (FIORICET,ESGIC) -40 mg per tablet Take 1 tablet by mouth every 4 (four) hours as needed for headaches 12 tablet 4    Diclofenac Sodium (VOLTAREN) 1 % Apply 2 g topically 4 (four) times a day 150 g 1    fluticasone (FLONASE) 50 mcg/act nasal spray SPRAY 2 SPRAYS INTO EACH NOSTRIL EVERY DAY 48 mL 1    gabapentin (NEURONTIN) 300 mg capsule TAKE 2 CAPS IN AM AND NOON AND 3 CAPS AT BEDTIME 630 capsule 2    metFORMIN (GLUCOPHAGE-XR) 500 mg 24 hr tablet Take 1 tablet (500 mg total) by mouth daily with breakfast 90 tablet 0    NIFEdipine ER (ADALAT CC) 60 MG 24 hr tablet TAKE 1 TABLET BY MOUTH EVERY DAY 90 tablet 1    omeprazole (PriLOSEC) 40 MG capsule Take 1 capsule (40 mg total) by mouth daily 30 min or more prior to eating or drinking in the morning 30 capsule 3    oxybutynin (DITROPAN-XL) 10 MG 24 hr tablet TAKE 1 TABLET BY MOUTH DAILY AT BEDTIME 90 tablet 1    sertraline (ZOLOFT) 50 mg tablet TAKE 1 TABLET BY MOUTH DAILY AT BEDTIME 90 tablet 1    valACYclovir (VALTREX) 1,000 mg tablet Take 1,000 mg by mouth daily      famotidine (PEPCID)  40 MG tablet Take 1 tablet (40 mg total) by mouth daily at bedtime (Patient not taking: Reported on 3/18/2024) 30 tablet 3    Wixela Inhub 250-50 MCG/ACT inhaler Inhale 1 puff 2 (two) times a day Rinse mouth after use. 180 blister 1     No current facility-administered medications for this visit.       Allergies  Allergies   Allergen Reactions    Nuts - Food Allergy Shortness Of Breath and Anaphylaxis    Peanut Oil - Food Allergy      Hives anaphylaxis    Shellfish Allergy - Food Allergy Shortness Of Breath and Swelling    Sumatriptan Headache and Other (See Comments)     Other reaction(s): Headache    Copaxone [Glatiramer Acetate] Hives    Cat Hair Extract     Cephalexin Rash     Pt says they took keflex again and didn't get a reaction from it. Pt states they believe it was something they ate that day instead.     Glatiramer Rash     Other reaction(s): rash    Pollen Extract Sneezing    Seasonal Ic [Cholestatin]          The following portions of the patient's history were reviewed and updated as appropriate: allergies, current medications, past medical history, past social history, past surgical history and problem list.      Vitals  Vitals:    04/25/24 0738   BP: 125/63   Pulse: 59   Resp: 16   Temp: 97.5 °F (36.4 °C)   SpO2: 97%   Weight: 64 kg (141 lb)   Height: 5' (1.524 m)           Physical Exam  Physical Exam  Constitutional:       Appearance: Normal appearance.   HENT:      Head: Normocephalic and atraumatic.      Right Ear: External ear normal.      Left Ear: External ear normal.      Nose: Nose normal.   Eyes:      General: No scleral icterus.     Conjunctiva/sclera: Conjunctivae normal.   Cardiovascular:      Pulses: Normal pulses.   Pulmonary:      Effort: Pulmonary effort is normal.   Musculoskeletal:         General: Normal range of motion.      Cervical back: Normal range of motion.   Neurological:      General: No focal deficit present.      Mental Status: She is alert and oriented to person,  "place, and time.   Psychiatric:         Mood and Affect: Mood normal.         Behavior: Behavior normal.         Thought Content: Thought content normal.         Judgment: Judgment normal.           Results  No results found for this or any previous visit (from the past 1 hour(s)).]  No results found for: \"PSA\"  Lab Results   Component Value Date    CALCIUM 9.6 12/11/2023     12/21/2017    K 4.3 12/11/2023    CO2 28 12/11/2023     12/11/2023    BUN 12 12/11/2023    CREATININE 0.70 12/11/2023     Lab Results   Component Value Date    WBC 6.6 01/23/2023    HGB 13.6 01/23/2023    HCT 40.6 01/23/2023    MCV 84.4 01/23/2023     01/23/2023           Orders  Orders Placed This Encounter   Procedures    POCT Measure NASREEN Lew      "

## 2024-04-25 ENCOUNTER — OFFICE VISIT (OUTPATIENT)
Dept: UROLOGY | Facility: CLINIC | Age: 64
End: 2024-04-25
Payer: COMMERCIAL

## 2024-04-25 VITALS
DIASTOLIC BLOOD PRESSURE: 63 MMHG | RESPIRATION RATE: 16 BRPM | SYSTOLIC BLOOD PRESSURE: 125 MMHG | HEIGHT: 60 IN | TEMPERATURE: 97.5 F | BODY MASS INDEX: 27.68 KG/M2 | OXYGEN SATURATION: 97 % | HEART RATE: 59 BPM | WEIGHT: 141 LBS

## 2024-04-25 DIAGNOSIS — N31.9 NEUROGENIC BLADDER: Primary | ICD-10-CM

## 2024-04-25 DIAGNOSIS — N32.81 OAB (OVERACTIVE BLADDER): ICD-10-CM

## 2024-04-25 LAB — POST-VOID RESIDUAL VOLUME, ML POC: 16 ML

## 2024-04-25 PROCEDURE — 99213 OFFICE O/P EST LOW 20 MIN: CPT | Performed by: PHYSICIAN ASSISTANT

## 2024-04-25 PROCEDURE — 51798 US URINE CAPACITY MEASURE: CPT | Performed by: PHYSICIAN ASSISTANT

## 2024-04-26 ENCOUNTER — OFFICE VISIT (OUTPATIENT)
Dept: PAIN MEDICINE | Facility: CLINIC | Age: 64
End: 2024-04-26
Payer: COMMERCIAL

## 2024-04-26 VITALS
SYSTOLIC BLOOD PRESSURE: 125 MMHG | DIASTOLIC BLOOD PRESSURE: 76 MMHG | HEART RATE: 66 BPM | BODY MASS INDEX: 27.68 KG/M2 | HEIGHT: 60 IN | WEIGHT: 141 LBS

## 2024-04-26 DIAGNOSIS — M54.16 LUMBAR RADICULOPATHY: ICD-10-CM

## 2024-04-26 DIAGNOSIS — M48.062 SPINAL STENOSIS OF LUMBAR REGION WITH NEUROGENIC CLAUDICATION: Primary | ICD-10-CM

## 2024-04-26 PROCEDURE — 99214 OFFICE O/P EST MOD 30 MIN: CPT | Performed by: STUDENT IN AN ORGANIZED HEALTH CARE EDUCATION/TRAINING PROGRAM

## 2024-04-26 NOTE — PROGRESS NOTES
Pain Medicine Follow-Up Note    Assessment:  1. Spinal stenosis of lumbar region with neurogenic claudication    2. Lumbar radiculopathy        Plan:  No orders of the defined types were placed in this encounter.      No orders of the defined types were placed in this encounter.      My impressions and treatment recommendations were discussed in detail with the patient who verbalized understanding and had no further questions.      64-year-old female returns her office following course of aquatic therapy for her back and right lower extremity pain.  She has notable improvement in her right lower extremity radicular symptoms and some relief in her lower back pain.  Overall about 50% improvement and there is increased ability to do activities of daily living.  She will be going to [place] for the next month and we will look to continue aquatic therapy exercises there.  She will return to us in 6 weeks or sooner medically necessary.  She still continues to look to avoid injection treatments at this time.  She will return to us and at that time we will determine whether updated imaging is necessary.  She may need updated referral to return to aquatic therapy as well.    [place] Prescription Drug Monitoring Program report was reviewed and was appropriate           Complete risks and benefits including bleeding, infection, tissue reaction, nerve injury and allergic reaction were discussed. The approach was demonstrated using models and literature was provided. Verbal and written consent was obtained.        Follow-up is planned in 6w time or sooner as warranted.  Discharge instructions were provided. I personally saw and examined the patient and I agree with the above discussed plan of care.    History of Present Illness:    Susana Brady is a 64 y.o. female who presents to [facility] for interval re-evaluation of the above stated pain complaints. The patient has a past medical and chronic  pain history as outlined in the assessment section. She was last seen on 3/22/2024 at which time she was referred to aquatic therapy..    She reports about 50% improvement in her condition since starting AT. She reports more pain free periods and there has been functional improvement in being able to do ADL's such as washing dishes. Mopping and sweeping are still a bit tough. The radicular pain in the right has been eliminated with PT.     Other than as stated above, the patient denies any interval changes in medications, medical condition, mental condition, symptoms, or allergies since the last office visit.         Review of Systems:    Review of Systems   Endocrine: Positive for polyuria.   Musculoskeletal:  Positive for arthralgias, back pain and gait problem.        DROM         Past Medical History:   Diagnosis Date    Asthma     Cardiac disease     heart murmur    COPD (chronic obstructive pulmonary disease) ([facility])     Depression     Diabetes mellitus (HCC)     Fibroid     Hyperlipidemia     Hypertension     Influenza     December 2017    Irregular heart beat     Kidney stone     Migraine     Migraine     MS (multiple sclerosis) (HCC)     Multiple sclerosis (HCC)     Nephrolithiasis     Neurogenic bladder     Panniculitis     Sleep apnea, obstructive     Urinary incontinence     Urine frequency 03/01/2024       Past Surgical History:   Procedure Laterality Date    COLONOSCOPY      DILATION AND CURETTAGE OF UTERUS      DC COLONOSCOPY FLX DX W/COLLJ SPEC WHEN PFRMD N/A 1/11/2018    Procedure: COLONOSCOPY;  Surgeon: Carlos Alberto Hendrix MD;  Location: [facility];  Service: Gastroenterology    DC TENDON SHEATH INCISION Left 1/17/2018    Procedure: RING TRIGGER FINGER RELEASE;  Surgeon: Nitin Ge MD;  Location: [facility] OR;  Service: Orthopedics    TONSILLECTOMY      UTERINE FIBROID SURGERY         Family History   Problem Relation Age of Onset    Stroke Mother     Diabetes Mother     Hypertension Mother      Emphysema Mother     Heart disease Father     Diabetes Father     Hypertension Father     Diabetes Sister     Sleep apnea Sister     No Known Problems Maternal Grandmother     No Known Problems Paternal Grandmother     Hypertension Brother     Graves' disease Brother     Breast cancer Maternal Aunt 68    Cancer Maternal Aunt     Stomach cancer Maternal Aunt     Sleep apnea Maternal Aunt     Cancer Maternal Aunt     No Known Problems Maternal Aunt     No Known Problems Maternal Aunt     No Known Problems Maternal Aunt     No Known Problems Maternal Aunt     No Known Problems Son     No Known Problems Son        Social History     Occupational History    Not on file   Tobacco Use    Smoking status: Never    Smokeless tobacco: Never   Vaping Use    Vaping status: Never Used   Substance and Sexual Activity    Alcohol use: Not Currently     Comment: social    Drug use: Never     Types: Marijuana    Sexual activity: Not Currently     Partners: Male     Birth control/protection: Post-menopausal         Current Outpatient Medications:     Albuterol Sulfate (VENTOLIN HFA IN), , Disp: , Rfl:     aspirin 81 MG tablet, Aspirin 81 MG TABS Take 1 tablet daily  Refills: 0  Active, Disp: , Rfl:     atorvastatin (LIPITOR) 40 mg tablet, TAKE 1 TABLET (40 MG TOTAL) BY MOUTH ONCE AT BEDTIME, Disp: 90 tablet, Rfl: 0    baclofen 20 mg tablet, TAKE 1 TABLET BY MOUTH FOUR TIMES A DAY, Disp: 360 tablet, Rfl: 2    butalbital-acetaminophen-caffeine (FIORICET,ESGIC) -40 mg per tablet, Take 1 tablet by mouth every 4 (four) hours as needed for headaches, Disp: 12 tablet, Rfl: 4    Diclofenac Sodium (VOLTAREN) 1 %, Apply 2 g topically 4 (four) times a day, Disp: 150 g, Rfl: 1    fluticasone (FLONASE) 50 mcg/act nasal spray, SPRAY 2 SPRAYS INTO EACH NOSTRIL EVERY DAY, Disp: 48 mL, Rfl: 1    gabapentin (NEURONTIN) 300 mg capsule, TAKE 2 CAPS IN AM AND NOON AND 3 CAPS AT BEDTIME, Disp: 630 capsule, Rfl: 2    metFORMIN (GLUCOPHAGE-XR) 500 mg 24  hr tablet, Take 1 tablet (500 mg total) by mouth daily with breakfast, Disp: 90 tablet, Rfl: 0    NIFEdipine ER (ADALAT CC) 60 MG 24 hr tablet, TAKE 1 TABLET BY MOUTH EVERY DAY, Disp: 90 tablet, Rfl: 1    omeprazole (PriLOSEC) 40 MG capsule, Take 1 capsule (40 mg total) by mouth daily 30 min or more prior to eating or drinking in the morning, Disp: 30 capsule, Rfl: 3    oxybutynin (DITROPAN-XL) 10 MG 24 hr tablet, TAKE 1 TABLET BY MOUTH DAILY AT BEDTIME, Disp: 90 tablet, Rfl: 1    sertraline (ZOLOFT) 50 mg tablet, TAKE 1 TABLET BY MOUTH DAILY AT BEDTIME, Disp: 90 tablet, Rfl: 1    valACYclovir (VALTREX) 1,000 mg tablet, Take 1,000 mg by mouth daily, Disp: , Rfl:     famotidine (PEPCID) 40 MG tablet, Take 1 tablet (40 mg total) by mouth daily at bedtime (Patient not taking: Reported on 3/18/2024), Disp: 30 tablet, Rfl: 3    Wixela Inhub 250-50 MCG/ACT inhaler, Inhale 1 puff 2 (two) times a day Rinse mouth after use., Disp: 180 blister, Rfl: 1    Allergies   Allergen Reactions    Nuts - Food Allergy Shortness Of Breath and Anaphylaxis    Peanut Oil - Food Allergy      Hives anaphylaxis    Shellfish Allergy - Food Allergy Shortness Of Breath and Swelling    Sumatriptan Headache and Other (See Comments)     Other reaction(s): Headache    Copaxone [Glatiramer Acetate] Hives    Cat Hair Extract     Cephalexin Rash     Pt says they took keflex again and didn't get a reaction from it. Pt states they believe it was something they ate that day instead.     Glatiramer Rash     Other reaction(s): rash    Pollen Extract Sneezing    Seasonal Ic [Cholestatin]        Physical Exam:    /76 (BP Location: Left arm)   Pulse 66   Ht 5' (1.524 m)   Wt 64 kg (141 lb)   BMI 27.54 kg/m²     Constitutional:normal, well developed, well nourished, alert, in no distress and non-toxic and no overt pain behavior.  Eyes:anicteric  HEENT:grossly intact  Neck:supple, symmetric, trachea midline and no masses   Pulmonary:even and  unlabored  Cardiovascular:No edema or pitting edema present  Skin:Normal without rashes or lesions and well hydrated  Psychiatric:Mood and affect appropriate  Neurologic:Cranial Nerves II-XII grossly intact  Musculoskeletal:normal      Imaging  No orders to display         No orders of the defined types were placed in this encounter.

## 2024-05-30 DIAGNOSIS — E78.5 HYPERLIPIDEMIA, UNSPECIFIED HYPERLIPIDEMIA TYPE: ICD-10-CM

## 2024-05-30 RX ORDER — ATORVASTATIN CALCIUM 40 MG/1
40 TABLET, FILM COATED ORAL
Qty: 90 TABLET | Refills: 0 | Status: SHIPPED | OUTPATIENT
Start: 2024-05-30

## 2024-06-11 NOTE — PROGRESS NOTES
Assessment:  1. Spinal stenosis of lumbar region with neurogenic claudication    2. Lumbar radiculopathy    3. Crushing injury of right foot, initial encounter        Plan:    Patient reports adequate management of back and right lower extremity radicular symptoms with consistent aquatic exercises.  Will hold off on any injection treatments at this time due to functional improvement.  If symptoms worsen in the future, she may certainly schedule epidural injection, however first we will likely need updated MRI of the lumbar spine as last test was done in 2020.    She is also reporting balance issues and this is likely neurological in nature and related to underlying MS and hemiparesis.  She will be following up with neurology.  May be a good candidate for neuro PT.    [place] Prescription Drug Monitoring Program report was reviewed and was appropriate     Complete risks and benefits including bleeding, infection, tissue reaction, nerve injury and allergic reaction were discussed. The approach was demonstrated using models and literature was provided. Verbal and written consent was obtained.    My impressions and treatment recommendations were discussed in detail with the patient who verbalized understanding and had no further questions.  Discharge instructions were provided. I personally saw and examined the patient and I agree with the above discussed plan of care.    Orders Placed This Encounter   Procedures    XR foot 2 vw right     Pain over dorsum of the mid foot with point tenderness. Recent crush injury. Rule out fracture     Standing Status:   Future     Standing Expiration Date:   6/12/2028     Scheduling Instructions:      Bring along any outside films relating to this procedure.           No orders of the defined types were placed in this encounter.      History of Present Illness:  Susana Brady is a 64 y.o. female who presents for a follow up office visit in regards to Back Pain, Shoulder Pain (B/L  shoulder ), and Neck Pain (Back of head pain ).   The patient’s current symptoms include bilateral lower back pain and bilateral shoulder pain.  Pain score 4 out of 10.  Intermittent, pressure-like, numbness, pins-and-needles.    At her last visit, we agreed to manage her back issues more conservatively given some improvement in her symptoms.  She reports eating further improvement in her low back symptoms.  She was away in [place] for about a month.  She does not feel that she needs referral to aquatic therapy at this time. She did do water therapy while in [place] on a daily basis and felt it helped.    She feels that her back and right leg issues are tolerable.     At the end of her trip she had a trip and fall onto her right side and right shoulder.     I have personally reviewed and/or updated the patient's past medical history, past surgical history, family history, social history, current medications, allergies, and vital signs today.     Review of Systems   Respiratory:  Positive for shortness of breath.    Musculoskeletal:  Positive for gait problem.        Swelling       Patient Active Problem List   Diagnosis    S/P trigger finger release    Ambulatory dysfunction    Chronic right-sided headaches    Multiple sclerosis (HCC)    Mesenteric panniculitis (HCC)    Right low back pain    Right hemiparesis (HCC)    Other spondylosis with radiculopathy, lumbar region    COPD (chronic obstructive pulmonary disease) (HCC)    Hyperlipidemia with target LDL less than 160    Hypertension    Edema of right lower extremity    Cervical disc disorder at C6-C7 level with radiculopathy    Encephalopathy due to severe acute respiratory syndrome coronavirus 2 (SARS-CoV-2)    Word finding difficulty    Slurred speech    Adhesive capsulitis of left shoulder    CINTHYA (obstructive sleep apnea)    DVT, lower extremity, distal, acute, right (HCC)    Depression    Overactive bladder    Left knee pain    Prediabetes    Frequent falls     Sciatica of right side associated with disorder of lumbar spine    Oropharyngeal dysphagia    Sensorineural hearing loss (SNHL) of both ears    Swelling of left side of face       Past Medical History:   Diagnosis Date    Asthma     Cardiac disease     heart murmur    COPD (chronic obstructive pulmonary disease) (HCC)     Depression     Diabetes mellitus (HCC)     Fibroid     Hyperlipidemia     Hypertension     Influenza     December 2017    Irregular heart beat     Kidney stone     Migraine     Migraine     MS (multiple sclerosis) (HCC)     Multiple sclerosis (HCC)     Nephrolithiasis     Neurogenic bladder     Panniculitis     Sleep apnea, obstructive     Urinary incontinence     Urine frequency 03/01/2024       Past Surgical History:   Procedure Laterality Date    COLONOSCOPY      DILATION AND CURETTAGE OF UTERUS      NH COLONOSCOPY FLX DX W/COLLJ SPEC WHEN PFRMD N/A 1/11/2018    Procedure: COLONOSCOPY;  Surgeon: Carlos Alberto Hendrix MD;  Location: [facility];  Service: Gastroenterology    NH TENDON SHEATH INCISION Left 1/17/2018    Procedure: RING TRIGGER FINGER RELEASE;  Surgeon: Nitin Ge MD;  Location:  MAIN OR;  Service: Orthopedics    TONSILLECTOMY      UTERINE FIBROID SURGERY         Family History   Problem Relation Age of Onset    Stroke Mother     Diabetes Mother     Hypertension Mother     Emphysema Mother     Heart disease Father     Diabetes Father     Hypertension Father     Diabetes Sister     Sleep apnea Sister     No Known Problems Maternal Grandmother     No Known Problems Paternal Grandmother     Hypertension Brother     Graves' disease Brother     Breast cancer Maternal Aunt 68    Cancer Maternal Aunt     Stomach cancer Maternal Aunt     Sleep apnea Maternal Aunt     Cancer Maternal Aunt     No Known Problems Maternal Aunt     No Known Problems Maternal Aunt     No Known Problems Maternal Aunt     No Known Problems Maternal Aunt     No Known Problems Son     No Known Problems Son         Social History     Occupational History    Not on file   Tobacco Use    Smoking status: Never    Smokeless tobacco: Never   Vaping Use    Vaping status: Never Used   Substance and Sexual Activity    Alcohol use: Not Currently     Comment: social    Drug use: Never     Types: Marijuana    Sexual activity: Not Currently     Partners: Male     Birth control/protection: Post-menopausal       Current Outpatient Medications on File Prior to Visit   Medication Sig    Albuterol Sulfate (VENTOLIN HFA IN)     aspirin 81 MG tablet Aspirin 81 MG TABS  Take 1 tablet daily   Refills: 0    Active    atorvastatin (LIPITOR) 40 mg tablet TAKE 1 TABLET (40 MG TOTAL) BY MOUTH ONCE AT BEDTIME    baclofen 20 mg tablet TAKE 1 TABLET BY MOUTH FOUR TIMES A DAY    Diclofenac Sodium (VOLTAREN) 1 % Apply 2 g topically 4 (four) times a day    fluticasone (FLONASE) 50 mcg/act nasal spray SPRAY 2 SPRAYS INTO EACH NOSTRIL EVERY DAY    gabapentin (NEURONTIN) 300 mg capsule TAKE 2 CAPS IN AM AND NOON AND 3 CAPS AT BEDTIME    metFORMIN (GLUCOPHAGE-XR) 500 mg 24 hr tablet Take 1 tablet (500 mg total) by mouth daily with breakfast    NIFEdipine ER (ADALAT CC) 60 MG 24 hr tablet TAKE 1 TABLET BY MOUTH EVERY DAY    omeprazole (PriLOSEC) 40 MG capsule Take 1 capsule (40 mg total) by mouth daily 30 min or more prior to eating or drinking in the morning    oxybutynin (DITROPAN-XL) 10 MG 24 hr tablet TAKE 1 TABLET BY MOUTH DAILY AT BEDTIME    sertraline (ZOLOFT) 50 mg tablet TAKE 1 TABLET BY MOUTH DAILY AT BEDTIME    valACYclovir (VALTREX) 1,000 mg tablet Take 1,000 mg by mouth daily    butalbital-acetaminophen-caffeine (FIORICET,ESGIC) -40 mg per tablet Take 1 tablet by mouth every 4 (four) hours as needed for headaches    famotidine (PEPCID) 40 MG tablet Take 1 tablet (40 mg total) by mouth daily at bedtime    Wixela Inhub 250-50 MCG/ACT inhaler Inhale 1 puff 2 (two) times a day Rinse mouth after use.     No current facility-administered  medications on file prior to visit.       Allergies   Allergen Reactions    Nuts - Food Allergy Shortness Of Breath and Anaphylaxis    Peanut Oil - Food Allergy      Hives anaphylaxis    Shellfish Allergy - Food Allergy Shortness Of Breath and Swelling    Sumatriptan Headache and Other (See Comments)     Other reaction(s): Headache    Copaxone [Glatiramer Acetate] Hives    Cat Hair Extract     Cephalexin Rash     Pt says they took keflex again and didn't get a reaction from it. Pt states they believe it was something they ate that day instead.     Glatiramer Rash     Other reaction(s): rash    Pollen Extract Sneezing    Seasonal Ic [Cholestatin]        Physical Exam:    /66   Pulse 74   Ht 5' (1.524 m)   Wt 64 kg (141 lb)   BMI 27.54 kg/m²     Constitutional:normal, well developed, well nourished, alert, in no distress and non-toxic and no overt pain behavior.  Eyes:anicteric  HEENT:grossly intact  Neck:supple, symmetric, trachea midline and no masses   Pulmonary:even and unlabored  Cardiovascular:No edema or pitting edema present  Skin:Normal without rashes or lesions and well hydrated  Psychiatric:Mood and affect appropriate  Neurologic:Cranial Nerves II-XII grossly intact  Musculoskeletal:normal    Imaging

## 2024-06-12 ENCOUNTER — APPOINTMENT (OUTPATIENT)
Dept: RADIOLOGY | Facility: CLINIC | Age: 64
End: 2024-06-12
Payer: MEDICARE

## 2024-06-12 ENCOUNTER — OFFICE VISIT (OUTPATIENT)
Dept: PAIN MEDICINE | Facility: CLINIC | Age: 64
End: 2024-06-12
Payer: MEDICARE

## 2024-06-12 VITALS
BODY MASS INDEX: 27.68 KG/M2 | SYSTOLIC BLOOD PRESSURE: 121 MMHG | WEIGHT: 141 LBS | HEIGHT: 60 IN | HEART RATE: 74 BPM | DIASTOLIC BLOOD PRESSURE: 66 MMHG

## 2024-06-12 DIAGNOSIS — S97.81XA CRUSHING INJURY OF RIGHT FOOT, INITIAL ENCOUNTER: ICD-10-CM

## 2024-06-12 DIAGNOSIS — M54.16 LUMBAR RADICULOPATHY: ICD-10-CM

## 2024-06-12 DIAGNOSIS — M48.062 SPINAL STENOSIS OF LUMBAR REGION WITH NEUROGENIC CLAUDICATION: Primary | ICD-10-CM

## 2024-06-12 PROCEDURE — 73620 X-RAY EXAM OF FOOT: CPT

## 2024-06-12 PROCEDURE — 99214 OFFICE O/P EST MOD 30 MIN: CPT | Performed by: STUDENT IN AN ORGANIZED HEALTH CARE EDUCATION/TRAINING PROGRAM

## 2024-06-12 NOTE — PATIENT INSTRUCTIONS
Neck Exercises   AMBULATORY CARE:   Neck exercises  help reduce neck pain, and improve neck movement and strength. Neck exercises also help prevent long-term neck problems.  Call your doctor if:   Your pain does not get better, or gets worse.    You have questions or concerns about your condition, care, or exercise program.    What you need to know about exercise safety:   Move slowly, gently, and smoothly.  Avoid fast or jerky motions.    Stand and sit the way your healthcare provider shows you.  Good posture may reduce your neck pain. Check your posture often, even when you are not doing your neck exercises.    Follow the exercise program recommended by your healthcare provider.  He or she will tell you which exercises are best for your condition. He or she will also tell you how many repetitions to do and how often you should do the exercises.    How to perform neck exercises safely:   Exercise position:  You may sit or stand while you do neck exercises. Face forward. Your shoulders should be straight and relaxed, with a good posture.         Head tilts, forward and back:  Gently bow your head and try to touch your chin to your chest. Your healthcare provider may tell you to push on the back of your neck to help bow your head. Raise your chin back to the starting position. Tilt your head back as far as possible so you are looking up at the ceiling. Your healthcare provider may tell you to lift your chin to help tilt your head back. Return your head to the starting position.         Head tilts, side to side:  Tilt your head, bringing your ear toward your shoulder. Then tilt your head toward the other shoulder.         Head turns:  Turn your head to look over your shoulder. Tilt your chin down and try to touch it to your shoulder. Do not raise your shoulder to your chin. Face forward again. Do the same on the other side.         Head rolls:  Slowly bring your chin toward your chest. Next, roll your head to the  right. Your ear should be positioned over your shoulder. Hold this position for 5 seconds. Roll your head back toward your chest and to the left into the same position. Hold for 5 seconds. Gently roll your head back and around in a clockwise [place] 3 times. Next, move your head in the reverse direction (counterclockwise) in a [place] 3 times. Do not shrug your shoulders upwards while you do this exercise.       Follow up with your doctor as directed:  Write down your questions so you remember to ask them during your visits.  © Copyright [employer] 2023 Information is for End User's use only and may not be sold, redistributed or otherwise used for commercial purposes.  The above information is an  only. It is not intended as medical advice for individual conditions or treatments. Talk to your doctor, nurse or pharmacist before following any medical regimen to see if it is safe and effective for you.

## 2024-06-13 ENCOUNTER — TELEPHONE (OUTPATIENT)
Dept: RADIOLOGY | Facility: CLINIC | Age: 64
End: 2024-06-13

## 2024-06-13 NOTE — TELEPHONE ENCOUNTER
----- Message from Familia Patricia MD sent at 6/13/2024 10:02 AM EDT -----  Xray of the foot negative for fracture.

## 2024-06-18 DIAGNOSIS — R73.03 PREDIABETES: ICD-10-CM

## 2024-06-19 RX ORDER — METFORMIN HYDROCHLORIDE 500 MG/1
500 TABLET, EXTENDED RELEASE ORAL
Qty: 30 TABLET | Refills: 0 | Status: SHIPPED | OUTPATIENT
Start: 2024-06-19

## 2024-07-19 DIAGNOSIS — R73.03 PREDIABETES: ICD-10-CM

## 2024-07-19 RX ORDER — METFORMIN HYDROCHLORIDE 500 MG/1
500 TABLET, EXTENDED RELEASE ORAL
Qty: 100 TABLET | Refills: 1 | Status: SHIPPED | OUTPATIENT
Start: 2024-07-19

## 2024-07-21 DIAGNOSIS — H69.93 ETD (EUSTACHIAN TUBE DYSFUNCTION), BILATERAL: ICD-10-CM

## 2024-07-21 RX ORDER — FLUTICASONE PROPIONATE 50 MCG
SPRAY, SUSPENSION (ML) NASAL
Qty: 48 ML | Refills: 1 | Status: SHIPPED | OUTPATIENT
Start: 2024-07-21

## 2024-08-14 PROBLEM — K65.4 MESENTERIC PANNICULITIS (HCC): Status: RESOLVED | Noted: 2018-03-15 | Resolved: 2024-08-14

## 2024-08-14 PROBLEM — I82.4Z1 DVT, LOWER EXTREMITY, DISTAL, ACUTE, RIGHT (HCC): Status: RESOLVED | Noted: 2022-05-05 | Resolved: 2024-08-14

## 2024-08-15 DIAGNOSIS — G35 MULTIPLE SCLEROSIS (HCC): ICD-10-CM

## 2024-08-15 DIAGNOSIS — M54.50 RIGHT LOW BACK PAIN, UNSPECIFIED CHRONICITY, UNSPECIFIED WHETHER SCIATICA PRESENT: ICD-10-CM

## 2024-08-18 RX ORDER — BACLOFEN 20 MG/1
20 TABLET ORAL 4 TIMES DAILY
Qty: 360 TABLET | Refills: 0 | Status: SHIPPED | OUTPATIENT
Start: 2024-08-18

## 2024-09-06 DIAGNOSIS — N31.9 NEUROGENIC BLADDER: ICD-10-CM

## 2024-09-06 DIAGNOSIS — I10 PRIMARY HYPERTENSION: ICD-10-CM

## 2024-09-06 DIAGNOSIS — N32.81 OAB (OVERACTIVE BLADDER): ICD-10-CM

## 2024-09-06 DIAGNOSIS — E78.5 HYPERLIPIDEMIA, UNSPECIFIED HYPERLIPIDEMIA TYPE: ICD-10-CM

## 2024-09-06 RX ORDER — ATORVASTATIN CALCIUM 40 MG/1
40 TABLET, FILM COATED ORAL
Qty: 90 TABLET | Refills: 0 | Status: SHIPPED | OUTPATIENT
Start: 2024-09-06 | End: 2024-09-09 | Stop reason: SDUPTHER

## 2024-09-06 RX ORDER — OXYBUTYNIN CHLORIDE 10 MG/1
10 TABLET, EXTENDED RELEASE ORAL
Qty: 90 TABLET | Refills: 1 | Status: SHIPPED | OUTPATIENT
Start: 2024-09-06

## 2024-09-09 ENCOUNTER — OFFICE VISIT (OUTPATIENT)
Age: 64
End: 2024-09-09
Payer: MEDICARE

## 2024-09-09 VITALS
SYSTOLIC BLOOD PRESSURE: 118 MMHG | HEIGHT: 60 IN | BODY MASS INDEX: 27.88 KG/M2 | WEIGHT: 142 LBS | HEART RATE: 65 BPM | RESPIRATION RATE: 16 BRPM | DIASTOLIC BLOOD PRESSURE: 62 MMHG | TEMPERATURE: 96.9 F | OXYGEN SATURATION: 96 %

## 2024-09-09 DIAGNOSIS — J43.9 PULMONARY EMPHYSEMA, UNSPECIFIED EMPHYSEMA TYPE (HCC): ICD-10-CM

## 2024-09-09 DIAGNOSIS — G47.33 OSA (OBSTRUCTIVE SLEEP APNEA): ICD-10-CM

## 2024-09-09 DIAGNOSIS — E78.5 HYPERLIPIDEMIA, UNSPECIFIED HYPERLIPIDEMIA TYPE: ICD-10-CM

## 2024-09-09 DIAGNOSIS — R73.03 PREDIABETES: ICD-10-CM

## 2024-09-09 DIAGNOSIS — Z11.4 SCREENING FOR HIV (HUMAN IMMUNODEFICIENCY VIRUS): ICD-10-CM

## 2024-09-09 DIAGNOSIS — I10 PRIMARY HYPERTENSION: Primary | ICD-10-CM

## 2024-09-09 DIAGNOSIS — G35 MULTIPLE SCLEROSIS (HCC): ICD-10-CM

## 2024-09-09 PROCEDURE — 99214 OFFICE O/P EST MOD 30 MIN: CPT

## 2024-09-09 RX ORDER — NIFEDIPINE 30 MG
30 TABLET, EXTENDED RELEASE ORAL DAILY
Qty: 100 TABLET | Refills: 3 | Status: SHIPPED | OUTPATIENT
Start: 2024-09-09

## 2024-09-09 RX ORDER — METFORMIN HCL 500 MG
500 TABLET, EXTENDED RELEASE 24 HR ORAL
Qty: 100 TABLET | Refills: 1 | Status: SHIPPED | OUTPATIENT
Start: 2024-09-09

## 2024-09-09 RX ORDER — ATORVASTATIN CALCIUM 40 MG/1
40 TABLET, FILM COATED ORAL
Qty: 90 TABLET | Refills: 1 | Status: SHIPPED | OUTPATIENT
Start: 2024-09-09 | End: 2025-03-08

## 2024-09-09 NOTE — ASSESSMENT & PLAN NOTE
Will cut her nifedipine in half to see if that alleviates her LHN.  I suspect her BP is low at home.  She has a BP cuff, encouraged to check 3-4 times a week, given literature about taking her BP properly.  She will write down her readings and will review at her next visit.  If she continues to have LHN, she will check her BP at that time as well.  Encouraged drinking plenty of fluids as her HCT was noted to be low on her last set of labs which is likely from dehydration.  Will review readings and consider holding nifedipine at her next visit based on symptoms.

## 2024-09-09 NOTE — ASSESSMENT & PLAN NOTE
Has appt with neurology in November.  Currently on baclofen and gabapentin.  Follows up with pain management as well.  Encouraged to do PT in the past.

## 2024-09-09 NOTE — ASSESSMENT & PLAN NOTE
Lab Results   Component Value Date    HGBA1C 6.1 09/21/2023     Will check labs prior to her next visit.  Tolerating metformin well.  Is also on statin.

## 2024-09-09 NOTE — PROGRESS NOTES
Ambulatory Visit  Name: Susana Brady      : 1960      MRN: [id number]  Encounter Provider: Savi Winn PA-C  Encounter Date: 2024   Encounter department: [facility] [place]    Assessment & Plan   1. Primary hypertension  Assessment & Plan:  Will cut her nifedipine in half to see if that alleviates her LHN.  I suspect her BP is low at home.  She has a BP cuff, encouraged to check 3-4 times a week, given literature about taking her BP properly.  She will write down her readings and will review at her next visit.  If she continues to have LHN, she will check her BP at that time as well.  Encouraged drinking plenty of fluids as her HCT was noted to be low on her last set of labs which is likely from dehydration.  Will review readings and consider holding nifedipine at her next visit based on symptoms.  Orders:  -     NIFEdipine ER (ADALAT CC) 30 MG 24 hr tablet; Take 1 tablet (30 mg total) by mouth daily  -     CBC and differential; Future  2. Prediabetes  Assessment & Plan:  Lab Results   Component Value Date    HGBA1C 6.1 2023     Will check labs prior to her next visit.  Tolerating metformin well.  Is also on statin.  Orders:  -     metFORMIN (GLUCOPHAGE-XR) 500 mg 24 hr tablet; Take 1 tablet (500 mg total) by mouth daily with breakfast  -     Albumin / creatinine urine ratio; Future  -     Hemoglobin A1C; Future  3. Hyperlipidemia, unspecified hyperlipidemia type  -     atorvastatin (LIPITOR) 40 mg tablet; Take 1 tablet (40 mg total) by mouth once at bedtime  4. Pulmonary emphysema, unspecified emphysema type (HCC)  Assessment & Plan:  Symptoms stable.  Follows with pulmonary with appt in the near future.  5. CINTHYA (obstructive sleep apnea)  Assessment & Plan:  Using CPAP, tolerating well.  6. Multiple sclerosis (HCC)  Assessment & Plan:  Has appt with neurology in November.  Currently on baclofen and gabapentin.  Follows up with pain management as well.  Encouraged to  do PT in the past.    7. Screening for HIV (human immunodeficiency virus)  -     HIV 1/2 AG/AB w Reflex [facility] for 2 yr old and above; Future      Depression Screening and Follow-up Plan: Patient's depression screening was positive with a PHQ-9 score of 7. Patient assessed for underlying major depression. Brief counseling provided and recommend additional follow-up/re-evaluation next office visit. Patient with underlying depression and was advised to continue current medications as prescribed. Currently Zoloft.    [place] Plan of Care: Recommended assistive device to help with gait and balance. Follows with neurology.  Has been advised to go to PT in the past.        History of Present Illness     HPI    Pt is here for follow up.      MS:  She has an appointment with neurology in November for her MS.      HTN:  Pt has been running on the low side.  She has been taking her nifedipine.  Reports some LHN at times.  There was consideration of stopping her nifedipine due to low BP in the past.  Will decrease her dose in half.      COPD/CINTHYA:  Tolerating her CPAP.  She sees pulmonary on September 16th.  Doing well overall.      Review of Systems   Constitutional: Negative.    Respiratory: Negative.     Cardiovascular: Negative.    Gastrointestinal: Negative.    Musculoskeletal:  Positive for arthralgias and gait problem (multiple sclerosis).   Neurological:  Positive for weakness (MS) and light-headedness. Negative for syncope and headaches.   All other systems reviewed and are negative.    Past Medical History:   Diagnosis Date   • Asthma    • Cardiac disease     heart murmur   • COPD (chronic obstructive pulmonary disease) ([facility])    • Depression    • Diabetes mellitus ([facility])    • DVT, lower extremity, distal, acute, right ([facility]) 05/05/2022   • Fibroid    • Hyperlipidemia    • Hypertension    • Influenza     December 2017   • Irregular heart beat    • Kidney stone    • Migraine    • Migraine    • MS (multiple sclerosis) ([facility])     • Multiple sclerosis (HCC)    • Nephrolithiasis    • Neurogenic bladder    • Panniculitis    • Sleep apnea, obstructive    • Urinary incontinence    • Urine frequency 03/01/2024     Past Surgical History:   Procedure Laterality Date   • COLONOSCOPY     • DILATION AND CURETTAGE OF UTERUS     • [place] COLONOSCOPY FLX DX W/COLLJ SPEC WHEN PFRMD N/A 1/11/2018    Procedure: COLONOSCOPY;  Surgeon: Carlos Alberto Hendrix MD;  Location: [facility];  Service: Gastroenterology   • [place] TENDON SHEATH INCISION Left 1/17/2018    Procedure: RING TRIGGER FINGER RELEASE;  Surgeon: Nitin Ge MD;  Location:  MAIN OR;  Service: Orthopedics   • TONSILLECTOMY     • UTERINE FIBROID SURGERY       Family History   Problem Relation Age of Onset   • Stroke Mother    • Diabetes Mother    • Hypertension Mother    • Emphysema Mother    • Heart disease Father    • Diabetes Father    • Hypertension Father    • Diabetes Sister    • Sleep apnea Sister    • No Known Problems Maternal Grandmother    • No Known Problems Paternal Grandmother    • Hypertension Brother    • Graves' disease Brother    • Breast cancer Maternal Aunt 68   • Cancer Maternal Aunt    • Stomach cancer Maternal Aunt    • Sleep apnea Maternal Aunt    • Cancer Maternal Aunt    • No Known Problems Maternal Aunt    • No Known Problems Maternal Aunt    • No Known Problems Maternal Aunt    • No Known Problems Maternal Aunt    • No Known Problems Son    • No Known Problems Son      Social History     Tobacco Use   • Smoking status: Never   • Smokeless tobacco: Never   Vaping Use   • Vaping status: Never Used   Substance and Sexual Activity   • Alcohol use: Not Currently     Comment: social   • Drug use: Never     Types: Marijuana   • Sexual activity: Not Currently     Partners: Male     Birth control/protection: Post-menopausal     Current Outpatient Medications on File Prior to Visit   Medication Sig   • Albuterol Sulfate (VENTOLIN HFA IN)    • aspirin 81 MG tablet Aspirin 81 MG  TABS  Take 1 tablet daily   Refills: 0    Active   • baclofen 20 mg tablet Take 1 tablet (20 mg total) by mouth 4 (four) times a day   • Diclofenac Sodium (VOLTAREN) 1 % Apply 2 g topically 4 (four) times a day   • fluticasone (FLONASE) 50 mcg/act nasal spray SPRAY 2 SPRAYS INTO EACH NOSTRIL EVERY DAY   • gabapentin (NEURONTIN) 300 mg capsule TAKE 2 CAPS IN AM AND NOON AND 3 CAPS AT BEDTIME   • omeprazole (PriLOSEC) 40 MG capsule Take 1 capsule (40 mg total) by mouth daily 30 min or more prior to eating or drinking in the morning   • oxybutynin (DITROPAN-XL) 10 MG 24 hr tablet TAKE 1 TABLET BY MOUTH DAILY AT BEDTIME   • sertraline (ZOLOFT) 50 mg tablet TAKE 1 TABLET BY MOUTH DAILY AT BEDTIME   • valACYclovir (VALTREX) 1,000 mg tablet Take 1,000 mg by mouth daily   • [DISCONTINUED] atorvastatin (LIPITOR) 40 mg tablet TAKE 1 TABLET (40 MG TOTAL) BY MOUTH ONCE AT BEDTIME   • [DISCONTINUED] metFORMIN (GLUCOPHAGE-XR) 500 mg 24 hr tablet TAKE 1 TABLET BY MOUTH EVERY DAY WITH BREAKFAST   • [DISCONTINUED] NIFEdipine ER (ADALAT CC) 60 MG 24 hr tablet TAKE 1 TABLET BY MOUTH EVERY DAY   • [DISCONTINUED] butalbital-acetaminophen-caffeine (FIORICET,ESGIC) -40 mg per tablet Take 1 tablet by mouth every 4 (four) hours as needed for headaches   • [DISCONTINUED] famotidine (PEPCID) 40 MG tablet Take 1 tablet (40 mg total) by mouth daily at bedtime   • [DISCONTINUED] Wixela Inhub 250-50 MCG/ACT inhaler Inhale 1 puff 2 (two) times a day Rinse mouth after use.     Allergies   Allergen Reactions   • Nuts - Food Allergy Shortness Of Breath and Anaphylaxis   • Peanut Oil - Food Allergy      Hives anaphylaxis   • Shellfish Allergy - Food Allergy Shortness Of Breath and Swelling   • Sumatriptan Headache and Other (See Comments)     Other reaction(s): Headache   • Copaxone [Glatiramer Acetate] Hives   • Cat Hair Extract    • Cephalexin Rash     Pt says they took keflex again and didn't get a reaction from it. Pt states they believe it  was something they ate that day instead.    • Glatiramer Rash     Other reaction(s): rash   • Pollen Extract Sneezing   • Seasonal Ic [Cholestatin]      Immunization History   Administered Date(s) Administered   • COVID-19 PFIZER VACCINE 0.3 ML IM 03/18/2021, 04/12/2021, 09/10/2021   • COVID-19 Pfizer Vac BIVALENT Damion-sucrose 12 Yr+ IM 12/21/2022   • COVID-19 Pfizer mRNA vacc PF damion-sucrose 12 yr and older (Comirnaty) 01/05/2023, 01/05/2024   • INFLUENZA 01/12/2022, 10/13/2022   • Influenza, recombinant, quadrivalent,injectable, preservative free 10/13/2022   • Influenza, seasonal, injectable 10/15/2012   • Pneumococcal Conjugate Vaccine 20-valent (Pcv20), Polysace 09/21/2023   • Pneumococcal Polysaccharide PPV23 10/22/2012     Objective     /62 (BP Location: Right arm, Patient Position: Sitting, Cuff Size: Large)   Pulse 65   Temp (!) 96.9 °F (36.1 °C) (Tympanic)   Resp 16   Ht 5' (1.524 m)   Wt 64.4 kg (142 lb)   SpO2 96%   BMI 27.73 kg/m²     Physical Exam  HENT:      Head: Normocephalic and atraumatic.      Right Ear: Hearing and external ear normal.      Left Ear: Hearing and external ear normal.      Nose: Nose normal.      Mouth/Throat:      Lips: Pink.      Mouth: Mucous membranes are moist.   Eyes:      General: Lids are normal. Vision grossly intact.      Extraocular Movements: Extraocular movements intact.      Conjunctiva/sclera: Conjunctivae normal.      Pupils: Pupils are equal, round, and reactive to light.   Cardiovascular:      Rate and Rhythm: Normal rate and regular rhythm.   Pulmonary:      Effort: Pulmonary effort is normal. No respiratory distress.      Breath sounds: Normal breath sounds.   Abdominal:      General: Bowel sounds are normal.      Palpations: Abdomen is soft.      Tenderness: There is no abdominal tenderness.   Musculoskeletal:      Right lower leg: No edema.      Left lower leg: No edema.   Skin:     Capillary Refill: Capillary refill takes less than 2 seconds.    Neurological:      Mental Status: She is alert and oriented to person, place, and time.      Cranial Nerves: Cranial nerves 2-12 are intact.      Motor: Weakness present. No tremor.      Gait: Gait abnormal.   Psychiatric:         Mood and Affect: Mood normal.

## 2024-09-16 ENCOUNTER — APPOINTMENT (OUTPATIENT)
Age: 64
End: 2024-09-16
Payer: MEDICARE

## 2024-09-16 ENCOUNTER — OFFICE VISIT (OUTPATIENT)
Age: 64
End: 2024-09-16
Payer: MEDICARE

## 2024-09-16 VITALS
TEMPERATURE: 97.6 F | HEIGHT: 60 IN | BODY MASS INDEX: 27.68 KG/M2 | HEART RATE: 76 BPM | SYSTOLIC BLOOD PRESSURE: 104 MMHG | DIASTOLIC BLOOD PRESSURE: 60 MMHG | OXYGEN SATURATION: 94 % | WEIGHT: 141 LBS

## 2024-09-16 DIAGNOSIS — G47.33 OSA ON CPAP: ICD-10-CM

## 2024-09-16 DIAGNOSIS — J45.991 COUGH VARIANT ASTHMA: ICD-10-CM

## 2024-09-16 DIAGNOSIS — J45.991 COUGH VARIANT ASTHMA: Primary | ICD-10-CM

## 2024-09-16 PROCEDURE — G2211 COMPLEX E/M VISIT ADD ON: HCPCS | Performed by: INTERNAL MEDICINE

## 2024-09-16 PROCEDURE — 99214 OFFICE O/P EST MOD 30 MIN: CPT | Performed by: INTERNAL MEDICINE

## 2024-09-16 PROCEDURE — 71046 X-RAY EXAM CHEST 2 VIEWS: CPT

## 2024-09-16 NOTE — PROGRESS NOTES
"Assessment/Plan:   Diagnoses and all orders for this visit:    Cough variant asthma  -     XR chest pa and lateral; Future  -     Complete PFT with post Bronchodilator and Six Minute walk; Future    CINTHYA on CPAP        Cough variant asthma, prior PFTs in 2019 with normal spirometry, given the worsening cough will get a repeat complete PFT and follow-up  Chest x-ray PA and lateral view  She has been on Wixela in the past she states that she is currently using Trelegy 1 puff once a day I do not see the order on the computer for that.  She states she will go home call and let us know so that we can add that on the list of medications.  If she does have the Trelegy 1 puff once a day recommend continuing to use the 1 puff once a day rinse mouth after use  Continue with albuterol MDI 2 puffs 4 times daily as needed only.  CINTHYA on CPAP continue with current settings she states she has been receiving the supplies as well cleaning of the supplies and change of PAP supplies discussed  Recommend weight loss  Caution against driving when sleepy.  Follow-up in 4 to 6 weeks or earlier as needed  No follow-ups on file.  All questions are answered to the patient's satisfaction and understanding.  She verbalizes understanding.  She is encouraged to call with any further questions or concerns.    Portions of the record may have been created with voice recognition software.  Occasional wrong word or \"sound a like\" substitutions may have occurred due to the inherent limitations of voice recognition software.  Read the chart carefully and recognize, using context, where substitutions have occurred.    Electronically Signed by Lisa Avila MD    ______________________________________________________________________    Chief Complaint:   Chief Complaint   Patient presents with    Follow-up       Patient ID: Susana is a 64 y.o. y.o. female has a past medical history of Asthma, Cardiac disease, COPD (chronic obstructive pulmonary disease) " (HCC), Depression, Diabetes mellitus (HCC), DVT, lower extremity, distal, acute, right (HCC) (05/05/2022), Fibroid, Hyperlipidemia, Hypertension, Influenza, Irregular heart beat, Kidney stone, Migraine, Migraine, MS (multiple sclerosis) (HCC), Multiple sclerosis (HCC), Nephrolithiasis, Neurogenic bladder, Panniculitis, Sleep apnea, obstructive, Urinary incontinence, and Urine frequency (03/01/2024).    9/16/2024  Patient presents today for follow-up visit.  Patient has not followed up in approximately 2 years  Ingrid is a very pleasant 64-year-old lady who has never smoked with history of multiple sclerosis following up with Neurology, who has history of likely cough variant asthma has been on bronchodilators has been on Pulmicort and albuterol as needed which she states has been working well, but recently she has had increased use of the rescue inhaler as well as the waking up in the middle of the night with asthma symptoms requiring the rescue inhaler she states.    She had COVID-19 infection in November/December of 2020 was admitted in the hospital for about 4 days did not have the need to go home on home oxygen.    Since her last visit she states the symptoms of shortness of breath and wheezing have gotten better   she had a diagnostic sleep study done with severe obstructive sleep apnea with an AHI of 55 and was placed on a CPAP and has been consistently using it  She states she has been using Trelegy 1 puff once a day the last prescription that I see on the computer was Wixela that she was placed on do not see an order for Trelegy          Review of Systems   Constitutional:  Positive for fatigue.   HENT: Negative.     Eyes: Negative.    Respiratory:  Positive for cough.    Cardiovascular: Negative.    Gastrointestinal: Negative.    Endocrine: Negative.    Genitourinary: Negative.    Musculoskeletal: Negative.    Allergic/Immunologic: Negative.    Neurological: Negative.    Psychiatric/Behavioral: Negative.          Smoking history: She reports that she has never smoked. She has never used smokeless tobacco.    The following portions of the patient's history were reviewed and updated as appropriate: allergies, current medications, past family history, past medical history, past social history, past surgical history, and problem list.    Immunization History   Administered Date(s) Administered    COVID-19 PFIZER VACCINE 0.3 ML IM 03/18/2021, 04/12/2021, 09/10/2021    COVID-19 Pfizer Vac BIVALENT Damion-sucrose 12 Yr+ IM 12/21/2022    COVID-19 Pfizer mRNA vacc PF damion-sucrose 12 yr and older (Comirnaty) 01/05/2023, 01/05/2024    INFLUENZA 01/12/2022, 10/13/2022    Influenza, recombinant, quadrivalent,injectable, preservative free 10/13/2022    Influenza, seasonal, injectable 10/15/2012    Pneumococcal Conjugate Vaccine 20-valent (Pcv20), Polysace 09/21/2023    Pneumococcal Polysaccharide PPV23 10/22/2012     Current Outpatient Medications   Medication Sig Dispense Refill    Albuterol Sulfate (VENTOLIN HFA IN)       aspirin 81 MG tablet Aspirin 81 MG TABS  Take 1 tablet daily   Refills: 0    Active      atorvastatin (LIPITOR) 40 mg tablet Take 1 tablet (40 mg total) by mouth once at bedtime 90 tablet 1    baclofen 20 mg tablet Take 1 tablet (20 mg total) by mouth 4 (four) times a day 360 tablet 0    Diclofenac Sodium (VOLTAREN) 1 % Apply 2 g topically 4 (four) times a day 150 g 1    fluticasone (FLONASE) 50 mcg/act nasal spray SPRAY 2 SPRAYS INTO EACH NOSTRIL EVERY DAY 48 mL 1    gabapentin (NEURONTIN) 300 mg capsule TAKE 2 CAPS IN AM AND NOON AND 3 CAPS AT BEDTIME 630 capsule 2    metFORMIN (GLUCOPHAGE-XR) 500 mg 24 hr tablet Take 1 tablet (500 mg total) by mouth daily with breakfast 100 tablet 1    NIFEdipine ER (ADALAT CC) 30 MG 24 hr tablet Take 1 tablet (30 mg total) by mouth daily 100 tablet 3    omeprazole (PriLOSEC) 40 MG capsule Take 1 capsule (40 mg total) by mouth daily 30 min or more prior to eating or drinking in  the morning 30 capsule 3    oxybutynin (DITROPAN-XL) 10 MG 24 hr tablet TAKE 1 TABLET BY MOUTH DAILY AT BEDTIME 90 tablet 1    sertraline (ZOLOFT) 50 mg tablet TAKE 1 TABLET BY MOUTH DAILY AT BEDTIME 90 tablet 1    valACYclovir (VALTREX) 1,000 mg tablet Take 1,000 mg by mouth daily       No current facility-administered medications for this visit.     Allergies: Nuts - food allergy, Peanut oil - food allergy, Shellfish allergy - food allergy, Sumatriptan, Copaxone [glatiramer acetate], Cat hair extract, Cephalexin, Glatiramer, Pollen extract, and Seasonal ic [cholestatin]    Objective:  Vitals:    09/16/24 1359   BP: 104/60   Pulse: 76   Temp: 97.6 °F (36.4 °C)   SpO2: 94%   Weight: 64 kg (141 lb)   Height: 5' (1.524 m)   Oxygen Therapy  SpO2: 94 %  .  Wt Readings from Last 3 Encounters:   09/16/24 64 kg (141 lb)   09/09/24 64.4 kg (142 lb)   06/12/24 64 kg (141 lb)     Body mass index is 27.54 kg/m².    Physical Exam  Vitals and nursing note reviewed.   Constitutional:       Appearance: She is well-developed.   HENT:      Head: Normocephalic and atraumatic.   Eyes:      Conjunctiva/sclera: Conjunctivae normal.      Pupils: Pupils are equal, round, and reactive to light.   Neck:      Thyroid: No thyromegaly.      Vascular: No JVD.   Cardiovascular:      Rate and Rhythm: Normal rate and regular rhythm.      Heart sounds: Normal heart sounds. No murmur heard.     No friction rub. No gallop.   Pulmonary:      Effort: Pulmonary effort is normal. No respiratory distress.      Breath sounds: Normal breath sounds. No wheezing or rales.   Chest:      Chest wall: No tenderness.   Musculoskeletal:         General: No tenderness or deformity. Normal range of motion.      Cervical back: Normal range of motion and neck supple.   Lymphadenopathy:      Cervical: No cervical adenopathy.   Skin:     General: Skin is warm and dry.   Neurological:      Mental Status: She is alert and oriented to person, place, and time.         Lab  Review:   Office Visit on 04/25/2024   Component Date Value    POST-VOID RESIDUAL VOLUM* 04/25/2024 16        Diagnostics:  No pertinent imaging studies reviewed.  ESS: Total score: 12

## 2024-10-03 DIAGNOSIS — G35 MULTIPLE SCLEROSIS (HCC): ICD-10-CM

## 2024-10-30 ENCOUNTER — TELEPHONE (OUTPATIENT)
Age: 64
End: 2024-10-30

## 2024-10-30 NOTE — TELEPHONE ENCOUNTER
10/30/24    Patient called office and requested to reschedule 11/12/24 appt with   Dr. Morales, due that [place] of [place] the [place].    Per Patient OK Appt Rescheduled for 03/13/25, 9:00 AM With   Dr. Morales at the [place] Location.      Any questions, please contact Patient.  Thank You.

## 2024-11-26 DIAGNOSIS — G35 MULTIPLE SCLEROSIS (HCC): ICD-10-CM

## 2024-11-26 DIAGNOSIS — M54.50 RIGHT LOW BACK PAIN, UNSPECIFIED CHRONICITY, UNSPECIFIED WHETHER SCIATICA PRESENT: ICD-10-CM

## 2024-11-29 RX ORDER — BACLOFEN 20 MG/1
20 TABLET ORAL 4 TIMES DAILY
Qty: 360 TABLET | Refills: 0 | Status: SHIPPED | OUTPATIENT
Start: 2024-11-29

## 2024-12-11 ENCOUNTER — OFFICE VISIT (OUTPATIENT)
Age: 64
End: 2024-12-11
Payer: COMMERCIAL

## 2024-12-11 VITALS
SYSTOLIC BLOOD PRESSURE: 134 MMHG | HEIGHT: 60 IN | OXYGEN SATURATION: 94 % | DIASTOLIC BLOOD PRESSURE: 63 MMHG | BODY MASS INDEX: 26.62 KG/M2 | WEIGHT: 135.6 LBS | HEART RATE: 78 BPM | RESPIRATION RATE: 14 BRPM | TEMPERATURE: 96.1 F

## 2024-12-11 DIAGNOSIS — I10 PRIMARY HYPERTENSION: ICD-10-CM

## 2024-12-11 DIAGNOSIS — Z00.00 ANNUAL PHYSICAL EXAM: ICD-10-CM

## 2024-12-11 DIAGNOSIS — F33.41 RECURRENT MAJOR DEPRESSIVE DISORDER, IN PARTIAL REMISSION (HCC): Primary | ICD-10-CM

## 2024-12-11 DIAGNOSIS — M47.26 OTHER SPONDYLOSIS WITH RADICULOPATHY, LUMBAR REGION: ICD-10-CM

## 2024-12-11 DIAGNOSIS — H90.3 SENSORINEURAL HEARING LOSS (SNHL) OF BOTH EARS: ICD-10-CM

## 2024-12-11 DIAGNOSIS — G35 MULTIPLE SCLEROSIS (HCC): ICD-10-CM

## 2024-12-11 DIAGNOSIS — R73.03 PREDIABETES: ICD-10-CM

## 2024-12-11 DIAGNOSIS — H69.93 ETD (EUSTACHIAN TUBE DYSFUNCTION), BILATERAL: ICD-10-CM

## 2024-12-11 DIAGNOSIS — Z23 ENCOUNTER FOR IMMUNIZATION: ICD-10-CM

## 2024-12-11 DIAGNOSIS — Z12.31 ENCOUNTER FOR SCREENING MAMMOGRAM FOR BREAST CANCER: ICD-10-CM

## 2024-12-11 DIAGNOSIS — M85.88 OSTEOPENIA OF OTHER SITE: ICD-10-CM

## 2024-12-11 PROBLEM — G81.91 RIGHT HEMIPARESIS (HCC): Status: RESOLVED | Noted: 2019-02-21 | Resolved: 2024-12-11

## 2024-12-11 PROCEDURE — 90662 IIV NO PRSV INCREASED AG IM: CPT

## 2024-12-11 PROCEDURE — 99213 OFFICE O/P EST LOW 20 MIN: CPT

## 2024-12-11 PROCEDURE — 90471 IMMUNIZATION ADMIN: CPT

## 2024-12-11 RX ORDER — GABAPENTIN 300 MG/1
CAPSULE ORAL
Qty: 630 CAPSULE | Refills: 0 | Status: SHIPPED | OUTPATIENT
Start: 2024-12-11

## 2024-12-11 RX ORDER — LIDOCAINE 50 MG/G
1 PATCH TOPICAL DAILY
Qty: 30 PATCH | Refills: 0 | Status: SHIPPED | OUTPATIENT
Start: 2024-12-11

## 2024-12-11 RX ORDER — FLUTICASONE PROPIONATE 50 MCG
1 SPRAY, SUSPENSION (ML) NASAL DAILY
Qty: 9.9 ML | Refills: 2 | Status: SHIPPED | OUTPATIENT
Start: 2024-12-11 | End: 2025-03-11

## 2024-12-11 RX ORDER — FLUTICASONE PROPIONATE AND SALMETEROL 100; 50 UG/1; UG/1
1 POWDER RESPIRATORY (INHALATION) 2 TIMES DAILY
COMMUNITY

## 2024-12-11 NOTE — ASSESSMENT & PLAN NOTE
Was offered cochlear implant, but she doesn't want that surgery.  She has had increased hearing loss.  She doesn't want another refefral or hearing test because they will suggest the same thing.

## 2024-12-11 NOTE — PROGRESS NOTES
Adult Annual Physical  Name: Susana Brady      : 1960      MRN: [id number]  Encounter Provider: Savi Winn PA-C  Encounter Date: 2024   Encounter department: [facility] [place]    Assessment & Plan  Annual physical exam    Annual physical exam completed today.  - Medical history reviewed, including existing medical conditions, medications, and surgeries.   - Labs discussed to evaluate cholesterol, blood sugar, kidney function, liver function, and other important markers of health.  - BMI evaluated and discussed.  - Cancer screenings discussed: Mammogram/pap smear/colonoscopy.   - Vaccination status reviewed and pertinent immunizations and booster shots discussed.   - Bone health reviewed.   - Skin examination.  Discussed importance of sunscreen and other preventative measures for skin cancer.    - Lifestyle and health counseling completed including diet, exercise habits, smoking status, alcohol consumption.   - Mental health and wellbeing evaluated and discussed.  - Family history obtained to identify any of hereditary health risks.           Multiple sclerosis (HCC)  Unfortunately patient may be having progression of her MS symptoms.  She has a follow-up with neurology in 2025.  She has her name on a waiting list to get in sooner.  She has seen pain management as well as neurology.  The plan is to get more imaging studies as well.  She needs a refill of her gabapentin.  She would like to try Lidoderm patches for her back pain.  Orders:  •  lidocaine (Lidoderm) 5 %; Apply 1 patch topically over 12 hours daily Remove & Discard patch within 12 hours or as directed by MD  •  gabapentin (NEURONTIN) 300 mg capsule; TAKE 2 CAPS IN AM AND NOON AND 3 CAPS AT BEDTIME    Recurrent major depressive disorder, in partial remission (HCC)  Stable on the 50 mg of Zoloft.  Does not feel the need to go up on the dose.       Primary hypertension  Will put her blood pressure meds back  up to her prior dose.  She continues to have problems with her headaches, she is to return for follow-up.  She can continue the Aleve twice a day for her back pain and headaches.  She will get labs at [facility].  Orders:  •  NIFEdipine ER (ADALAT CC) 60 MG 24 hr tablet; Take 1 tablet (60 mg total) by mouth daily  •  Comprehensive metabolic panel; Future    Other spondylosis with radiculopathy, lumbar region  Patient has had physical therapy multiple times in recent years.  She plans to get a gym membership where there is a pool and to do water therapy on her own based on the HEP that she was given in the past.  If she wishes to have a prescription for physical therapy, she will notify the office.  She can continue the diclofenac and she would like to try Lidoderm patches as well.  Orders:  •  Diclofenac Sodium (VOLTAREN) 1 %; Apply 2 g topically 4 (four) times a day  •  lidocaine (Lidoderm) 5 %; Apply 1 patch topically over 12 hours daily Remove & Discard patch within 12 hours or as directed by MD    Sensorineural hearing loss (SNHL) of both ears  Was offered cochlear implant, but she doesn't want that surgery.  She has had increased hearing loss.  She doesn't want another refefral or hearing test because they will suggest the same thing.       ETD (Eustachian tube dysfunction), bilateral  Some of her increase in hearing loss could be due to eustachian tube dysfunction.  She is not using the Flonase daily for at least 2 to 3 months.  She states this increase in hearing loss has been since she had COVID. I advised her to use this daily and given instructions on its proper use.  Orders:  •  fluticasone (FLONASE) 50 mcg/act nasal spray; 1 spray into each nostril daily    Prediabetes  Patient will get an A1c, she is currently on metformin.       Osteopenia of other site  Patient has not had a DEXA scan done, advised she can get this done at the same time as her mammogram.  Orders:  •  DXA bone density spine hip and pelvis;  Future    Encounter for screening mammogram for breast cancer  Is due for mammogram next month, order placed.  Orders:  •  Mammo screening bilateral w 3d and cad; Future    Encounter for immunization  Patient would like flu shot today.  Orders:  •  influenza vaccine, high-dose, PF 0.5 mL (Fluzone High Dose)    Immunizations and preventive care screenings were discussed with patient today. Appropriate education was printed on patient's after visit summary.    Counseling:  Alcohol/drug use: discussed moderation in alcohol intake, the recommendations for healthy alcohol use, and avoidance of illicit drug use.  Dental Health: discussed importance of regular tooth brushing, flossing, and dental visits.  Injury prevention: discussed safety/seat belts, safety helmets, smoke detectors, carbon monoxide detectors, and smoking near bedding or upholstery.  Sexual health: discussed sexually transmitted diseases, partner selection, use of condoms, avoidance of unintended pregnancy, and contraceptive alternatives.  Exercise: the importance of regular exercise/physical activity was discussed. Recommend exercise 3-5 times per week for at least 30 minutes.       Falls Plan of Care: balance, strength, and gait training instructions were provided. Medications that increase falls were reviewed. Assessed feet and footwear. Vitamin D supplementation was recommended. Home safety education provided. Already follows with neurology, has done PT in the past.        History of Present Illness     Adult Annual Physical:  Patient presents for annual physical.     Diet and Physical Activity:  - Diet/Nutrition: well balanced diet.  - Exercise: no formal exercise.    General Health:  - Sleep: sleeps well.  - Hearing: decreased hearing bilateral ears.  - Vision: no vision problems.  - Dental: regular dental visits.    /GYN Health:  - Follows with GYN: yes.   - Menopause: postmenopausal.   - History of STDs: no  - Contraception: menopause.       Advanced Care Planning:  - Has an advanced directive?: no    - Has a durable medical POA?: no    - ACP document given to patient?: no        Pt is here for annual physical and a couple complaints.    She has had increase in headaches.  She went down on nifedipine and her BPs at home are over 140 systolic.  She has been taking Aleve for her back pain and it does help her headache.  No vision changes, but has some ringing in her ears and just decreased hearing in general.      Her low back pain is increased.  Worse in the morning.  Radiates down her legs.  She normally walks with a cane.  She follows with neurology and pain management.  She canceled her November neurology appointment and she could not get back in until March 2025.  The plan is to send her for more recent imaging.  The Aleve helps her back pain and Voltaren gel.  She was doing water aerobics in [place] and wants to do that again.    Patient reports increased hearing loss since she had COVID.  She is not using Flonase daily.  She has a history of eustachian tube dysfunction.  She has seen ENT in the past and had audiology screens.  Last year she was told her hearing did not get any worse, but it is not good either.  She was offered a cochlear implant, but she does not want to have that surgery.    Due for colonoscopy in 2028.  Goes to GYN in February and is UTD on pap smears.    Review of Systems   Constitutional: Negative.    HENT:  Positive for hearing loss. Negative for congestion, ear discharge, ear pain, sinus pressure and trouble swallowing.    Respiratory: Negative.     Cardiovascular: Negative.    Gastrointestinal: Negative.    Musculoskeletal:  Positive for arthralgias and gait problem (multiple sclerosis).   Skin: Negative.    Neurological:  Positive for weakness (MS) and light-headedness. Negative for syncope and headaches.   All other systems reviewed and are negative.        Objective   /63 (BP Location: Left arm, Patient  Position: Sitting, Cuff Size: Standard)   Pulse 78   Temp (!) 96.1 °F (35.6 °C) (Tympanic)   Resp 14   Ht 5' (1.524 m)   Wt 61.5 kg (135 lb 9.6 oz)   SpO2 94%   BMI 26.48 kg/m²     Physical Exam  Constitutional:       General: She is not in acute distress.     Appearance: She is not toxic-appearing.   HENT:      Head: Normocephalic and atraumatic.      Right Ear: Hearing, ear canal and external ear normal. A middle ear effusion is present. There is no impacted cerumen. No mastoid tenderness. Tympanic membrane is not erythematous.      Left Ear: Hearing, ear canal and external ear normal. A middle ear effusion is present. There is no impacted cerumen. No mastoid tenderness. Tympanic membrane is not erythematous.      Nose: Nose normal.      Mouth/Throat:      Lips: Pink.      Mouth: Mucous membranes are moist. No oral lesions.   Eyes:      General: Lids are normal. Vision grossly intact.      Extraocular Movements: Extraocular movements intact.      Conjunctiva/sclera: Conjunctivae normal.      Pupils: Pupils are equal, round, and reactive to light.   Cardiovascular:      Rate and Rhythm: Normal rate and regular rhythm.   Pulmonary:      Effort: Pulmonary effort is normal. No respiratory distress.      Breath sounds: Normal breath sounds.   Abdominal:      General: Bowel sounds are normal.      Palpations: Abdomen is soft.      Tenderness: There is no abdominal tenderness.   Musculoskeletal:      Cervical back: Normal range of motion and neck supple.      Right lower leg: No edema.      Left lower leg: No edema.      Comments: Patient ambulates with a cane, has right-sided weakness due to her MS which is her baseline.   Skin:     General: Skin is warm and dry.      Capillary Refill: Capillary refill takes less than 2 seconds.   Neurological:      Mental Status: She is alert and oriented to person, place, and time.      Cranial Nerves: Cranial nerves 2-12 are intact.      Motor: Weakness present. No tremor.       Gait: Gait abnormal.   Psychiatric:         Mood and Affect: Mood normal.         Behavior: Behavior normal. Behavior is cooperative.

## 2024-12-11 NOTE — ASSESSMENT & PLAN NOTE
Patient has had physical therapy multiple times in recent years.  She plans to get a gym membership where there is a pool and to do water therapy on her own based on the HEP that she was given in the past.  If she wishes to have a prescription for physical therapy, she will notify the office.  She can continue the diclofenac and she would like to try Lidoderm patches as well.  Orders:  •  Diclofenac Sodium (VOLTAREN) 1 %; Apply 2 g topically 4 (four) times a day  •  lidocaine (Lidoderm) 5 %; Apply 1 patch topically over 12 hours daily Remove & Discard patch within 12 hours or as directed by MD

## 2024-12-11 NOTE — ASSESSMENT & PLAN NOTE
Will put her blood pressure meds back up to her prior dose.  She continues to have problems with her headaches, she is to return for follow-up.  She can continue the Aleve twice a day for her back pain and headaches.  She will get labs at [facility].  Orders:  •  NIFEdipine ER (ADALAT CC) 60 MG 24 hr tablet; Take 1 tablet (60 mg total) by mouth daily  •  Comprehensive metabolic panel; Future

## 2024-12-11 NOTE — PATIENT INSTRUCTIONS
"Patient Education     Routine physical for adults   The Basics   Written by the doctors and editors at [facility]   What is a physical? -- A physical is a routine visit, or \"check-up,\" with your doctor. You might also hear it called a \"wellness visit\" or \"preventive visit.\"  During each visit, the doctor will:   Ask about your physical and mental health   Ask about your habits, behaviors, and lifestyle   Do an exam   Give you vaccines if needed   Talk to you about any medicines you take   Give advice about your health   Answer your questions  Getting regular check-ups is an important part of taking care of your health. It can help your doctor find and treat any problems you have. But it's also important for preventing health problems.  A routine physical is different from a \"sick visit.\" A sick visit is when you see a doctor because of a health concern or problem. Since physicals are scheduled ahead of time, you can think about what you want to ask the doctor.  How often should I get a physical? -- It depends on your age and health. In general, for people age 21 years and older:   If you are younger than 50 years, you might be able to get a physical every 3 years.   If you are 50 years or older, your doctor might recommend a physical every year.  If you have an ongoing health condition, like diabetes or high blood pressure, your doctor will probably want to see you more often.  What happens during a physical? -- In general, each visit will include:   Physical exam - The doctor or nurse will check your height, weight, heart rate, and blood pressure. They will also look at your eyes and ears. They will ask about how you are feeling and whether you have any symptoms that bother you.   Medicines - It's a good idea to bring a list of all the medicines you take to each doctor visit. Your doctor will talk to you about your medicines and answer any questions. Tell them if you are having any side effects that bother you. You " "should also tell them if you are having trouble paying for any of your medicines.   Habits and behaviors - This includes:   Your diet   Your exercise habits   Whether you smoke, drink alcohol, or use drugs   Whether you are sexually active   Whether you feel safe at home  Your doctor will talk to you about things you can do to improve your health and lower your risk of health problems. They will also offer help and support. For example, if you want to quit smoking, they can give you advice and might prescribe medicines. If you want to improve your diet or get more physical activity, they can help you with this, too.   Lab tests, if needed - The tests you get will depend on your age and situation. For example, your doctor might want to check your:   Cholesterol   Blood sugar   Iron level   Vaccines - The recommended vaccines will depend on your age, health, and what vaccines you already had. Vaccines are very important because they can prevent certain serious or deadly infections.   Discussion of screening - \"Screening\" means checking for diseases or other health problems before they cause symptoms. Your doctor can recommend screening based on your age, risk, and preferences. This might include tests to check for:   Cancer, such as breast, prostate, cervical, ovarian, colorectal, prostate, lung, or skin cancer   Sexually transmitted infections, such as chlamydia and gonorrhea   Mental health conditions like depression and anxiety  Your doctor will talk to you about the different types of screening tests. They can help you decide which screenings to have. They can also explain what the results might mean.   Answering questions - The physical is a good time to ask the doctor or nurse questions about your health. If needed, they can refer you to other doctors or specialists, too.  Adults older than 65 years often need other care, too. As you get older, your doctor will talk to you about:   How to prevent falling at " home   Hearing or vision tests   Memory testing   How to take your medicines safely   Making sure that you have the help and support you need at home  All topics are updated as new evidence becomes available and our peer review process is complete.  This topic retrieved from [employer] on: May 02, 2024.  Topic 090081 Version 1.0  Release: 32.4.3 - C32.122  © 2024 UpToDate, Inc. and/or its affiliates. All rights reserved.  Consumer Information Use and Disclaimer   Disclaimer: This generalized information is a limited summary of diagnosis, treatment, and/or medication information. It is not meant to be comprehensive and should be used as a tool to help the user understand and/or assess potential diagnostic and treatment options. It does NOT include all information about conditions, treatments, medications, side effects, or risks that may apply to a specific patient. It is not intended to be medical advice or a substitute for the medical advice, diagnosis, or treatment of a health care provider based on the health care provider's examination and assessment of a patient's specific and unique circumstances. Patients must speak with a health care provider for complete information about their health, medical questions, and treatment options, including any risks or benefits regarding use of medications. This information does not endorse any treatments or medications as safe, effective, or approved for treating a specific patient. UpToDate, Inc. and its affiliates disclaim any warranty or liability relating to this information or the use thereof.The use of this information is governed by the Terms of Use, available at https://www.woltersBlazeMeteruwer.com/en/know/clinical-effectiveness-terms. 2024© UpToDate, Inc. and its affiliates and/or licensors. All rights reserved.  Copyright   © 2024 UpToDate, Inc. and/or its affiliates. All rights reserved.

## 2024-12-11 NOTE — ASSESSMENT & PLAN NOTE
Unfortunately patient may be having progression of her MS symptoms.  She has a follow-up with neurology in March 2025.  She has her name on a waiting list to get in sooner.  She has seen pain management as well as neurology.  The plan is to get more imaging studies as well.  She needs a refill of her gabapentin.  She would like to try Lidoderm patches for her back pain.  Orders:  •  lidocaine (Lidoderm) 5 %; Apply 1 patch topically over 12 hours daily Remove & Discard patch within 12 hours or as directed by MD  •  gabapentin (NEURONTIN) 300 mg capsule; TAKE 2 CAPS IN AM AND NOON AND 3 CAPS AT BEDTIME

## 2024-12-12 ENCOUNTER — RESULTS FOLLOW-UP (OUTPATIENT)
Age: 64
End: 2024-12-12

## 2024-12-12 ENCOUNTER — TELEPHONE (OUTPATIENT)
Dept: ADMINISTRATIVE | Facility: OTHER | Age: 64
End: 2024-12-12

## 2024-12-12 LAB
ALBUMIN SERPL-MCNC: 4.6 G/DL (ref 3.6–5.1)
ALBUMIN/CREAT UR: 11 MG/G CREAT
ALBUMIN/GLOB SERPL: 1.9 (CALC) (ref 1–2.5)
ALP SERPL-CCNC: 120 U/L (ref 37–153)
ALT SERPL-CCNC: 24 U/L (ref 6–29)
AST SERPL-CCNC: 18 U/L (ref 10–35)
BASOPHILS # BLD AUTO: 32 CELLS/UL (ref 0–200)
BASOPHILS NFR BLD AUTO: 0.5 %
BILIRUB SERPL-MCNC: 0.7 MG/DL (ref 0.2–1.2)
BUN SERPL-MCNC: 9 MG/DL (ref 7–25)
BUN/CREAT SERPL: ABNORMAL (CALC) (ref 6–22)
CALCIUM SERPL-MCNC: 9.7 MG/DL (ref 8.6–10.4)
CHLORIDE SERPL-SCNC: 107 MMOL/L (ref 98–110)
CO2 SERPL-SCNC: 31 MMOL/L (ref 20–32)
CREAT SERPL-MCNC: 0.78 MG/DL (ref 0.5–1.05)
CREAT UR-MCNC: 130 MG/DL (ref 20–275)
EOSINOPHIL # BLD AUTO: 70 CELLS/UL (ref 15–500)
EOSINOPHIL NFR BLD AUTO: 1.1 %
ERYTHROCYTE [DISTWIDTH] IN BLOOD BY AUTOMATED COUNT: 12.9 % (ref 11–15)
GFR/BSA.PRED SERPLBLD CYS-BASED-ARV: 84 ML/MIN/1.73M2
GLOBULIN SER CALC-MCNC: 2.4 G/DL (CALC) (ref 1.9–3.7)
GLUCOSE SERPL-MCNC: 110 MG/DL (ref 65–99)
HBA1C MFR BLD: 6.6 % OF TOTAL HGB
HCT VFR BLD AUTO: 44.7 % (ref 35–45)
HGB BLD-MCNC: 14.2 G/DL (ref 11.7–15.5)
HIV 1+2 AB+HIV1 P24 AG SERPL QL IA: NORMAL
LYMPHOCYTES # BLD AUTO: 2509 CELLS/UL (ref 850–3900)
LYMPHOCYTES NFR BLD AUTO: 39.2 %
MCH RBC QN AUTO: 28.2 PG (ref 27–33)
MCHC RBC AUTO-ENTMCNC: 31.8 G/DL (ref 32–36)
MCV RBC AUTO: 88.7 FL (ref 80–100)
MICROALBUMIN UR-MCNC: 1.4 MG/DL
MONOCYTES # BLD AUTO: 474 CELLS/UL (ref 200–950)
MONOCYTES NFR BLD AUTO: 7.4 %
NEUTROPHILS # BLD AUTO: 3315 CELLS/UL (ref 1500–7800)
NEUTROPHILS NFR BLD AUTO: 51.8 %
PLATELET # BLD AUTO: 301 THOUSAND/UL (ref 140–400)
PMV BLD REES-ECKER: 10.9 FL (ref 7.5–12.5)
POTASSIUM SERPL-SCNC: 4.4 MMOL/L (ref 3.5–5.3)
PROT SERPL-MCNC: 7 G/DL (ref 6.1–8.1)
RBC # BLD AUTO: 5.04 MILLION/UL (ref 3.8–5.1)
SODIUM SERPL-SCNC: 145 MMOL/L (ref 135–146)
WBC # BLD AUTO: 6.4 THOUSAND/UL (ref 3.8–10.8)

## 2024-12-12 NOTE — TELEPHONE ENCOUNTER
----- Message from Savi Winn PA-C sent at 12/12/2024  9:14 AM EST -----  Regarding: care gap request  12/12/24 9:14 AM    Hello, our patient attached above has had annual physical completed/performed. Please assist in updating the patient chart by pulling the document from chart review tab within Chart Review. The date of service is 12/11/2024.     Thank you,  Savi Winn PA-C   [facility] [place]

## 2024-12-16 NOTE — TELEPHONE ENCOUNTER
Upon review of the In Basket request we have found/obtained the documentation. After careful review of the document we are unable to complete this request for CRC: Colonoscopy because the documentation does not have the result(s) needed to close the requested care gap(s).    Any additional questions or concerns should be emailed to the Practice Liaisons via the appropriate education email address, please do not reply via In Basket.    Thank you  Mickey Calvert MA   [facility]

## 2024-12-20 ENCOUNTER — HOSPITAL ENCOUNTER (OUTPATIENT)
Age: 64
Discharge: HOME | End: 2024-12-20
Payer: MEDICARE

## 2024-12-20 VITALS — WEIGHT: 139 LBS | BODY MASS INDEX: 29.18 KG/M2 | HEIGHT: 58 IN

## 2024-12-20 DIAGNOSIS — M85.88 OSTEOPENIA OF OTHER SITE: ICD-10-CM

## 2024-12-20 PROCEDURE — 77080 DXA BONE DENSITY AXIAL: CPT

## 2024-12-24 ENCOUNTER — RESULTS FOLLOW-UP (OUTPATIENT)
Age: 64
End: 2024-12-24

## 2025-01-02 NOTE — TELEPHONE ENCOUNTER
Patient called for appt for her foot  Call transferred to podiatry  Subjective   Patient ID: Iván Ingram is a 2 y.o. male who presents for Earache and Ear Drainage.  Iván is here with mum as this morning his dad noticed that his left ear was draining. He has a very mild cough and cold, No fever, no irritability. Mum reports that he has a PET in one ear. .    Ear Drainage   There is pain in the left ear. This is a new problem. The current episode started today. Episode frequency: noted once this morning. The problem has been unchanged. There has been no fever. Associated symptoms include coughing and rhinorrhea. He has tried nothing for the symptoms.       Review of Systems   HENT:  Positive for congestion and rhinorrhea.         Ear drainage     Respiratory:  Positive for cough.        Objective   Physical Exam  Vitals reviewed.   Constitutional:       General: He is active.      Appearance: Normal appearance. He is well-developed.   HENT:      Head: Normocephalic and atraumatic.      Right Ear: Tympanic membrane, ear canal and external ear normal.      Ears:      Comments: Left Tm opaque mucousy discharge in the ear canal. TM or tube not visualized     Nose: Congestion present.   Eyes:      Extraocular Movements: Extraocular movements intact.      Conjunctiva/sclera: Conjunctivae normal.      Pupils: Pupils are equal, round, and reactive to light.   Cardiovascular:      Rate and Rhythm: Normal rate and regular rhythm.   Pulmonary:      Effort: Pulmonary effort is normal.      Breath sounds: Normal breath sounds.   Musculoskeletal:      Cervical back: Normal range of motion.   Skin:     General: Skin is warm.   Neurological:      Mental Status: He is alert and oriented for age.         Assessment/Plan   Diagnoses and all orders for this visit:  Otorrhea of left ear  -     ciprofloxacin-dexamethasone (Ciprodex) otic suspension; Administer 4 drops into the left ear 2 times a day for 7 days.  Viral URI  Comments:  rich Minor has a viral upper respiratory infection. he   was advised to drink plenty of fluids and get plenty of rest. Use of a humidifier and saline nose drops was recommended.  he  will return if symptoms worsen or persist.          Constantin Grimaldo MD 01/02/25 3:03 PM

## 2025-02-20 ENCOUNTER — TELEPHONE (OUTPATIENT)
Dept: NEUROLOGY | Facility: CLINIC | Age: 65
End: 2025-02-20

## 2025-02-20 NOTE — TELEPHONE ENCOUNTER
Confirming and reminding her of her appt on 3/13/25  at 9am with Dr Morales at the [place] Office. I asked she come 15mins prior to appt to allow us time to update her chart as needed. I also asked if she is unable to make the appt and needs to reachedule or cancel to please call the office [phone].

## 2025-02-26 DIAGNOSIS — G35 MULTIPLE SCLEROSIS (HCC): ICD-10-CM

## 2025-02-26 RX ORDER — GABAPENTIN 300 MG/1
CAPSULE ORAL
Qty: 630 CAPSULE | Refills: 1 | Status: SHIPPED | OUTPATIENT
Start: 2025-02-26

## 2025-03-11 DIAGNOSIS — E78.5 HYPERLIPIDEMIA, UNSPECIFIED HYPERLIPIDEMIA TYPE: ICD-10-CM

## 2025-03-12 RX ORDER — ATORVASTATIN CALCIUM 40 MG/1
40 TABLET, FILM COATED ORAL
Qty: 90 TABLET | Refills: 1 | Status: SHIPPED | OUTPATIENT
Start: 2025-03-12 | End: 2025-09-08

## 2025-03-13 ENCOUNTER — OFFICE VISIT (OUTPATIENT)
Dept: NEUROLOGY | Facility: CLINIC | Age: 65
End: 2025-03-13
Payer: COMMERCIAL

## 2025-03-13 VITALS
OXYGEN SATURATION: 99 % | HEIGHT: 58 IN | HEART RATE: 89 BPM | RESPIRATION RATE: 14 BRPM | TEMPERATURE: 97.5 F | SYSTOLIC BLOOD PRESSURE: 132 MMHG | BODY MASS INDEX: 29.56 KG/M2 | DIASTOLIC BLOOD PRESSURE: 78 MMHG | WEIGHT: 140.8 LBS

## 2025-03-13 DIAGNOSIS — R47.1 DYSARTHRIA: ICD-10-CM

## 2025-03-13 DIAGNOSIS — N32.81 OAB (OVERACTIVE BLADDER): ICD-10-CM

## 2025-03-13 DIAGNOSIS — M47.26 OTHER SPONDYLOSIS WITH RADICULOPATHY, LUMBAR REGION: ICD-10-CM

## 2025-03-13 DIAGNOSIS — N31.9 NEUROGENIC BLADDER: ICD-10-CM

## 2025-03-13 DIAGNOSIS — G35 MULTIPLE SCLEROSIS (HCC): Primary | ICD-10-CM

## 2025-03-13 DIAGNOSIS — J43.9 PULMONARY EMPHYSEMA, UNSPECIFIED EMPHYSEMA TYPE (HCC): Primary | ICD-10-CM

## 2025-03-13 PROCEDURE — 99214 OFFICE O/P EST MOD 30 MIN: CPT | Performed by: PSYCHIATRY & NEUROLOGY

## 2025-03-13 RX ORDER — OXYBUTYNIN CHLORIDE 10 MG/1
10 TABLET, EXTENDED RELEASE ORAL
Qty: 90 TABLET | Refills: 0 | Status: SHIPPED | OUTPATIENT
Start: 2025-03-13

## 2025-03-13 NOTE — PROGRESS NOTES
Name: Susana Brady      : 1960      MRN: [id number]  Encounter Provider: Carlota Morales MD  Encounter Date: 3/13/2025   Encounter department: [facility] [place]  :  Assessment & Plan  Multiple sclerosis (HCC)  Mrs. Brady has presented to [facility] multiple sclerosis center for follow-up on multiple sclerosis and related concerns.  Patient has residual right-sided hemiparesis with no.  Patient described her motor function has been gradually getting worse as she is ambulating with a cane now.  Patient take Voltaren gel and Lidoderm for upper neck related issues.  Patient continue taking baclofen 20 mg 4 times daily as she still has intermittent muscle spasm and tightness in her face with dysarthric speech.  Patient was offered 4 mg of tizanidine on a as needed basis.   Patient is not on disease modifying therapy due to immuno senescence related concerns.  Patient would benefit to resume aquatic therapy  Patient has eustachian tube dysfunction after COVID with hearing loss in the left side.  Patient completed last blood work in 2024 with personally reviewed her findings during this visit.       Other spondylosis with radiculopathy, lumbar region    Orders:  •  Diclofenac Sodium (VOLTAREN) 1 %; Apply 2 g topically 4 (four) times a day    Dysarthria  Patient described tightness in the face and difficulty pronouncing words in the form of dysarthria but no signs of dysphonia or laryngeal spasm of course has been noted on exam.  Patient will be offered tizanidine 4 mg only as needed basis.  Patient may require evaluation by ENT team for laryngeal dystonia if she shows progression of her dysfunction.  No dysphagia or aspiration reported.  Orders:  •  tiZANidine (ZANAFLEX) 4 mg tablet; Take 1 tablet (4 mg total) by mouth daily as needed for muscle spasms for up to 1 dose          History of Present Illness   HPI     Mrs. Brady has presented to [facility]  sclerosis center for follow-up on multiple sclerosis and related questions.     Patient has residual right-sided hemiparesis due to last MS relapse many years back as she ambulates with a cane.  Patient is not on disease modifying regimen due to immuno senescence.    Today's complaints are: Susana is alone.  Patient reports being ok. She still reports on and off dizziness when she walks, she says it varies based on activity level.  Patient still has the occasional right sided weakness but no better no worse.  Patient has noticed she's slurring her words a lot more then she has, the beginning of the appt she was slurring a lot.  Patient also mentioned she had a bone density test done and her bones are bad/worse.  Patient wants to talk about supplements.  Patient was diagnosed with osteopenia and she was prescribed calcium 1200 mg daily as previous vitamin D level was within normal range at 56.  Patient stated her back pain is terrible, she says worse in the morning ranking it a 8 out of 10. But she says her pain isn't being managed and she wants to know what pain medications will be recommended.patient also mentioned she is having the crawling sensation on the back of her neck, sensation was occurred while in room she struggled to refocus on the appt.       We personally reviewed patient x-ray of the hips from 2020 patient has mild osteoarthritis in her right hip.  MRI of the lumbar spine from 2020 was personally reviewed as well as we agreed patient has multilevel degenerative changes with moderate to severe foraminal stenosis and likely L4-L5 radiculopathy as result with moderate spinal canal stenosis at the same level.  Patient does not describe saddle anesthesia; patient has bladder dysfunction due to multiple sclerosis with close follow-up with urology was offered.        Review of Systems   Constitutional: Negative.  Negative for chills and fever.   HENT: Negative.  Negative for ear pain and sore throat.    Eyes:  "Negative.  Negative for pain and visual disturbance.   Respiratory: Negative.  Negative for cough and shortness of breath.    Cardiovascular: Negative.  Negative for chest pain and palpitations.   Gastrointestinal: Negative.  Negative for abdominal pain and vomiting.   Endocrine: Negative.    Genitourinary: Negative.  Negative for dysuria and hematuria.   Musculoskeletal:  Positive for back pain (lower back right side, worse in am, 8 of 10 pain) and neck pain (hand crawling sensation). Negative for arthralgias. Gait problem: T25FW 13.22 seconds, using a cane.  Skin: Negative.  Negative for color change and rash.   Allergic/Immunologic: Negative.    Neurological:  Positive for dizziness (on and off balance but no falls), speech difficulty (occassionally slurring words) and weakness. Negative for seizures and syncope.   Hematological: Negative.    Psychiatric/Behavioral: Negative.     All other systems reviewed and are negative.   I have personally reviewed the MA's review of systems and made changes as necessary.         Objective   /78 (BP Location: Right arm, Patient Position: Sitting, Cuff Size: Adult)   Pulse 89   Temp 97.5 °F (36.4 °C) (Temporal)   Resp 14   Ht 4' 10\" (1.473 m)   Wt 63.9 kg (140 lb 12.8 oz)   SpO2 99%   BMI 29.43 kg/m²     Physical Exam  Neurological Exam  CARDIOVASCULAR: RRR, normal S1S2, no murmurs, no rubs. RESP: clear to auscultation bilaterally, no wheezes/rhonchi/rales. ABDOMEN: soft, non tender, non distended. SKIN: no rash or skin lesions. EXTREMITIES: no edema, pulses 2+bilaterally. PSYCH: appropriate mood and affect  NEUROLOGIC COMPREHENSIVE EXAM: Patient is oriented to person, place and time, NAD; appropriate affect. CN II, III, IV, V, VI, VII,VIII,IX,X,XI-XII intact with EOMI, PERRLA, OKN intact, VF grossly intact, fundi poorly visualized secondary to pupillary constriction; symmetric face noted. Motor: 5/5 UE/LE bilateral symmetric, RUE 4/5 shoulder abduction and finger " flexion; RLU 4-/5 hip flexion and knee flexion, 2/5 dorsiflexion rigjt; Sensory: intact to light touch and pinprick bilaterally; normal vibration sensation feet bilaterally; Coordination within normal limits on FTN and ROLANDO testing; DTR: 2/4 through, no Babinski, no clonus. Tandem gait is abnormal. Romberg: negative

## 2025-03-13 NOTE — ASSESSMENT & PLAN NOTE
Patient described tightness in the face and difficulty pronouncing words in the form of dysarthria but no signs of dysphonia or laryngeal spasm of course has been noted on exam.  Patient will be offered tizanidine 4 mg only as needed basis.  Patient may require evaluation by ENT team for laryngeal dystonia if she shows progression of her dysfunction.  No dysphagia or aspiration reported.  Orders:  •  tiZANidine (ZANAFLEX) 4 mg tablet; Take 1 tablet (4 mg total) by mouth daily as needed for muscle spasms for up to 1 dose

## 2025-03-13 NOTE — ASSESSMENT & PLAN NOTE
Mrs. Brady has presented to [facility] multiple sclerosis center for follow-up on multiple sclerosis and related concerns.  Patient has residual right-sided hemiparesis with no.  Patient described her motor function has been gradually getting worse as she is ambulating with a cane now.  Patient take Voltaren gel and Lidoderm for upper neck related issues.  Patient continue taking baclofen 20 mg 4 times daily as she still has intermittent muscle spasm and tightness in her face with dysarthric speech.  Patient was offered 4 mg of tizanidine on a as needed basis.   Patient is not on disease modifying therapy due to immuno senescence related concerns.  Patient would benefit to resume aquatic therapy  Patient has eustachian tube dysfunction after COVID with hearing loss in the left side.  Patient completed last blood work in December 2024 with personally reviewed her findings during this visit.

## 2025-03-14 RX ORDER — FLUTICASONE PROPIONATE AND SALMETEROL 100; 50 UG/1; UG/1
1 POWDER RESPIRATORY (INHALATION) 2 TIMES DAILY
Qty: 60 BLISTER | Refills: 0 | Status: SHIPPED | OUTPATIENT
Start: 2025-03-14

## 2025-03-14 RX ORDER — ATORVASTATIN CALCIUM 40 MG/1
40 TABLET, FILM COATED ORAL
Qty: 90 TABLET | Refills: 0 | OUTPATIENT
Start: 2025-03-14 | End: 2025-09-10

## 2025-03-14 RX ORDER — VALACYCLOVIR HYDROCHLORIDE 1 G/1
1000 TABLET, FILM COATED ORAL DAILY
Refills: 0 | OUTPATIENT
Start: 2025-03-14

## 2025-04-04 DIAGNOSIS — G35 MULTIPLE SCLEROSIS (HCC): ICD-10-CM

## 2025-04-21 ENCOUNTER — TELEPHONE (OUTPATIENT)
Age: 65
End: 2025-04-21

## 2025-04-21 NOTE — TELEPHONE ENCOUNTER
Yanni from [email or web address] is calling to confirm if patient is still actively a patient of ours. I advised him that patient's last office visit was on 9/16/2024.    Thank you.

## 2025-04-28 ENCOUNTER — OFFICE VISIT (OUTPATIENT)
Dept: UROLOGY | Facility: CLINIC | Age: 65
End: 2025-04-28
Payer: COMMERCIAL

## 2025-04-28 VITALS
HEIGHT: 58 IN | OXYGEN SATURATION: 94 % | BODY MASS INDEX: 29.6 KG/M2 | HEART RATE: 75 BPM | SYSTOLIC BLOOD PRESSURE: 120 MMHG | DIASTOLIC BLOOD PRESSURE: 62 MMHG | WEIGHT: 141 LBS | TEMPERATURE: 97.6 F

## 2025-04-28 DIAGNOSIS — N32.81 OVERACTIVE BLADDER: Primary | ICD-10-CM

## 2025-04-28 DIAGNOSIS — N31.9 NEUROGENIC BLADDER: ICD-10-CM

## 2025-04-28 DIAGNOSIS — J43.9 PULMONARY EMPHYSEMA, UNSPECIFIED EMPHYSEMA TYPE (HCC): Primary | ICD-10-CM

## 2025-04-28 LAB — POST-VOID RESIDUAL VOLUME, ML POC: 84 ML

## 2025-04-28 PROCEDURE — 51798 US URINE CAPACITY MEASURE: CPT | Performed by: PHYSICIAN ASSISTANT

## 2025-04-28 PROCEDURE — 99213 OFFICE O/P EST LOW 20 MIN: CPT | Performed by: PHYSICIAN ASSISTANT

## 2025-04-28 RX ORDER — OXYBUTYNIN CHLORIDE 10 MG/1
10 TABLET, EXTENDED RELEASE ORAL
Qty: 90 TABLET | Refills: 3 | Status: SHIPPED | OUTPATIENT
Start: 2025-04-28

## 2025-04-28 NOTE — ASSESSMENT & PLAN NOTE
- Well managed on Oxybutynin 10 mg daily   - Urinated prior to visit. Random bladder scan 84 mL   Orders:    POCT Measure PVR    oxybutynin (DITROPAN-XL) 10 MG 24 hr tablet; Take 1 tablet (10 mg total) by mouth daily at bedtime

## 2025-04-28 NOTE — PROGRESS NOTES
Name: Susana Brady      : 1960      MRN: [id number]  Encounter Provider: Britt Lew PA-C  Encounter Date: 2025   Encounter department: [facility] [place]  :  Assessment & Plan  Overactive bladder  - Well managed on Oxybutynin 10 mg daily   - Urinated prior to visit. Random bladder scan 84 mL   Orders:    POCT Measure PVR    oxybutynin (DITROPAN-XL) 10 MG 24 hr tablet; Take 1 tablet (10 mg total) by mouth daily at bedtime    Neurogenic bladder  - Obtain RBUS prior to next visit.   Orders:    US kidney and bladder; Future    oxybutynin (DITROPAN-XL) 10 MG 24 hr tablet; Take 1 tablet (10 mg total) by mouth daily at bedtime        History of Present Illness   Susana Brady is a 65 y.o. female who presents for follow up.     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 Currently managed on Oxybutynin 10 mg daily. She reports seeing a urologist approximately 10 years ago and had work-up including what sounds like cystoscopy as well as urodynamic testing at that time.  Denies any prior  surgical manipulation. She is doing well on Oxybutynin with good symptom control. No new or worsening issues.      She also does have prior history of kidney stones with spontaneous expulsion.  CT last year was negative for any urinary tract calculi      Review of Systems   Constitutional:  Negative for chills and fever.   Respiratory:  Negative for shortness of breath.    Cardiovascular:  Negative for chest pain.   Gastrointestinal:  Negative for abdominal pain.   Genitourinary:  Negative for difficulty urinating, dysuria, flank pain, frequency, hematuria, pelvic pain and urgency.   Neurological:  Negative for dizziness.          Objective   There were no vitals taken for this visit.    Physical Exam  Constitutional:       Appearance: Normal appearance.   HENT:      Head: Normocephalic and atraumatic.      Right Ear: External ear normal.      Left Ear: External  "ear normal.      Nose: Nose normal.   Eyes:      General: No scleral icterus.     Conjunctiva/sclera: Conjunctivae normal.   Cardiovascular:      Pulses: Normal pulses.   Pulmonary:      Effort: Pulmonary effort is normal.   Musculoskeletal:         General: Normal range of motion.      Cervical back: Normal range of motion.   Neurological:      General: No focal deficit present.      Mental Status: She is alert and oriented to person, place, and time.   Psychiatric:         Mood and Affect: Mood normal.         Behavior: Behavior normal.         Thought Content: Thought content normal.         Judgment: Judgment normal.          Results   No results found for: \"PSA\"  Lab Results   Component Value Date    CALCIUM 9.7 12/11/2024     12/21/2017    K 4.4 12/11/2024    CO2 31 12/11/2024     12/11/2024    BUN 9 12/11/2024    CREATININE 0.78 12/11/2024     Lab Results   Component Value Date    WBC 6.4 12/11/2024    HGB 14.2 12/11/2024    HCT 44.7 12/11/2024    MCV 88.7 12/11/2024     12/11/2024       Office Urine Dip  No results found for this or any previous visit (from the past hour).      "

## 2025-04-29 RX ORDER — ALBUTEROL SULFATE 90 UG/1
2 INHALANT RESPIRATORY (INHALATION) EVERY 6 HOURS PRN
Qty: 18 G | Refills: 2 | Status: SHIPPED | OUTPATIENT
Start: 2025-04-29

## 2025-04-30 ENCOUNTER — TELEPHONE (OUTPATIENT)
Age: 65
End: 2025-04-30

## 2025-04-30 NOTE — TELEPHONE ENCOUNTER
Pt called and stated for her recent visit at check in they were having issues with the copay pymt. Pt just looked at her bank statement and the office charged her for 2 copays. Pt was giving the billind dept phone to contact for reimbursement

## 2025-06-09 DIAGNOSIS — R73.03 PREDIABETES: ICD-10-CM

## 2025-06-09 RX ORDER — METFORMIN HYDROCHLORIDE 500 MG/1
500 TABLET, EXTENDED RELEASE ORAL
Qty: 100 TABLET | Refills: 1 | Status: SHIPPED | OUTPATIENT
Start: 2025-06-09

## 2025-06-23 ENCOUNTER — PATIENT MESSAGE (OUTPATIENT)
Age: 65
End: 2025-06-23

## 2025-07-09 ENCOUNTER — OFFICE VISIT (OUTPATIENT)
Age: 65
End: 2025-07-09
Payer: COMMERCIAL

## 2025-07-09 VITALS
HEART RATE: 72 BPM | DIASTOLIC BLOOD PRESSURE: 70 MMHG | OXYGEN SATURATION: 96 % | BODY MASS INDEX: 29.43 KG/M2 | SYSTOLIC BLOOD PRESSURE: 110 MMHG | WEIGHT: 140.2 LBS | HEIGHT: 58 IN | TEMPERATURE: 93.6 F

## 2025-07-09 DIAGNOSIS — G35 MULTIPLE SCLEROSIS (HCC): Primary | ICD-10-CM

## 2025-07-09 DIAGNOSIS — B35.3 TINEA PEDIS OF LEFT FOOT: ICD-10-CM

## 2025-07-09 DIAGNOSIS — F33.41 RECURRENT MAJOR DEPRESSIVE DISORDER, IN PARTIAL REMISSION (HCC): ICD-10-CM

## 2025-07-09 DIAGNOSIS — R73.03 PREDIABETES: ICD-10-CM

## 2025-07-09 DIAGNOSIS — G47.33 OSA (OBSTRUCTIVE SLEEP APNEA): ICD-10-CM

## 2025-07-09 DIAGNOSIS — J38.5 LARYNGEAL SPASM: ICD-10-CM

## 2025-07-09 DIAGNOSIS — E78.5 HYPERLIPIDEMIA WITH TARGET LDL LESS THAN 160: ICD-10-CM

## 2025-07-09 DIAGNOSIS — J43.9 PULMONARY EMPHYSEMA, UNSPECIFIED EMPHYSEMA TYPE (HCC): ICD-10-CM

## 2025-07-09 DIAGNOSIS — I10 PRIMARY HYPERTENSION: ICD-10-CM

## 2025-07-09 PROCEDURE — 99214 OFFICE O/P EST MOD 30 MIN: CPT

## 2025-07-09 RX ORDER — ALBUTEROL SULFATE 90 UG/1
2 INHALANT RESPIRATORY (INHALATION) EVERY 6 HOURS PRN
Qty: 18 G | Refills: 2 | Status: SHIPPED | OUTPATIENT
Start: 2025-07-09

## 2025-07-09 RX ORDER — NIFEDIPINE 30 MG
30 TABLET, EXTENDED RELEASE ORAL DAILY
Qty: 100 TABLET | Refills: 3 | Status: SHIPPED | OUTPATIENT
Start: 2025-07-09

## 2025-07-09 RX ORDER — CLOTRIMAZOLE 1 %
CREAM (GRAM) TOPICAL 2 TIMES DAILY
Qty: 60 G | Refills: 2 | Status: SHIPPED | OUTPATIENT
Start: 2025-07-09

## 2025-07-09 NOTE — ASSESSMENT & PLAN NOTE
Needs updated labs, continue with the 40 mg atorvastatin daily.  Orders:    Lipid Panel with Direct LDL reflex; Future

## 2025-07-09 NOTE — ASSESSMENT & PLAN NOTE
Blood pressure has been controlled on the 60 mg.  She would like to go down on the dose, 30 mg.  Her headaches have resolved after using the CPAP consistently.  She will monitor blood pressures at home and if increasing above 130/90, will increase her dose back to 60 mg.  She will obtain lab work.    Orders:    CBC and differential; Future    Comprehensive metabolic panel; Future    NIFEdipine ER (ADALAT CC) 30 MG 24 hr tablet; Take 1 tablet (30 mg total) by mouth daily

## 2025-07-09 NOTE — ASSESSMENT & PLAN NOTE
Patient will get an A1c, she is currently on metformin once a day.    Orders:    Albumin / creatinine urine ratio; Future    Hemoglobin A1C; Future

## 2025-07-09 NOTE — ASSESSMENT & PLAN NOTE
Symptoms stable.  Follows with pulmonary.  Refill provided for inhaler.  Orders:    albuterol (Ventolin HFA) 90 mcg/act inhaler; Inhale 2 puffs every 6 (six) hours as needed for wheezing or shortness of breath

## 2025-07-09 NOTE — ASSESSMENT & PLAN NOTE
She is following with neurology.  Managing with gabapentin, baclofen.  Does not find the tizanidine helpful and more sedating than she would like.  She has an appointment in 2 months.

## 2025-07-09 NOTE — PROGRESS NOTES
Name: Susana Brady      : 1960      MRN: [id number]  Encounter Provider: Savi Winn PA-C  Encounter Date: 2025   Encounter department: [facility] [place]  :  Assessment & Plan  Multiple sclerosis (HCC)  She is following with neurology.  Managing with gabapentin, baclofen.  Does not find the tizanidine helpful and more sedating than she would like.  She has an appointment in 2 months.       Pulmonary emphysema, unspecified emphysema type (HCC)  Symptoms stable.  Follows with pulmonary.  Refill provided for inhaler.  Orders:    albuterol (Ventolin HFA) 90 mcg/act inhaler; Inhale 2 puffs every 6 (six) hours as needed for wheezing or shortness of breath    Recurrent major depressive disorder, in partial remission (HCC)  Stable on the 50 mg of Zoloft.  Does not feel the need to go up on the dose.  She had a recent death in the family which increased her PHQ-9 score.  She does not wish to change the dose at this time but will follow-up if she changes her mind.          Laryngeal spasm  Reports episode of severe spasm, lasted 10 min and then resolved.  She has seen neurology about this and there was concern that the dysarthria was actually laryngeal spasm and recommended ENT evaluation if worsening.  Will place referral now since she does not see neurology for another 2 months.  Worse with speaking loudly and talking long periods, so advised ot limit volume and duration of speaking.  Call 911 or go to a [facility] house for help if this occurs again.  Orders:    Ambulatory Referral to Otolaryngology; Future    Primary hypertension  Blood pressure has been controlled on the 60 mg.  She would like to go down on the dose, 30 mg.  Her headaches have resolved after using the CPAP consistently.  She will monitor blood pressures at home and if increasing above 130/90, will increase her dose back to 60 mg.  She will obtain lab work.    Orders:    CBC and differential; Future     Comprehensive metabolic panel; Future    NIFEdipine ER (ADALAT CC) 30 MG 24 hr tablet; Take 1 tablet (30 mg total) by mouth daily     CINTHYA (obstructive sleep apnea)  Using CPAP, tolerating well.  Headaches are resolving.        Hyperlipidemia with target LDL less than 160  Needs updated labs, continue with the 40 mg atorvastatin daily.  Orders:    Lipid Panel with Direct LDL reflex; Future    Prediabetes  Patient will get an A1c, she is currently on metformin once a day.    Orders:    Albumin / creatinine urine ratio; Future    Hemoglobin A1C; Future     Tinea pedis of left foot  Left great toe and heel are affected.  Needs refill of antifungal cream.  Advised to change old shoes as well.  Orders:    clotrimazole (LOTRIMIN) 1 % cream; Apply topically 2 (two) times a day          Depression Screening and Follow-up Plan: Patient's depression screening was positive with a PHQ-9 score of 10.   Patient declines further evaluation by mental health professional and/or medications. Brief counseling provided. Will re-evaluate at next office visit. Patient with underlying depression and was advised to continue current medications as prescribed. She does not want increase in Zoloft.  She had a family member pass away suddenly at age 51 so is contributing to her increase in depression.      History of Present Illness   HPI    PT is here for 6 month follow up.  Using CPAP and headaches improved drastically.  She has worked on diet and cut down on salt.  BP is much better controlled.  She has had increase in a sensation of her throat closing.  She has brought this up to neurology.  There is concern it could be laryngeal spasm.  Patient states it does not happen often, but occasionally with increased volume of voice or talking for long periods it can be bothersome.      Review of Systems   Constitutional: Negative.    HENT:  Positive for hearing loss. Negative for congestion, drooling, ear discharge, ear pain, sinus pressure,  "trouble swallowing and voice change.         \"Throat closing sensation\".   Respiratory: Negative.     Cardiovascular: Negative.    Gastrointestinal: Negative.    Musculoskeletal:  Positive for arthralgias and gait problem (multiple sclerosis).   Skin: Negative.    Neurological:  Positive for weakness (MS) and light-headedness. Negative for syncope and headaches.   All other systems reviewed and are negative.      Objective   /70 (Patient Position: Sitting, Cuff Size: Standard)   Pulse 72   Temp (!) 93.6 °F (34.2 °C) (Tympanic)   Ht 4' 10\" (1.473 m)   Wt 63.6 kg (140 lb 3.2 oz)   LMP 02/01/2013   SpO2 96%   BMI 29.30 kg/m²      Physical Exam  Constitutional:       General: She is not in acute distress.     Appearance: She is ill-appearing (Chronically). She is not toxic-appearing.   HENT:      Head: Normocephalic and atraumatic.      Right Ear: Hearing and external ear normal.      Left Ear: Hearing and external ear normal.      Nose: Nose normal.      Mouth/Throat:      Lips: Pink.      Mouth: Mucous membranes are moist. No oral lesions.      Tongue: No lesions.      Palate: No mass.      Pharynx: Oropharynx is clear. Uvula midline. No pharyngeal swelling.     Eyes:      General: Lids are normal. Vision grossly intact.      Extraocular Movements: Extraocular movements intact.      Conjunctiva/sclera: Conjunctivae normal.     Neck:      Thyroid: No thyroid mass, thyromegaly or thyroid tenderness.     Cardiovascular:      Rate and Rhythm: Normal rate and regular rhythm.      Heart sounds: Normal heart sounds.   Pulmonary:      Effort: Pulmonary effort is normal. No respiratory distress.      Breath sounds: Normal breath sounds.   Abdominal:      General: Bowel sounds are normal.      Palpations: Abdomen is soft.      Tenderness: There is no abdominal tenderness.     Musculoskeletal:      Cervical back: Normal range of motion and neck supple. No pain with movement.      Right lower leg: No edema.      Left " lower leg: No edema.      Comments: Patient ambulates with a cane, has right-sided weakness due to her MS which is her baseline.   Lymphadenopathy:      Cervical: No cervical adenopathy.     Skin:     General: Skin is warm and dry.      Capillary Refill: Capillary refill takes less than 2 seconds.     Neurological:      Mental Status: She is alert and oriented to person, place, and time.      Cranial Nerves: Cranial nerves 2-12 are intact.      Motor: Weakness present. No tremor.      Gait: Gait abnormal.     Psychiatric:         Mood and Affect: Mood normal.         Behavior: Behavior normal. Behavior is cooperative.

## 2025-07-09 NOTE — ASSESSMENT & PLAN NOTE
Stable on the 50 mg of Zoloft.  Does not feel the need to go up on the dose.  She had a recent death in the family which increased her PHQ-9 score.  She does not wish to change the dose at this time but will follow-up if she changes her mind.

## 2025-07-12 LAB
ALBUMIN SERPL-MCNC: 4.5 G/DL (ref 3.6–5.1)
ALBUMIN/CREAT UR: 7 MG/G CREAT
ALBUMIN/GLOB SERPL: 2 (CALC) (ref 1–2.5)
ALP SERPL-CCNC: 118 U/L (ref 37–153)
ALT SERPL-CCNC: 20 U/L (ref 6–29)
AST SERPL-CCNC: 19 U/L (ref 10–35)
BASOPHILS # BLD AUTO: 32 CELLS/UL (ref 0–200)
BASOPHILS NFR BLD AUTO: 0.5 %
BILIRUB SERPL-MCNC: 0.6 MG/DL (ref 0.2–1.2)
BUN SERPL-MCNC: 15 MG/DL (ref 7–25)
BUN/CREAT SERPL: ABNORMAL (CALC) (ref 6–22)
CALCIUM SERPL-MCNC: 9.3 MG/DL (ref 8.6–10.4)
CHLORIDE SERPL-SCNC: 107 MMOL/L (ref 98–110)
CHOLEST SERPL-MCNC: 169 MG/DL
CHOLEST/HDLC SERPL: 3 (CALC)
CO2 SERPL-SCNC: 27 MMOL/L (ref 20–32)
CREAT SERPL-MCNC: 0.68 MG/DL (ref 0.5–1.05)
CREAT UR-MCNC: 162 MG/DL (ref 20–275)
EOSINOPHIL # BLD AUTO: 122 CELLS/UL (ref 15–500)
EOSINOPHIL NFR BLD AUTO: 1.9 %
ERYTHROCYTE [DISTWIDTH] IN BLOOD BY AUTOMATED COUNT: 12.9 % (ref 11–15)
GFR/BSA.PRED SERPLBLD CYS-BASED-ARV: 96 ML/MIN/1.73M2
GLOBULIN SER CALC-MCNC: 2.2 G/DL (CALC) (ref 1.9–3.7)
GLUCOSE SERPL-MCNC: 102 MG/DL (ref 65–99)
HBA1C MFR BLD: 6.4 %
HCT VFR BLD AUTO: 43.5 % (ref 35–45)
HDLC SERPL-MCNC: 57 MG/DL
HGB BLD-MCNC: 14.1 G/DL (ref 11.7–15.5)
LDLC SERPL CALC-MCNC: 92 MG/DL (CALC)
LYMPHOCYTES # BLD AUTO: 2912 CELLS/UL (ref 850–3900)
LYMPHOCYTES NFR BLD AUTO: 45.5 %
MCH RBC QN AUTO: 28.9 PG (ref 27–33)
MCHC RBC AUTO-ENTMCNC: 32.4 G/DL (ref 32–36)
MCV RBC AUTO: 89.1 FL (ref 80–100)
MICROALBUMIN UR-MCNC: 1.1 MG/DL
MONOCYTES # BLD AUTO: 422 CELLS/UL (ref 200–950)
MONOCYTES NFR BLD AUTO: 6.6 %
NEUTROPHILS # BLD AUTO: 2912 CELLS/UL (ref 1500–7800)
NEUTROPHILS NFR BLD AUTO: 45.5 %
NONHDLC SERPL-MCNC: 112 MG/DL (CALC)
PLATELET # BLD AUTO: 261 THOUSAND/UL (ref 140–400)
PMV BLD REES-ECKER: 10.8 FL (ref 7.5–12.5)
POTASSIUM SERPL-SCNC: 4.3 MMOL/L (ref 3.5–5.3)
PROT SERPL-MCNC: 6.7 G/DL (ref 6.1–8.1)
RBC # BLD AUTO: 4.88 MILLION/UL (ref 3.8–5.1)
SODIUM SERPL-SCNC: 142 MMOL/L (ref 135–146)
TRIGL SERPL-MCNC: 100 MG/DL
WBC # BLD AUTO: 6.4 THOUSAND/UL (ref 3.8–10.8)

## 2025-07-14 ENCOUNTER — RESULTS FOLLOW-UP (OUTPATIENT)
Age: 65
End: 2025-07-14

## 2025-07-14 NOTE — TELEPHONE ENCOUNTER
----- Message from Dre Ambriz MD sent at 7/14/2025  9:19 AM EDT -----  Please let patient know that labs look good overall. A1c and fasting sugar remain in prediabetic range. Continue current dose of metformin  ----- Message -----  From: Josh Wade Lab Results In  Sent: 7/12/2025   2:00 AM EDT  To: Savi Winn PA-C

## 2025-07-28 ENCOUNTER — HOSPITAL ENCOUNTER (OUTPATIENT)
Dept: MAMMOGRAPHY | Facility: CLINIC | Age: 65
Discharge: HOME | End: 2025-07-28
Payer: COMMERCIAL

## 2025-07-28 VITALS — BODY MASS INDEX: 28.22 KG/M2 | HEIGHT: 59 IN | WEIGHT: 140 LBS

## 2025-07-28 DIAGNOSIS — Z12.31 ENCOUNTER FOR SCREENING MAMMOGRAM FOR BREAST CANCER: ICD-10-CM

## 2025-07-28 PROCEDURE — 77067 SCR MAMMO BI INCL CAD: CPT

## 2025-07-28 PROCEDURE — 77063 BREAST TOMOSYNTHESIS BI: CPT

## 2025-08-20 DIAGNOSIS — R73.03 PREDIABETES: ICD-10-CM

## 2025-08-20 DIAGNOSIS — M47.26 OTHER SPONDYLOSIS WITH RADICULOPATHY, LUMBAR REGION: ICD-10-CM

## 2025-08-20 DIAGNOSIS — N31.9 NEUROGENIC BLADDER: ICD-10-CM

## 2025-08-20 DIAGNOSIS — M54.50 RIGHT LOW BACK PAIN, UNSPECIFIED CHRONICITY, UNSPECIFIED WHETHER SCIATICA PRESENT: ICD-10-CM

## 2025-08-20 DIAGNOSIS — G35 MULTIPLE SCLEROSIS (HCC): ICD-10-CM

## 2025-08-20 DIAGNOSIS — I10 PRIMARY HYPERTENSION: ICD-10-CM

## 2025-08-20 DIAGNOSIS — N32.81 OVERACTIVE BLADDER: ICD-10-CM

## 2025-08-21 RX ORDER — BACLOFEN 20 MG/1
20 TABLET ORAL 4 TIMES DAILY
Qty: 360 TABLET | Refills: 0 | Status: SHIPPED | OUTPATIENT
Start: 2025-08-21

## 2025-08-21 RX ORDER — NIFEDIPINE 30 MG
30 TABLET, EXTENDED RELEASE ORAL DAILY
Qty: 100 TABLET | Refills: 0 | OUTPATIENT
Start: 2025-08-21

## 2025-08-21 RX ORDER — GABAPENTIN 300 MG/1
CAPSULE ORAL
Qty: 630 CAPSULE | Refills: 1 | Status: SHIPPED | OUTPATIENT
Start: 2025-08-21

## 2025-08-21 RX ORDER — OXYBUTYNIN CHLORIDE 10 MG/1
10 TABLET, EXTENDED RELEASE ORAL
Qty: 90 TABLET | Refills: 1 | Status: SHIPPED | OUTPATIENT
Start: 2025-08-21

## 2025-08-21 RX ORDER — METFORMIN HYDROCHLORIDE 500 MG/1
500 TABLET, EXTENDED RELEASE ORAL
Qty: 100 TABLET | Refills: 0 | OUTPATIENT
Start: 2025-08-21

## (undated) DEVICE — SUT PROLENE 4-0 PC-3 18 IN 8634G

## (undated) DEVICE — INTENDED FOR TISSUE SEPARATION, AND OTHER PROCEDURES THAT REQUIRE A SHARP SURGICAL BLADE TO PUNCTURE OR CUT.: Brand: BARD-PARKER SAFETY BLADES SIZE 15, STERILE

## (undated) DEVICE — GLOVE INDICATOR PI UNDERGLOVE SZ 7.5 BLUE

## (undated) DEVICE — GLOVE SRG BIOGEL 7.5

## (undated) DEVICE — 3M™ STERI-STRIP™ REINFORCED ADHESIVE SKIN CLOSURES, R1546, 1/4 IN X 4 IN (6 MM X 100 MM), 10 STRIPS/ENVELOPE: Brand: 3M™ STERI-STRIP™

## (undated) DEVICE — SPONGE PVP SCRUB WING STERILE

## (undated) DEVICE — GLOVE INDICATOR PI UNDERGLOVE SZ 7 BLUE

## (undated) DEVICE — SUT MONOCRYL 4-0 PS-2 18 IN Y496G

## (undated) DEVICE — GLOVE INDICATOR PI UNDERGLOVE SZ 8 BLUE

## (undated) DEVICE — PACK PLASTIC HAND PBDS

## (undated) DEVICE — GLOVE SRG BIOGEL 7

## (undated) DEVICE — CHLORAPREP HI-LITE 26ML ORANGE